# Patient Record
Sex: MALE | Race: WHITE | NOT HISPANIC OR LATINO | Employment: OTHER | URBAN - METROPOLITAN AREA
[De-identification: names, ages, dates, MRNs, and addresses within clinical notes are randomized per-mention and may not be internally consistent; named-entity substitution may affect disease eponyms.]

---

## 2017-01-05 RX ORDER — TORSEMIDE 20 MG/1
TABLET ORAL
Qty: 90 TABLET | Refills: 3 | Status: ON HOLD | OUTPATIENT
Start: 2017-01-05 | End: 2017-08-11 | Stop reason: HOSPADM

## 2017-01-05 RX ORDER — CLOPIDOGREL BISULFATE 75 MG/1
TABLET ORAL
Qty: 90 TABLET | Refills: 3 | Status: ON HOLD | OUTPATIENT
Start: 2017-01-05 | End: 2017-08-11

## 2017-01-05 RX ORDER — RANOLAZINE 500 MG/1
TABLET, FILM COATED, EXTENDED RELEASE ORAL
Qty: 180 TABLET | Refills: 3 | Status: SHIPPED | OUTPATIENT
Start: 2017-01-05 | End: 2017-10-20

## 2017-01-05 RX ORDER — LEVETIRACETAM 500 MG/1
TABLET ORAL
Qty: 180 TABLET | Refills: 3 | Status: SHIPPED | OUTPATIENT
Start: 2017-01-05 | End: 2017-02-20 | Stop reason: ALTCHOICE

## 2017-01-05 RX ORDER — PEN NEEDLE, DIABETIC 31 GX5/16"
NEEDLE, DISPOSABLE MISCELLANEOUS
Qty: 360 EACH | Refills: 3 | Status: SHIPPED | OUTPATIENT
Start: 2017-01-05 | End: 2018-05-22 | Stop reason: SDUPTHER

## 2017-01-05 RX ORDER — DABIGATRAN ETEXILATE MESYLATE 75 MG/1
CAPSULE ORAL
Qty: 180 CAPSULE | Refills: 3 | Status: ON HOLD | OUTPATIENT
Start: 2017-01-05 | End: 2017-08-11

## 2017-01-05 RX ORDER — INSULIN GLARGINE 100 [IU]/ML
INJECTION, SOLUTION SUBCUTANEOUS
Qty: 45 ML | Refills: 3 | Status: SHIPPED | OUTPATIENT
Start: 2017-01-05 | End: 2017-03-08 | Stop reason: SDUPTHER

## 2017-01-05 RX ORDER — ISOSORBIDE MONONITRATE 60 MG/1
TABLET, EXTENDED RELEASE ORAL
Qty: 90 TABLET | Refills: 3 | Status: SHIPPED | OUTPATIENT
Start: 2017-01-05 | End: 2017-10-23 | Stop reason: SDUPTHER

## 2017-01-05 RX ORDER — INSULIN LISPRO 100 [IU]/ML
INJECTION, SOLUTION INTRAVENOUS; SUBCUTANEOUS
Qty: 90 ML | Refills: 3 | Status: SHIPPED | OUTPATIENT
Start: 2017-01-05 | End: 2017-03-08 | Stop reason: SDUPTHER

## 2017-01-05 RX ORDER — CARVEDILOL 25 MG/1
TABLET ORAL
Qty: 90 TABLET | Refills: 3 | Status: SHIPPED | OUTPATIENT
Start: 2017-01-05 | End: 2017-10-20 | Stop reason: SDUPTHER

## 2017-01-31 ENCOUNTER — TELEPHONE (OUTPATIENT)
Dept: FAMILY MEDICINE | Facility: CLINIC | Age: 72
End: 2017-01-31

## 2017-02-02 DIAGNOSIS — Z95.810 CARDIAC DEFIBRILLATOR IN SITU: Primary | ICD-10-CM

## 2017-02-02 DIAGNOSIS — I50.32 CHRONIC DIASTOLIC CONGESTIVE HEART FAILURE: ICD-10-CM

## 2017-02-02 DIAGNOSIS — I49.01 VF (VENTRICULAR FIBRILLATION): ICD-10-CM

## 2017-02-17 NOTE — TELEPHONE ENCOUNTER
----- Message from Funmi Moore sent at 2/17/2017 10:42 AM CST -----  Please call patients daughter, Leyla, in regards to medication questions, [phone]

## 2017-02-20 ENCOUNTER — CLINICAL SUPPORT (OUTPATIENT)
Dept: CARDIOLOGY | Facility: CLINIC | Age: 72
End: 2017-02-20
Payer: MEDICARE

## 2017-02-20 ENCOUNTER — PATIENT OUTREACH (OUTPATIENT)
Dept: ADMINISTRATIVE | Facility: CLINIC | Age: 72
End: 2017-02-20
Payer: MEDICARE

## 2017-02-20 DIAGNOSIS — I50.32 CHRONIC DIASTOLIC CONGESTIVE HEART FAILURE: ICD-10-CM

## 2017-02-20 DIAGNOSIS — I49.01 VF (VENTRICULAR FIBRILLATION): ICD-10-CM

## 2017-02-20 DIAGNOSIS — Z95.810 CARDIAC DEFIBRILLATOR IN SITU: ICD-10-CM

## 2017-02-20 PROCEDURE — 93284 PRGRMG EVAL IMPLANTABLE DFB: CPT | Mod: S$GLB,,, | Performed by: INTERNAL MEDICINE

## 2017-02-20 RX ORDER — LEVETIRACETAM 750 MG/1
500 TABLET ORAL 2 TIMES DAILY
COMMUNITY
End: 2017-02-27 | Stop reason: ALTCHOICE

## 2017-02-20 RX ORDER — ATORVASTATIN CALCIUM 10 MG/1
10 TABLET, FILM COATED ORAL DAILY
Qty: 90 TABLET | Refills: 3 | Status: SHIPPED | OUTPATIENT
Start: 2017-02-20 | End: 2017-04-12

## 2017-02-20 NOTE — PROGRESS NOTES
Discharge Information     Discharge Date:  2/16/17          Primary Discharge Diagnosis:  Seizure          Juanita Wasserman RN attempted to contact patient. No answer. The following message was left for the patient to return the call:  Good morning,  I am a nurse calling on behalf of [facility] from the Care Coordination [facility].  This is a Transitional Care Call for Abdullahi Salazar. When you have a moment please contact us at [phone] or [phone] Monday through Friday, between the hours of 8 am to 4 pm. We look forward to speaking with you. On behalf of [facility] have a nice day.    The patient has a scheduled HOSFU appointment with Maximo Hernandez MD on 3/1/17 @ 1320hrs. Message sent to Physician staff.    ADDENDUM:  Pt returned my call @ 0853hrs and TCC was completed

## 2017-02-20 NOTE — PATIENT INSTRUCTIONS
"  Recurrent Seizure (Adult)    You have had another seizure today. A common cause of seizures that keep happening (recurrent seizures) is missing doses of seizure medicine. But sometimes seizures are hard to control even when you take the medicine correctly. If this is the case for you, your healthcare provider may need to increase your dosage. Or you may need to add or change to another medicine.  Home care  Follow these tips when caring for yourself at home. For this seizure:  · Seizures arent predictable. So avoid doing anything that might cause danger to you or other people if you have another seizure. Until the seizures are under good control, take these precautions:  ¨ Dont drive, ride a motorcycle, or ride a bike.  ¨ Dont operate dangerous equipment such as power tools  ¨ Take showers instead of baths.  ¨ Dont swim or climb ladders, trees, or roofs.  · Tell your close friends and relatives about your seizure. Teach them what to do for you if it happens again.  · If medicine was prescribed to prevent seizures, take it exactly as directed. It does not work when taken "as needed." Missing doses will increase the risk of having another seizure.  · If you miss a dose, take the missed dose as soon as you remember. If it is almost time for your next dose, skip the missed dose. Restart the medicine at your next scheduled time. Dont take extra medicine to make up the missed dose.  · Wear a "Medic-Alert" bracelet to let emergency personnel know about your condition.  · Follow a regular sleep schedule such that you get at least 6 to 8 hours of restful sleep every night. This is especially important when you are sick with a cold or flu and/or another type of infection.  For future seizures, if you are alone:  If you feel a seizure coming on, lie down on a bed or on the floor with something soft under your head. Lie on your left side, not on your back. This will keep you from falling. It will also let fluid drain out " of your mouth and prevent choking. Be sure you are clear of any objects that might injure you during the seizure. Call for help if there is time.  For future seizures, if someone is with you:  The person should help you get into a safe position and call for help. The person shouldnt try to force anything in your mouth once the seizure begins. This could harm your teeth or jaw.  Follow-up care  Follow up with your healthcare provider. Keep a seizure calendar to record how often you have a seizure. If you are being started on anti-seizure medicine, make sure that you use additional birth control. Seizure medicine can affect how well birth control pills work, and you could become pregnant. Avoid alcohol until your doctor tells you its OK.  Note: For the safety of yourself and others on the road, certain states require that the treating doctor tell the Public Health Department about any adult who is treated for a seizure and is at risk of more seizures. In this case, the Department of Motor Vehicles will be told. A restriction will be put on your s license until a doctor gives you medical clearance to drive again. Contact your treating doctor to find out if your state requires the reporting of patients with a seizures condition.  When to seek medical advice  Call your healthcare provider right away if any of these occur:  · Seizures happen more often or last longer than usual  · A seizure lasts over 5 minutes  · You dont wake up between seizures  · Confusion that lasts more than 30 minutes after a seizure  · Injury during a seizure  · Fever over 100.4ºF (38.0ºC), or as advised  · Unusual irritability, drowsiness, or confusion  · Stiff or painful neck  · Headache that gets worse   Date Last Reviewed: 8/1/2016  © 4791-3872 [employer]. [address] [place] [address]. All rights reserved. This information is not intended as a substitute for professional medical care. Always follow your  healthcare professional's instructions.

## 2017-02-21 ENCOUNTER — TELEPHONE (OUTPATIENT)
Dept: NEUROLOGY | Facility: CLINIC | Age: 72
End: 2017-02-21

## 2017-02-21 ENCOUNTER — TELEPHONE (OUTPATIENT)
Dept: FAMILY MEDICINE | Facility: CLINIC | Age: 72
End: 2017-02-21

## 2017-02-21 NOTE — TELEPHONE ENCOUNTER
----- Message from Sharita Kaur sent at 2/21/2017  9:16 AM CST -----  Contact: pt  Pt is requesting to speak with nurse regarding Neuro Surgery. Pt was not specific. Pls call pt back at [phone]

## 2017-02-21 NOTE — TELEPHONE ENCOUNTER
Spoke with Tara Gallagher RN clinical care coordinator in Cardiology/Pacemaker dept. Patient was seen at [place] by Dr. Rocha and was told he may have hydro encephalitis, scheduled to see Dr. Hyman for second opinion. [facility] Tara plans to talk to Dr. Rodney about placing a referral, as he is at hospital today. Appt scheduled, spoke with patient, directions given.

## 2017-02-22 ENCOUNTER — OFFICE VISIT (OUTPATIENT)
Dept: FAMILY MEDICINE | Facility: CLINIC | Age: 72
End: 2017-02-22
Payer: MEDICARE

## 2017-02-22 ENCOUNTER — LAB VISIT (OUTPATIENT)
Dept: LAB | Facility: HOSPITAL | Age: 72
End: 2017-02-22
Attending: NURSE PRACTITIONER
Payer: MEDICARE

## 2017-02-22 VITALS
SYSTOLIC BLOOD PRESSURE: 150 MMHG | BODY MASS INDEX: 35.83 KG/M2 | TEMPERATURE: 98 F | HEIGHT: 67 IN | DIASTOLIC BLOOD PRESSURE: 83 MMHG | RESPIRATION RATE: 18 BRPM | OXYGEN SATURATION: 96 % | HEART RATE: 55 BPM | WEIGHT: 228.31 LBS

## 2017-02-22 DIAGNOSIS — G40.909 SEIZURE DISORDER: Primary | ICD-10-CM

## 2017-02-22 DIAGNOSIS — Z79.4 LONG-TERM INSULIN USE: ICD-10-CM

## 2017-02-22 DIAGNOSIS — G91.9 HYDROCEPHALUS: ICD-10-CM

## 2017-02-22 DIAGNOSIS — I10 ESSENTIAL HYPERTENSION: ICD-10-CM

## 2017-02-22 DIAGNOSIS — N17.9 ACUTE RENAL FAILURE, UNSPECIFIED ACUTE RENAL FAILURE TYPE: ICD-10-CM

## 2017-02-22 LAB
ALBUMIN SERPL BCP-MCNC: 3.6 G/DL
ALP SERPL-CCNC: 72 U/L
ALT SERPL W/O P-5'-P-CCNC: 22 U/L
ANION GAP SERPL CALC-SCNC: 9 MMOL/L
AST SERPL-CCNC: 19 U/L
BILIRUB SERPL-MCNC: 0.5 MG/DL
BUN SERPL-MCNC: 18 MG/DL
CALCIUM SERPL-MCNC: 9.6 MG/DL
CHLORIDE SERPL-SCNC: 103 MMOL/L
CO2 SERPL-SCNC: 28 MMOL/L
CREAT SERPL-MCNC: 1.7 MG/DL
CREAT UR-MCNC: 34 MG/DL
EST. GFR  (AFRICAN AMERICAN): 45.2 ML/MIN/1.73 M^2
EST. GFR  (NON AFRICAN AMERICAN): 39.1 ML/MIN/1.73 M^2
GLUCOSE SERPL-MCNC: 181 MG/DL
MICROALBUMIN UR DL<=1MG/L-MCNC: 524 UG/ML
MICROALBUMIN/CREATININE RATIO: 1541.2 UG/MG
POTASSIUM SERPL-SCNC: 4.3 MMOL/L
PROT SERPL-MCNC: 7.1 G/DL
SODIUM SERPL-SCNC: 140 MMOL/L

## 2017-02-22 PROCEDURE — 99999 PR PBB SHADOW E&M-EST. PATIENT-LVL IV: CPT | Mod: PBBFAC,,, | Performed by: NURSE PRACTITIONER

## 2017-02-22 PROCEDURE — 36415 COLL VENOUS BLD VENIPUNCTURE: CPT | Mod: PO

## 2017-02-22 PROCEDURE — 3079F DIAST BP 80-89 MM HG: CPT | Mod: S$GLB,,, | Performed by: NURSE PRACTITIONER

## 2017-02-22 PROCEDURE — 1157F ADVNC CARE PLAN IN RCRD: CPT | Mod: S$GLB,,, | Performed by: NURSE PRACTITIONER

## 2017-02-22 PROCEDURE — 1160F RVW MEDS BY RX/DR IN RCRD: CPT | Mod: S$GLB,,, | Performed by: NURSE PRACTITIONER

## 2017-02-22 PROCEDURE — 2022F DILAT RTA XM EVC RTNOPTHY: CPT | Mod: S$GLB,,, | Performed by: NURSE PRACTITIONER

## 2017-02-22 PROCEDURE — 1159F MED LIST DOCD IN RCRD: CPT | Mod: S$GLB,,, | Performed by: NURSE PRACTITIONER

## 2017-02-22 PROCEDURE — 3077F SYST BP >= 140 MM HG: CPT | Mod: S$GLB,,, | Performed by: NURSE PRACTITIONER

## 2017-02-22 PROCEDURE — 3045F PR MOST RECENT HEMOGLOBIN A1C LEVEL 7.0-9.0%: CPT | Mod: S$GLB,,, | Performed by: NURSE PRACTITIONER

## 2017-02-22 PROCEDURE — 80053 COMPREHEN METABOLIC PANEL: CPT

## 2017-02-22 PROCEDURE — 3066F NEPHROPATHY DOC TX: CPT | Mod: S$GLB,,, | Performed by: NURSE PRACTITIONER

## 2017-02-22 PROCEDURE — 99499 UNLISTED E&M SERVICE: CPT | Mod: S$GLB,,, | Performed by: NURSE PRACTITIONER

## 2017-02-22 PROCEDURE — 99213 OFFICE O/P EST LOW 20 MIN: CPT | Mod: S$GLB,,, | Performed by: NURSE PRACTITIONER

## 2017-02-22 NOTE — MR AVS SNAPSHOT
[facility]  [address] [facility] [address]  Phone: [phone]  Fax: [phone]                  Abdullahi Salazar   2017 10:00 AM   Office Visit    Description:  Male : 1945   Provider:  Shellie Tejada NP   Department:  [facility]           Reason for Visit     Follow-up           Diagnoses this Visit        Comments    Seizure disorder    -  Primary     Hydrocephalus         Uncontrolled type 2 diabetes mellitus with nephropathy         Essential hypertension         Long-term insulin use         Acute renal failure, unspecified acute renal failure type                To Do List           Future Appointments        Provider Department Dept Phone    2017 2:55 PM LABORATORY, [place] [facility] [phone]    3/1/2017 9:00 AM Efe Greene MD [facility] - Neurology [phone]    3/1/2017 1:20 PM Maximo Hernandez MD [facility] [phone]    3/6/2017 11:00 AM Maximo Hernandez MD [facility] [phone]    2017 9:20 AM Hermilo Hyman DO [facility] Neurology [phone]      Goals (5 Years of Data)     None      Follow-Up and Disposition     Return in about 2 weeks (around 3/8/2017).      [facility]     [facility] Nurse Care [facility] - 24/7 Assistance  Registered nurses in the [facility] provide clinical advisement, health education, appointment booking, and other advisory services.  Call for this free service at [phone].             Medications           Message regarding Medications     Verify the changes and/or additions to your medication regime listed below are the same as discussed with your clinician today.  If any of these changes or additions are incorrect, please notify your healthcare provider.             Verify that the below list of medications is an accurate representation of the medications you are currently taking.  If none reported, the  "list may be blank. If incorrect, please contact your healthcare provider. Carry this list with you in case of emergency.           Current Medications     ACCU-CHEK CLEOPATRA PLUS TEST STRP Strp TEST BLOOD SUGARS 4 TIMES DAILY    amlodipine (NORVASC) 5 MG tablet Take 1 tablet (5 mg total) by mouth once daily.    aspirin (ECOTRIN) 81 MG EC tablet Take 81 mg by mouth once daily. Every day    atorvastatin (LIPITOR) 10 MG tablet Take 1 tablet (10 mg total) by mouth once daily.    BD INSULIN PEN NEEDLE UF SHORT 31 gauge x 5/16" Ndle USE FOUR TIMES DAILY    benazepril (LOTENSIN) 20 MG tablet Take 1 tablet (20 mg total) by mouth every evening.    carvedilol (COREG) 25 MG tablet TAKE 1/2 TABLET TWICE DAILY WITH MEALS    clopidogrel (PLAVIX) 75 mg tablet TAKE 1 TABLET ONE TIME DAILY    ezetimibe (ZETIA) 10 mg tablet Take 1 tablet (10 mg total) by mouth once daily.    HUMALOG KWIKPEN 100 unit/mL InPn pen INJECT  30 UNITS SUBCUTANEOUSLY THREE TIMES DAILY BEFORE MEALS    isosorbide mononitrate (IMDUR) 60 MG 24 hr tablet TAKE 1 TABLET (60 MG TOTAL) BY MOUTH EVERY EVENING.    lancets (TRUEPLUS LANCETS) 28 gauge Misc 1 lancet by Misc.(Non-Drug; Combo Route) route 2 (two) times daily as needed.    LANTUS SOLOSTAR 100 unit/mL (3 mL) InPn pen INJECT  45 UNITS SUBCUTANEOUSLY EVERY DAY    levetiracetam (KEPPRA) 750 MG Tab Take 500 mg by mouth 2 (two) times daily.    PRADAXA 75 mg Cap TAKE 1 CAPSULE TWICE DAILY    RANEXA 500 mg Tb12 TAKE 1 TABLET (500 MG TOTAL) BY MOUTH 2 (TWO) TIMES DAILY.    torsemide (DEMADEX) 20 MG Tab TAKE 1 TABLET (20 MG TOTAL) BY MOUTH EVERY MORNING.    nitroGLYCERIN (NITROSTAT) 0.4 MG SL tablet Place 1 tablet (0.4 mg total) under the tongue every 5 (five) minutes as needed for Chest pain.           Clinical Reference Information           Your Vitals Were     BP Pulse Temp Resp Height Weight    150/83 55 97.6 °F (36.4 °C) (Tympanic) 18 5' 7" (1.702 m) 103.5 kg (228 lb 4.6 oz)    SpO2 BMI             96% 35.75 kg/m2  "        Blood Pressure          Most Recent Value    BP  (!)  150/83      Allergies as of 2/22/2017     No Known Allergies      Immunizations Administered on Date of Encounter - 2/22/2017     None      Orders Placed During Today's Visit     Future Labs/Procedures Expected by Expires    Comprehensive metabolic panel  2/22/2017 4/23/2018    Microalbumin/creatinine urine ratio  2/22/2017 4/23/2018      Language Assistance Services     ATTENTION: Language assistance services are available, free of charge. Please call [phone].      ATENCIÓN: Si nikkyla irina, tiene a puentes disposición servicios gratuitos de asistencia lingüística. Llame al [phone].     CHÚ Ý: N?u b?n nói Ti?ng Vi?t, có các d?ch v? h? tr? ngôn ng? mi?n phí dành cho b?n. G?i s? [phone].         [facility] complies with applicable Federal civil rights laws and does not discriminate on the basis of race, color, national origin, age, disability, or sex.

## 2017-02-22 NOTE — PROGRESS NOTES
"Subjective:       Patient ID: Abdullahi Salazar is a 71 y.o. male.    Chief Complaint: Follow-up  Pt reports to clinic for follow up evaluation of recent hospitalization at [place].  Pt's wife called EMS for seizure activity.  Pt reports he did skip a "dose or 2" of Keppra.  During hospitalization CT indicated hydrocephalus.  Pt has an appt with neuro on 3/1/17.  Pt denies headache, vision changes or changes in gait. Pt was also noted to have a BNP of 469; EF=55-60%; Creatinine elevated at 1.5; Hemoglobin A1C has increased to 9.5 (previous was 8.0). Pt reports FBS of 130, acknowledges not monitoring diet.  Pt also reports he has not been taking Humalog as ordered.  He has been using 20units tid instead of 30 units tid as prescribed.  Pt wife reported pt was "drooling at lot on yesterday", home health nurse was available and monitored patient closely for seizure activity.   HPI  Review of Systems   Constitutional: Negative.    HENT: Negative.    Respiratory: Negative.    Genitourinary: Negative.    Neurological: Negative for dizziness, tremors, seizures, speech difficulty, light-headedness and headaches.       Objective:      Physical Exam   Constitutional: He is oriented to person, place, and time. He appears well-developed and well-nourished.   HENT:   Head: Normocephalic.   Right Ear: Tympanic membrane normal.   Left Ear: Tympanic membrane normal.   Nose: Mucosal edema and rhinorrhea present. Right sinus exhibits no maxillary sinus tenderness and no frontal sinus tenderness. Left sinus exhibits no maxillary sinus tenderness and no frontal sinus tenderness.   Eyes: EOM are normal. Pupils are equal, round, and reactive to light.   Neck: Neck supple.   Cardiovascular: Normal rate and normal heart sounds.    Pulmonary/Chest: Effort normal and breath sounds normal.   Musculoskeletal: Normal range of motion.   Lymphadenopathy:     He has no cervical adenopathy.   Neurological: He is alert and oriented to person, place, " and time. He has normal strength. Gait normal. GCS eye subscore is 4. GCS verbal subscore is 5. GCS motor subscore is 6.   Skin: Skin is warm and dry.   Psychiatric: He has a normal mood and affect.   Vitals reviewed.      Assessment:       1. Seizure disorder    2. Hydrocephalus    3. Uncontrolled type 2 diabetes mellitus with nephropathy    4. Essential hypertension    5. Long-term insulin use    6. Acute renal failure, unspecified acute renal failure type        Plan:   Seizure disorder  -educated both patient and wife on compliance with keppra. Verbalized understanding.  Keep appt with neurologist.   -seek emergency evaluation for seizure activity  Hydrocephalus  -seek emergency evaluation for changes in behavior, vision, decreased LOC or worsening headache.   Uncontrolled type 2 diabetes mellitus with nephropathy  -     Microalbumin/creatinine urine ratio; Future; Expected date: 2/22/17  -     Comprehensive metabolic panel; Future; Expected date: 2/22/17  -uncontrolled.  Pt instructed to take Humalog 30 units tid with meals as previously prescribed and Lantus 45 units as HS.  Pt also instructed to monitor glucose readings ACHS.  Blood glucose log given.  Bring log to follow up appt with Dr. clarke to re evaluate.    Essential hypertension  -guidelines recommends ,140/80;   uncontrolled  Long-term insulin use  Per #3  Acute renal failure, unspecified acute renal failure type  -will re evaluate chemistry for improving Creatinine.   If not improved or worsening microalbumin/creatinine ratio, will refer to nephro.     Return in about 2 weeks (around 3/8/2017).

## 2017-02-23 NOTE — PROGRESS NOTES
Patient, Abdullahi Salazar (MRN #[id number]), presented with a recorded BMI of 35.75 kg/m^2 and a documented comorbidity(s):  - Diabetes Mellitus Type 2  - Hypertension  to which the severe obesity is a contributing factor. This is consistent with the definition of severe obesity (BMI 35.0-35.9) with comorbidity (ICD-10 E66.01, Z68.35). The patient's severe obesity was monitored, evaluated, addressed and/or treated. This addendum to the medical record is made on 02/23/2017.

## 2017-02-24 ENCOUNTER — TELEPHONE (OUTPATIENT)
Dept: NEUROLOGY | Facility: CLINIC | Age: 72
End: 2017-02-24

## 2017-02-24 NOTE — TELEPHONE ENCOUNTER
----- Message from Eden Nelson sent at 2/24/2017  1:52 PM CST -----  Daughter (Leydi Salazar)requesting to speak with nurse concerning getting sooner appointment for patient than April/please call back at [phone] to reschedule or advise.

## 2017-02-24 NOTE — TELEPHONE ENCOUNTER
Spoke with patients daughter, informed her the soonest available now in May 9th for a consult. Reports he has appt with Dr. Sidney Felton this upcoming week, keeping appt with Boogie as well.  On the cancellation list

## 2017-02-27 ENCOUNTER — OFFICE VISIT (OUTPATIENT)
Dept: NEPHROLOGY | Facility: CLINIC | Age: 72
End: 2017-02-27
Payer: MEDICARE

## 2017-02-27 VITALS
SYSTOLIC BLOOD PRESSURE: 140 MMHG | OXYGEN SATURATION: 98 % | WEIGHT: 230.63 LBS | BODY MASS INDEX: 36.12 KG/M2 | HEART RATE: 70 BPM | TEMPERATURE: 98 F | DIASTOLIC BLOOD PRESSURE: 70 MMHG

## 2017-02-27 DIAGNOSIS — G40.909 SEIZURE DISORDER: ICD-10-CM

## 2017-02-27 DIAGNOSIS — I15.2 HYPERTENSION ASSOCIATED WITH DIABETES: ICD-10-CM

## 2017-02-27 DIAGNOSIS — N18.30 TYPE 2 DIABETES MELLITUS WITH STAGE 3 CHRONIC KIDNEY DISEASE, WITH LONG-TERM CURRENT USE OF INSULIN: ICD-10-CM

## 2017-02-27 DIAGNOSIS — E11.22 TYPE 2 DIABETES MELLITUS WITH STAGE 3 CHRONIC KIDNEY DISEASE, WITH LONG-TERM CURRENT USE OF INSULIN: ICD-10-CM

## 2017-02-27 DIAGNOSIS — R80.9 PROTEINURIA DUE TO TYPE 2 DIABETES MELLITUS: ICD-10-CM

## 2017-02-27 DIAGNOSIS — N18.30 KIDNEY DISEASE, CHRONIC, STAGE III (GFR 30-59 ML/MIN): Primary | ICD-10-CM

## 2017-02-27 DIAGNOSIS — E11.29 PROTEINURIA DUE TO TYPE 2 DIABETES MELLITUS: ICD-10-CM

## 2017-02-27 DIAGNOSIS — G91.9 HYDROCEPHALUS: ICD-10-CM

## 2017-02-27 DIAGNOSIS — Z79.4 TYPE 2 DIABETES MELLITUS WITH STAGE 3 CHRONIC KIDNEY DISEASE, WITH LONG-TERM CURRENT USE OF INSULIN: ICD-10-CM

## 2017-02-27 DIAGNOSIS — R80.9 DIABETES MELLITUS WITH MICROALBUMINURIA: ICD-10-CM

## 2017-02-27 DIAGNOSIS — E11.29 DIABETES MELLITUS WITH MICROALBUMINURIA: ICD-10-CM

## 2017-02-27 DIAGNOSIS — E11.59 HYPERTENSION ASSOCIATED WITH DIABETES: ICD-10-CM

## 2017-02-27 DIAGNOSIS — E11.21 TYPE 2 DIABETES MELLITUS WITH DIABETIC NEPHROPATHY, WITHOUT LONG-TERM CURRENT USE OF INSULIN: ICD-10-CM

## 2017-02-27 PROCEDURE — 99214 OFFICE O/P EST MOD 30 MIN: CPT | Mod: S$GLB,,, | Performed by: INTERNAL MEDICINE

## 2017-02-27 PROCEDURE — 2022F DILAT RTA XM EVC RTNOPTHY: CPT | Mod: S$GLB,,, | Performed by: INTERNAL MEDICINE

## 2017-02-27 PROCEDURE — 1160F RVW MEDS BY RX/DR IN RCRD: CPT | Mod: S$GLB,,, | Performed by: INTERNAL MEDICINE

## 2017-02-27 PROCEDURE — 99999 PR PBB SHADOW E&M-EST. PATIENT-LVL III: CPT | Mod: PBBFAC,,, | Performed by: INTERNAL MEDICINE

## 2017-02-27 PROCEDURE — 3077F SYST BP >= 140 MM HG: CPT | Mod: S$GLB,,, | Performed by: INTERNAL MEDICINE

## 2017-02-27 PROCEDURE — 1126F AMNT PAIN NOTED NONE PRSNT: CPT | Mod: S$GLB,,, | Performed by: INTERNAL MEDICINE

## 2017-02-27 PROCEDURE — 1157F ADVNC CARE PLAN IN RCRD: CPT | Mod: S$GLB,,, | Performed by: INTERNAL MEDICINE

## 2017-02-27 PROCEDURE — 3045F PR MOST RECENT HEMOGLOBIN A1C LEVEL 7.0-9.0%: CPT | Mod: S$GLB,,, | Performed by: INTERNAL MEDICINE

## 2017-02-27 PROCEDURE — 3066F NEPHROPATHY DOC TX: CPT | Mod: S$GLB,,, | Performed by: INTERNAL MEDICINE

## 2017-02-27 PROCEDURE — 99499 UNLISTED E&M SERVICE: CPT | Mod: S$GLB,,, | Performed by: INTERNAL MEDICINE

## 2017-02-27 PROCEDURE — 1159F MED LIST DOCD IN RCRD: CPT | Mod: S$GLB,,, | Performed by: INTERNAL MEDICINE

## 2017-02-27 PROCEDURE — 3078F DIAST BP <80 MM HG: CPT | Mod: S$GLB,,, | Performed by: INTERNAL MEDICINE

## 2017-02-27 RX ORDER — FERROUS SULFATE 325(65) MG
325 TABLET ORAL 3 TIMES DAILY
COMMUNITY
Start: 2017-02-23 | End: 2017-10-20

## 2017-02-27 RX ORDER — LEVETIRACETAM 500 MG/1
750 TABLET ORAL 2 TIMES DAILY
COMMUNITY
End: 2018-02-19 | Stop reason: SDUPTHER

## 2017-02-27 NOTE — MR AVS SNAPSHOT
[facility] - Nephrology  [address] [facility] [address]  [facility] [address]  Phone: [phone]                  Abdullahi Salazar   2017 1:20 PM   Office Visit    Description:  Male : 1945   Provider:  Gregory Sosa MD   Department:  [facility] - Nephrology           Reason for Visit     Consult     Abnormal Lab                To Do List           Future Appointments        Provider Department Dept Phone    3/1/2017 9:00 AM Efe Greene MD [facility] Neurology [phone]    3/1/2017 1:20 PM [facility] [phone]    3/6/2017 11:00 AM Maximo Hernandez MD [facility] [phone]    2017 9:20 AM Hermilo Hyman DO [facility] Neurology [phone]    2017 10:00 AM Toño Lawrence MD [facility] - General Surgery [phone]      Goals (5 Years of Data)     None      Follow-Up and Disposition     Return in about 4 months (around 2017).      [facility]     [facility] Nurse Care Line -  Assistance  Registered nurses in the [facility] provide clinical advisement, health education, appointment booking, and other advisory services.  Call for this free service at [phone].             Medications           Message regarding Medications     Verify the changes and/or additions to your medication regime listed below are the same as discussed with your clinician today.  If any of these changes or additions are incorrect, please notify your healthcare provider.             Verify that the below list of medications is an accurate representation of the medications you are currently taking.  If none reported, the list may be blank. If incorrect, please contact your healthcare provider. Carry this list with you in case of emergency.           Current Medications     ACCU-CHEK CLEOPATRA PLUS TEST STRP Strp TEST BLOOD SUGARS 4 TIMES DAILY    amlodipine (NORVASC) 5 MG tablet Take 1 tablet (5 mg total) by mouth once  "daily.    aspirin (ECOTRIN) 81 MG EC tablet Take 81 mg by mouth once daily. Every day    atorvastatin (LIPITOR) 10 MG tablet Take 1 tablet (10 mg total) by mouth once daily.    BD INSULIN PEN NEEDLE UF SHORT 31 gauge x 5/16" Ndle USE FOUR TIMES DAILY    benazepril (LOTENSIN) 20 MG tablet Take 1 tablet (20 mg total) by mouth every evening.    carvedilol (COREG) 25 MG tablet TAKE 1/2 TABLET TWICE DAILY WITH MEALS    clopidogrel (PLAVIX) 75 mg tablet TAKE 1 TABLET ONE TIME DAILY    ezetimibe (ZETIA) 10 mg tablet Take 1 tablet (10 mg total) by mouth once daily.    ferrous sulfate 325 mg (65 mg iron) Tab tablet 325 mg 3 (three) times daily.     HUMALOG KWIKPEN 100 unit/mL InPn pen INJECT  30 UNITS SUBCUTANEOUSLY THREE TIMES DAILY BEFORE MEALS    isosorbide mononitrate (IMDUR) 60 MG 24 hr tablet TAKE 1 TABLET (60 MG TOTAL) BY MOUTH EVERY EVENING.    lancets (TRUEPLUS LANCETS) 28 gauge Misc 1 lancet by Misc.(Non-Drug; Combo Route) route 2 (two) times daily as needed.    LANTUS SOLOSTAR 100 unit/mL (3 mL) InPn pen INJECT  45 UNITS SUBCUTANEOUSLY EVERY DAY    levetiracetam (KEPPRA) 500 MG Tab Take 500 mg by mouth 2 (two) times daily.    PRADAXA 75 mg Cap TAKE 1 CAPSULE TWICE DAILY    RANEXA 500 mg Tb12 TAKE 1 TABLET (500 MG TOTAL) BY MOUTH 2 (TWO) TIMES DAILY.    torsemide (DEMADEX) 20 MG Tab TAKE 1 TABLET (20 MG TOTAL) BY MOUTH EVERY MORNING.    nitroGLYCERIN (NITROSTAT) 0.4 MG SL tablet Place 1 tablet (0.4 mg total) under the tongue every 5 (five) minutes as needed for Chest pain.           Clinical Reference Information           Your Vitals Were     BP                   140/70           Blood Pressure          Most Recent Value    BP  (!)  140/70      Allergies as of 2/27/2017     No Known Allergies      Immunizations Administered on Date of Encounter - 2/27/2017     None      Language Assistance Services     ATTENTION: Language assistance services are available, free of charge. Please call [phone].      ATENCIÓN: " Si habla español, tiene a puentes disposición servicios gratuitos de asistencia lingüística. Llame al [phone].     CHÚ Ý: N?u b?n nói Ti?ng Vi?t, có các d?ch v? h? tr? ngôn ng? mi?n phí dành cho b?n. G?i s? [phone].         [facility] complies with applicable Federal civil rights laws and does not discriminate on the basis of race, color, national origin, age, disability, or sex.

## 2017-02-27 NOTE — LETTER
March 6, 2017      Shellie Tejada NP  [address]  1st Floor  [facility] [address]           [facility] Nephrology  [address] [facility] [address] [place] [address]  Phone: [phone]          Patient: Abdullahi Salazar   MR Number: [id number]   YOB: 1945   Date of Visit: 2/27/2017       Dear Shellie Tejada:    Thank you for referring Abdullahi Salazar to me for evaluation. Attached you will find relevant portions of my assessment and plan of care.    If you have questions, please do not hesitate to call me. I look forward to following Abdullahi Salazar along with you.    Sincerely,    Gregory Sosa MD    Enclosure  CC:  No Recipients    If you would like to receive this communication electronically, please contact [email or web address] or [phone] to request more information on [facility] Link access.    For providers and/or their staff who would like to refer a patient to [facility], please contact us through our one-stop-shop provider referral line, Yvette Russo, at [phone].    If you feel you have received this communication in error or would no longer like to receive these types of communications, please e-mail [email or web address]

## 2017-03-01 ENCOUNTER — LAB VISIT (OUTPATIENT)
Dept: LAB | Facility: HOSPITAL | Age: 72
End: 2017-03-01
Attending: FAMILY MEDICINE
Payer: MEDICARE

## 2017-03-01 ENCOUNTER — OFFICE VISIT (OUTPATIENT)
Dept: FAMILY MEDICINE | Facility: CLINIC | Age: 72
End: 2017-03-01
Payer: MEDICARE

## 2017-03-01 ENCOUNTER — OFFICE VISIT (OUTPATIENT)
Dept: NEUROLOGY | Facility: CLINIC | Age: 72
End: 2017-03-01
Payer: MEDICARE

## 2017-03-01 VITALS
SYSTOLIC BLOOD PRESSURE: 166 MMHG | HEART RATE: 69 BPM | DIASTOLIC BLOOD PRESSURE: 80 MMHG | WEIGHT: 231.5 LBS | HEIGHT: 67 IN | BODY MASS INDEX: 36.34 KG/M2

## 2017-03-01 VITALS
HEIGHT: 67 IN | TEMPERATURE: 97 F | WEIGHT: 231.25 LBS | SYSTOLIC BLOOD PRESSURE: 160 MMHG | DIASTOLIC BLOOD PRESSURE: 80 MMHG | BODY MASS INDEX: 36.29 KG/M2 | HEART RATE: 70 BPM | RESPIRATION RATE: 20 BRPM

## 2017-03-01 DIAGNOSIS — N40.1 BENIGN NON-NODULAR PROSTATIC HYPERPLASIA WITH LOWER URINARY TRACT SYMPTOMS: ICD-10-CM

## 2017-03-01 DIAGNOSIS — G40.909 SEIZURE DISORDER: ICD-10-CM

## 2017-03-01 DIAGNOSIS — R80.9 DIABETES MELLITUS WITH MICROALBUMINURIA: ICD-10-CM

## 2017-03-01 DIAGNOSIS — E11.29 DIABETES MELLITUS WITH MICROALBUMINURIA: ICD-10-CM

## 2017-03-01 DIAGNOSIS — G91.9 HYDROCEPHALUS: Primary | ICD-10-CM

## 2017-03-01 DIAGNOSIS — I70.213 ATHEROSCLEROSIS OF NATIVE ARTERY OF BOTH LOWER EXTREMITIES WITH INTERMITTENT CLAUDICATION: ICD-10-CM

## 2017-03-01 DIAGNOSIS — R41.3 MEMORY LOSS: ICD-10-CM

## 2017-03-01 DIAGNOSIS — I10 ESSENTIAL HYPERTENSION: ICD-10-CM

## 2017-03-01 DIAGNOSIS — I48.0 PAROXYSMAL ATRIAL FIBRILLATION: ICD-10-CM

## 2017-03-01 DIAGNOSIS — R26.9 GAIT DIFFICULTY: Chronic | ICD-10-CM

## 2017-03-01 DIAGNOSIS — R35.0 URINARY FREQUENCY: ICD-10-CM

## 2017-03-01 LAB
BACTERIA #/AREA URNS HPF: ABNORMAL /HPF
BILIRUB UR QL STRIP: NEGATIVE
CLARITY UR: CLEAR
COLOR UR: YELLOW
GLUCOSE UR QL STRIP: NEGATIVE
HGB UR QL STRIP: NEGATIVE
HYALINE CASTS #/AREA URNS LPF: 30 /LPF
KETONES UR QL STRIP: NEGATIVE
LEUKOCYTE ESTERASE UR QL STRIP: NEGATIVE
MICROSCOPIC COMMENT: ABNORMAL
NITRITE UR QL STRIP: NEGATIVE
PH UR STRIP: 6 [PH] (ref 5–8)
PROT UR QL STRIP: ABNORMAL
RBC #/AREA URNS HPF: 1 /HPF (ref 0–4)
SP GR UR STRIP: 1.03 (ref 1–1.03)
SQUAMOUS #/AREA URNS HPF: 2 /HPF
URN SPEC COLLECT METH UR: ABNORMAL
WBC #/AREA URNS HPF: 1 /HPF (ref 0–5)

## 2017-03-01 PROCEDURE — 99499 UNLISTED E&M SERVICE: CPT | Mod: S$GLB,,, | Performed by: FAMILY MEDICINE

## 2017-03-01 PROCEDURE — 99499 UNLISTED E&M SERVICE: CPT | Mod: S$GLB,,, | Performed by: PSYCHIATRY & NEUROLOGY

## 2017-03-01 PROCEDURE — 1157F ADVNC CARE PLAN IN RCRD: CPT | Mod: S$GLB,,, | Performed by: PSYCHIATRY & NEUROLOGY

## 2017-03-01 PROCEDURE — 99999 PR PBB SHADOW E&M-EST. PATIENT-LVL III: CPT | Mod: PBBFAC,,, | Performed by: PSYCHIATRY & NEUROLOGY

## 2017-03-01 PROCEDURE — 1159F MED LIST DOCD IN RCRD: CPT | Mod: S$GLB,,, | Performed by: PSYCHIATRY & NEUROLOGY

## 2017-03-01 PROCEDURE — 36415 COLL VENOUS BLD VENIPUNCTURE: CPT | Mod: PO

## 2017-03-01 PROCEDURE — 99203 OFFICE O/P NEW LOW 30 MIN: CPT | Mod: S$GLB,,, | Performed by: PSYCHIATRY & NEUROLOGY

## 2017-03-01 PROCEDURE — 83036 HEMOGLOBIN GLYCOSYLATED A1C: CPT

## 2017-03-01 PROCEDURE — 99499 UNLISTED E&M SERVICE: CPT | Mod: ,,, | Performed by: FAMILY MEDICINE

## 2017-03-01 PROCEDURE — 1160F RVW MEDS BY RX/DR IN RCRD: CPT | Mod: S$GLB,,, | Performed by: PSYCHIATRY & NEUROLOGY

## 2017-03-01 PROCEDURE — 99999 PR PBB SHADOW E&M-EST. PATIENT-LVL IV: CPT | Mod: PBBFAC,,, | Performed by: FAMILY MEDICINE

## 2017-03-01 PROCEDURE — 1126F AMNT PAIN NOTED NONE PRSNT: CPT | Mod: S$GLB,,, | Performed by: PSYCHIATRY & NEUROLOGY

## 2017-03-01 PROCEDURE — 3079F DIAST BP 80-89 MM HG: CPT | Mod: S$GLB,,, | Performed by: PSYCHIATRY & NEUROLOGY

## 2017-03-01 PROCEDURE — 3077F SYST BP >= 140 MM HG: CPT | Mod: S$GLB,,, | Performed by: PSYCHIATRY & NEUROLOGY

## 2017-03-01 RX ORDER — AMLODIPINE BESYLATE 10 MG/1
10 TABLET ORAL DAILY
Qty: 90 TABLET | Refills: 3 | Status: SHIPPED | OUTPATIENT
Start: 2017-03-01 | End: 2017-10-23 | Stop reason: SDUPTHER

## 2017-03-01 NOTE — PATIENT INSTRUCTIONS
1) Ask primary care to check your prostate     2) I will ask your cardiologist if it is okay to stop Pradaxa temproarily for a lumbar puncture    3) We will get you scheduled for a repeat EEG - this is to find out if your seizure medications are working well enough    4) Go to the second floor today and have your blood drawn

## 2017-03-01 NOTE — PROGRESS NOTES
"Abdullahi WARD Chief Complaints during this visit:  New Patient visit for  "I thought I was coming for evation for hydrocephalus"   Maximo Hernandez MD  [address] [place] [address]    Primary Care Physician  Maximo Hernandez MD  [address] [facility] [address]          History of present illness:   71 y.o.  male seen in consultation for evaluation of NPH.  Patient is accompanied by his wife Rd and daughter Catrachito who aid in history giving. They state he has had at least two CT scans that have shown possible NPH. He has had gait difficulty since 2011. His family decscribes a slow, deliberate gait that he states he still has. No falls.     He has also had some memory trouble, his daughter states he got lost driving once and couldn't figure out how to log into his email. This has also been present over the last five years. He and his family state he is independent in all ADLs. They are concerned that his short term memory is bad.     Regarding possible urinary incontinence, he is having urinary frequency. He is not sure if his prostate has been checked. Has not had any true incontinence but does have trouble getting to the bathroom on time and frequent nocturia.     At [facility], he was told he needed a lumbar puncture and possuble shunt. They would like to transition from [facility] to [facility].     Of note pt also has seizures, starting about one year ago. Family is not able to provide history of the first event, his most recent event, he was slumped over in a chair, drooling and kicking his feet. This was about two weeks ago. He was already on Keppra and this was increased to 750mg BID. Had one incident as well in the last two weeks where he was unable to speak to a therapist who came to the house.     Is on Pradaxa for atrial fibrillation and CAD     II.  Review of systems      Hyposmia (HENT)?Yes  RBD/sleep issues (Constitutional)?Yes due to urinary frequency and nocturia "   Depression/anxiety (Psychiatric)?No  Fatigue (Constitutional)?Yes  Constipation (GI)?No  Urinary issues ()?Yes  Sexual dysfunction ()?No  Orthostasis (Cardiovascular)?No  Leg swelling (Cardiovascular)? No  Falls (Musculoskeletal)?No  Cognitive impairment (Neurologic)?Yes  Psychoses (Psychiatric)?No  Pain/Paresthesia (Neurologic)?No  Visual changes (Eyes)?No  Moles / skin changes (Skin)?No  Stridor / SOB (Pulm)?No  Bruising (Heme)?No    III.  Past Medical History:   Diagnosis Date    Actinic keratoses     Altered mental status 3/22/2015    Anticoagulant long-term use     Atrial fibrillation     CAD (coronary artery disease)     stents after bypass    Carotid artery stenosis 3/31/2015    Colon polyps 2011    repeat colonoscopy 2014    Combined hyperlipidemia associated with type 2 diabetes mellitus     Diabetes mellitus type II, uncontrolled     dx 2004    GERD (gastroesophageal reflux disease)     Grand mal seizure     last 2/2016    History of cardiac pacemaker 3/31/2015    HTN (hypertension)     Mitral valve insufficiency     Seizure disorder, grand mal     Sleep apnea with use of continuous positive airway pressure (CPAP)     Syncope 3/30/2015     Family History   Problem Relation Age of Onset    Stomach cancer Mother     Lung cancer Father     Diabetes Sister     Hypertension Sister     Heart disease Neg Hx     Stroke Neg Hx      No family history of seizures     Social History     Social History    Marital status:      Spouse name: N/A    Number of children: N/A    Years of education: N/A     Social History Main Topics    Smoking status: Former Smoker     Packs/day: 2.00     Years: 25.00     Types: Cigarettes     Quit date: 1/1/1983    Smokeless tobacco: Never Used      Comment: quit 1983     Alcohol use Yes      Comment: occasional    Drug use: No    Sexual activity: Not Asked     Other Topics Concern    None     Social History Narrative         Current Outpatient  "Prescriptions on File Prior to Visit   Medication Sig Dispense Refill    ACCU-CHEK CLEOPATRA PLUS TEST STRP Strp TEST BLOOD SUGARS 4 TIMES DAILY 150 strip PRN    amlodipine (NORVASC) 5 MG tablet Take 1 tablet (5 mg total) by mouth once daily. 90 tablet 0    aspirin (ECOTRIN) 81 MG EC tablet Take 81 mg by mouth once daily. Every day      atorvastatin (LIPITOR) 10 MG tablet Take 1 tablet (10 mg total) by mouth once daily. 90 tablet 3    BD INSULIN PEN NEEDLE UF SHORT 31 gauge x 5/16" Ndle USE FOUR TIMES DAILY 360 each 3    benazepril (LOTENSIN) 20 MG tablet Take 1 tablet (20 mg total) by mouth every evening. 90 tablet 3    carvedilol (COREG) 25 MG tablet TAKE 1/2 TABLET TWICE DAILY WITH MEALS 90 tablet 3    clopidogrel (PLAVIX) 75 mg tablet TAKE 1 TABLET ONE TIME DAILY 90 tablet 3    ezetimibe (ZETIA) 10 mg tablet Take 1 tablet (10 mg total) by mouth once daily. 90 tablet 3    ferrous sulfate 325 mg (65 mg iron) Tab tablet 325 mg 3 (three) times daily.       HUMALOG KWIKPEN 100 unit/mL InPn pen INJECT  30 UNITS SUBCUTANEOUSLY THREE TIMES DAILY BEFORE MEALS 90 mL 3    isosorbide mononitrate (IMDUR) 60 MG 24 hr tablet TAKE 1 TABLET (60 MG TOTAL) BY MOUTH EVERY EVENING. 90 tablet 3    lancets (TRUEPLUS LANCETS) 28 gauge Misc 1 lancet by Misc.(Non-Drug; Combo Route) route 2 (two) times daily as needed. 200 each 3    LANTUS SOLOSTAR 100 unit/mL (3 mL) InPn pen INJECT  45 UNITS SUBCUTANEOUSLY EVERY DAY 45 mL 3    levetiracetam (KEPPRA) 500 MG Tab Take 750 mg by mouth 2 (two) times daily.       PRADAXA 75 mg Cap TAKE 1 CAPSULE TWICE DAILY 180 capsule 3    RANEXA 500 mg Tb12 TAKE 1 TABLET (500 MG TOTAL) BY MOUTH 2 (TWO) TIMES DAILY. 180 tablet 3    torsemide (DEMADEX) 20 MG Tab TAKE 1 TABLET (20 MG TOTAL) BY MOUTH EVERY MORNING. 90 tablet 3    nitroGLYCERIN (NITROSTAT) 0.4 MG SL tablet Place 1 tablet (0.4 mg total) under the tongue every 5 (five) minutes as needed for Chest pain. 30 tablet 0     No current " "facility-administered medications on file prior to visit.        PRIOR problem-specific medications tried:  For seizures: Keppra for Gait issues: None     Review of patient's allergies indicates:  No Known Allergies    IV. Physical Exam    Vitals:    03/01/17 0825   BP: (!) 166/80   Pulse: 69   Weight: 105 kg (231 lb 7.7 oz)   Height: 5' 7" (1.702 m)     General appearance: Well nourished, well developed, no acute distress.     HEENT: NC/AT, MMM, normal pharynx            -------------------------------------------------------------    MOCA 22/30          Facial Expression: slightly diminished with reduced blink frequency       Affect: full       Orientation to time & place:  Oriented to time, place, person and situation       Attention & concentration:  Normal attention span and concentration          Speech:  normal (not dysarthric)  -------------------------------------------------------  Cranial nerves: optic discs not visualized, pupils equal round and reactive, extraocular movements intact,       facial sensation intact, face symmetrical, hearing intact to whisper, palate raises midline, shoulder shrug strength normal, tongue protrudes midline.        -------------------------------------------------------  Musculoskeletal  Muscle tone: all 4 extremities normal        Muscle Bulk: all 4 extremities normal        Muscle strength:  5/5 in all 4 extremities        No pronator drift  Sensation: Intact to light touch, pin prick, vibration in all extremities        Deep tendon Reflexes: 1+ bilateral biceps, triceps, patella and ankles        --------------------------------------------------------------  Cerebellar and Coordination  Gait:  Mildly magnetic, with reduced stride length, slightly wide based       Finger-nose: no dysmetria       Rapid Alternating Movements (pronation/supination):  R normal; L normal  --------------------------------------------------------------  MOVEMENT DISORDERS FOCUSED " EXAM  Abnormality of movement (bradykinesia, hyperkinesia) present? No    Tremor present?   No   Posture:  normal  Postural stability:  no Rhomberg    V.  Laboratory/ Radiological Data: (Personally reviewed images)    CT brain without contrast 2/15/2016:             Lab Results   Component Value Date    [id number] 465 07/08/2016     Lab Results   Component Value Date    TSH 1.395 09/21/2016     Routine EEG:   DATE OF SERVICE: 02/16/2016.     PATIENT HISTORY: The patient is a 70-year-old male who for the past three or   four years has had intermittent episodes of somewhat variable shaking and   collapsing lasting a few minutes. He did apparently have a tonic-clonic seizure  observed in the ER while being admitted for an upper respiratory infection.     EEG REPORT: This is a standard 16-channel digital EEG recording with electrodes  and the International 10-20 electrode placement system. The patient has a   basic background rhythm that attains 9 Hz, predominantly in the 7 to 9 Hz range.  Does show reactivity. Of note, drowsiness was obtained. The patient does   show some apparent mild disorganization in the left central temporal area, which  is intermittent.     INTERPRETATION: This is a primarily waking and drowsy EEG that does show   possible disorganization in the left central temporal area. It could be   consistent with a seizure focus. I recommend clinical correlation.    VI. Medical Decision Making         Problem List Items Addressed This Visit        Neuro    Hydrocephalus - Primary    Overview     Evident on CT scans dating back to 2015. Unclear if Ex Vacuo vs NPH         Current Assessment & Plan     - Large volume LP with gait assessment if okay to pause Pradaxa per cardiologist          Seizure disorder    Overview     Two to three seizure like episidoes in the last year with abnormal EEG. Last seizure occurred while on Keppra. Concern for persistent seizure focus.          Current Assessment & Plan     -  Repeat EEG on the [facility] if possible   - Check Magnesium and B vitamins today   - Consider repeat CT with and without contrast pending results  - Likely add second AED pending results  - Pt has been advised by outside provider that he cannot drive in the next year. He is not driving at this time.          Relevant Orders    EEG,w/awake & drowsy record    Magnesium    VITAMIN B1    VITAMIN B2    VITAMIN B6    Memory loss    Overview     Present also since 2011. NPH vs MCI          Current Assessment & Plan     - Large volume LP pending   - Check B vitamins as above   - Last TSH wnl             Renal    Urinary frequency    Overview     With Nocturia. No true incontinence. This sounds more obstructive vs central CNS in etiology. Has not had prostate checked per patient and family          Current Assessment & Plan     - Have instructed family to contact their PCP for possible prostate exam  - large volume LP pending if okay to pause Pradaxa, even if evidence of NPH found, would expect history more consistent with true incontinence rather than nocturia with urgency. May have both obstructive and central cause for this.             Other    Gait difficulty (Chronic)    Overview     Since 2011. Suspect NPH. Slight hypomimia, no Parkinsonism on exam today         Current Assessment & Plan     - Large volume LP with gait assessment if okay to pause Pradaxa per cardiologist   - L-dopa trial if large volume tap not beneficial for gait          Relevant Orders    VITAMIN B1                   Tests ordered during this visit:   Orders Placed This Encounter   Procedures    Magnesium    VITAMIN B1    VITAMIN B2    VITAMIN B6    EEG,w/awake & drowsy record         Follow up with myself or Dr. Hyman on the [facility] per patient and family preference. EEG scheduled on the [facility] 3/9/17.

## 2017-03-01 NOTE — MR AVS SNAPSHOT
[facility]  [address] [facility] [address]  Phone: [phone]  Fax: [phone]                  Abdullahi Salazar   3/1/2017 1:20 PM   Office Visit    Description:  Male : 1945   Provider:  Maximo Hernandez MD   Department:  [facility]           Reason for Visit     Follow-up           Diagnoses this Visit        Comments    Hydrocephalus    -  Primary     Seizure disorder         Essential hypertension         Urinary frequency         Benign non-nodular prostatic hyperplasia with lower urinary tract symptoms         Paroxysmal atrial fibrillation         Atherosclerosis of native artery of both lower extremities with intermittent claudication         Diabetes mellitus with microalbuminuria                To Do List           Future Appointments        Provider Department Dept Phone    3/1/2017 3:40 PM LABORATORY, [place] [facility] [phone]    3/9/2017 3:00 PM [facility] EEG [facility] [phone]    3/24/2017 9:20 AM Maximo Hernandez MD [facility] [phone]    2017 9:20 AM Hermilo Hyman DO [place] - Neurology [phone]    2017 10:00 AM Toño Lawrence MD '[place] - General Surgery [phone]      Goals (5 Years of Data)     None      Follow-Up and Disposition     Return in about 3 weeks (around 3/22/2017) for blood pressure check.    Follow-up and Disposition History       These Medications        Disp Refills Start End    amlodipine (NORVASC) 10 MG tablet 90 tablet 3 3/1/2017     Take 1 tablet (10 mg total) by mouth once daily. - Oral    Pharmacy: [facility] - [place] [id number] [facility] Ph #: [phone]         [facility]     [facility] On Call Nurse Care Line -  Assistance  Registered nurses in the [facility] Center provide clinical advisement, health education, appointment booking, and other advisory services.  Call for this  "free service at [phone].             Medications           Message regarding Medications     Verify the changes and/or additions to your medication regime listed below are the same as discussed with your clinician today.  If any of these changes or additions are incorrect, please notify your healthcare provider.        START taking these NEW medications        Refills    amlodipine (NORVASC) 10 MG tablet 3    Sig: Take 1 tablet (10 mg total) by mouth once daily.    Class: Normal    Route: Oral           Verify that the below list of medications is an accurate representation of the medications you are currently taking.  If none reported, the list may be blank. If incorrect, please contact your healthcare provider. Carry this list with you in case of emergency.           Current Medications     ACCU-CHEK CLEOPATRA PLUS TEST STRP Strp TEST BLOOD SUGARS 4 TIMES DAILY    aspirin (ECOTRIN) 81 MG EC tablet Take 81 mg by mouth once daily. Every day    atorvastatin (LIPITOR) 10 MG tablet Take 1 tablet (10 mg total) by mouth once daily.    BD INSULIN PEN NEEDLE UF SHORT 31 gauge x 5/16" Ndle USE FOUR TIMES DAILY    benazepril (LOTENSIN) 20 MG tablet Take 1 tablet (20 mg total) by mouth every evening.    carvedilol (COREG) 25 MG tablet TAKE 1/2 TABLET TWICE DAILY WITH MEALS    clopidogrel (PLAVIX) 75 mg tablet TAKE 1 TABLET ONE TIME DAILY    ezetimibe (ZETIA) 10 mg tablet Take 1 tablet (10 mg total) by mouth once daily.    ferrous sulfate 325 mg (65 mg iron) Tab tablet 325 mg 3 (three) times daily.     HUMALOG KWIKPEN 100 unit/mL InPn pen INJECT  30 UNITS SUBCUTANEOUSLY THREE TIMES DAILY BEFORE MEALS    isosorbide mononitrate (IMDUR) 60 MG 24 hr tablet TAKE 1 TABLET (60 MG TOTAL) BY MOUTH EVERY EVENING.    lancets (TRUEPLUS LANCETS) 28 gauge Misc 1 lancet by Misc.(Non-Drug; Combo Route) route 2 (two) times daily as needed.    LANTUS SOLOSTAR 100 unit/mL (3 mL) InPn pen INJECT  45 UNITS SUBCUTANEOUSLY EVERY DAY    " levetiracetam (KEPPRA) 500 MG Tab Take 750 mg by mouth 2 (two) times daily.     PRADAXA 75 mg Cap TAKE 1 CAPSULE TWICE DAILY    RANEXA 500 mg Tb12 TAKE 1 TABLET (500 MG TOTAL) BY MOUTH 2 (TWO) TIMES DAILY.    torsemide (DEMADEX) 20 MG Tab TAKE 1 TABLET (20 MG TOTAL) BY MOUTH EVERY MORNING.    amlodipine (NORVASC) 10 MG tablet Take 1 tablet (10 mg total) by mouth once daily.    nitroGLYCERIN (NITROSTAT) 0.4 MG SL tablet Place 1 tablet (0.4 mg total) under the tongue every 5 (five) minutes as needed for Chest pain.           Clinical Reference Information           Your Vitals Were     BP                   160/80           Blood Pressure          Most Recent Value    BP  (!)  160/80      Allergies as of 3/1/2017     No Known Allergies      Immunizations Administered on Date of Encounter - 3/1/2017     None      Orders Placed During Today's Visit     Future Labs/Procedures Expected by Expires    Hemoglobin A1c  3/1/2017 3/1/2018    Urinalysis  3/1/2017 3/31/2017    Urine culture  3/1/2017 4/30/2018      Language Assistance Services     ATTENTION: Language assistance services are available, free of charge. Please call [phone].      ATENCIÓN: Si habla irina, tiene a puentes disposición servicios gratuitos de asistencia lingüística. Llame al [phone].     [facility] Ý: N?u b?n nói Ti?ng Vi?t, có các d?ch v? h? tr? ngôn ng? mi?n phí dành cho b?n. G?i s? [phone].         [facility] complies with applicable Federal civil rights laws and does not discriminate on the basis of race, color, national origin, age, disability, or sex.

## 2017-03-01 NOTE — PROGRESS NOTES
Subjective:      Patient ID: Abdullahi Salazar is a 71 y.o. male.    Chief Complaint: Follow-up (hospital)    HPI The patient was recently hospitalized at [facility] for seizure disorder.  The discharge summary from the hospitalization is copied below for reference and completeness.      Transitional Care Note    Family and/or Caretaker present at visit?  Yes.  Diagnostic tests reviewed/disposition: No diagnosic tests pending after this hospitalization.  Disease/illness education: he understands what he had  Home health/community services discussion/referrals: Patient has home health established at [facility]. .   Establishment or re-establishment of referral orders for community resources: No other necessary community resources.   Discussion with other health care providers: No discussion with other health care providers necessary.       Discharge Summaries  - in this encounter    Table of Contents for Discharge Summaries  Jono Tijerina MD - 02/16/2017 12:26 PM CST  Jono Tijreina MD - 02/16/2017 11:04 AM CST     Jono Tijerina MD - 02/16/2017 12:26 PM CST  DATES OF SERVICE: 02/13/2017 -     ADMITTING DIAGNOSES:   1. Noncompliance with the medication.  2. Diabetes mellitus.  3. Hypertension.  4. Biventricular cardiac pacemaker.  5. Hyperlipidemia.  6. Essential hypertension.  7. Atrial fibrillation.  8. Seizure disorder.    DISCHARGE DIAGNOSES:   1. Noncompliance with the medication.  2. Diabetes mellitus.  3. Hypertension.  4. Biventricular cardiac pacemaker.  5. Hyperlipidemia.  6. Essential hypertension.  7. Atrial fibrillation.  8. Seizure disorder.    CONSULTATIONS REQUESTED DURING HOSPITAL COURSE: Neurosurgery consult with Dr. Aldridge, neurology consult with Dr. Rocha and cardiology consult with Dr. Canales.    HOSPITAL COURSE: The patient is a 71-year-old male with multiple medical   problems. At home, the patient is supposed to take many medications, but he   has been  noncompliant. The patient has skipped a few doses of his seizure   medicine, presented to the Emergency Room with seizure-like activity. He was   seen in the Emergency Room, loaded with IV antiepileptic medication and   admitted to the hospitalist service. Neurology was consulted. The patient   also had an EEG done, which was negative. The patient's imaging of the brain   was suspicious for normal pressure hydrocephalus with dilated ventricle. At   this point, neurology has recommended lumbar puncture and a neurosurgery   consultation for possible ventricular shunt placement. Considering that the   patient was on aspirin, Plavix and Pradaxa, at this point neurology is   recommending to take the patient off from these medications at least 1 week   prior to the procedure and then Pradaxa should be stopped 2 days prior to the   lumbar puncture and then neurosurgery will intervene if patient gets benefit   from large volume lumbar puncture. No other medical problems are noted. The   patient is feeling fine and he is anxious to get out of the hospital. Care   plan is discussed with the patient and his family and they agree for procedure  and wanted to proceed with a large volume lumbar puncture.    I have personally spoken to Dr. Aldridge and Dr. Rocha. At this point, Dr. Rocha is recommending to readmit the patient on 03/06/2017 for large volume  lumbar puncture.    PHYSICAL EXAMINATION:  VITAL SIGNS: On the day of discharge, in the last 24 hours, the patient's   temperature was 98.2, pulse 70, respirations 18, blood pressure 166/78.  HEENT: PERRLA. Extraocular movements intact. The sclerae are anicteric. Mouth  and throat without lesions. Moist mucous membranes.  NECK: Supple, with a good range of motion. There is no lymphadenopathy or   thyromegaly. No carotid bruits.  CHEST: Good inspiratory effort. The lungs are clear to auscultation and   percussion. There is good air exchange.  HEART: Regular rate and  rhythm. There is no appreciable murmur or gallop. The  PMI is not displaced.  ABDOMEN: Soft. Active bowel sounds are present. Non-tender to palpation.   There is no organomegaly.  EXTREMITIES: Peripheral pulses are 2+ and intact. There is no peripheral   edema. All joints have a full range of motion.  NEUROLOGIC: Alert and oriented x 3. Cranial nerves II through XII are grossly  intact. Sensory and motor exams are grossly intact.  SKIN: Color is normal for age and gender, no rash or petechiae.    LABORATORY DATA: The patient's most recent blood work result shows WBC count   6.4, hemoglobin 14.4, hematocrit 41.0, platelet 152. Glucose 192, BUN 14,   creatinine 1.46, sodium 142, potassium 4.3, chloride 103, CO2 32.    CONDITION UPON DISCHARGE: Stable.    ACTIVITY: As tolerated.    DIET: Diabetic and cardiac.    MEDICATIONS: Please refer to discharge med reconciliation form for complete   list of discharge medication.    Total discharge time in coordination of care, review of medication, explaining  the care plan, and coordination of discharge more than 30 minutes. Please   refer to discharge med reconciliation form for full list of discharge   medications. The patient and his family is advised to return to the hospital   on 03/06/2017 for readmission. The patient should stop taking aspirin and   Plavix on 02/27/2017 and the patient should stop his Pradaxa on February 03/03/2017. Large volume lumbar puncture will be done in the hospital by Dr. Rocha while the patient is admitted to the hospitalist service and after   lumbar puncture, further intervention from Neurology and Neurosurgery   consultation will be entertained. Please refer to discharge orders for   complete details.    Jono Tijerina MD  For a full list of reconciled medications on discharge, please refer to the   Discharge Medication List Summary for Patient Use, and/or the Discharge   Orders.   V# [id number] D# [id number]  D: ga/WT: DS/T: pre  DD:  02/16/2017 11:04 TD: 02/16/2017 12:26     Back to top of Discharge Summaries  Jhonathan Tijerina MD - 02/16/2017 11:04 AM CST  Formatting of this note may be different from the original.  Total time with the patient was >30 minutes and greater than 50% was spent in counseling and coordination of care. The assessment and plan have been discussed at length. Physicians' notes reviewed. Labs and procedure reviewed.       Medication List     CHANGE how you take these medications     levETIRAcetam 750 MG tablet   Commonly known as: KEPPRA   Take 1 tablet (750 mg total) by mouth 2 (two) times daily.   What changed: See the new instructions.       CONTINUE taking these medications     amLODIPine 10 MG tablet   Commonly known as: NORVASC     aspirin EC 81 MG EC tablet   Commonly known as: ECOTRIN     benazepril 20 MG tablet   Commonly known as: LOTENSIN     carvedilol 12.5 MG tablet   Commonly known as: COREG     CENTRUM SILVER ORAL     clopidogrel 75 mg tablet   Commonly known as: PLAVIX     ezetimibe 10 mg tablet   Commonly known as: ZETIA     insulin glargine 100 unit/mL injection   Commonly known as: LANTUS     insulin lispro 100 unit/mL injection   Commonly known as: HumaLOG     isosorbide mononitrate 60 MG 24 hr tablet   Commonly known as: IMDUR     PRADAXA 75 mg Cap   Generic drug: dabigatran etexilate     torsemide 20 MG tablet   Commonly known as: DEMADEX         Where to Get Your Medications     These medications were sent to [facility] - [facility] [address], [place] [address]   Phone: [phone]    levETIRAcetam 750 MG tablet           Discharge summary Dictated # [id number]    JHONATHAN TIJERINA MD  2/16/2017  11:04 AM     Back to top of Discharge Summaries  Discharge Instructions  - in this encounter    The following attachments cannot be sent through Care Everywhere.    SEIZURE, ADULT (ENGLISH)  DIABETES AND EXERCISE (ENGLISH)  HYPERTENSION (ENGLISH)  DIABETES  MELLITUS AND FOOD (ENGLISH)  CORONARY ARTERY DISEASE, MALE (ENGLISH)  Medications at Time of Discharge  - as of this encounter    Medication Sig. Disp. Refills Start Date End Date   amLODIPine (NORVASC) 10 MG tablet   Take 5 mg by mouth daily.            aspirin EC (ECOTRIN) 81 MG EC tablet   Take 81 mg by mouth daily.           benazepril (LOTENSIN) 20 MG tablet   Take 20 mg by mouth daily.           carvedilol (COREG) 12.5 MG tablet   Take 12.5 mg by mouth 2 (two) times daily with meals.           clopidogrel (PLAVIX) 75 mg tablet   TAKE 1 TABLET ONE TIME DAILY     01/05/2017     dabigatran etexilate (PRADAXA) 75 mg Cap   Take 75 mg by mouth 2 (two) times daily.           ezetimibe (ZETIA) 10 mg tablet   Take 10 mg by mouth daily.           FOLIC ACID/MULTIVIT-MIN/LUTEIN (CENTRUM SILVER ORAL)   Take by mouth.           insulin glargine (LANTUS) 100 unit/mL injection   Inject 45 Units into the skin nightly.           insulin lispro (HUMALOG) 100 unit/mL injection   Inject 15 Units into the skin 3 (three) times daily with meals.           isosorbide mononitrate (IMDUR) 60 MG 24 hr tablet   Take 60 mg by mouth every evening.           levETIRAcetam (KEPPRA) 750 MG tablet   Take 1 tablet (750 mg total) by mouth 2 (two) times daily. 60 tablet   3 02/16/2017     torsemide (DEMADEX) 20 MG tablet   Take 20 mg by mouth daily.           Ordered Prescriptions  - in this encounter    Prescription Sig. Disp. Refills Start Date End Date   levETIRAcetam (KEPPRA) 750 MG tablet   Take 1 tablet (750 mg total) by mouth 2 (two) times daily. 60 tablet   3 02/16/2017     Discharge Disposition  - in this encounter    Disposition Code Departure Means Destination   Home Health     Home       He has had some urinary frequency and was seen by the neurologist at [facility] as the patient has cancelled the LP at [facility] and is going to do that in [place] at [facility].    He had some complaints and the neurologist wanted me to check his prostate  "today.  Below are her notes on his issues with the urine.               Renal     Urinary frequency     Overview       With Nocturia. No true incontinence. This sounds more obstructive vs central CNS in etiology. Has not had prostate checked per patient and family          Current Assessment & Plan       - Have instructed family to contact their PCP for possible prostate exam  - large volume LP pending if okay to pause Pradaxa, even if evidence of NPH found, would expect history more consistent with true incontinence rather than nocturia with urgency. May have both obstructive and central cause for this.            His bp was high.  BP (!) 160/80  Pulse 70  Temp 97.3 °F (36.3 °C) (Oral)   Resp 20  Ht 5' 7" (1.702 m)  Wt 104.9 kg (231 lb 4.2 oz)  BMI 36.22 kg/m2    Health Maintenance Due   Topic Date Due    TETANUS VACCINE  11/12/1963    Hemoglobin A1c  12/21/2016     He has atherosclerosis of the legs and he has a decreased ability to walk distances.  He sees Dr. Escamilla for his vasculature.  He has been on plavix and aspirin.     He also has PAF and he states that rarely feels palpitations.    The patient presents with diabetes.  The patient denies polyuria, polydipsia, polyphagia, hypoglycemia and paresthesias.  The patient's glucose control has been good.  He states that he has been working hard on this.    Home glucose averages are routinely checked.  The patient is without retinopathy currently.  The patient has no history of neuropathy.  The patient currently complains of no podiatric problems.  The patient has excellent compliance.  Hemoglobin A1C   Date Value Ref Range Status   09/21/2016 8.0 (H) 4.5 - 6.2 % Final     Comment:     According to ADA guidelines, hemoglobin A1C <7.0% represents  optimal control in non-pregnant diabetic patients.  Different  metrics may apply to specific populations.   Standards of Medical Care in Diabetes - 2016.  For the purpose of screening for the presence of " diabetes:  <5.7%     Consistent with the absence of diabetes  5.7-6.4%  Consistent with increasing risk for diabetes   (prediabetes)  >or=6.5%  Consistent with diabetes  Currently no consensus exists for use of hemoglobin A1C  for diagnosis of diabetes for children.     07/08/2016 8.1 (H) 4.5 - 6.2 % Final     Comment:     According to ADA guidelines, hemoglobin A1C <7.0% represents  optimal control in non-pregnant diabetic patients.  Different  metrics may apply to specific populations.   Standards of Medical Care in Diabetes - 2016.  For the purpose of screening for the presence of diabetes:  <5.7%     Consistent with the absence of diabetes  5.7-6.4%  Consistent with increasing risk for diabetes   (prediabetes)  >or=6.5%  Consistent with diabetes  Currently no consensus exists for use of hemoglobin A1C  for diagnosis of diabetes for children.     02/15/2016 8.5 (H) 0.0 - 5.6 % Final     Comment:     Reference Interval:  5.0 - 5.6 Normal   5.7 - 6.4 High Risk   > 6.5 Diabetic    Hgb A1c results are standardized based on the (NGSP) National   Glycohemoglobin Standardization Program.    Hemoglobin A1C levels are related to mean serum/plasma glucose   during the preceding 2-3 months.          No results found for: DAVID [id number]    Past Medical History:  Past Medical History:   Diagnosis Date    Actinic keratoses     Altered mental status 3/22/2015    Anticoagulant long-term use     Atrial fibrillation     CAD (coronary artery disease)     stents after bypass    Carotid artery stenosis 3/31/2015    Colon polyps 2011    repeat colonoscopy 2014    Combined hyperlipidemia associated with type 2 diabetes mellitus     Diabetes mellitus type II, uncontrolled     dx 2004    GERD (gastroesophageal reflux disease)     Grand mal seizure     last 2/2016    History of cardiac pacemaker 3/31/2015    HTN (hypertension)     Mitral valve insufficiency     Seizure disorder, grand mal     Sleep apnea with use of  "continuous positive airway pressure (CPAP)     Syncope 3/30/2015     Past Surgical History:   Procedure Laterality Date    CARDIAC PACEMAKER PLACEMENT      CATARACT EXTRACTION W/ INTRAOCULAR LENS  IMPLANT, BILATERAL      CORONARY ARTERY BYPASS GRAFT  1993    three vessels    CORONARY STENT PLACEMENT      HERNIA REPAIR      as infant     Review of patient's allergies indicates:  No Known Allergies  Current Outpatient Prescriptions on File Prior to Visit   Medication Sig Dispense Refill    ACCU-CHEK CLEOPATRA PLUS TEST STRP Strp TEST BLOOD SUGARS 4 TIMES DAILY 150 strip PRN    aspirin (ECOTRIN) 81 MG EC tablet Take 81 mg by mouth once daily. Every day      atorvastatin (LIPITOR) 10 MG tablet Take 1 tablet (10 mg total) by mouth once daily. 90 tablet 3    BD INSULIN PEN NEEDLE UF SHORT 31 gauge x 5/16" Ndle USE FOUR TIMES DAILY 360 each 3    benazepril (LOTENSIN) 20 MG tablet Take 1 tablet (20 mg total) by mouth every evening. 90 tablet 3    carvedilol (COREG) 25 MG tablet TAKE 1/2 TABLET TWICE DAILY WITH MEALS 90 tablet 3    clopidogrel (PLAVIX) 75 mg tablet TAKE 1 TABLET ONE TIME DAILY 90 tablet 3    ezetimibe (ZETIA) 10 mg tablet Take 1 tablet (10 mg total) by mouth once daily. 90 tablet 3    ferrous sulfate 325 mg (65 mg iron) Tab tablet 325 mg 3 (three) times daily.       HUMALOG KWIKPEN 100 unit/mL InPn pen INJECT  30 UNITS SUBCUTANEOUSLY THREE TIMES DAILY BEFORE MEALS 90 mL 3    isosorbide mononitrate (IMDUR) 60 MG 24 hr tablet TAKE 1 TABLET (60 MG TOTAL) BY MOUTH EVERY EVENING. 90 tablet 3    lancets (TRUEPLUS LANCETS) 28 gauge Misc 1 lancet by Misc.(Non-Drug; Combo Route) route 2 (two) times daily as needed. 200 each 3    LANTUS SOLOSTAR 100 unit/mL (3 mL) InPn pen INJECT  45 UNITS SUBCUTANEOUSLY EVERY DAY 45 mL 3    levetiracetam (KEPPRA) 500 MG Tab Take 750 mg by mouth 2 (two) times daily.       PRADAXA 75 mg Cap TAKE 1 CAPSULE TWICE DAILY 180 capsule 3    RANEXA 500 mg Tb12 TAKE 1 TABLET " "(500 MG TOTAL) BY MOUTH 2 (TWO) TIMES DAILY. 180 tablet 3    torsemide (DEMADEX) 20 MG Tab TAKE 1 TABLET (20 MG TOTAL) BY MOUTH EVERY MORNING. 90 tablet 3    [DISCONTINUED] amlodipine (NORVASC) 5 MG tablet Take 1 tablet (5 mg total) by mouth once daily. 90 tablet 0    nitroGLYCERIN (NITROSTAT) 0.4 MG SL tablet Place 1 tablet (0.4 mg total) under the tongue every 5 (five) minutes as needed for Chest pain. 30 tablet 0     No current facility-administered medications on file prior to visit.      Social History     Social History    Marital status:      Spouse name: N/A    Number of children: N/A    Years of education: N/A     Occupational History    Not on file.     Social History Main Topics    Smoking status: Former Smoker     Packs/day: 2.00     Years: 25.00     Types: Cigarettes     Quit date: 1/1/1983    Smokeless tobacco: Never Used      Comment: quit 1983     Alcohol use Yes      Comment: occasional    Drug use: No    Sexual activity: Not on file     Other Topics Concern    Not on file     Social History Narrative     Family History   Problem Relation Age of Onset    Stomach cancer Mother     Lung cancer Father     Diabetes Sister     Hypertension Sister     Heart disease Neg Hx     Stroke Neg Hx            Review of Systems   Constitutional: Positive for fatigue. Negative for fever.   Respiratory: Negative for cough, shortness of breath and wheezing.    Cardiovascular: Negative for chest pain and palpitations.   Gastrointestinal: Negative for abdominal distention and abdominal pain.   Genitourinary: Positive for frequency. Negative for dysuria and hematuria.       Objective:   BP (!) 160/80  Pulse 70  Temp 97.3 °F (36.3 °C) (Oral)   Resp 20  Ht 5' 7" (1.702 m)  Wt 104.9 kg (231 lb 4.2 oz)  BMI 36.22 kg/m2    Physical Exam   Constitutional: He is oriented to person, place, and time. He appears well-developed and well-nourished.   HENT:   Head: Normocephalic and atraumatic.   Right " Ear: External ear normal.   Left Ear: External ear normal.   Nose: Nose normal.   Mouth/Throat: Oropharynx is clear and moist. No oropharyngeal exudate.   Eyes: Conjunctivae and EOM are normal. Pupils are equal, round, and reactive to light. Right eye exhibits no discharge. Left eye exhibits no discharge. No scleral icterus.   Neck: Normal range of motion. Neck supple. No JVD present. No thyromegaly present.   Cardiovascular: Normal rate, regular rhythm, normal heart sounds and intact distal pulses.  Exam reveals no gallop and no friction rub.    No murmur heard.  Pulmonary/Chest: Effort normal and breath sounds normal. No respiratory distress. He has no wheezes. He has no rales. He exhibits no tenderness.   Abdominal: Soft. Bowel sounds are normal. He exhibits no distension and no mass. There is no tenderness. There is no rebound and no guarding.   Genitourinary: Prostate is enlarged. Prostate is not tender.   Musculoskeletal: Normal range of motion. He exhibits no edema or tenderness.   Lymphadenopathy:     He has no cervical adenopathy.   Neurological: He is alert and oriented to person, place, and time. No cranial nerve deficit. Coordination normal.   Skin: Skin is warm and dry. He is not diaphoretic.   Psychiatric: He has a normal mood and affect.       Assessment:     1. Hydrocephalus    2. Seizure disorder    3. Essential hypertension    4. Urinary frequency    5. Benign non-nodular prostatic hyperplasia with lower urinary tract symptoms    6. Paroxysmal atrial fibrillation    7. Atherosclerosis of native artery of both lower extremities with intermittent claudication    8. Diabetes mellitus with microalbuminuria        Plan:    Abdullahi was seen today for follow-up.    Diagnoses and all orders for this visit:    Hydrocephalus    Seizure disorder    Essential hypertension  -     amlodipine (NORVASC) 10 MG tablet; Take 1 tablet (10 mg total) by mouth once daily.    Urinary frequency  -     Urinalysis; Future  -      Urine culture; Future    Benign non-nodular prostatic hyperplasia with lower urinary tract symptoms    Paroxysmal atrial fibrillation    Atherosclerosis of native artery of both lower extremities with intermittent claudication    Diabetes mellitus with microalbuminuria  -     Hemoglobin A1c; Future      rtc in 3 weeks for a recheck on the blood pressure.  See his neurologist for his workup as they are planning with the heart doctor's approval.   Treat his urinary frequency based on the urinalysis.  If it is infected treat that.  If it is not infected, use something for his bph to see if that will help.  I will probably choose flomax.  Decrease his fluid intake.

## 2017-03-01 NOTE — PROGRESS NOTES
The urine is not infected.  Therefore, I will treat his prostate enlargement with flomax 0.4 mg po q day #90 and rf x 3.    He does have a significant amount of protein in the urine.  Book him with a nephrologist.

## 2017-03-01 NOTE — LETTER
March 1, 2017      Maximo Hernandez MD  [address] [address]           [facility]  [address]  [place] [address]  Phone: [phone]  Fax: [phone]          Patient: Abdullahi Salazar   MR Number: [id number]   YOB: 1945   Date of Visit: 3/1/2017       Dear Dr. Maximo Hernandez:    Thank you for referring Abdullahi Salazar to me for evaluation. Attached you will find relevant portions of my assessment and plan of care.    If you have questions, please do not hesitate to call me. I look forward to following Abdullahi Salazar along with you.    Sincerely,    Efe Greene MD    Enclosure  CC:  No Recipients    If you would like to receive this communication electronically, please contact [email or web address] or [phone] to request more information on [facility] access.    For providers and/or their staff who would like to refer a patient to [facility], please contact us through our one-stop-shop provider referral line, [facility], at [phone].    If you feel you have received this communication in error or would no longer like to receive these types of communications, please e-mail [email or web address]

## 2017-03-01 NOTE — ASSESSMENT & PLAN NOTE
- Have instructed family to contact their PCP for possible prostate exam  - large volume LP pending if okay to pause Pradaxa, even if evidence of NPH found, would expect history more consistent with true incontinence rather than nocturia with urgency. May have both obstructive and central cause for this.

## 2017-03-01 NOTE — ASSESSMENT & PLAN NOTE
- Large volume LP with gait assessment if okay to pause Pradaxa per cardiologist   - L-dopa trial if large volume tap not beneficial for gait

## 2017-03-01 NOTE — ASSESSMENT & PLAN NOTE
- Repeat EEG on the [place] if possible   - Check Magnesium and B vitamins today   - Consider repeat CT with and without contrast pending results  - Likely add second AED pending results  - Pt has been advised by outside provider that he cannot drive in the next year. He is not driving at this time.

## 2017-03-01 NOTE — MR AVS SNAPSHOT
[facility]  [address]  [facility] [address]  Phone: [phone]  Fax: [phone]                  Abdullahi Salazar   3/1/2017 9:00 AM   Office Visit    Description:  Male : 1945   Provider:  Efe Greene MD   Department:  [facility]           Diagnoses this Visit        Comments    Hydrocephalus    -  Primary     Gait difficulty         Urinary frequency         Memory loss         Seizure disorder                To Do List           Future Appointments        Provider Department Dept Phone    3/9/2017 3:00 PM [facility] [phone]    3/24/2017 9:20 AM Maximo Hernandez MD [facility] Medicine [phone]    2017 9:20 AM Hermilo Hyman DO [facility] Neurology [phone]    2017 10:00 AM Toño Lawrence MD '[place] [facility] [phone]      Goals (5 Years of Data)     None      [facility]     [facility] -  Assistance  Registered nurses in the [facility] provide clinical advisement, health education, appointment booking, and other advisory services.  Call for this free service at [phone].             Medications           Message regarding Medications     Verify the changes and/or additions to your medication regime listed below are the same as discussed with your clinician today.  If any of these changes or additions are incorrect, please notify your healthcare provider.             Verify that the below list of medications is an accurate representation of the medications you are currently taking.  If none reported, the list may be blank. If incorrect, please contact your healthcare provider. Carry this list with you in case of emergency.           Current Medications     ACCU-CHEK CLEOPATRA PLUS TEST STRP Strp TEST BLOOD SUGARS 4 TIMES DAILY    aspirin (ECOTRIN) 81 MG EC tablet Take 81 mg by mouth once daily. Every day    atorvastatin (LIPITOR) 10 MG  "tablet Take 1 tablet (10 mg total) by mouth once daily.    BD INSULIN PEN NEEDLE UF SHORT 31 gauge x 5/16" Ndle USE FOUR TIMES DAILY    benazepril (LOTENSIN) 20 MG tablet Take 1 tablet (20 mg total) by mouth every evening.    carvedilol (COREG) 25 MG tablet TAKE 1/2 TABLET TWICE DAILY WITH MEALS    clopidogrel (PLAVIX) 75 mg tablet TAKE 1 TABLET ONE TIME DAILY    ezetimibe (ZETIA) 10 mg tablet Take 1 tablet (10 mg total) by mouth once daily.    ferrous sulfate 325 mg (65 mg iron) Tab tablet 325 mg 3 (three) times daily.     HUMALOG KWIKPEN 100 unit/mL InPn pen INJECT  30 UNITS SUBCUTANEOUSLY THREE TIMES DAILY BEFORE MEALS    isosorbide mononitrate (IMDUR) 60 MG 24 hr tablet TAKE 1 TABLET (60 MG TOTAL) BY MOUTH EVERY EVENING.    lancets (TRUEPLUS LANCETS) 28 gauge Misc 1 lancet by Misc.(Non-Drug; Combo Route) route 2 (two) times daily as needed.    LANTUS SOLOSTAR 100 unit/mL (3 mL) InPn pen INJECT  45 UNITS SUBCUTANEOUSLY EVERY DAY    levetiracetam (KEPPRA) 500 MG Tab Take 750 mg by mouth 2 (two) times daily.     PRADAXA 75 mg Cap TAKE 1 CAPSULE TWICE DAILY    RANEXA 500 mg Tb12 TAKE 1 TABLET (500 MG TOTAL) BY MOUTH 2 (TWO) TIMES DAILY.    torsemide (DEMADEX) 20 MG Tab TAKE 1 TABLET (20 MG TOTAL) BY MOUTH EVERY MORNING.    nitroGLYCERIN (NITROSTAT) 0.4 MG SL tablet Place 1 tablet (0.4 mg total) under the tongue every 5 (five) minutes as needed for Chest pain.           Clinical Reference Information           Your Vitals Were     BP                   166/80           Blood Pressure          Most Recent Value    BP  (!)  166/80      Allergies as of 3/1/2017     No Known Allergies      Immunizations Administered on Date of Encounter - 3/1/2017     None      Orders Placed During Today's Visit     Future Labs/Procedures Expected by Expires    Magnesium  3/1/2017 4/30/2018    VITAMIN B1  3/1/2017 4/30/2018    VITAMIN B2  3/1/2017 4/30/2018    VITAMIN B6  3/1/2017 4/30/2018    EEG,w/awake & drowsy record  As directed " 3/1/2018      Instructions    1) Ask primary care to check your prostate     2) I will ask your cardiologist if it is okay to stop Pradaxa temproarily for a lumbar puncture    3) We will get you scheduled for a repeat EEG - this is to find out if your seizure medications are working well enough    4) Go to the second floor today and have your blood drawn             Language Assistance Services     ATTENTION: Language assistance services are available, free of charge. Please call [phone].      ATENCIÓN: Si habla irina, tiene a puentes disposición servicios gratuitos de asistencia lingüística. Llame al [phone].     CHACHO Ý: N?u b?n nói Ti?ng Vi?t, có các d?ch v? h? tr? ngôn ng? mi?n phí dành cho b?n. G?i s? [phone].         [facility] complies with applicable Federal civil rights laws and does not discriminate on the basis of race, color, national origin, age, disability, or sex.

## 2017-03-02 LAB
BACTERIA UR CULT: NO GROWTH
ESTIMATED AVG GLUCOSE: 212 MG/DL
HBA1C MFR BLD HPLC: 9 %

## 2017-03-02 NOTE — PROGRESS NOTES
The diabetes is very out of control.   Change from:    HUMALOG KWIKPEN 100 unit/mL InPn pen      Sig: INJECT  30 UNITS SUBCUTANEOUSLY THREE TIMES DAILY BEFORE MEALS   AND      LANTUS SOLOSTAR 100 unit/mL (3 mL) InPn pen       Sig: INJECT  45 UNITS SUBCUTANEOUSLY EVERY DAY  TO THE FOLLOWING DOSES:                HUMALOG KWIKPEN 100 unit/mL InPn pen      Sig: INJECT 33 UNITS SUBCUTANEOUSLY THREE TIMES DAILY BEFORE MEALS   AND      LANTUS SOLOSTAR 100 unit/mL (3 mL) InPn pen       Sig: INJECT 52 UNITS SUBCUTANEOUSLY EVERY DAY    Also, book an appointment with the diabetic educator.   Keep a log of the glucoses before meals and bed time so that this can be reviewed.    Recheck an a1c in 3 months.

## 2017-03-03 DIAGNOSIS — R26.9 GAIT DIFFICULTY: Primary | Chronic | ICD-10-CM

## 2017-03-03 NOTE — PROGRESS NOTES
High volume LP with PT scheduled 3/10/17 at 1pm. Pt instructed to stop his Pradaxa 3/6/17 and present to 2nd floor radiology dept here at the [facility]. PT notified via email.

## 2017-03-08 ENCOUNTER — TELEPHONE (OUTPATIENT)
Dept: NEUROLOGY | Facility: CLINIC | Age: 72
End: 2017-03-08

## 2017-03-08 DIAGNOSIS — N18.30 KIDNEY DISEASE, CHRONIC, STAGE III (GFR 30-59 ML/MIN): Primary | ICD-10-CM

## 2017-03-08 DIAGNOSIS — E11.29 DIABETES MELLITUS WITH MICROALBUMINURIA: ICD-10-CM

## 2017-03-08 DIAGNOSIS — R80.9 PROTEINURIA DUE TO TYPE 2 DIABETES MELLITUS: ICD-10-CM

## 2017-03-08 DIAGNOSIS — R80.9 DIABETES MELLITUS WITH MICROALBUMINURIA: ICD-10-CM

## 2017-03-08 DIAGNOSIS — E11.29 PROTEINURIA DUE TO TYPE 2 DIABETES MELLITUS: ICD-10-CM

## 2017-03-08 RX ORDER — TAMSULOSIN HYDROCHLORIDE 0.4 MG/1
0.4 CAPSULE ORAL DAILY
Qty: 90 CAPSULE | Refills: 3 | Status: SHIPPED | OUTPATIENT
Start: 2017-03-08 | End: 2017-10-23 | Stop reason: SDUPTHER

## 2017-03-08 RX ORDER — INSULIN GLARGINE 100 [IU]/ML
INJECTION, SOLUTION SUBCUTANEOUS
Qty: 45 ML | Refills: 3 | Status: SHIPPED | OUTPATIENT
Start: 2017-03-08 | End: 2017-04-12 | Stop reason: SDUPTHER

## 2017-03-08 RX ORDER — INSULIN LISPRO 100 [IU]/ML
INJECTION, SOLUTION INTRAVENOUS; SUBCUTANEOUS
Qty: 90 ML | Refills: 3 | Status: SHIPPED | OUTPATIENT
Start: 2017-03-08 | End: 2017-04-12 | Stop reason: SDUPTHER

## 2017-03-08 NOTE — TELEPHONE ENCOUNTER
----- Message from Quynh Adams sent at 3/8/2017  2:38 PM CST -----  Contact: Daughter- Maritza  Returning call,       Call: [phone]

## 2017-03-08 NOTE — TELEPHONE ENCOUNTER
I have signed for the following orders AND/OR meds.  Please call the patient and ask the patient to schedule the testing AND/OR inform about any medications that were sent.      Orders Placed This Encounter   Procedures    Ambulatory consult to Nephrology     Referral Priority:   Routine     Referral Type:   Consultation     Referral Reason:   Specialty Services Required     Requested Specialty:   Nephrology     Number of Visits Requested:   1    Ambulatory consult to Diabetic Education     Referral Priority:   Routine     Referral Type:   Consultation     Referral Reason:   Specialty Services Required     Requested Specialty:   Diabetes     Number of Visits Requested:   1         Medications Ordered This Encounter      insulin glargine (LANTUS SOLOSTAR) 100 unit/mL (3 mL) InPn pen          Sig: INJECT  52 UNITS SUBCUTANEOUSLY EVERY DAY          Dispense:  45 mL          Refill:  3      insulin lispro (HUMALOG KWIKPEN) 100 unit/mL InPn pen          Sig: INJECT  33 UNITS SUBCUTANEOUSLY THREE TIMES DAILY BEFORE MEALS          Dispense:  90 mL          Refill:  3      tamsulosin (FLOMAX) 0.4 mg Cp24          Sig: Take 1 capsule (0.4 mg total) by mouth once daily.          Dispense:  90 capsule          Refill:  3

## 2017-03-09 ENCOUNTER — HOSPITAL ENCOUNTER (OUTPATIENT)
Dept: NEUROLOGY | Facility: HOSPITAL | Age: 72
Discharge: HOME | End: 2017-03-09
Attending: PSYCHIATRY & NEUROLOGY
Payer: MEDICARE

## 2017-03-09 DIAGNOSIS — G40.909 SEIZURE DISORDER: ICD-10-CM

## 2017-03-09 PROCEDURE — 95819 EEG AWAKE AND ASLEEP: CPT | Mod: 26,,, | Performed by: PSYCHIATRY & NEUROLOGY

## 2017-03-09 PROCEDURE — 95819 EEG AWAKE AND ASLEEP: CPT

## 2017-03-09 NOTE — TELEPHONE ENCOUNTER
Spoke with patient's daughter. Pt with confusion regarding his Pradaxa, needs to stop this 3-4 days prior to LP per his cardiolgist. She is going to call him to remind him to stop this today and call us back if he has taken it today so that we can reschedule his LP.

## 2017-03-10 ENCOUNTER — HOSPITAL ENCOUNTER (OUTPATIENT)
Dept: RADIOLOGY | Facility: HOSPITAL | Age: 72
Discharge: HOME | End: 2017-03-10
Attending: PSYCHIATRY & NEUROLOGY
Payer: MEDICARE

## 2017-03-10 ENCOUNTER — OFFICE VISIT (OUTPATIENT)
Dept: NEUROLOGY | Facility: CLINIC | Age: 72
End: 2017-03-10
Payer: MEDICARE

## 2017-03-10 VITALS
HEART RATE: 69 BPM | HEIGHT: 67 IN | BODY MASS INDEX: 35.98 KG/M2 | SYSTOLIC BLOOD PRESSURE: 187 MMHG | WEIGHT: 229.25 LBS | DIASTOLIC BLOOD PRESSURE: 89 MMHG

## 2017-03-10 DIAGNOSIS — R94.01 ABNORMAL EEG: Primary | ICD-10-CM

## 2017-03-10 DIAGNOSIS — R26.9 GAIT DIFFICULTY: Chronic | ICD-10-CM

## 2017-03-10 PROBLEM — E11.29 TYPE 2 DIABETES MELLITUS WITH RENAL COMPLICATION: Status: ACTIVE | Noted: 2017-03-10

## 2017-03-10 PROBLEM — E11.22 TYPE 2 DIABETES MELLITUS WITH STAGE 3 CHRONIC KIDNEY DISEASE: Status: ACTIVE | Noted: 2017-03-10

## 2017-03-10 PROBLEM — N18.30 TYPE 2 DIABETES MELLITUS WITH STAGE 3 CHRONIC KIDNEY DISEASE: Status: ACTIVE | Noted: 2017-03-10

## 2017-03-10 LAB
BASOPHILS NFR CSF MANUAL: 1 %
CLARITY CSF: ABNORMAL
CLARITY CSF: ABNORMAL
COLOR CSF: ABNORMAL
COLOR CSF: ABNORMAL
CSF, COMMENT: ABNORMAL
EOSINOPHIL NFR CSF MANUAL: 1 %
EOSINOPHIL NFR CSF MANUAL: 3 %
GLUCOSE CSF-MCNC: 88 MG/DL
INR PPP: 1.1
LYMPHOCYTES NFR CSF MANUAL: 18 %
LYMPHOCYTES NFR CSF MANUAL: 39 %
MONOS+MACROS NFR CSF MANUAL: 16 %
MONOS+MACROS NFR CSF MANUAL: 6 %
NEUTROPHILS NFR CSF MANUAL: 42 %
NEUTROPHILS NFR CSF MANUAL: 74 %
PROT CSF-MCNC: 99 MG/DL
PROTHROMBIN TIME: 11.7 SEC
RBC # CSF: 930 /CU MM
RBC # CSF: ABNORMAL /CU MM
SPECIMEN VOL CSF: 0.5 ML
SPECIMEN VOL CSF: 4.5 ML
WBC # CSF: 4 /CU MM
WBC # CSF: 4 /CU MM

## 2017-03-10 PROCEDURE — 77003 FLUOROGUIDE FOR SPINE INJECT: CPT | Mod: 26,,, | Performed by: RADIOLOGY

## 2017-03-10 PROCEDURE — 84157 ASSAY OF PROTEIN OTHER: CPT

## 2017-03-10 PROCEDURE — 62270 DX LMBR SPI PNXR: CPT | Mod: TC

## 2017-03-10 PROCEDURE — 89051 BODY FLUID CELL COUNT: CPT | Mod: 91

## 2017-03-10 PROCEDURE — 82945 GLUCOSE OTHER FLUID: CPT

## 2017-03-10 PROCEDURE — 99999 PR PBB SHADOW E&M-EST. PATIENT-LVL IV: CPT | Mod: PBBFAC,,, | Performed by: PSYCHIATRY & NEUROLOGY

## 2017-03-10 PROCEDURE — 85610 PROTHROMBIN TIME: CPT

## 2017-03-10 PROCEDURE — 36415 COLL VENOUS BLD VENIPUNCTURE: CPT

## 2017-03-10 PROCEDURE — 62270 DX LMBR SPI PNXR: CPT | Mod: ,,, | Performed by: RADIOLOGY

## 2017-03-10 PROCEDURE — 99499 UNLISTED E&M SERVICE: CPT | Mod: S$GLB,,, | Performed by: PSYCHIATRY & NEUROLOGY

## 2017-03-10 NOTE — PROGRESS NOTES
CSF specimens collected using two patient identifiers.  Verified by the patient, Jayleen Canales, and myself.    Oxana Preston, RTR) [id number]

## 2017-03-10 NOTE — PROGRESS NOTES
Physical Therapy  Evaluation & Treatment    Abdullahi Salazar   MRN: [id number]   Admitting Diagnosis: LP for treatment of NPH    Pre LP Assesment: 2187-8084  Post LP Assessment: 3051-9181  Billable Minutes:  Evaluation 20 and Therapeutic Activity 25    Diagnosis: LP for treatment of NPH    Past Medical History:   Diagnosis Date    Actinic keratoses     Altered mental status 3/22/2015    Anticoagulant long-term use     Atrial fibrillation     CAD (coronary artery disease)     stents after bypass    Carotid artery stenosis 3/31/2015    Colon polyps 2011    repeat colonoscopy 2014    Combined hyperlipidemia associated with type 2 diabetes mellitus     Diabetes mellitus type II, uncontrolled     dx 2004    GERD (gastroesophageal reflux disease)     Grand mal seizure     last 2/2016    History of cardiac pacemaker 3/31/2015    HTN (hypertension)     Mitral valve insufficiency     Seizure disorder, grand mal     Sleep apnea with use of continuous positive airway pressure (CPAP)     Syncope 3/30/2015      Past Surgical History:   Procedure Laterality Date    CARDIAC PACEMAKER PLACEMENT      CATARACT EXTRACTION W/ INTRAOCULAR LENS  IMPLANT, BILATERAL      CORONARY ARTERY BYPASS GRAFT  1993    three vessels    CORONARY STENT PLACEMENT      HERNIA REPAIR      as infant       Referring physician: Sidney-Fee  Date referred to PT: 3/10/17    General Precautions: Standard,      Patient History:  Pt states living in [place], [place] in a 1SH with 3 ARVIN (railing on left) with his wife. Pt states being independent with all ADLs and functional mobility PTA with no DME. Pt reports only having one fall during seizure.  Equipment used at home:  No Assistive Device.  Equipment owned but not using:  No Assistive Device.  Activity level: active.   Work: retired .   Drive: can drive, has been asked to stop driving for a year due to recent seizure 2/13/17.   Cooking/Cleaning/Managing Home: yes.   Hand dominance:right.  "  Yessenia practiced: Confucianism.  Hobbies: photography & wood working.    Subjective:  Communicated with Dr. Greene prior to session.  Pt agrees to participate in evaluation. After final assessment, pt states "I want to walk to the car, I don't need the wheelchair."  Pt's wife and daughter present during both sessions.  Chief Complaint: gait instability  Patient goals: "I want to walk better."    Pt reports not having any pain.    Objective:     Cognitive Exam:  Oriented to: Person, Place, Time and Situation    Follows Commands/attention: Follows multistep  commands  Communication: clear/fluent  Safety awareness/insight to disability: intact    Physical Exam:  Postural examination/scapula alignment: Rounded shoulder    Skin integrity: Visible skin intact  Edema: None noted    Sensation:   Intact  light/touch BUE and BLE    Upper Extremity Range of Motion:  Right Upper Extremity: WFL  Left Upper Extremity: WFL    Upper Extremity Strength:  Right Upper Extremity: WFL  Left Upper Extremity: WFL    Lower Extremity Range of Motion:  Right Lower Extremity: WFL  Left Lower Extremity: WFL    Lower Extremity Strength:  Right Lower Extremity: WFL  Left Lower Extremity: WFL     Fine motor coordination:  Intact  Left hand, finger to nose, Right hand, finger to nose, Left hand thumb/finger opposition skills, Right hand thumb/finger opposition skills, RLE heel shin and LLE heel shin    Tinetti Balance Score  Gait:   Pre: 8 Post: 12  Balance:    Pre: 13 Post: 16  Total:   Pre: 21 Post: 28    Dynamic Gait Index:   Pre: 17 Post: 23    Therapeutic Activities and Exercises:  Provided education to patient and family regarding purpose of balance assessments, scores will be reported to MD and they would have a discussion about long-term treatment options. Pt requested copies of balance assessments, provided written copies of Tinetti and DGI. Pt and family verbalized understanding of all education.    Assessment:   Abdullahi Salazar is a 71 " y.o. male with a medical diagnosis of NPH. Pt receiving Physical Therapy evaluation due to LP procedure to determine long-term treatment options. Pt presents with good strength of BUE and BLE. Pt's balance initially was poor, scoring on both assessments as a moderate fall risk. Post LP procedure, pt's balance improved categorizing him as a low fall risk. Pt initially presented with short shuffling gait pattern with slow varinder. After LP, pt presents with an increased fluid and natural gait pattern with a normal gait speed. Pt would benefit from OP Physical Therapy to continue to improve balance at a higher level.     Rehab identified problem list/impairments: impaired balance    Rehab potential is good.    Activity tolerance: Excellent    Discharge recommendations: OP PT    GOALS:   No goals set at this evaluation.  PLAN:    Plan of Care reviewed with: Patient, family and Dr. Greene    Personal factors/comorbidities: seizures  Body systems elements affected: neuromuscular system  Clinical Presentation: stable  Functional Outcome Tools: ROM, MMT, Tinetti, DGI  Evaluation Complexity Level: Low    Ivis Castro PT, DPT  3/10/2017  [phone]

## 2017-03-10 NOTE — H&P
"Radiology History & Physical      SUBJECTIVE:     Chief Complaint: Gait disturbance, concern for NPH    History of Present Illness:  Abdullahi Salazar is a 71 y.o. male who presents for lumbar puncture.    Past Medical History:   Diagnosis Date    Actinic keratoses     Altered mental status 3/22/2015    Anticoagulant long-term use     Atrial fibrillation     CAD (coronary artery disease)     stents after bypass    Carotid artery stenosis 3/31/2015    Colon polyps 2011    repeat colonoscopy 2014    Combined hyperlipidemia associated with type 2 diabetes mellitus     Diabetes mellitus type II, uncontrolled     dx 2004    GERD (gastroesophageal reflux disease)     Grand mal seizure     last 2/2016    History of cardiac pacemaker 3/31/2015    HTN (hypertension)     Mitral valve insufficiency     Seizure disorder, grand mal     Sleep apnea with use of continuous positive airway pressure (CPAP)     Syncope 3/30/2015     Past Surgical History:   Procedure Laterality Date    CARDIAC PACEMAKER PLACEMENT      CATARACT EXTRACTION W/ INTRAOCULAR LENS  IMPLANT, BILATERAL      CORONARY ARTERY BYPASS GRAFT  1993    three vessels    CORONARY STENT PLACEMENT      HERNIA REPAIR      as infant       Home Meds:   Prior to Admission medications    Medication Sig Start Date End Date Taking? Authorizing Provider   ACCU-CHEK CLEOPATRA PLUS TEST STRP Strp TEST BLOOD SUGARS 4 TIMES DAILY 8/19/16   Maximo Hernandez MD   amlodipine (NORVASC) 10 MG tablet Take 1 tablet (10 mg total) by mouth once daily. 3/1/17   Maximo Hernandez MD   aspirin (ECOTRIN) 81 MG EC tablet Take 81 mg by mouth once daily. Every day 12/1/11   Historical Provider, MD   atorvastatin (LIPITOR) 10 MG tablet Take 1 tablet (10 mg total) by mouth once daily. 2/20/17 2/20/18  Gordy Rodney MD   BD INSULIN PEN NEEDLE UF SHORT 31 gauge x 5/16" Ndle USE FOUR TIMES DAILY 1/5/17   Maximo Hernandez MD   benazepril (LOTENSIN) 20 MG tablet Take 1 tablet (20 mg " total) by mouth every evening. 10/12/16 10/12/17  Kash Gayle MD   carvedilol (COREG) 25 MG tablet TAKE 1/2 TABLET TWICE DAILY WITH MEALS 1/5/17   Maximo Hernandez MD   clopidogrel (PLAVIX) 75 mg tablet TAKE 1 TABLET ONE TIME DAILY 1/5/17   Maximo Hernandez MD   ezetimibe (ZETIA) 10 mg tablet Take 1 tablet (10 mg total) by mouth once daily. 3/14/16 3/14/17  Maximo Hernandez MD   ferrous sulfate 325 mg (65 mg iron) Tab tablet 325 mg 3 (three) times daily.  2/23/17   Historical Provider, MD   insulin glargine (LANTUS SOLOSTAR) 100 unit/mL (3 mL) InPn pen INJECT  52 UNITS SUBCUTANEOUSLY EVERY DAY 3/8/17   Maximo Hernandez MD   insulin lispro (HUMALOG KWIKPEN) 100 unit/mL InPn pen INJECT  33 UNITS SUBCUTANEOUSLY THREE TIMES DAILY BEFORE MEALS 3/8/17   Maximo Hernandez MD   isosorbide mononitrate (IMDUR) 60 MG 24 hr tablet TAKE 1 TABLET (60 MG TOTAL) BY MOUTH EVERY EVENING. 1/5/17   Maximo Hernandez MD   lancets (TRUEPLUS LANCETS) 28 gauge Misc 1 lancet by Misc.(Non-Drug; Combo Route) route 2 (two) times daily as needed. 5/25/16   Maximo Hernandez MD   levetiracetam (KEPPRA) 500 MG Tab Take 750 mg by mouth 2 (two) times daily.     Historical Provider, MD   nitroGLYCERIN (NITROSTAT) 0.4 MG SL tablet Place 1 tablet (0.4 mg total) under the tongue every 5 (five) minutes as needed for Chest pain. 2/19/16 2/20/17  Stephen Sharma MD   PRADAXA 75 mg Cap TAKE 1 CAPSULE TWICE DAILY 1/5/17   Maximo Hernandez MD   RANEXA 500 mg Tb12 TAKE 1 TABLET (500 MG TOTAL) BY MOUTH 2 (TWO) TIMES DAILY. 1/5/17   Maximo Hernandez MD   tamsulosin (FLOMAX) 0.4 mg Cp24 Take 1 capsule (0.4 mg total) by mouth once daily. 3/8/17 3/8/18  Maximo Hernandez MD   torsemide (DEMADEX) 20 MG Tab TAKE 1 TABLET (20 MG TOTAL) BY MOUTH EVERY MORNING. 1/5/17   Maximo Hernandez MD     Anticoagulants/Antiplatelets: Aspirin 81mg QD (last dose 3/9/2017), Plavix 75mg QD (last dose 3/6/17), Pradaxa 75mg BID (last dose 3/6/17 AM)    Allergies: Review of  patient's allergies indicates:  No Known Allergies  Sedation History:  no adverse reactions    Review of Systems:   Hematological: no known coagulopathies  Respiratory: no shortness of breath  Cardiovascular: no chest pain  Gastrointestinal: no abdominal pain  Genito-Urinary: no dysuria  Musculoskeletal: negative  Neurological: no TIA or stroke symptoms         OBJECTIVE:     Vital Signs (Most Recent)       Physical Exam:  ASA: III  Mallampati: II    General: no acute distress  Mental Status: alert and oriented to person, place and time  HEENT: normocephalic, atraumatic  Chest: unlabored breathing  Abdomen: nondistended  Extremity: moves all extremities    Laboratory  Lab Results   Component Value Date    INR 1.1 03/10/2017       Lab Results   Component Value Date    WBC 5.42 09/21/2016    HGB 13.4 (L) 09/21/2016    HCT 38.9 (L) 09/21/2016    MCV 96 09/21/2016     09/21/2016      Lab Results   Component Value Date     (H) 02/22/2017     02/22/2017    K 4.3 02/22/2017     02/22/2017    CO2 28 02/22/2017    BUN 18 02/22/2017    CREATININE 1.7 (H) 02/22/2017    CALCIUM 9.6 02/22/2017    MG 2.0 03/01/2017    ALT 22 02/22/2017    AST 19 02/22/2017    ALBUMIN 3.6 02/22/2017    BILITOT 0.5 02/22/2017    BILIDIR 0.4 (H) 07/24/2015       ASSESSMENT/PLAN:     Sedation Plan: Local anesthesia  Patient will undergo lumbar puncture.    Rafa Santiago MD  PGY-II  Dept of Radiology   Pager: [phone]

## 2017-03-10 NOTE — PROGRESS NOTES
Pt not interviewed or examined today. In office for PT assessment only.     EEG reviewed with focal left temporal slowing. Unable to have MRI, Will obtain CT brain with and without contrast. Communicated plan of care to patient and family.

## 2017-03-10 NOTE — PT/OT/SLP EVAL
Physical Therapy  Evaluation & Treatment    Abdullahi Salazar   MRN: [id number]   Admitting Diagnosis: LP for treatment of NPH    Pre LP Assesment: 4961-4216  Post LP Assessment: 5981-6934  Billable Minutes:  Evaluation 20 and Therapeutic Activity 25    Diagnosis: LP for treatment of NPH    Past Medical History:   Diagnosis Date    Actinic keratoses     Altered mental status 3/22/2015    Anticoagulant long-term use     Atrial fibrillation     CAD (coronary artery disease)     stents after bypass    Carotid artery stenosis 3/31/2015    Colon polyps 2011    repeat colonoscopy 2014    Combined hyperlipidemia associated with type 2 diabetes mellitus     Diabetes mellitus type II, uncontrolled     dx 2004    GERD (gastroesophageal reflux disease)     Grand mal seizure     last 2/2016    History of cardiac pacemaker 3/31/2015    HTN (hypertension)     Mitral valve insufficiency     Seizure disorder, grand mal     Sleep apnea with use of continuous positive airway pressure (CPAP)     Syncope 3/30/2015      Past Surgical History:   Procedure Laterality Date    CARDIAC PACEMAKER PLACEMENT      CATARACT EXTRACTION W/ INTRAOCULAR LENS  IMPLANT, BILATERAL      CORONARY ARTERY BYPASS GRAFT  1993    three vessels    CORONARY STENT PLACEMENT      HERNIA REPAIR      as infant       Referring physician: Sidney-Fee  Date referred to PT: 3/10/17    General Precautions: Standard,      Patient History:  Pt states living in [place], [place] in a 1SH with 3 ARVIN (railing on left) with his wife. Pt states being independent with all ADLs and functional mobility PTA with no DME. Pt reports only having one fall during seizure.  Equipment used at home:  No Assistive Device.  Equipment owned but not using:  No Assistive Device.  Activity level: active.   Work: retired .   Drive: can drive, has been asked to stop driving for a year due to recent seizure 2/13/17.   Cooking/Cleaning/Managing Home: yes.   Hand dominance:right.  "  Yessenia practiced: [employer].  Hobbies: photography & wood working.    Subjective:  Communicated with Dr. Greene prior to session.  Pt agrees to participate in evaluation. After final assessment, pt states "I want to walk to the car, I don't need the wheelchair."  Pt's wife and daughter present during both sessions.  Chief Complaint: gait instability  Patient goals: "I want to walk better."    Pt reports not having any pain.    Objective:     Cognitive Exam:  Oriented to: Person, Place, Time and Situation    Follows Commands/attention: Follows multistep  commands  Communication: clear/fluent  Safety awareness/insight to disability: intact    Physical Exam:  Postural examination/scapula alignment: Rounded shoulder    Skin integrity: Visible skin intact  Edema: None noted    Sensation:   Intact  light/touch BUE and BLE    Upper Extremity Range of Motion:  Right Upper Extremity: WFL  Left Upper Extremity: WFL    Upper Extremity Strength:  Right Upper Extremity: WFL  Left Upper Extremity: WFL    Lower Extremity Range of Motion:  Right Lower Extremity: WFL  Left Lower Extremity: WFL    Lower Extremity Strength:  Right Lower Extremity: WFL  Left Lower Extremity: WFL     Fine motor coordination:  Intact  Left hand, finger to nose, Right hand, finger to nose, Left hand thumb/finger opposition skills, Right hand thumb/finger opposition skills, RLE heel shin and LLE heel shin    Tinetti Balance Score  Gait:   Pre: 8 Post: 12  Balance:    Pre: 13 Post: 16  Total:   Pre: 21 Post: 28    Dynamic Gait Index:   Pre: 17 Post: 23    Therapeutic Activities and Exercises:  Provided education to patient and family regarding purpose of balance assessments, scores will be reported to MD and they would have a discussion about long-term treatment options. Pt requested copies of balance assessments, provided written copies of Tinetti and DGI. Pt and family verbalized understanding of all education.    Assessment:   Abdullahi Salazar is a 71 " y.o. male with a medical diagnosis of NPH. Pt receiving Physical Therapy evaluation due to LP procedure to determine long-term treatment options. Pt presents with good strength of BUE and BLE. Pt's balance initially was poor, scoring on both assessments as a moderate fall risk. Post LP procedure, pt's balance improved categorizing him as a low fall risk. Pt initially presented with short shuffling gait pattern with slow varinder. After LP, pt presents with an increased fluid and natural gait pattern with a normal gait speed. Pt would benefit from OP Physical Therapy to continue to improve balance at a higher level.     Rehab identified problem list/impairments: impaired balance    Rehab potential is good.    Activity tolerance: Excellent    Discharge recommendations: OP PT    GOALS:   No goals set at this evaluation.  PLAN:    Plan of Care reviewed with: Patient, family and Dr. Greene    Personal factors/comorbidities: seizures  Body systems elements affected: neuromuscular system  Clinical Presentation: stable  Functional Outcome Tools: ROM, MMT, Tinetti, DGI  Evaluation Complexity Level: Low    Ivis Castro PT, DPT  3/10/2017  [phone]

## 2017-03-10 NOTE — MR AVS SNAPSHOT
[facility] Neurology  [address]  [facility] [address]  Phone: [phone]  Fax: [phone]                  Abdullahi Salazar   3/10/2017 12:00 PM   Office Visit    Description:  Male : 1945   Provider:  Efe Greene MD   Department:  [facility]rology           Diagnoses this Visit        Comments    Abnormal EEG    -  Primary            To Do List           Future Appointments        Provider Department Dept Phone    3/10/2017  1:00 PM [facility] FLIP2 500 LB LIMIT [facility] [phone]    3/15/2017 1:45 PM [facility] CT1 LIMIT 450 LBS [facility] [phone]    3/24/2017 9:20 AM Maximo Hernandez MD [facility] [phone]    3/29/2017 9:40 AM Gregory Sosa MD [facility] [phone]    2017 9:20 AM Hermilo Hyman DO [facility] [phone]      Goals (5 Years of Data)     None      [facility]     [facility] -  Assistance  Registered nurses in the [facility] provide clinical advisement, health education, appointment booking, and other advisory services.  Call for this free service at [phone].             Medications           Message regarding Medications     Verify the changes and/or additions to your medication regime listed below are the same as discussed with your clinician today.  If any of these changes or additions are incorrect, please notify your healthcare provider.             Verify that the below list of medications is an accurate representation of the medications you are currently taking.  If none reported, the list may be blank. If incorrect, please contact your healthcare provider. Carry this list with you in case of emergency.           Current Medications     ACCU-CHEK CLEOPATRA PLUS TEST STRP Strp TEST BLOOD SUGARS 4 TIMES DAILY    amlodipine (NORVASC) 10 MG tablet Take 1 tablet (10 mg total) by mouth once daily.    aspirin  "(ECOTRIN) 81 MG EC tablet Take 81 mg by mouth once daily. Every day    atorvastatin (LIPITOR) 10 MG tablet Take 1 tablet (10 mg total) by mouth once daily.    BD INSULIN PEN NEEDLE UF SHORT 31 gauge x 5/16" Ndle USE FOUR TIMES DAILY    benazepril (LOTENSIN) 20 MG tablet Take 1 tablet (20 mg total) by mouth every evening.    carvedilol (COREG) 25 MG tablet TAKE 1/2 TABLET TWICE DAILY WITH MEALS    clopidogrel (PLAVIX) 75 mg tablet TAKE 1 TABLET ONE TIME DAILY    ezetimibe (ZETIA) 10 mg tablet Take 1 tablet (10 mg total) by mouth once daily.    ferrous sulfate 325 mg (65 mg iron) Tab tablet 325 mg 3 (three) times daily.     insulin glargine (LANTUS SOLOSTAR) 100 unit/mL (3 mL) InPn pen INJECT  52 UNITS SUBCUTANEOUSLY EVERY DAY    insulin lispro (HUMALOG KWIKPEN) 100 unit/mL InPn pen INJECT  33 UNITS SUBCUTANEOUSLY THREE TIMES DAILY BEFORE MEALS    isosorbide mononitrate (IMDUR) 60 MG 24 hr tablet TAKE 1 TABLET (60 MG TOTAL) BY MOUTH EVERY EVENING.    lancets (TRUEPLUS LANCETS) 28 gauge Misc 1 lancet by Misc.(Non-Drug; Combo Route) route 2 (two) times daily as needed.    levetiracetam (KEPPRA) 500 MG Tab Take 750 mg by mouth 2 (two) times daily.     nitroGLYCERIN (NITROSTAT) 0.4 MG SL tablet Place 1 tablet (0.4 mg total) under the tongue every 5 (five) minutes as needed for Chest pain.    PRADAXA 75 mg Cap TAKE 1 CAPSULE TWICE DAILY    RANEXA 500 mg Tb12 TAKE 1 TABLET (500 MG TOTAL) BY MOUTH 2 (TWO) TIMES DAILY.    tamsulosin (FLOMAX) 0.4 mg Cp24 Take 1 capsule (0.4 mg total) by mouth once daily.    torsemide (DEMADEX) 20 MG Tab TAKE 1 TABLET (20 MG TOTAL) BY MOUTH EVERY MORNING.           Clinical Reference Information           Your Vitals Were     BP Pulse Height Weight BMI    187/89 69 5' 7" (1.702 m) 104 kg (229 lb 4.5 oz) 35.91 kg/m2      Blood Pressure          Most Recent Value    BP  (!)  187/89      Allergies as of 3/10/2017     No Known Allergies      Immunizations Administered on Date of Encounter - " 3/10/2017     None      Orders Placed During Today's Visit     Future Labs/Procedures Expected by Expires    CT Head W Wo Contrast  3/10/2017 3/10/2018      Language Assistance Services     ATTENTION: Language assistance services are available, free of charge. Please call [phone].      ATENCIÓN: Si habla español, tiene a puentes disposición servicios gratuitos de asistencia lingüística. Llame al [phone].     CHÚ Ý: N?u b?n nói Ti?ng Vi?t, có các d?ch v? h? tr? ngôn ng? mi?n phí dành cho b?n. G?i s? [phone].         [facility] complies with applicable Federal civil rights laws and does not discriminate on the basis of race, color, national origin, age, disability, or sex.

## 2017-03-10 NOTE — PROCEDURES
DATE OF SERVICE:  03/09/2017    EEG NUMBER:  ON-    REFERRING PHYSICIAN:  Efe Greene MD    This EEG was performed to assess for evidence of underlying epilepsy.    ELECTROENCEPHALOGRAM REPORT    METHODOLOGY:  Electroencephalographic (EEG) recording is recorded with   electrodes placed according to the International 10-20 placement system.  Thirty   two (32) channels of digital signal (sampling rate of 512/sec), including T1   and T2, were simultaneously recorded from the scalp and may include EKG, EMG,   and/or eye monitors.  Recording band pass was 0.1 to 512 Hz.  Digital video   recording of the patient is simultaneously recorded with the EEG.  The patient   is instructed to report clinical symptoms which may occur during the recording   session.  EEG and video recording are stored and archived in digital format.    Activation procedures, which include photic stimulation, hyperventilation and   instructing patients to perform simple tasks, are done in selected patients.    The EEG is displayed on a monitor screen and can be reviewed using different   montages.  Computer assisted-analysis is employed to detect spike and   electrographic seizure activity.  The entire record is submitted for computer   analysis.  The entire recording is visually reviewed, and the times identified   by computer analysis as being spikes or seizures are reviewed again.    Compressed spectral analysis (CSA) is also performed on the activity recorded   from each individual channel.  This is displayed as a power display of   frequencies from 0 to 30 Hz over time.  The CSA is reviewed looking for   asymmetries in power between homologous areas of the scalp, then compared with   the original EEG recording.    Netgen software was also utilized in the review of this study.  This software   suite analyzes the EEG recording in multiple domains.  Coherence and rhythmicity   are computed to identify EEG sections which may contain  organized seizures.    Each channel undergoes analysis to detect the presence of spike and sharp waves   which have special and morphological characteristics of epileptic activity.  The   routine EEG recording is converted from special into frequency domain.  This is   then displayed comparing homologous areas to identify areas of significant   asymmetry.  Algorithm to identify non-cortically generated artifact is used to   separate artifact from the EEG. This EEG was performed to assist for evidence of   underlying epilepsy.    EEG FINDINGS:  The recording was obtained with a number of standard bipolar and   referential montages during wakefulness, drowsiness and sleep.  In the alert   state, the posterior background rhythm was a symmetric, well-modulated 8.5 to 9   Hz alpha rhythm, which reacted symmetrically to eye opening.  Intermittent   photic stimulation failed to evoke driving responses.  No abnormalities were   activated by photic stimulation.  Hyperventilation was not performed.  During   drowsiness, the background rhythm waxed and waned and there were periods of   slowing.  Intermittent left temporal focal polymorphic delta range slowing was   noted.  During stage II of sleep, symmetric V waves and sleep spindles were   noted.  There were no interictal epileptiform abnormalities and no clinical or   electrographic seizures were recorded.  The EKG channel revealed sinus rhythm   with occasional PVCs.    IMPRESSION:  This is an abnormal EEG during wakefulness, drowsiness and sleep.    Left temporal focal slowing was noted.    CLINICAL CORRELATION:  The patient is a 71-year-old male who is being evaluated   for history of seizures, who is currently maintained on Keppra.  This is an   abnormal EEG during wakefulness, drowsiness and sleep.  The presence of focal   slowing in the left temporal region is suggestive of focal neuronal dysfunction   in this region.  There is no evidence of an epileptic process on  this recording.    No seizures were recorded during this study.  Of note, the EKG channel   revealed sinus rhythm with occasional PVCs.      FAK/PN  dd: 03/09/2017 16:54:55 (CST)  td: 03/09/2017 17:20:29 (CST)  Doc ID   #[id number]  Job ID #[id number]    CC:

## 2017-03-10 NOTE — PROGRESS NOTES
Subjective:       Patient ID: Abdullahi Salazar is a 71 y.o. White male who presents for follow-up evaluation of Consult and Chronic Kidney Disease    HPI     He is known to me from my previous practice in [place].     He reports he is doing well however his last Hba1c was 8. He denies edema and no SOB although mobility is impaired. No problems with urination. No foamy urine and no flank pain. He avoids NSAIDs and no herbal medications    Review of Systems   Constitutional: Negative for activity change, appetite change, fatigue and unexpected weight change.   HENT: Negative for facial swelling.    Respiratory: Negative for cough and shortness of breath.    Cardiovascular: Negative for chest pain and leg swelling.   Gastrointestinal: Negative for abdominal distention.   Genitourinary: Negative for difficulty urinating, dysuria and hematuria.   Musculoskeletal: Positive for arthralgias.   Neurological: Negative for weakness and headaches.       Objective:      Physical Exam   Constitutional: He is oriented to person, place, and time. He appears well-developed and well-nourished.   Neck: No JVD present.   Cardiovascular: S1 normal and S2 normal.  Exam reveals no friction rub.    Pulmonary/Chest: Breath sounds normal. He has no wheezes. He has no rales.   Abdominal: Soft.   Musculoskeletal: He exhibits no edema.   Neurological: He is alert and oriented to person, place, and time.   Skin: Skin is warm and dry.   Psychiatric: He has a normal mood and affect.   Vitals reviewed.      Assessment:       1. Kidney disease, chronic, stage III (GFR 30-59 ml/min)    2. Hypertension associated with diabetes    3. Proteinuria due to type 2 diabetes mellitus    4. Uncontrolled type 2 diabetes mellitus without complication, with long-term current use of insulin    5. Seizure disorder    6. Hydrocephalus    7. Diabetes mellitus with microalbuminuria        Plan:             CKD stage 3 with stable kidney function.  Continue RAAS blockade  for renal preservation    HTN is controlled    MBD--due for PTH    DM--continue endocrine follow up      RTC 4 months with labs prior in [place]

## 2017-03-13 ENCOUNTER — PATIENT MESSAGE (OUTPATIENT)
Dept: NEUROLOGY | Facility: CLINIC | Age: 72
End: 2017-03-13

## 2017-03-14 ENCOUNTER — PATIENT MESSAGE (OUTPATIENT)
Dept: NEUROLOGY | Facility: CLINIC | Age: 72
End: 2017-03-14

## 2017-03-15 ENCOUNTER — HOSPITAL ENCOUNTER (OUTPATIENT)
Dept: RADIOLOGY | Facility: HOSPITAL | Age: 72
Discharge: HOME | End: 2017-03-15
Attending: PSYCHIATRY & NEUROLOGY
Payer: MEDICARE

## 2017-03-15 DIAGNOSIS — R94.01 ABNORMAL EEG: ICD-10-CM

## 2017-03-15 PROCEDURE — 70470 CT HEAD/BRAIN W/O & W/DYE: CPT | Mod: TC,PO

## 2017-03-15 PROCEDURE — 25500020 PHARM REV CODE 255: Mod: PO | Performed by: PSYCHIATRY & NEUROLOGY

## 2017-03-15 PROCEDURE — 70470 CT HEAD/BRAIN W/O & W/DYE: CPT | Mod: 26,,, | Performed by: RADIOLOGY

## 2017-03-15 RX ADMIN — IOHEXOL 75 ML: 350 INJECTION, SOLUTION INTRAVENOUS at 01:03

## 2017-03-17 PROCEDURE — 99495 TRANSJ CARE MGMT MOD F2F 14D: CPT | Mod: S$GLB,,, | Performed by: FAMILY MEDICINE

## 2017-03-22 ENCOUNTER — OFFICE VISIT (OUTPATIENT)
Dept: FAMILY MEDICINE | Facility: CLINIC | Age: 72
End: 2017-03-22
Payer: MEDICARE

## 2017-03-22 VITALS
TEMPERATURE: 98 F | HEIGHT: 67 IN | RESPIRATION RATE: 18 BRPM | BODY MASS INDEX: 36.42 KG/M2 | WEIGHT: 232.06 LBS | HEART RATE: 71 BPM | SYSTOLIC BLOOD PRESSURE: 167 MMHG | DIASTOLIC BLOOD PRESSURE: 80 MMHG

## 2017-03-22 DIAGNOSIS — I10 ESSENTIAL HYPERTENSION: Primary | ICD-10-CM

## 2017-03-22 PROCEDURE — 99213 OFFICE O/P EST LOW 20 MIN: CPT | Mod: S$GLB,,, | Performed by: NURSE PRACTITIONER

## 2017-03-22 PROCEDURE — 1160F RVW MEDS BY RX/DR IN RCRD: CPT | Mod: S$GLB,,, | Performed by: NURSE PRACTITIONER

## 2017-03-22 PROCEDURE — 1159F MED LIST DOCD IN RCRD: CPT | Mod: S$GLB,,, | Performed by: NURSE PRACTITIONER

## 2017-03-22 PROCEDURE — 1157F ADVNC CARE PLAN IN RCRD: CPT | Mod: S$GLB,,, | Performed by: NURSE PRACTITIONER

## 2017-03-22 PROCEDURE — 99499 UNLISTED E&M SERVICE: CPT | Mod: S$GLB,,, | Performed by: NURSE PRACTITIONER

## 2017-03-22 PROCEDURE — 3079F DIAST BP 80-89 MM HG: CPT | Mod: S$GLB,,, | Performed by: NURSE PRACTITIONER

## 2017-03-22 PROCEDURE — 99999 PR PBB SHADOW E&M-EST. PATIENT-LVL IV: CPT | Mod: PBBFAC,,, | Performed by: NURSE PRACTITIONER

## 2017-03-22 PROCEDURE — 3077F SYST BP >= 140 MM HG: CPT | Mod: S$GLB,,, | Performed by: NURSE PRACTITIONER

## 2017-03-22 NOTE — PROGRESS NOTES
Subjective:       Patient ID: Abdullahi Salazar is a 71 y.o. male.    Chief Complaint: Blood Pressure Check  Pt reports to clinic for follow up evaluation of blood pressure.  On last visit with PCP pt was placed on norvasc.  Pt's daughter reports 2 bp readings in 140's, and one bp reading of 180's.  Pt did not take bp medication today.  Denies any chest pain, headache or dizziness. Reports decreased sodium intake. PMH of CAD,  CHF, hyperlipidemia, DM, CKD stage 3 and carotid stenosis.    Hypertension   This is a chronic problem. The current episode started more than 1 year ago. The problem has been gradually worsening since onset. The problem is uncontrolled. Pertinent negatives include no blurred vision, chest pain, palpitations or sweats. Risk factors for coronary artery disease include diabetes mellitus, dyslipidemia, family history, male gender and obesity. Past treatments include ACE inhibitors and calcium channel blockers. The current treatment provides moderate improvement. Compliance problems include diet.      Review of Systems   Constitutional: Negative.    HENT: Negative.    Eyes: Negative for blurred vision.   Respiratory: Negative for cough.    Cardiovascular: Positive for leg swelling. Negative for chest pain and palpitations.   Gastrointestinal: Negative.    Genitourinary: Negative.    Psychiatric/Behavioral: Negative.        Objective:      Physical Exam   Constitutional: He is oriented to person, place, and time. He appears well-developed and well-nourished.   HENT:   Head: Normocephalic.   Eyes: EOM are normal.   Neck: Neck supple.   Cardiovascular: Normal rate and normal heart sounds.  Exam reveals no friction rub.    No murmur heard.  Pulmonary/Chest: Effort normal and breath sounds normal.   Musculoskeletal: He exhibits edema (1+ pitting BLE).   Neurological: He is alert and oriented to person, place, and time.   Skin: Skin is warm and dry.   Vitals reviewed.      Assessment:       1. Essential  hypertension        Plan:   Essential hypertension  -not able to accurately evaluate pt's bp today, due to no taking meds prior to visit.  Pt instructed to continue current treatment plan.  Instructed patient to monitor bp 1-2 hours after taking medication.  email bp readings within 5-7 days.    -uncontrolled.  Guidelines <140/80;  -diet and exercise to aid in treatment    Follow up pending pt's home readings

## 2017-03-22 NOTE — MR AVS SNAPSHOT
[facility]  [address] [address]  Phone: [phone]  Fax: [phone]                  Abdullahi Salazar   3/22/2017 9:00 AM   Office Visit    Description:  Male : 1945   Provider:  Shellie Tejada NP   Department:  [facility]           Reason for Visit     Blood Pressure Check           Diagnoses this Visit        Comments    Essential hypertension    -  Primary            To Do List           Future Appointments        Provider Department Dept Phone    3/22/2017 9:00 AM Shellie Tejada NP [facility] [phone]    3/24/2017 9:00 AM Efe Greene MD [facility] [phone]    2017 2:00 PM Maximo Hernandez MD [facility] [phone]    2017 9:20 AM Hermilo Hyman DO [facility] [phone]    2017 10:00 AM Toño Lawrence MD [facility] [phone]      Goals (5 Years of Data)     None      [facility]     [facility] -  Assistance  Registered nurses in [facility] provide clinical advisement, health education, appointment booking, and other advisory services.  Call for this free service at [phone].             Medications           Message regarding Medications     Verify the changes and/or additions to your medication regime listed below are the same as discussed with your clinician today.  If any of these changes or additions are incorrect, please notify your healthcare provider.             Verify that the below list of medications is an accurate representation of the medications you are currently taking.  If none reported, the list may be blank. If incorrect, please contact your healthcare provider. Carry this list with you in case of emergency.           Current Medications     ACCU-CHEK CLEOPATRA PLUS TEST STRP Strp TEST BLOOD SUGARS 4 TIMES DAILY    amlodipine (NORVASC) 10 MG tablet Take 1 tablet (10 mg  "total) by mouth once daily.    aspirin (ECOTRIN) 81 MG EC tablet Take 81 mg by mouth once daily. Every day    atorvastatin (LIPITOR) 10 MG tablet Take 1 tablet (10 mg total) by mouth once daily.    BD INSULIN PEN NEEDLE UF SHORT 31 gauge x 5/16" Ndle USE FOUR TIMES DAILY    benazepril (LOTENSIN) 20 MG tablet Take 1 tablet (20 mg total) by mouth every evening.    carvedilol (COREG) 25 MG tablet TAKE 1/2 TABLET TWICE DAILY WITH MEALS    clopidogrel (PLAVIX) 75 mg tablet TAKE 1 TABLET ONE TIME DAILY    ferrous sulfate 325 mg (65 mg iron) Tab tablet 325 mg 3 (three) times daily.     insulin glargine (LANTUS SOLOSTAR) 100 unit/mL (3 mL) InPn pen INJECT  52 UNITS SUBCUTANEOUSLY EVERY DAY    insulin lispro (HUMALOG KWIKPEN) 100 unit/mL InPn pen INJECT  33 UNITS SUBCUTANEOUSLY THREE TIMES DAILY BEFORE MEALS    isosorbide mononitrate (IMDUR) 60 MG 24 hr tablet TAKE 1 TABLET (60 MG TOTAL) BY MOUTH EVERY EVENING.    lancets (TRUEPLUS LANCETS) 28 gauge Misc 1 lancet by Misc.(Non-Drug; Combo Route) route 2 (two) times daily as needed.    levetiracetam (KEPPRA) 500 MG Tab Take 750 mg by mouth 2 (two) times daily.     PRADAXA 75 mg Cap TAKE 1 CAPSULE TWICE DAILY    RANEXA 500 mg Tb12 TAKE 1 TABLET (500 MG TOTAL) BY MOUTH 2 (TWO) TIMES DAILY.    tamsulosin (FLOMAX) 0.4 mg Cp24 Take 1 capsule (0.4 mg total) by mouth once daily.    torsemide (DEMADEX) 20 MG Tab TAKE 1 TABLET (20 MG TOTAL) BY MOUTH EVERY MORNING.    ezetimibe (ZETIA) 10 mg tablet Take 1 tablet (10 mg total) by mouth once daily.    nitroGLYCERIN (NITROSTAT) 0.4 MG SL tablet Place 1 tablet (0.4 mg total) under the tongue every 5 (five) minutes as needed for Chest pain.           Clinical Reference Information           Your Vitals Were     BP Pulse Temp Resp Height Weight    167/80 71 97.6 °F (36.4 °C) (Tympanic) 18 5' 7" (1.702 m) 105.3 kg (232 lb 0.6 oz)    BMI                36.34 kg/m2          Blood Pressure          Most Recent Value    BP  (!)  167/80    "   Allergies as of 3/22/2017     No Known Allergies      Immunizations Administered on Date of Encounter - 3/22/2017     None      Instructions    Monitor blood pressure daily after taking medication.   E-mail reading within 1 week.         Language Assistance Services     ATTENTION: Language assistance services are available, free of charge. Please call [phone].      ATENCIÓN: Si nikkyla irina, tiene a puentes disposición servicios gratuitos de asistencia lingüística. Llame al [phone].     CHÚ Ý: N?u b?n nói Ti?ng Vi?t, có các d?ch v? h? tr? ngôn ng? mi?n phí dành cho b?n. G?i s? [phone].         [facility] complies with applicable Federal civil rights laws and does not discriminate on the basis of race, color, national origin, age, disability, or sex.

## 2017-03-24 ENCOUNTER — OFFICE VISIT (OUTPATIENT)
Dept: NEUROLOGY | Facility: CLINIC | Age: 72
End: 2017-03-24
Payer: MEDICARE

## 2017-03-24 VITALS
HEIGHT: 67 IN | BODY MASS INDEX: 36.54 KG/M2 | DIASTOLIC BLOOD PRESSURE: 80 MMHG | SYSTOLIC BLOOD PRESSURE: 176 MMHG | HEART RATE: 69 BPM | WEIGHT: 232.81 LBS

## 2017-03-24 DIAGNOSIS — G40.909 SEIZURE DISORDER: ICD-10-CM

## 2017-03-24 DIAGNOSIS — R35.0 URINARY FREQUENCY: ICD-10-CM

## 2017-03-24 DIAGNOSIS — R26.9 ABNORMAL GAIT: Primary | ICD-10-CM

## 2017-03-24 DIAGNOSIS — G91.9 HYDROCEPHALUS: ICD-10-CM

## 2017-03-24 DIAGNOSIS — R26.9 GAIT DIFFICULTY: Chronic | ICD-10-CM

## 2017-03-24 PROCEDURE — 1159F MED LIST DOCD IN RCRD: CPT | Mod: S$GLB,,, | Performed by: PSYCHIATRY & NEUROLOGY

## 2017-03-24 PROCEDURE — 99213 OFFICE O/P EST LOW 20 MIN: CPT | Mod: S$GLB,,, | Performed by: PSYCHIATRY & NEUROLOGY

## 2017-03-24 PROCEDURE — 3077F SYST BP >= 140 MM HG: CPT | Mod: S$GLB,,, | Performed by: PSYCHIATRY & NEUROLOGY

## 2017-03-24 PROCEDURE — 99499 UNLISTED E&M SERVICE: CPT | Mod: S$GLB,,, | Performed by: PSYCHIATRY & NEUROLOGY

## 2017-03-24 PROCEDURE — 1126F AMNT PAIN NOTED NONE PRSNT: CPT | Mod: S$GLB,,, | Performed by: PSYCHIATRY & NEUROLOGY

## 2017-03-24 PROCEDURE — 1160F RVW MEDS BY RX/DR IN RCRD: CPT | Mod: S$GLB,,, | Performed by: PSYCHIATRY & NEUROLOGY

## 2017-03-24 PROCEDURE — 3079F DIAST BP 80-89 MM HG: CPT | Mod: S$GLB,,, | Performed by: PSYCHIATRY & NEUROLOGY

## 2017-03-24 PROCEDURE — 99999 PR PBB SHADOW E&M-EST. PATIENT-LVL III: CPT | Mod: PBBFAC,,, | Performed by: PSYCHIATRY & NEUROLOGY

## 2017-03-24 PROCEDURE — 1157F ADVNC CARE PLAN IN RCRD: CPT | Mod: S$GLB,,, | Performed by: PSYCHIATRY & NEUROLOGY

## 2017-03-24 NOTE — PROGRESS NOTES
Abdullahi WARD Chief Complaints during this visit:  Follow up for seizures and hydrocephalus with abnormal gait.   Maximo Hernandez MD  [address] [place] [address]    Primary Care Physician  Maximo Hernandez MD  [address] [facility] [address]    Interval History 3/24/17:  Accompanied today by his daughter and son in law, wife, Rd.   Had EEG and LP done, walking improved but only 5ml of CSF removed per radiology.   Pt couldn't walk the full 6 blocks to the street car about one week ago.  Still feels like walking is better overall.   Physical therapist is working with him.   No seizure like activity since last visit  Was found to have enlarged prostate, started on flomax.       History of present illness 3/1/17:   71 y.o.  male seen in consultation for evaluation of NPH.  Patient is accompanied by his wife Rd and daughter Catrachito who aid in history giving. They state he has had at least two CT scans that have shown possible NPH. He has had gait difficulty since 2011. His family decscribes a slow, deliberate gait that he states he still has. No falls.     He has also had some memory trouble, his daughter states he got lost driving once and couldn't figure out how to log into his email. This has also been present over the last five years. He and his family state he is independent in all ADLs. They are concerned that his short term memory is bad.     Regarding possible urinary incontinence, he is having urinary frequency. He is not sure if his prostate has been checked. Has not had any true incontinence but does have trouble getting to the bathroom on time and frequent nocturia.     At [facility], he was told he needed a lumbar puncture and possuble shunt. They would like to transition from [facility] system to [facility].     Of note pt also has seizures, starting about one year ago. Family is not able to provide history of the first event, his most recent event, he was slumped over in a  chair, drooling and kicking his feet. This was about two weeks ago. He was already on Keppra and this was increased to 750mg BID. Had one incident as well in the last two weeks where he was unable to speak to a therapist who came to the house.     Is on Pradaxa for atrial fibrillation and CAD     II.  Review of systems      Urinary issues ()?Yes    III.  Past Medical History:   Diagnosis Date    Actinic keratoses     Altered mental status 3/22/2015    Anticoagulant long-term use     Atrial fibrillation     CAD (coronary artery disease)     stents after bypass    Carotid artery stenosis 3/31/2015    Colon polyps 2011    repeat colonoscopy 2014    Combined hyperlipidemia associated with type 2 diabetes mellitus     Diabetes mellitus type II, uncontrolled     dx 2004    GERD (gastroesophageal reflux disease)     Grand mal seizure     last 2/2016    History of cardiac pacemaker 3/31/2015    HTN (hypertension)     Mitral valve insufficiency     Seizure disorder, grand mal     Sleep apnea with use of continuous positive airway pressure (CPAP)     Syncope 3/30/2015     Family History   Problem Relation Age of Onset    Stomach cancer Mother     Lung cancer Father     Diabetes Sister     Hypertension Sister     Heart disease Neg Hx     Stroke Neg Hx      No family history of seizures     Social History     Social History    Marital status:      Spouse name: N/A    Number of children: N/A    Years of education: N/A     Social History Main Topics    Smoking status: Former Smoker     Packs/day: 2.00     Years: 25.00     Types: Cigarettes     Quit date: 1/1/1983    Smokeless tobacco: Never Used      Comment: quit 1983     Alcohol use Yes      Comment: occasional    Drug use: No    Sexual activity: Not on file     Other Topics Concern    Not on file     Social History Narrative         Current Outpatient Prescriptions on File Prior to Visit   Medication Sig Dispense Refill    ACCU-CHEK  "CLEOPATRA PLUS TEST STRP Strp TEST BLOOD SUGARS 4 TIMES DAILY 150 strip PRN    amlodipine (NORVASC) 10 MG tablet Take 1 tablet (10 mg total) by mouth once daily. 90 tablet 3    aspirin (ECOTRIN) 81 MG EC tablet Take 81 mg by mouth once daily. Every day      atorvastatin (LIPITOR) 10 MG tablet Take 1 tablet (10 mg total) by mouth once daily. 90 tablet 3    BD INSULIN PEN NEEDLE UF SHORT 31 gauge x 5/16" Ndle USE FOUR TIMES DAILY 360 each 3    benazepril (LOTENSIN) 20 MG tablet Take 1 tablet (20 mg total) by mouth every evening. 90 tablet 3    carvedilol (COREG) 25 MG tablet TAKE 1/2 TABLET TWICE DAILY WITH MEALS 90 tablet 3    clopidogrel (PLAVIX) 75 mg tablet TAKE 1 TABLET ONE TIME DAILY 90 tablet 3    ezetimibe (ZETIA) 10 mg tablet Take 1 tablet (10 mg total) by mouth once daily. 90 tablet 3    ferrous sulfate 325 mg (65 mg iron) Tab tablet 325 mg 3 (three) times daily.       insulin glargine (LANTUS SOLOSTAR) 100 unit/mL (3 mL) InPn pen INJECT  52 UNITS SUBCUTANEOUSLY EVERY DAY 45 mL 3    insulin lispro (HUMALOG KWIKPEN) 100 unit/mL InPn pen INJECT  33 UNITS SUBCUTANEOUSLY THREE TIMES DAILY BEFORE MEALS 90 mL 3    isosorbide mononitrate (IMDUR) 60 MG 24 hr tablet TAKE 1 TABLET (60 MG TOTAL) BY MOUTH EVERY EVENING. 90 tablet 3    lancets (TRUEPLUS LANCETS) 28 gauge Misc 1 lancet by Misc.(Non-Drug; Combo Route) route 2 (two) times daily as needed. 200 each 3    levetiracetam (KEPPRA) 500 MG Tab Take 750 mg by mouth 2 (two) times daily.       nitroGLYCERIN (NITROSTAT) 0.4 MG SL tablet Place 1 tablet (0.4 mg total) under the tongue every 5 (five) minutes as needed for Chest pain. 30 tablet 0    PRADAXA 75 mg Cap TAKE 1 CAPSULE TWICE DAILY 180 capsule 3    RANEXA 500 mg Tb12 TAKE 1 TABLET (500 MG TOTAL) BY MOUTH 2 (TWO) TIMES DAILY. 180 tablet 3    tamsulosin (FLOMAX) 0.4 mg Cp24 Take 1 capsule (0.4 mg total) by mouth once daily. 90 capsule 3    torsemide (DEMADEX) 20 MG Tab TAKE 1 TABLET (20 MG TOTAL) " "BY MOUTH EVERY MORNING. 90 tablet 3     No current facility-administered medications on file prior to visit.        PRIOR problem-specific medications tried:  For seizures: Keppra for Gait issues: None     Review of patient's allergies indicates:  No Known Allergies    IV. Physical Exam    Vitals:    03/24/17 0759   BP: (!) 176/80   Pulse: 69   Weight: 105.6 kg (232 lb 12.9 oz)   Height: 5' 7" (1.702 m)     General appearance: Well nourished, well developed, no acute distress.     HEENT: NC/AT, MMM, normal pharynx            -------------------------------------------------------------    MOCA 22/30 from 3/1/17    Facial Expression: slightly diminished with reduced blink frequency       Affect: full       Speech:  normal (not dysarthric)  -------------------------------------------------------  Cranial nerves: EOMI         --------------------------------------------------------------  Cerebellar and Coordination  Gait:  Normal stance and stride length with slightly reduced arm swing     Rapid Alternating Movements (pronation/supination):  R normal; L normal  --------------------------------------------------------------  MOVEMENT DISORDERS FOCUSED EXAM  Abnormality of movement (bradykinesia, hyperkinesia) present? No    Tremor present?   No     V.  Laboratory/ Radiological Data: (Personally reviewed images)    CT brain without contrast 2/15/2016:             Lab Results   Component Value Date    XYFAGZQY31 465 07/08/2016     Lab Results   Component Value Date    TSH 1.395 09/21/2016     Routine EEG:   DATE OF SERVICE: 02/16/2016.     PATIENT HISTORY: The patient is a 70-year-old male who for the past three or   four years has had intermittent episodes of somewhat variable shaking and   collapsing lasting a few minutes. He did apparently have a tonic-clonic seizure  observed in the ER while being admitted for an upper respiratory infection.     EEG REPORT: This is a standard 16-channel digital EEG recording with " electrodes  and the International 10-20 electrode placement system. The patient has a   basic background rhythm that attains 9 Hz, predominantly in the 7 to 9 Hz range.  Does show reactivity. Of note, drowsiness was obtained. The patient does   show some apparent mild disorganization in the left central temporal area, which  is intermittent.     INTERPRETATION: This is a primarily waking and drowsy EEG that does show   possible disorganization in the left central temporal area. It could be   consistent with a seizure focus. I recommend clinical correlation.    CSF studies reviewed with patient and family, increased red cells due to physical tap and on ASA and plavix at time of LP    CT head with and without contrast 3/15/17 - no evidence for left temporal mass,   1.  Persistent ventricular dilatation which is suspicious for communicating (normal pressure) hydrocephalus.  The degree of ventricular dilatation is unchanged.  2.  Chronic microvascular ischemic changes of the bilateral cerebral white matter.    EEG 3/9/17:  EEG FINDINGS: The recording was obtained with a number of standard bipolar and   referential montages during wakefulness, drowsiness and sleep. In the alert   state, the posterior background rhythm was a symmetric, well-modulated 8.5 to 9   Hz alpha rhythm, which reacted symmetrically to eye opening. Intermittent   photic stimulation failed to evoke driving responses. No abnormalities were   activated by photic stimulation. Hyperventilation was not performed. During   drowsiness, the background rhythm waxed and waned and there were periods of   slowing. Intermittent left temporal focal polymorphic delta range slowing was   noted. During stage II of sleep, symmetric V waves and sleep spindles were   noted. There were no interictal epileptiform abnormalities and no clinical or   electrographic seizures were recorded. The EKG channel revealed sinus rhythm   with occasional PVCs.     IMPRESSION: This is  an abnormal EEG during wakefulness, drowsiness and sleep.   Left temporal focal slowing was noted.     CLINICAL CORRELATION: The patient is a 71-year-old male who is being evaluated   for history of seizures, who is currently maintained on Keppra. This is an   abnormal EEG during wakefulness, drowsiness and sleep. The presence of focal   slowing in the left temporal region is suggestive of focal neuronal dysfunction   in this region. There is no evidence of an epileptic process on this recording.  No seizures were recorded during this study. Of note, the EKG channel   revealed sinus rhythm with occasional PVCs.    VI. Medical Decision Making         Problem List Items Addressed This Visit        Other    Gait difficulty (Chronic)    Overview     Since 2011. Suspect NPH. Slight hypomimia, again no other Parkinsonism on exam today         Current Assessment & Plan     Gait improved after last LP, and is still better for me today than last visit  but only 5ml of CSF withdrawn, concern this may have been placebo effect.    - Obtain Cisternogram as above         Hydrocephalus    Overview     Evident on CT scans dating back to 2015. Unclear if Ex Vacuo vs NPH. Gait improved after LP 3/10/17 but only 5ml of CSF removed.          Current Assessment & Plan     - Plan for cisternogram   - Referral to NSGY on the [facility] if positive  - Continued clinical monitoring if negative   - Continue physical therapy          Seizure disorder    Overview     Last seizure 2/2017 occurred while on Keppra, Keppra increased to 750 BID at that time. CT brain with and without contrast negative for evidence of L temporal lobe mass. Persistent slowing on EEG 3/9/17 L temporal.            Current Assessment & Plan     No seizure like activity since last visit    - Continue Keppra 750mg BID  - Pt aware he cannot drive until he is at least 6 month seizure free. Last seizure in February 2017.          Urinary frequency    Overview     Found to  have prostate enlargement per family, now on flomax          Current Assessment & Plan     - Defer management to PCP            Other Visit Diagnoses     Abnormal gait    -  Primary    Relevant Orders    NM Cisternogram    FL Lumbar Puncture (xpd)                   Tests ordered during this visit:   Orders Placed This Encounter   Procedures    NM Cisternogram    FL Lumbar Puncture (xpd)

## 2017-03-24 NOTE — ASSESSMENT & PLAN NOTE
- Plan for cisternogram   - Referral to NSGY on the [place] if positive  - Continued clinical monitoring if negative   - Continue physical therapy

## 2017-03-24 NOTE — MR AVS SNAPSHOT
[facility]  [address]  [facility] [address]  Phone: [phone]  Fax: [phone]                  Abdullahi Salazar   3/24/2017 9:00 AM   Office Visit    Description:  Male : 1945   Provider:  Efe Greene MD   Department:  [facility]           Diagnoses this Visit        Comments    Abnormal gait    -  Primary            To Do List           Future Appointments        Provider Department Dept Phone    2017 2:00 PM Maximo Hernandez MD [facility] [phone]    2017 9:20 AM Hermilo Hyman DO [facility] [phone]    2017 11:00 AM NOM FLIP2 500 LB LIMIT [facility] [phone]    2017 3:00 PM NOM NM4 MG2 400LB LIMIT [facility] [phone]    2017 3:00 PM NOM NM4 MG2 400LB LIMIT [facility] [phone]      Goals (5 Years of Data)     None      [facility]     [facility] Nurse Care Line -  Assistance  Registered nurses in the [facility] provide clinical advisement, health education, appointment booking, and other advisory services.  Call for this free service at [phone].             Medications           Message regarding Medications     Verify the changes and/or additions to your medication regime listed below are the same as discussed with your clinician today.  If any of these changes or additions are incorrect, please notify your healthcare provider.             Verify that the below list of medications is an accurate representation of the medications you are currently taking.  If none reported, the list may be blank. If incorrect, please contact your healthcare provider. Carry this list with you in case of emergency.           Current Medications     ACCU-CHEK CLEOPATRA PLUS TEST STRP Strp TEST BLOOD SUGARS 4 TIMES DAILY    amlodipine (NORVASC) 10 MG tablet Take 1 tablet (10 mg total) by mouth once daily.     "aspirin (ECOTRIN) 81 MG EC tablet Take 81 mg by mouth once daily. Every day    atorvastatin (LIPITOR) 10 MG tablet Take 1 tablet (10 mg total) by mouth once daily.    BD INSULIN PEN NEEDLE UF SHORT 31 gauge x 5/16" Ndle USE FOUR TIMES DAILY    benazepril (LOTENSIN) 20 MG tablet Take 1 tablet (20 mg total) by mouth every evening.    carvedilol (COREG) 25 MG tablet TAKE 1/2 TABLET TWICE DAILY WITH MEALS    clopidogrel (PLAVIX) 75 mg tablet TAKE 1 TABLET ONE TIME DAILY    ezetimibe (ZETIA) 10 mg tablet Take 1 tablet (10 mg total) by mouth once daily.    ferrous sulfate 325 mg (65 mg iron) Tab tablet 325 mg 3 (three) times daily.     insulin glargine (LANTUS SOLOSTAR) 100 unit/mL (3 mL) InPn pen INJECT  52 UNITS SUBCUTANEOUSLY EVERY DAY    insulin lispro (HUMALOG KWIKPEN) 100 unit/mL InPn pen INJECT  33 UNITS SUBCUTANEOUSLY THREE TIMES DAILY BEFORE MEALS    isosorbide mononitrate (IMDUR) 60 MG 24 hr tablet TAKE 1 TABLET (60 MG TOTAL) BY MOUTH EVERY EVENING.    lancets (TRUEPLUS LANCETS) 28 gauge Misc 1 lancet by Misc.(Non-Drug; Combo Route) route 2 (two) times daily as needed.    levetiracetam (KEPPRA) 500 MG Tab Take 750 mg by mouth 2 (two) times daily.     nitroGLYCERIN (NITROSTAT) 0.4 MG SL tablet Place 1 tablet (0.4 mg total) under the tongue every 5 (five) minutes as needed for Chest pain.    PRADAXA 75 mg Cap TAKE 1 CAPSULE TWICE DAILY    RANEXA 500 mg Tb12 TAKE 1 TABLET (500 MG TOTAL) BY MOUTH 2 (TWO) TIMES DAILY.    tamsulosin (FLOMAX) 0.4 mg Cp24 Take 1 capsule (0.4 mg total) by mouth once daily.    torsemide (DEMADEX) 20 MG Tab TAKE 1 TABLET (20 MG TOTAL) BY MOUTH EVERY MORNING.           Clinical Reference Information           Your Vitals Were     BP Pulse Height Weight BMI    176/80 69 5' 7" (1.702 m) 105.6 kg (232 lb 12.9 oz) 36.46 kg/m2      Blood Pressure          Most Recent Value    BP  (!)  176/80      Allergies as of 3/24/2017     No Known Allergies      Immunizations Administered on Date of " Encounter - 3/24/2017     None      Orders Placed During Today's Visit     Future Labs/Procedures Expected by Expires    FL Lumbar Puncture (xpd)  3/24/2017 3/24/2018    NM Cisternogram  3/24/2017 3/24/2018      Language Assistance Services     ATTENTION: Language assistance services are available, free of charge. Please call [phone].      ATENCIÓN: Si habla español, tiene a puentes disposición servicios gratuitos de asistencia lingüística. Llame al [phone].     CHÚ Ý: N?u b?n nói Ti?ng Vi?t, có các d?ch v? h? tr? ngôn ng? mi?n phí dành cho b?n. G?i s? [phone].         [facility] complies with applicable Federal civil rights laws and does not discriminate on the basis of race, color, national origin, age, disability, or sex.

## 2017-03-24 NOTE — ASSESSMENT & PLAN NOTE
Gait improved after last LP, and is still better for me today than last visit  but only 5ml of CSF withdrawn, concern this may have been placebo effect.    - Obtain Cisternogram as above

## 2017-03-24 NOTE — LETTER
March 24, 2017      Maximo Hernandez MD  [address] [facility] [address]           [facility]  [address]  [place] [address]  Phone: [phone]  Fax: [phone]          Patient: Abdullahi Salazar   MR Number: [id number]   YOB: 1945   Date of Visit: 3/24/2017       Dear Dr. Maximo Hernandez:    Thank you for referring Abdullahi Salazar to me for evaluation. Attached you will find relevant portions of my assessment and plan of care.    If you have questions, please do not hesitate to call me. I look forward to following Abdullahi Salazar along with you.    Sincerely,    Efe Greene MD    Enclosure  CC:  No Recipients    If you would like to receive this communication electronically, please contact [email or web address] or [phone] to request more information on [facility] access.    For providers and/or their staff who would like to refer a patient to [facility], please contact us through our one-stop-shop provider referral line, [facility], at [phone].    If you feel you have received this communication in error or would no longer like to receive these types of communications, please e-mail [email or web address]

## 2017-03-24 NOTE — ASSESSMENT & PLAN NOTE
No seizure like activity since last visit    - Continue Keppra 750mg BID  - Pt aware he cannot drive until he is at least 6 month seizure free. Last seizure in February 2017.

## 2017-04-07 ENCOUNTER — PATIENT MESSAGE (OUTPATIENT)
Dept: NEUROLOGY | Facility: CLINIC | Age: 72
End: 2017-04-07

## 2017-04-12 ENCOUNTER — OFFICE VISIT (OUTPATIENT)
Dept: FAMILY MEDICINE | Facility: CLINIC | Age: 72
End: 2017-04-12
Payer: MEDICARE

## 2017-04-12 VITALS
BODY MASS INDEX: 36.63 KG/M2 | DIASTOLIC BLOOD PRESSURE: 90 MMHG | HEART RATE: 69 BPM | HEIGHT: 67 IN | SYSTOLIC BLOOD PRESSURE: 160 MMHG | WEIGHT: 233.38 LBS

## 2017-04-12 DIAGNOSIS — R80.9 PROTEINURIA DUE TO TYPE 2 DIABETES MELLITUS: ICD-10-CM

## 2017-04-12 DIAGNOSIS — N18.30 TYPE 2 DIABETES MELLITUS WITH STAGE 3 CHRONIC KIDNEY DISEASE, WITH LONG-TERM CURRENT USE OF INSULIN: ICD-10-CM

## 2017-04-12 DIAGNOSIS — E11.59 HYPERTENSION ASSOCIATED WITH DIABETES: ICD-10-CM

## 2017-04-12 DIAGNOSIS — E66.9 OBESITY WITH SERIOUS COMORBIDITY: ICD-10-CM

## 2017-04-12 DIAGNOSIS — I11.0 HYPERTENSIVE HEART DISEASE WITH HEART FAILURE: ICD-10-CM

## 2017-04-12 DIAGNOSIS — I15.2 HYPERTENSION ASSOCIATED WITH DIABETES: ICD-10-CM

## 2017-04-12 DIAGNOSIS — N18.30 KIDNEY DISEASE, CHRONIC, STAGE III (GFR 30-59 ML/MIN): ICD-10-CM

## 2017-04-12 DIAGNOSIS — E11.29 PROTEINURIA DUE TO TYPE 2 DIABETES MELLITUS: ICD-10-CM

## 2017-04-12 DIAGNOSIS — E11.29 DIABETES MELLITUS WITH MICROALBUMINURIA: ICD-10-CM

## 2017-04-12 DIAGNOSIS — E11.22 TYPE 2 DIABETES MELLITUS WITH STAGE 3 CHRONIC KIDNEY DISEASE, WITH LONG-TERM CURRENT USE OF INSULIN: ICD-10-CM

## 2017-04-12 DIAGNOSIS — E78.2 COMBINED HYPERLIPIDEMIA ASSOCIATED WITH TYPE 2 DIABETES MELLITUS: ICD-10-CM

## 2017-04-12 DIAGNOSIS — Z79.4 TYPE 2 DIABETES MELLITUS WITH STAGE 3 CHRONIC KIDNEY DISEASE, WITH LONG-TERM CURRENT USE OF INSULIN: ICD-10-CM

## 2017-04-12 DIAGNOSIS — R80.9 DIABETES MELLITUS WITH MICROALBUMINURIA: ICD-10-CM

## 2017-04-12 DIAGNOSIS — E11.69 COMBINED HYPERLIPIDEMIA ASSOCIATED WITH TYPE 2 DIABETES MELLITUS: ICD-10-CM

## 2017-04-12 PROCEDURE — 3077F SYST BP >= 140 MM HG: CPT | Mod: S$GLB,,, | Performed by: FAMILY MEDICINE

## 2017-04-12 PROCEDURE — 1160F RVW MEDS BY RX/DR IN RCRD: CPT | Mod: S$GLB,,, | Performed by: FAMILY MEDICINE

## 2017-04-12 PROCEDURE — 1126F AMNT PAIN NOTED NONE PRSNT: CPT | Mod: S$GLB,,, | Performed by: FAMILY MEDICINE

## 2017-04-12 PROCEDURE — 99999 PR PBB SHADOW E&M-EST. PATIENT-LVL III: CPT | Mod: PBBFAC,,, | Performed by: FAMILY MEDICINE

## 2017-04-12 PROCEDURE — 3066F NEPHROPATHY DOC TX: CPT | Mod: S$GLB,,, | Performed by: FAMILY MEDICINE

## 2017-04-12 PROCEDURE — 3080F DIAST BP >= 90 MM HG: CPT | Mod: S$GLB,,, | Performed by: FAMILY MEDICINE

## 2017-04-12 PROCEDURE — 99214 OFFICE O/P EST MOD 30 MIN: CPT | Mod: S$GLB,,, | Performed by: FAMILY MEDICINE

## 2017-04-12 PROCEDURE — 1159F MED LIST DOCD IN RCRD: CPT | Mod: S$GLB,,, | Performed by: FAMILY MEDICINE

## 2017-04-12 PROCEDURE — 3045F PR MOST RECENT HEMOGLOBIN A1C LEVEL 7.0-9.0%: CPT | Mod: S$GLB,,, | Performed by: FAMILY MEDICINE

## 2017-04-12 PROCEDURE — 1157F ADVNC CARE PLAN IN RCRD: CPT | Mod: S$GLB,,, | Performed by: FAMILY MEDICINE

## 2017-04-12 PROCEDURE — 99499 UNLISTED E&M SERVICE: CPT | Mod: S$GLB,,, | Performed by: FAMILY MEDICINE

## 2017-04-12 RX ORDER — INSULIN GLARGINE 100 [IU]/ML
INJECTION, SOLUTION SUBCUTANEOUS
Qty: 45 ML | Refills: 3 | Status: SHIPPED | OUTPATIENT
Start: 2017-04-12 | End: 2017-07-12 | Stop reason: SDUPTHER

## 2017-04-12 RX ORDER — INSULIN LISPRO 100 [IU]/ML
INJECTION, SOLUTION INTRAVENOUS; SUBCUTANEOUS
Qty: 90 ML | Refills: 3 | Status: ON HOLD | OUTPATIENT
Start: 2017-04-12 | End: 2017-08-11

## 2017-04-12 RX ORDER — BENAZEPRIL HYDROCHLORIDE 40 MG/1
40 TABLET ORAL DAILY
Qty: 90 TABLET | Refills: 0 | Status: SHIPPED | OUTPATIENT
Start: 2017-04-12 | End: 2017-05-26 | Stop reason: SDUPTHER

## 2017-04-12 RX ORDER — ATORVASTATIN CALCIUM 20 MG/1
20 TABLET, FILM COATED ORAL DAILY
Qty: 90 TABLET | Refills: 0 | Status: SHIPPED | OUTPATIENT
Start: 2017-04-12 | End: 2017-05-26 | Stop reason: SDUPTHER

## 2017-04-12 RX ORDER — EZETIMIBE 10 MG/1
10 TABLET ORAL DAILY
Qty: 90 TABLET | Refills: 0 | Status: SHIPPED | OUTPATIENT
Start: 2017-04-12 | End: 2017-05-26 | Stop reason: SDUPTHER

## 2017-04-12 NOTE — PROGRESS NOTES
Subjective:      Patient ID: Abdullahi Salazar is a 71 y.o. male.    Chief Complaint: diabetes  HPI   The patient presents with diabetes.  The patient denies polyuria, polydipsia, polyphagia, hypoglycemia and paresthesias.  The patient's glucose control has been good.  Home glucose averages are routinely checked.  The patient is without retinopathy currently.  The patient has no history of neuropathy.  The patient currently complains of no podiatric problems.  The patient has excellent compliance.  Hemoglobin A1C   Date Value Ref Range Status   03/01/2017 9.0 (H) 4.5 - 6.2 % Final     Comment:     According to ADA guidelines, hemoglobin A1C <7.0% represents  optimal control in non-pregnant diabetic patients.  Different  metrics may apply to specific populations.   Standards of Medical Care in Diabetes - 2016.  For the purpose of screening for the presence of diabetes:  <5.7%     Consistent with the absence of diabetes  5.7-6.4%  Consistent with increasing risk for diabetes   (prediabetes)  >or=6.5%  Consistent with diabetes  Currently no consensus exists for use of hemoglobin A1C  for diagnosis of diabetes for children.     09/21/2016 8.0 (H) 4.5 - 6.2 % Final     Comment:     According to ADA guidelines, hemoglobin A1C <7.0% represents  optimal control in non-pregnant diabetic patients.  Different  metrics may apply to specific populations.   Standards of Medical Care in Diabetes - 2016.  For the purpose of screening for the presence of diabetes:  <5.7%     Consistent with the absence of diabetes  5.7-6.4%  Consistent with increasing risk for diabetes   (prediabetes)  >or=6.5%  Consistent with diabetes  Currently no consensus exists for use of hemoglobin A1C  for diagnosis of diabetes for children.     07/08/2016 8.1 (H) 4.5 - 6.2 % Final     Comment:     According to ADA guidelines, hemoglobin A1C <7.0% represents  optimal control in non-pregnant diabetic patients.  Different  metrics may apply to specific  "populations.   Standards of Medical Care in Diabetes - 2016.  For the purpose of screening for the presence of diabetes:  <5.7%     Consistent with the absence of diabetes  5.7-6.4%  Consistent with increasing risk for diabetes   (prediabetes)  >or=6.5%  Consistent with diabetes  Currently no consensus exists for use of hemoglobin A1C  for diagnosis of diabetes for children.       No results found for: MICROALBUR, [id number]    The patient presents with essential hypertension.  The patient is tolerating the medication well and is in excellent compliance.  The patient is experiencing no side effects.  Counseling was offered regarding low salt diets.  The patient has a reduced salt intake.  The patient denies chest pain, palpitations, shortness of breath, dyspnea on exertion, left or murmur neck pain, nausea, vomiting, diaphoresis, paroxysmal nocturnal dyspnea, and orthopnea.   BP (!) 160/90  Pulse 69  Ht 5' 7" (1.702 m)  Wt 105.8 kg (233 lb 5.7 oz)  BMI 36.55 kg/m2     The patient presents with hyperlipidemia.  The patient reports tolerating the medication well and is in excellent compliance.  There have been no medication side effects.  The patient denies chest pain, neuropathy, and myalgias.  The patient has reduced fat intake and has been exercising.  Current treatment has included the medications listed in the med card.    Lab Results   Component Value Date    CHOL 217 (H) 09/21/2016    CHOL 210 (H) 08/24/2016    CHOL 187 02/18/2016       Lab Results   Component Value Date    HDL 38 (L) 09/21/2016    HDL 40 08/24/2016    HDL 33 (L) 02/18/2016       Lab Results   Component Value Date    LDLCALC 151.8 09/21/2016    LDLCALC 139.8 08/24/2016    LDLCALC 118.6 02/18/2016       Lab Results   Component Value Date    TRIG 136 09/21/2016    TRIG 151 (H) 08/24/2016    TRIG 177 (H) 02/18/2016       Lab Results   Component Value Date    CHOLHDL 17.5 (L) 09/21/2016    CHOLHDL 19.0 (L) 08/24/2016    CHOLHDL 17.6 (L) " "02/18/2016     Lab Results   Component Value Date    ALT 22 02/22/2017    AST 19 02/22/2017    ALKPHOS 72 02/22/2017    BILITOT 0.5 02/22/2017       Health Maintenance Due   Topic Date Due    TETANUS VACCINE  11/12/1963       Past Medical History:  Past Medical History:   Diagnosis Date    Actinic keratoses     Altered mental status 3/22/2015    Anticoagulant long-term use     Atrial fibrillation     CAD (coronary artery disease)     stents after bypass    Carotid artery stenosis 3/31/2015    Colon polyps 2011    repeat colonoscopy 2014    Combined hyperlipidemia associated with type 2 diabetes mellitus     Diabetes mellitus type II, uncontrolled     dx 2004    GERD (gastroesophageal reflux disease)     Grand mal seizure     last 2/2016    History of cardiac pacemaker 3/31/2015    HTN (hypertension)     Mitral valve insufficiency     Seizure disorder, grand mal     Sleep apnea with use of continuous positive airway pressure (CPAP)     Syncope 3/30/2015     Past Surgical History:   Procedure Laterality Date    CARDIAC PACEMAKER PLACEMENT      CATARACT EXTRACTION W/ INTRAOCULAR LENS  IMPLANT, BILATERAL      CORONARY ARTERY BYPASS GRAFT  1993    three vessels    CORONARY STENT PLACEMENT      HERNIA REPAIR      as infant     Review of patient's allergies indicates:  No Known Allergies  Current Outpatient Prescriptions on File Prior to Visit   Medication Sig Dispense Refill    ACCU-CHEK CLEOPATRA PLUS TEST STRP Strp TEST BLOOD SUGARS 4 TIMES DAILY 150 strip PRN    amlodipine (NORVASC) 10 MG tablet Take 1 tablet (10 mg total) by mouth once daily. 90 tablet 3    aspirin (ECOTRIN) 81 MG EC tablet Take 81 mg by mouth once daily. Every day      BD INSULIN PEN NEEDLE UF SHORT 31 gauge x 5/16" Ndle USE FOUR TIMES DAILY 360 each 3    carvedilol (COREG) 25 MG tablet TAKE 1/2 TABLET TWICE DAILY WITH MEALS 90 tablet 3    clopidogrel (PLAVIX) 75 mg tablet TAKE 1 TABLET ONE TIME DAILY 90 tablet 3    ferrous " sulfate 325 mg (65 mg iron) Tab tablet 325 mg 3 (three) times daily.       isosorbide mononitrate (IMDUR) 60 MG 24 hr tablet TAKE 1 TABLET (60 MG TOTAL) BY MOUTH EVERY EVENING. 90 tablet 3    lancets (TRUEPLUS LANCETS) 28 gauge Misc 1 lancet by Misc.(Non-Drug; Combo Route) route 2 (two) times daily as needed. 200 each 3    levetiracetam (KEPPRA) 500 MG Tab Take 750 mg by mouth 2 (two) times daily.       PRADAXA 75 mg Cap TAKE 1 CAPSULE TWICE DAILY 180 capsule 3    RANEXA 500 mg Tb12 TAKE 1 TABLET (500 MG TOTAL) BY MOUTH 2 (TWO) TIMES DAILY. 180 tablet 3    tamsulosin (FLOMAX) 0.4 mg Cp24 Take 1 capsule (0.4 mg total) by mouth once daily. 90 capsule 3    torsemide (DEMADEX) 20 MG Tab TAKE 1 TABLET (20 MG TOTAL) BY MOUTH EVERY MORNING. 90 tablet 3    [DISCONTINUED] atorvastatin (LIPITOR) 10 MG tablet Take 1 tablet (10 mg total) by mouth once daily. 90 tablet 3    [DISCONTINUED] benazepril (LOTENSIN) 20 MG tablet Take 1 tablet (20 mg total) by mouth every evening. 90 tablet 3    [DISCONTINUED] insulin glargine (LANTUS SOLOSTAR) 100 unit/mL (3 mL) InPn pen INJECT  52 UNITS SUBCUTANEOUSLY EVERY DAY 45 mL 3    [DISCONTINUED] insulin lispro (HUMALOG KWIKPEN) 100 unit/mL InPn pen INJECT  33 UNITS SUBCUTANEOUSLY THREE TIMES DAILY BEFORE MEALS 90 mL 3    nitroGLYCERIN (NITROSTAT) 0.4 MG SL tablet Place 1 tablet (0.4 mg total) under the tongue every 5 (five) minutes as needed for Chest pain. 30 tablet 0    [DISCONTINUED] ezetimibe (ZETIA) 10 mg tablet Take 1 tablet (10 mg total) by mouth once daily. 90 tablet 3     No current facility-administered medications on file prior to visit.      Social History     Social History    Marital status:      Spouse name: N/A    Number of children: N/A    Years of education: N/A     Occupational History    Not on file.     Social History Main Topics    Smoking status: Former Smoker     Packs/day: 2.00     Years: 25.00     Types: Cigarettes     Quit date: 1/1/1983     "Smokeless tobacco: Never Used      Comment: quit 1983     Alcohol use Yes      Comment: occasional    Drug use: No    Sexual activity: Not on file     Other Topics Concern    Not on file     Social History Narrative     Family History   Problem Relation Age of Onset    Stomach cancer Mother     Lung cancer Father     Diabetes Sister     Hypertension Sister     Heart disease Neg Hx     Stroke Neg Hx            Review of Systems   Constitutional: Negative.  Negative for chills, diaphoresis and fever.   HENT: Negative for congestion, hearing loss, mouth sores, postnasal drip and sore throat.    Eyes: Negative for pain and visual disturbance.   Respiratory: Negative for cough, chest tightness, shortness of breath and wheezing.    Cardiovascular: Negative for chest pain.   Gastrointestinal: Negative for abdominal pain, anal bleeding, blood in stool, constipation, diarrhea, nausea and vomiting.   Genitourinary: Negative for dysuria and hematuria.   Musculoskeletal: Negative for back pain, neck pain and neck stiffness.   Skin: Negative for rash.   Neurological: Negative for dizziness and weakness.       Objective:   BP (!) 140/67  Pulse 69  Ht 5' 7" (1.702 m)  Wt 105.8 kg (233 lb 5.7 oz)  BMI 36.55 kg/m2    Physical Exam   Constitutional: He appears well-developed and well-nourished.   Cardiovascular: Normal rate, regular rhythm and normal heart sounds.  Exam reveals no gallop and no friction rub.    No murmur heard.  Pulmonary/Chest: Effort normal and breath sounds normal. No respiratory distress. He has no wheezes. He has no rales.   Musculoskeletal: He exhibits no edema.       Assessment:     1. Uncontrolled type 2 diabetes mellitus without complication, with long-term current use of insulin    2. Diabetes mellitus with microalbuminuria    3. Hypertension associated with diabetes    4. Hypertensive heart disease with heart failure    5. Kidney disease, chronic, stage III (GFR 30-59 ml/min)    6. " Proteinuria due to type 2 diabetes mellitus    7. Type 2 diabetes mellitus with stage 3 chronic kidney disease, with long-term current use of insulin    8. Combined hyperlipidemia associated with type 2 diabetes mellitus    9. Obesity with serious comorbidity        Plan:   Diagnoses and all orders for this visit:    Uncontrolled type 2 diabetes mellitus without complication, with long-term current use of insulin  -     insulin lispro (HUMALOG KWIKPEN) 100 unit/mL InPn pen; INJECT  33 UNITS SUBCUTANEOUSLY THREE TIMES DAILY BEFORE MEALS  -     insulin glargine (LANTUS SOLOSTAR) 100 unit/mL (3 mL) InPn pen; INJECT 62 UNITS SUBCUTANEOUSLY EVERY DAY  -     Ambulatory Referral to Diabetes Education  -     Hemoglobin A1c; Future    Diabetes mellitus with microalbuminuria  -     insulin lispro (HUMALOG KWIKPEN) 100 unit/mL InPn pen; INJECT  33 UNITS SUBCUTANEOUSLY THREE TIMES DAILY BEFORE MEALS  -     insulin glargine (LANTUS SOLOSTAR) 100 unit/mL (3 mL) InPn pen; INJECT 62 UNITS SUBCUTANEOUSLY EVERY DAY  -     Ambulatory Referral to Diabetes Education  -     Hemoglobin A1c; Future    Hypertension associated with diabetes  -     benazepril (LOTENSIN) 40 MG tablet; Take 1 tablet (40 mg total) by mouth once daily.    Hypertensive heart disease with heart failure  -     benazepril (LOTENSIN) 40 MG tablet; Take 1 tablet (40 mg total) by mouth once daily.    Kidney disease, chronic, stage III (GFR 30-59 ml/min)    Proteinuria due to type 2 diabetes mellitus    Type 2 diabetes mellitus with stage 3 chronic kidney disease, with long-term current use of insulin  -     insulin lispro (HUMALOG KWIKPEN) 100 unit/mL InPn pen; INJECT  33 UNITS SUBCUTANEOUSLY THREE TIMES DAILY BEFORE MEALS  -     insulin glargine (LANTUS SOLOSTAR) 100 unit/mL (3 mL) InPn pen; INJECT 62 UNITS SUBCUTANEOUSLY EVERY DAY  -     Ambulatory Referral to Diabetes Education    Combined hyperlipidemia associated with type 2 diabetes mellitus  -     ezetimibe  (ZETIA) 10 mg tablet; Take 1 tablet (10 mg total) by mouth once daily.  -     atorvastatin (LIPITOR) 20 MG tablet; Take 1 tablet (20 mg total) by mouth once daily.  -     Hepatic function panel; Future  -     Lipid panel; Future    Obesity with serious comorbidity    I counseled 3 x on weight reduction.  rtc in 3-4 weeks for a bp checkand to review glucoses.

## 2017-04-12 NOTE — MR AVS SNAPSHOT
[facility]  [address] [place] [address]  Phone: [phone]  Fax: [phone]                  Abdullahi Salazar   2017 2:00 PM   Office Visit    Description:  Male : 1945   Provider:  Maximo Hernandez MD   Department:  [facility]           Diagnoses this Visit        Comments    Uncontrolled type 2 diabetes mellitus without complication, with long-term current use of insulin    -  Primary     Diabetes mellitus with microalbuminuria         Hypertension associated with diabetes         Hypertensive heart disease with heart failure         Kidney disease, chronic, stage III (GFR 30-59 ml/min)         Proteinuria due to type 2 diabetes mellitus         Type 2 diabetes mellitus with stage 3 chronic kidney disease, with long-term current use of insulin         Combined hyperlipidemia associated with type 2 diabetes mellitus         Obesity with serious comorbidity                To Do List           Future Appointments        Provider Department Dept Phone    2017 11:00 AM [facility] FLIP2 [address] LB LIMIT [facility] [phone]    2017 3:00 PM [facility] NM4 MG2 [address] [facility] [phone]    2017 3:00 PM [facility] NM4 MG2 [address] LIMIT [facility] [phone]    2017 3:00 PM [facility] NM4 MG2 [address] LIMIT [facility] [phone]    2017 10:00 AM Toño Lawrence MD [facility] [phone]      Goals (5 Years of Data)     None      Follow-Up and Disposition     Return in about 4 weeks (around 5/10/2017) for blood pressure check.    Follow-up and Disposition History       These Medications        Disp Refills Start End    ezetimibe (ZETIA) 10 mg tablet 90 tablet 0 2017    Take 1 tablet (10 mg total) by mouth once daily. - Oral    Pharmacy: [facility] [address] Ph #: [phone]        atorvastatin (LIPITOR) 20 MG tablet 90 tablet 0 4/12/2017 4/12/2018    Take 1 tablet (20 mg total) by mouth once daily. - Oral    Pharmacy: [facility] [place] [id number] [facility] Ph #: [phone]       benazepril (LOTENSIN) 40 MG tablet 90 tablet 0 4/12/2017 4/12/2018    Take 1 tablet (40 mg total) by mouth once daily. - Oral    Pharmacy: [facility] [place] [id number] [facility] Ph #: [phone]       insulin lispro (HUMALOG KWIKPEN) 100 unit/mL InPn pen 90 mL 3 4/12/2017     INJECT  33 UNITS SUBCUTANEOUSLY THREE TIMES DAILY BEFORE MEALS    Pharmacy: [place] [id number] [facility] Ph #: [phone]       insulin glargine (LANTUS SOLOSTAR) 100 unit/mL (3 mL) InPn pen 45 mL 3 4/12/2017     INJECT 62 UNITS SUBCUTANEOUSLY EVERY DAY    Pharmacy: [place] [id number] [facility] Ph #: [phone]         [facility]     [facility] On [facility] Nurse Care Line - 24/7 Assistance  Unless otherwise directed by your provider, please contact [facility], our nurse care line that is available for 24/7 assistance.     Registered nurses in the [facility] provide: appointment scheduling, clinical advisement, health education, and other advisory services.  Call: [phone] (toll free)               Medications           Message regarding Medications     Verify the changes and/or additions to your medication regime listed below are the same as discussed with your clinician today.  If any of these changes or additions are incorrect, please notify your healthcare provider.        START taking these NEW medications        Refills    atorvastatin (LIPITOR) 20 MG tablet 0    Sig: Take 1 tablet (20 mg total) by mouth once daily.    Class: Normal    Route: Oral    benazepril (LOTENSIN) 40 MG tablet 0    Sig: Take 1 tablet (40 mg total) by mouth once daily.    Class: Normal    Route: Oral     "  CHANGE how you are taking these medications     Start Taking Instead of    insulin glargine (LANTUS SOLOSTAR) 100 unit/mL (3 mL) InPn pen insulin glargine (LANTUS SOLOSTAR) 100 unit/mL (3 mL) InPn pen    Dosage:  INJECT 62 UNITS SUBCUTANEOUSLY EVERY DAY Dosage:  INJECT  52 UNITS SUBCUTANEOUSLY EVERY DAY    Reason for Change:  Reorder            Verify that the below list of medications is an accurate representation of the medications you are currently taking.  If none reported, the list may be blank. If incorrect, please contact your healthcare provider. Carry this list with you in case of emergency.           Current Medications     ACCU-CHEK CLEOPATRA PLUS TEST STRP Strp TEST BLOOD SUGARS 4 TIMES DAILY    amlodipine (NORVASC) 10 MG tablet Take 1 tablet (10 mg total) by mouth once daily.    aspirin (ECOTRIN) 81 MG EC tablet Take 81 mg by mouth once daily. Every day    BD INSULIN PEN NEEDLE UF SHORT 31 gauge x 5/16" Ndle USE FOUR TIMES DAILY    carvedilol (COREG) 25 MG tablet TAKE 1/2 TABLET TWICE DAILY WITH MEALS    clopidogrel (PLAVIX) 75 mg tablet TAKE 1 TABLET ONE TIME DAILY    ferrous sulfate 325 mg (65 mg iron) Tab tablet 325 mg 3 (three) times daily.     insulin glargine (LANTUS SOLOSTAR) 100 unit/mL (3 mL) InPn pen INJECT 62 UNITS SUBCUTANEOUSLY EVERY DAY    insulin lispro (HUMALOG KWIKPEN) 100 unit/mL InPn pen INJECT  33 UNITS SUBCUTANEOUSLY THREE TIMES DAILY BEFORE MEALS    isosorbide mononitrate (IMDUR) 60 MG 24 hr tablet TAKE 1 TABLET (60 MG TOTAL) BY MOUTH EVERY EVENING.    lancets (TRUEPLUS LANCETS) 28 gauge Misc 1 lancet by Misc.(Non-Drug; Combo Route) route 2 (two) times daily as needed.    levetiracetam (KEPPRA) 500 MG Tab Take 750 mg by mouth 2 (two) times daily.     PRADAXA 75 mg Cap TAKE 1 CAPSULE TWICE DAILY    RANEXA 500 mg Tb12 TAKE 1 TABLET (500 MG TOTAL) BY MOUTH 2 (TWO) TIMES DAILY.    tamsulosin (FLOMAX) 0.4 mg Cp24 Take 1 capsule (0.4 mg total) by mouth once daily.    torsemide (DEMADEX) 20 " "MG Tab TAKE 1 TABLET (20 MG TOTAL) BY MOUTH EVERY MORNING.    atorvastatin (LIPITOR) 20 MG tablet Take 1 tablet (20 mg total) by mouth once daily.    benazepril (LOTENSIN) 40 MG tablet Take 1 tablet (40 mg total) by mouth once daily.    ezetimibe (ZETIA) 10 mg tablet Take 1 tablet (10 mg total) by mouth once daily.    nitroGLYCERIN (NITROSTAT) 0.4 MG SL tablet Place 1 tablet (0.4 mg total) under the tongue every 5 (five) minutes as needed for Chest pain.           Clinical Reference Information           Your Vitals Were     BP Pulse Height Weight BMI    160/90 69 5' 7" (1.702 m) 105.8 kg (233 lb 5.7 oz) 36.55 kg/m2      Blood Pressure          Most Recent Value    BP  (!)  160/90      Allergies as of 4/12/2017     No Known Allergies      Immunizations Administered on Date of Encounter - 4/12/2017     None      Orders Placed During Today's Visit      Normal Orders This Visit    Ambulatory Referral to Diabetes Education     Future Labs/Procedures Expected by Expires    Hemoglobin A1c  7/11/2017 (Approximate) 8/10/2017    Hepatic function panel  7/11/2017 (Approximate) 8/10/2017    Lipid panel  7/11/2017 (Approximate) 8/10/2017      Language Assistance Services     ATTENTION: Language assistance services are available, free of charge. Please call [phone].      ATENCIÓN: Si nikkyla irina, tiene a puentes disposición servicios gratuitos de asistencia lingüística. Llame al [phone].     CHÚ Ý: N?u b?n nói Ti?ng Vi?t, có các d?ch v? h? tr? ngôn ng? mi?n phí dành cho b?n. G?i s? [phone].         [facility] [place] complies with applicable Federal civil rights laws and does not discriminate on the basis of race, color, national origin, age, disability, or sex.        "

## 2017-04-24 ENCOUNTER — HOSPITAL ENCOUNTER (OUTPATIENT)
Dept: RADIOLOGY | Facility: HOSPITAL | Age: 72
Discharge: HOME | End: 2017-04-24
Attending: PSYCHIATRY & NEUROLOGY
Payer: MEDICARE

## 2017-04-24 DIAGNOSIS — R26.9 ABNORMAL GAIT: ICD-10-CM

## 2017-04-24 PROCEDURE — 77003 FLUOROGUIDE FOR SPINE INJECT: CPT | Mod: 26,GC,, | Performed by: RADIOLOGY

## 2017-04-24 PROCEDURE — 62270 DX LMBR SPI PNXR: CPT | Mod: TC

## 2017-04-24 PROCEDURE — 78630 CEREBROSPINAL FLUID SCAN: CPT | Mod: 26,,, | Performed by: NUCLEAR MEDICINE

## 2017-04-24 PROCEDURE — 62270 DX LMBR SPI PNXR: CPT | Mod: GC,,, | Performed by: RADIOLOGY

## 2017-04-24 NOTE — H&P
"Radiology History & Physical      SUBJECTIVE:     Chief Complaint: rule out NPH    History of Present Illness:  Abdullahi Salazar is a 71 y.o. male who presents for Cisternogram    Past Medical History:   Diagnosis Date    Actinic keratoses     Altered mental status 3/22/2015    Anticoagulant long-term use     Atrial fibrillation     CAD (coronary artery disease)     stents after bypass    Carotid artery stenosis 3/31/2015    Colon polyps 2011    repeat colonoscopy 2014    Combined hyperlipidemia associated with type 2 diabetes mellitus     Diabetes mellitus type II, uncontrolled     dx 2004    GERD (gastroesophageal reflux disease)     Grand mal seizure     last 2/2016    History of cardiac pacemaker 3/31/2015    HTN (hypertension)     Mitral valve insufficiency     Seizure disorder, grand mal     Sleep apnea with use of continuous positive airway pressure (CPAP)     Syncope 3/30/2015     Past Surgical History:   Procedure Laterality Date    CARDIAC PACEMAKER PLACEMENT      CATARACT EXTRACTION W/ INTRAOCULAR LENS  IMPLANT, BILATERAL      CORONARY ARTERY BYPASS GRAFT  1993    three vessels    CORONARY STENT PLACEMENT      HERNIA REPAIR      as infant       Home Meds:   Prior to Admission medications    Medication Sig Start Date End Date Taking? Authorizing Provider   ACCU-CHEK CLEOPATRA PLUS TEST STRP Strp TEST BLOOD SUGARS 4 TIMES DAILY 8/19/16   Maximo Hernandez MD   amlodipine (NORVASC) 10 MG tablet Take 1 tablet (10 mg total) by mouth once daily. 3/1/17   Maximo Hernandez MD   aspirin (ECOTRIN) 81 MG EC tablet Take 81 mg by mouth once daily. Every day 12/1/11   Historical Provider, MD   atorvastatin (LIPITOR) 20 MG tablet Take 1 tablet (20 mg total) by mouth once daily. 4/12/17 4/12/18  Maximo Hernandez MD   BD INSULIN PEN NEEDLE UF SHORT 31 gauge x 5/16" Ndle USE FOUR TIMES DAILY 1/5/17   Maximo Hernandez MD   benazepril (LOTENSIN) 40 MG tablet Take 1 tablet (40 mg total) by mouth once daily. " 4/12/17 4/12/18  Maximo Hernandez MD   carvedilol (COREG) 25 MG tablet TAKE 1/2 TABLET TWICE DAILY WITH MEALS 1/5/17   Maximo Hernandez MD   clopidogrel (PLAVIX) 75 mg tablet TAKE 1 TABLET ONE TIME DAILY 1/5/17   Maximo Hernandez MD   ezetimibe (ZETIA) 10 mg tablet Take 1 tablet (10 mg total) by mouth once daily. 4/12/17 4/12/18  Maximo Hernandez MD   ferrous sulfate 325 mg (65 mg iron) Tab tablet 325 mg 3 (three) times daily.  2/23/17   Historical Provider, MD   insulin glargine (LANTUS SOLOSTAR) 100 unit/mL (3 mL) InPn pen INJECT 62 UNITS SUBCUTANEOUSLY EVERY DAY 4/12/17   Maximo Hernandez MD   insulin lispro (HUMALOG KWIKPEN) 100 unit/mL InPn pen INJECT  33 UNITS SUBCUTANEOUSLY THREE TIMES DAILY BEFORE MEALS 4/12/17   Maximo Hernandez MD   isosorbide mononitrate (IMDUR) 60 MG 24 hr tablet TAKE 1 TABLET (60 MG TOTAL) BY MOUTH EVERY EVENING. 1/5/17   Maximo Hernandez MD   lancets (TRUEPLUS LANCETS) 28 gauge Misc 1 lancet by Misc.(Non-Drug; Combo Route) route 2 (two) times daily as needed. 5/25/16   Maximo Hernandez MD   levetiracetam (KEPPRA) 500 MG Tab Take 750 mg by mouth 2 (two) times daily.     Historical Provider, MD   nitroGLYCERIN (NITROSTAT) 0.4 MG SL tablet Place 1 tablet (0.4 mg total) under the tongue every 5 (five) minutes as needed for Chest pain. 2/19/16 2/20/17  Stephen Sharma MD   PRADAXA 75 mg Cap TAKE 1 CAPSULE TWICE DAILY 1/5/17   Maximo Hernandez MD   RANEXA 500 mg Tb12 TAKE 1 TABLET (500 MG TOTAL) BY MOUTH 2 (TWO) TIMES DAILY. 1/5/17   Maximo Hernandez MD   tamsulosin (FLOMAX) 0.4 mg Cp24 Take 1 capsule (0.4 mg total) by mouth once daily. 3/8/17 3/8/18  Maximo Hernandez MD   torsemide (DEMADEX) 20 MG Tab TAKE 1 TABLET (20 MG TOTAL) BY MOUTH EVERY MORNING. 1/5/17   Maximo Hernandez MD     Anticoagulants/Antiplatelets: aspirin and plavix held since 4 days    Allergies: Review of patient's allergies indicates:  No Known Allergies  Sedation History:  no adverse reactions    Review of Systems:    Hematological: no known coagulopathies  Respiratory: no shortness of breath  Cardiovascular: no chest pain  Gastrointestinal: no abdominal pain  Genito-Urinary: no dysuria  Musculoskeletal: negative  Neurological: no TIA or stroke symptoms         OBJECTIVE:     Vital Signs (Most Recent)       Physical Exam:  ASA: 2  Mallampati: 2    General: no acute distress  Mental Status: alert and oriented to person, place and time  HEENT: normocephalic, atraumatic  Chest: unlabored breathing  Heart: regular heart rate  Abdomen: nondistended  Extremity: moves all extremities    Laboratory  Lab Results   Component Value Date    INR 1.1 03/10/2017       Lab Results   Component Value Date    WBC 5.42 09/21/2016    HGB 13.4 (L) 09/21/2016    HCT 38.9 (L) 09/21/2016    MCV 96 09/21/2016     09/21/2016      Lab Results   Component Value Date     (H) 02/22/2017     02/22/2017    K 4.3 02/22/2017     02/22/2017    CO2 28 02/22/2017    BUN 18 02/22/2017    CREATININE 1.7 (H) 02/22/2017    CALCIUM 9.6 02/22/2017    MG 2.0 03/01/2017    ALT 22 02/22/2017    AST 19 02/22/2017    ALBUMIN 3.6 02/22/2017    BILITOT 0.5 02/22/2017    BILIDIR 0.4 (H) 07/24/2015       ASSESSMENT/PLAN:     Sedation Plan: Locql  Patient will undergo Cisternogram.    BEVERLY BUTT  PGY-II Radiology Resident  [address] [facility]  Pager: [phone]

## 2017-04-24 NOTE — PROCEDURES
Radiology Post-Procedure Note    Pre Op Diagnosis: Rule out NPH    Post Op Diagnosis: Same    Procedure: lumbar puncture    Procedure performed by: Dr. Monroe     Written Informed Consent Obtained: Yes    Specimen Removed: 0 mL CSF    Estimated Blood Loss: Minimal    Findings: Following written informed consent and sterile prep and drape, a 22 gauge spinal needle was inserted at L3-L4 intralaminar space and a radiotracer was injected.  There were no complications. Full report to follow. Pt. Instructed on post procedure care including but not limited to signs of infection, bleeding, headaches, and follow up with ordering physician. Please see nuclear medicine report for further details of cistenogram.      Patient tolerated procedure well.    BEVERLY BUTT  PGY-II Radiology Resident  [address] [facility]  Pager: [phone]

## 2017-04-25 ENCOUNTER — HOSPITAL ENCOUNTER (OUTPATIENT)
Dept: RADIOLOGY | Facility: HOSPITAL | Age: 72
Discharge: HOME | End: 2017-04-25
Attending: PSYCHIATRY & NEUROLOGY
Payer: MEDICARE

## 2017-04-26 ENCOUNTER — HOSPITAL ENCOUNTER (OUTPATIENT)
Dept: RADIOLOGY | Facility: HOSPITAL | Age: 72
Discharge: HOME | End: 2017-04-26
Attending: PSYCHIATRY & NEUROLOGY
Payer: MEDICARE

## 2017-04-26 ENCOUNTER — PATIENT MESSAGE (OUTPATIENT)
Dept: NEUROLOGY | Facility: CLINIC | Age: 72
End: 2017-04-26

## 2017-04-26 PROCEDURE — 78630 CEREBROSPINAL FLUID SCAN: CPT | Mod: TC

## 2017-04-27 ENCOUNTER — TELEPHONE (OUTPATIENT)
Dept: NEUROLOGY | Facility: CLINIC | Age: 72
End: 2017-04-27

## 2017-04-27 DIAGNOSIS — G91.2 NPH (NORMAL PRESSURE HYDROCEPHALUS): ICD-10-CM

## 2017-04-27 NOTE — TELEPHONE ENCOUNTER
Left voicemail for Efe HILL.     Called pt to schedule an appt. Appt date, time, and location given. Pt verbalized understanding.

## 2017-04-27 NOTE — TELEPHONE ENCOUNTER
Spoke with  Marie, given the diagnosis of NPH based on cisternogram counseled that he will need surgical shunting. Will refer to Dr. Oconnor on the [place].     Will route chart to Dr. Oconnor's staff.

## 2017-04-28 ENCOUNTER — TELEPHONE (OUTPATIENT)
Dept: NEUROSURGERY | Facility: CLINIC | Age: 72
End: 2017-04-28

## 2017-04-28 NOTE — TELEPHONE ENCOUNTER
Returned pts call. Pt was confused regarding and thought his appt was actually surgery. Informed pt the appt is to see the provider and discuss possible treatments. Pt verbalized understanding.     ----- Message from Frida Anna sent at 4/28/2017  1:37 PM CDT -----  Contact: pt  Questions regarding procedure  Call back on# [phone]  thanks

## 2017-05-08 ENCOUNTER — OFFICE VISIT (OUTPATIENT)
Dept: SURGERY | Facility: CLINIC | Age: 72
End: 2017-05-08
Payer: MEDICARE

## 2017-05-08 VITALS
BODY MASS INDEX: 37.33 KG/M2 | DIASTOLIC BLOOD PRESSURE: 72 MMHG | TEMPERATURE: 98 F | HEART RATE: 69 BPM | SYSTOLIC BLOOD PRESSURE: 159 MMHG | WEIGHT: 238.31 LBS

## 2017-05-08 DIAGNOSIS — R10.31 RIGHT GROIN PAIN: Primary | ICD-10-CM

## 2017-05-08 PROCEDURE — 99213 OFFICE O/P EST LOW 20 MIN: CPT | Mod: S$GLB,,, | Performed by: SURGERY

## 2017-05-08 PROCEDURE — 1159F MED LIST DOCD IN RCRD: CPT | Mod: S$GLB,,, | Performed by: SURGERY

## 2017-05-08 PROCEDURE — 99999 PR PBB SHADOW E&M-EST. PATIENT-LVL III: CPT | Mod: PBBFAC,,, | Performed by: SURGERY

## 2017-05-08 PROCEDURE — 1126F AMNT PAIN NOTED NONE PRSNT: CPT | Mod: S$GLB,,, | Performed by: SURGERY

## 2017-05-08 PROCEDURE — 3078F DIAST BP <80 MM HG: CPT | Mod: S$GLB,,, | Performed by: SURGERY

## 2017-05-08 PROCEDURE — 1160F RVW MEDS BY RX/DR IN RCRD: CPT | Mod: S$GLB,,, | Performed by: SURGERY

## 2017-05-08 PROCEDURE — 3077F SYST BP >= 140 MM HG: CPT | Mod: S$GLB,,, | Performed by: SURGERY

## 2017-05-08 NOTE — PROGRESS NOTES
Subjective:       Patient ID: Abdullahi Salazar is a 71 y.o. male.    Follow-up for hernia check    Chief Complaint: No chief complaint on file.    HPI Comments: Patient was seen previously with concerns of an inguinal hernia.  None was seen on physical exam.  He underwent an ultrasound that did not show any evidence of a right or left inguinal hernia.    Patient presents for follow-up now.    He denies any groin pain or groin bulges.  He does not have any other complaints other than he recently had a seizure    Review of Systems   Constitutional: Negative.    HENT: Negative.    Eyes: Negative.    Respiratory: Negative.    Cardiovascular: Negative.    Gastrointestinal: Negative.    Endocrine: Negative.    Genitourinary: Negative.    Musculoskeletal: Negative.    Skin: Negative.    Allergic/Immunologic: Negative.    Neurological: Positive for seizures.   Hematological: Negative.    Psychiatric/Behavioral: Negative.        Objective:      Physical Exam   Constitutional: He is oriented to person, place, and time. No distress.   Overweight   HENT:   Head: Normocephalic.   Eyes: No scleral icterus.   Neck: Normal range of motion. No JVD present. No tracheal deviation present. No thyromegaly present.   Cardiovascular: Normal rate, normal heart sounds and intact distal pulses.  Exam reveals no friction rub.    No murmur heard.  No carotid bruit   Pulmonary/Chest: Effort normal.   Abdominal: Soft. Bowel sounds are normal. He exhibits no distension and no mass. There is no tenderness. No hernia.   There is a predominance of adipose tissue in the mons pubis area and just above the penile base but no janneth hernia   Genitourinary: Penis normal.   Lymphadenopathy:     He has no cervical adenopathy.   Neurological: He is alert and oriented to person, place, and time.   Skin: Skin is warm and dry.   Vitals reviewed.      Assessment:      no evidence of a right or left inguinal hernia   Plan:     follow-up with surgery as needed

## 2017-05-08 NOTE — PATIENT INSTRUCTIONS
I do not detect any evidence of a right or left inguinal hernia.    If you ever notice a bulge in your groin or pain in the groin or a fullness in the scrotum please let us know      Hernia (Adult)    A hernia can happen when there is a weakness or defect in the wall of the abdomen or groin. Intestines or nearby tissues may move from their usual location and push through the weakness in the wall. This can cause a hernia (bulge) you may see or feel.  Causes and Risk Factors   A hernia may be present at birth. Or it may be caused by the wear and tear of daily living. Certain factors can make a hernia more likely. These can include:  · Heavy lifting  · Straining, whether from lifting, movement, or constipation  · Chronic cough  · Injury to the abdominal wall  · Excess weight  · Pregnancy  · Prior surgery  · Older age  · Family history of hernia  Symptoms  Symptoms of a hernia may come on suddenly. Or they may appear slowly over time. Some common symptoms include:  · Bulge in the groin area, around the navel, or in the scrotum (the bulge may get bigger when you stand and go away when you lie down)  · Pain or pressure around the bulge  · Pain during activities such as lifting, coughing, or sneezing  · A feeling of weakness or pressure in the groin  · Pain or swelling in the scrotum  Types of hernias  There are different types of hernia. The type you have depends on its location:  · Inguinal: This type is in the groin or scrotum. It is more common in men.  · Femoral: This type is in the groin, upper thigh (where the leg bends), or labia. It is more common in women.  · Ventral: This type is in the abdominal wall.  · Umbilical: This type occurs around the navel (belly button).  · Incisional: This type occurs at the site of a previous surgery.  The condition of the hernia can help determine how urgently it needs to be treated.  · Reducible: It goes back in by itself, or it can be pushed back in.  · Irreducible: It cant be  pushed back in.  · Incarcerated/Strangulated: The intestine is trapped (incarcerated). If this happens, you wont be able to push the bulge back in. If the incarcerated hernia isnt treated, it may become strangulated. This means the area loses blood supply and the tissue may die. This requires emergency surgery! Treatment is needed right away!  In most cases, a hernia will not heal on its own. Surgery is usually needed to repair the defect in the abdominal wall or groin. Youll be told more about surgery, if needed.  If your symptoms are not severe, treatment may sometimes be delayed. In such cases, regular follow-up visits with the provider will be needed. Youll be asked to keep track of your symptoms and to watch for signs of more serious problems. You may also be given guidelines similar to the home care instructions below.  Home Care  To help keep a hernia from getting worse, you may be advised to:  · Avoid heavy lifting and straining as directed.  · Take steps to prevent constipation, such as eating more fiber and drinking more water. This may help reduce straining that can occur when having a bowel movement. Reducing straining may help keep your symptoms from getting worse.  · Maintain a healthy weight or lose excess weight. This can help reduce strain on abdominal muscles and tissues.  · Stop smoking. This can help prevent coughing that may also strain abdominal muscles and tissues.  Follow-up care  Follow up with your healthcare provider, or as directed. If imaging tests were done, they will be reviewed a doctor. You will be told the results and any new findings that may affect your care.  When to seek medical advice  Call your healthcare provider right away if any of these occur:  · Hernia hardens, swells, or grows larger  · Hernia can no longer be pushed back in  · Pain moves to the lower right abdomen (just below the waistline), or spreads to the back  Call 911  Call 911 right away if any of these  occur:  · Nausea and vomiting  · Severe pain, redness, or tenderness in the area near the hernia  · Pain worsens quickly and doesnt get better  · Inability to have a bowel movement or pass gas  · Fever of 100.4°F (38°C) or higher  · Trouble breathing  · Fainting  · Rapid heart rate  · Vomiting blood  · Large amounts of blood in stool  Date Last Reviewed: 6/9/2015  © 1417-4207 [employer]. [address] [place] [address], [place] [address]. All rights reserved. This information is not intended as a substitute for professional medical care. Always follow your healthcare professional's instructions.

## 2017-05-11 ENCOUNTER — INITIAL CONSULT (OUTPATIENT)
Dept: NEUROSURGERY | Facility: CLINIC | Age: 72
End: 2017-05-11
Payer: MEDICARE

## 2017-05-11 VITALS
BODY MASS INDEX: 36.61 KG/M2 | SYSTOLIC BLOOD PRESSURE: 157 MMHG | WEIGHT: 233.25 LBS | HEART RATE: 69 BPM | HEIGHT: 67 IN | DIASTOLIC BLOOD PRESSURE: 73 MMHG

## 2017-05-11 DIAGNOSIS — G91.2 NPH (NORMAL PRESSURE HYDROCEPHALUS): Primary | ICD-10-CM

## 2017-05-11 PROCEDURE — 3078F DIAST BP <80 MM HG: CPT | Mod: S$GLB,,, | Performed by: PHYSICIAN ASSISTANT

## 2017-05-11 PROCEDURE — 1126F AMNT PAIN NOTED NONE PRSNT: CPT | Mod: S$GLB,,, | Performed by: PHYSICIAN ASSISTANT

## 2017-05-11 PROCEDURE — 1160F RVW MEDS BY RX/DR IN RCRD: CPT | Mod: S$GLB,,, | Performed by: PHYSICIAN ASSISTANT

## 2017-05-11 PROCEDURE — 1159F MED LIST DOCD IN RCRD: CPT | Mod: S$GLB,,, | Performed by: PHYSICIAN ASSISTANT

## 2017-05-11 PROCEDURE — 3077F SYST BP >= 140 MM HG: CPT | Mod: S$GLB,,, | Performed by: PHYSICIAN ASSISTANT

## 2017-05-11 PROCEDURE — 99499 UNLISTED E&M SERVICE: CPT | Mod: S$GLB,,, | Performed by: PHYSICIAN ASSISTANT

## 2017-05-11 PROCEDURE — 99203 OFFICE O/P NEW LOW 30 MIN: CPT | Mod: S$GLB,,, | Performed by: PHYSICIAN ASSISTANT

## 2017-05-11 NOTE — MR AVS SNAPSHOT
[facility]  [address] [facility] [address] [place] [address]  Phone: [phone]  Fax: [phone]                  Abdullahi Salazar   2017 3:00 PM   Initial consult    Description:  Male : 1945   Provider:  Bel La PA-C   Department:  [facility]           Reason for Visit     Neurologic Problem                To Do List           Future Appointments        Provider Department Dept Phone    2017 8:00 AM JUANITO Corona, [facility] [phone]    2017 9:00 AM JUANITO Corona, [facility] [phone]    2017 8:45 AM LABORATORY, [place] [facility] [phone]    2017 8:20 AM LABORATORY, [place] [facility] [phone]    2017 8:25 AM SPECIMEN, [place] [facility] [phone]      Goals (5 Years of Data)     None      [facility]     [facility] Nurse Care Line -  Assistance  Unless otherwise directed by your provider, please contact [facility], our nurse care line that is available for  assistance.     Registered nurses in the [facility] provide: appointment scheduling, clinical advisement, health education, and other advisory services.  Call: [phone] (toll free)               Medications           Message regarding Medications     Verify the changes and/or additions to your medication regime listed below are the same as discussed with your clinician today.  If any of these changes or additions are incorrect, please notify your healthcare provider.             Verify that the below list of medications is an accurate representation of the medications you are currently taking.  If none reported, the list may be blank. If incorrect, please contact your healthcare provider. Carry this list with you in case of emergency.           Current Medications     ACCU-CHEK CLEOPATRA PLUS  "TEST STRP Strp TEST BLOOD SUGARS 4 TIMES DAILY    amlodipine (NORVASC) 10 MG tablet Take 1 tablet (10 mg total) by mouth once daily.    aspirin (ECOTRIN) 81 MG EC tablet Take 81 mg by mouth once daily. Every day    atorvastatin (LIPITOR) 20 MG tablet Take 1 tablet (20 mg total) by mouth once daily.    BD INSULIN PEN NEEDLE UF SHORT 31 gauge x 5/16" Ndle USE FOUR TIMES DAILY    benazepril (LOTENSIN) 40 MG tablet Take 1 tablet (40 mg total) by mouth once daily.    carvedilol (COREG) 25 MG tablet TAKE 1/2 TABLET TWICE DAILY WITH MEALS    clopidogrel (PLAVIX) 75 mg tablet TAKE 1 TABLET ONE TIME DAILY    ezetimibe (ZETIA) 10 mg tablet Take 1 tablet (10 mg total) by mouth once daily.    ferrous sulfate 325 mg (65 mg iron) Tab tablet 325 mg 3 (three) times daily.     insulin glargine (LANTUS SOLOSTAR) 100 unit/mL (3 mL) InPn pen INJECT 62 UNITS SUBCUTANEOUSLY EVERY DAY    insulin lispro (HUMALOG KWIKPEN) 100 unit/mL InPn pen INJECT  33 UNITS SUBCUTANEOUSLY THREE TIMES DAILY BEFORE MEALS    isosorbide mononitrate (IMDUR) 60 MG 24 hr tablet TAKE 1 TABLET (60 MG TOTAL) BY MOUTH EVERY EVENING.    lancets (TRUEPLUS LANCETS) 28 gauge Misc 1 lancet by Misc.(Non-Drug; Combo Route) route 2 (two) times daily as needed.    levetiracetam (KEPPRA) 500 MG Tab Take 750 mg by mouth 2 (two) times daily.     PRADAXA 75 mg Cap TAKE 1 CAPSULE TWICE DAILY    RANEXA 500 mg Tb12 TAKE 1 TABLET (500 MG TOTAL) BY MOUTH 2 (TWO) TIMES DAILY.    tamsulosin (FLOMAX) 0.4 mg Cp24 Take 1 capsule (0.4 mg total) by mouth once daily.    torsemide (DEMADEX) 20 MG Tab TAKE 1 TABLET (20 MG TOTAL) BY MOUTH EVERY MORNING.    nitroGLYCERIN (NITROSTAT) 0.4 MG SL tablet Place 1 tablet (0.4 mg total) under the tongue every 5 (five) minutes as needed for Chest pain.           Clinical Reference Information           Your Vitals Were     BP Pulse Height Weight BMI    157/73 69 5' 7" (1.702 m) 105.8 kg (233 lb 4 oz) 36.53 kg/m2      Blood Pressure          Most Recent " Value    BP  (!)  157/73      Allergies as of 5/11/2017     No Known Allergies      Immunizations Administered on Date of Encounter - 5/11/2017     None      Language Assistance Services     ATTENTION: Language assistance services are available, free of charge. Please call [phone].      ATENCIÓN: Si karol arevalo, tiene a puentes disposición servicios gratuitos de asistencia lingüística. Llame al [phone].     CHÚ Ý: N?u b?n nói Ti?ng Vi?t, có các d?ch v? h? tr? ngôn ng? mi?n phí dành cho b?n. G?i s? [phone].         [facility] complies with applicable Federal civil rights laws and does not discriminate on the basis of race, color, national origin, age, disability, or sex.

## 2017-05-11 NOTE — PROGRESS NOTES
Neurosurgery History & Physical    Patient ID: Abdullahi Salazar is a 71 y.o. male.    Chief Complaint   Patient presents with    Neurologic Problem     NPH. Denies headaches, occastional vertigo and intermittent bladder incontinence.        Review of Systems   Constitutional: Negative for activity change, chills, fatigue and unexpected weight change.   HENT: Negative for hearing loss, tinnitus, trouble swallowing and voice change.    Eyes: Negative for visual disturbance.   Respiratory: Negative for apnea, chest tightness and shortness of breath.    Cardiovascular: Negative for chest pain and palpitations.   Gastrointestinal: Negative for abdominal pain, constipation, diarrhea, nausea and vomiting.   Genitourinary: Positive for frequency. Negative for difficulty urinating and dysuria.   Musculoskeletal: Positive for gait problem. Negative for back pain, neck pain and neck stiffness.   Skin: Negative for wound.   Neurological: Negative for dizziness, tremors, seizures, facial asymmetry, speech difficulty, weakness, light-headedness, numbness and headaches.   Psychiatric/Behavioral: Negative for confusion and decreased concentration.       Past Medical History:   Diagnosis Date    Actinic keratoses     Altered mental status 3/22/2015    Anticoagulant long-term use     Atrial fibrillation     CAD (coronary artery disease)     stents after bypass    Carotid artery stenosis 3/31/2015    Colon polyps 2011    repeat colonoscopy 2014    Combined hyperlipidemia associated with type 2 diabetes mellitus     Diabetes mellitus type II, uncontrolled     dx 2004    GERD (gastroesophageal reflux disease)     Grand mal seizure     last 2/2016    History of cardiac pacemaker 3/31/2015    HTN (hypertension)     Mitral valve insufficiency     Seizure disorder, grand mal     Sleep apnea with use of continuous positive airway pressure (CPAP)     Syncope 3/30/2015     Social History     Social History    Marital status:  "     Spouse name: N/A    Number of children: N/A    Years of education: N/A     Occupational History    Not on file.     Social History Main Topics    Smoking status: Former Smoker     Packs/day: 2.00     Years: 25.00     Types: Cigarettes     Quit date: 1/1/1983    Smokeless tobacco: Never Used      Comment: quit 1983     Alcohol use Yes      Comment: occasional    Drug use: No    Sexual activity: Not on file     Other Topics Concern    Not on file     Social History Narrative     Family History   Problem Relation Age of Onset    Stomach cancer Mother     Lung cancer Father     Diabetes Sister     Hypertension Sister     Heart disease Neg Hx     Stroke Neg Hx      Review of patient's allergies indicates:  No Known Allergies    Current Outpatient Prescriptions:     ACCU-CHEK CLEOPATRA PLUS TEST STRP Strp, TEST BLOOD SUGARS 4 TIMES DAILY, Disp: 150 strip, Rfl: PRN    amlodipine (NORVASC) 10 MG tablet, Take 1 tablet (10 mg total) by mouth once daily., Disp: 90 tablet, Rfl: 3    aspirin (ECOTRIN) 81 MG EC tablet, Take 81 mg by mouth once daily. Every day, Disp: , Rfl:     atorvastatin (LIPITOR) 20 MG tablet, Take 1 tablet (20 mg total) by mouth once daily., Disp: 90 tablet, Rfl: 0    BD INSULIN PEN NEEDLE UF SHORT 31 gauge x 5/16" Ndle, USE FOUR TIMES DAILY, Disp: 360 each, Rfl: 3    benazepril (LOTENSIN) 40 MG tablet, Take 1 tablet (40 mg total) by mouth once daily., Disp: 90 tablet, Rfl: 0    carvedilol (COREG) 25 MG tablet, TAKE 1/2 TABLET TWICE DAILY WITH MEALS, Disp: 90 tablet, Rfl: 3    clopidogrel (PLAVIX) 75 mg tablet, TAKE 1 TABLET ONE TIME DAILY, Disp: 90 tablet, Rfl: 3    ezetimibe (ZETIA) 10 mg tablet, Take 1 tablet (10 mg total) by mouth once daily., Disp: 90 tablet, Rfl: 0    ferrous sulfate 325 mg (65 mg iron) Tab tablet, 325 mg 3 (three) times daily. , Disp: , Rfl:     insulin glargine (LANTUS SOLOSTAR) 100 unit/mL (3 mL) InPn pen, INJECT 62 UNITS SUBCUTANEOUSLY EVERY DAY, " "Disp: 45 mL, Rfl: 3    insulin lispro (HUMALOG KWIKPEN) 100 unit/mL InPn pen, INJECT  33 UNITS SUBCUTANEOUSLY THREE TIMES DAILY BEFORE MEALS, Disp: 90 mL, Rfl: 3    isosorbide mononitrate (IMDUR) 60 MG 24 hr tablet, TAKE 1 TABLET (60 MG TOTAL) BY MOUTH EVERY EVENING., Disp: 90 tablet, Rfl: 3    lancets (TRUEPLUS LANCETS) 28 gauge Misc, 1 lancet by Misc.(Non-Drug; Combo Route) route 2 (two) times daily as needed., Disp: 200 each, Rfl: 3    levetiracetam (KEPPRA) 500 MG Tab, Take 750 mg by mouth 2 (two) times daily. , Disp: , Rfl:     PRADAXA 75 mg Cap, TAKE 1 CAPSULE TWICE DAILY, Disp: 180 capsule, Rfl: 3    RANEXA 500 mg Tb12, TAKE 1 TABLET (500 MG TOTAL) BY MOUTH 2 (TWO) TIMES DAILY., Disp: 180 tablet, Rfl: 3    tamsulosin (FLOMAX) 0.4 mg Cp24, Take 1 capsule (0.4 mg total) by mouth once daily., Disp: 90 capsule, Rfl: 3    torsemide (DEMADEX) 20 MG Tab, TAKE 1 TABLET (20 MG TOTAL) BY MOUTH EVERY MORNING., Disp: 90 tablet, Rfl: 3    nitroGLYCERIN (NITROSTAT) 0.4 MG SL tablet, Place 1 tablet (0.4 mg total) under the tongue every 5 (five) minutes as needed for Chest pain., Disp: 30 tablet, Rfl: 0    Vitals:    05/11/17 1433   BP: (!) 157/73   Pulse: 69   Weight: 105.8 kg (233 lb 4 oz)   Height: 5' 7" (1.702 m)       Physical Exam   Constitutional: He is oriented to person, place, and time. He appears well-developed and well-nourished.   HENT:   Head: Normocephalic and atraumatic.   Eyes: Pupils are equal, round, and reactive to light.   Neck: Normal range of motion. Neck supple.   Cardiovascular: Normal rate.    Pulmonary/Chest: Effort normal.   Abdominal: He exhibits no distension.   Musculoskeletal: Normal range of motion. He exhibits no edema.   Neurological: He is alert and oriented to person, place, and time. He has a normal Finger-Nose-Finger Test, a normal Heel to Shin Test, a normal Romberg Test and a normal Tandem Gait Test.   Reflex Scores:       Tricep reflexes are 1+ on the right side and 1+ on " the left side.       Bicep reflexes are 1+ on the right side and 1+ on the left side.       Brachioradialis reflexes are 1+ on the right side and 1+ on the left side.       Patellar reflexes are 1+ on the right side and 1+ on the left side.       Achilles reflexes are 1+ on the right side and 1+ on the left side.  Skin: Skin is warm and dry.   Psychiatric: He has a normal mood and affect. His speech is normal and behavior is normal. Judgment and thought content normal.   Nursing note and vitals reviewed.      Neurologic Exam     Mental Status   Oriented to person, place, and time.   Oriented to person.   Oriented to place.   Oriented to time.   Follows 3 step commands.   Attention: normal. Concentration: normal.   Speech: speech is normal   Level of consciousness: alert  Knowledge: consistent with education.   Able to name object. Able to read. Able to repeat. Able to write. Normal comprehension.     Cranial Nerves     CN II   Visual acuity: normal  Right visual field deficit: none  Left visual field deficit: none     CN III, IV, VI   Pupils are equal, round, and reactive to light.  Right pupil: Size: 3 mm. Shape: regular. Reactivity: brisk. Consensual response: intact.   Left pupil: Size: 3 mm. Shape: regular. Reactivity: brisk. Consensual response: intact.   CN III: no CN III palsy  CN VI: no CN VI palsy  Nystagmus: none   Diplopia: none  Ophthalmoparesis: none  Conjugate gaze: present    CN V   Right facial sensation deficit: none  Left facial sensation deficit: none    CN VII   Right facial weakness: none  Left facial weakness: none    CN VIII   Hearing: intact    CN IX, X   CN IX normal.   CN X normal.     CN XI   Right sternocleidomastoid strength: normal  Left sternocleidomastoid strength: normal  Right trapezius strength: normal  Left trapezius strength: normal    CN XII   Fasciculations: absent  Tongue deviation: none    Motor Exam   Muscle bulk: normal  Overall muscle tone: normal  Right arm pronator  drift: absent  Left arm pronator drift: absent    Strength   Right neck flexion: 5/5  Left neck flexion: 5/5  Right neck extension: 5/5  Left neck extension: 5/5  Right deltoid: 5/5  Left deltoid: 5/5  Right biceps: 5/5  Left biceps: 5/5  Right triceps: 5/5  Left triceps: 5/5  Right wrist flexion: 5/5  Left wrist flexion: 5/5  Right wrist extension: /5  Left wrist extension: 5  Right interossei: 5  Left interossei: 5  Right abdominals: 5/5  Left abdominals: 5/5  Right iliopsoas:   Left iliopsoas:   Right quadriceps:   Left quadriceps:   Right hamstrin/5  Left hamstrin/5  Right glutei:   Left glutei:   Right anterior tibial:   Left anterior tibial:   Right posterior tibial:   Left posterior tibial:   Right peroneal:   Left peroneal:   Right gastroc:   Left gastroc:     Sensory Exam   Right arm light touch: normal  Left arm light touch: normal  Right leg light touch: normal  Left leg light touch: normal  Right arm vibration: normal  Left arm vibration: normal  Right arm pinprick: normal  Left arm pinprick: normal    Gait, Coordination, and Reflexes     Gait  Gait: shuffling and wide-based    Coordination   Romberg: negative  Finger to nose coordination: normal  Heel to shin coordination: normal  Tandem walking coordination: normal    Tremor   Resting tremor: absent  Intention tremor: absent  Action tremor: absent    Reflexes   Right brachioradialis: 1+  Left brachioradialis: 1+  Right biceps: 1+  Left biceps: 1+  Right triceps: 1+  Left triceps: 1+  Right patellar: 1+  Left patellar: 1+  Right achilles: 1+  Left achilles: 1+  Right Chavez: absent  Left Chavez: absent  Right ankle clonus: absent  Left ankle clonus: absent      Provider dictation:  The patient is a 71 year-old  male referred by Dr Greene for evaluation for  shunting for suspected NPH. He has a  6 year history of urinary frequency, wide based shuffling gait, ataxia, and some short  term memory loss. He has undergone lumbar puncture in which only 5cc of CSF was removed and he felt some immediate improvement with walking.  He has an implanted pacemaker and is unable to have MRI. He is maintained on long-term anticoagulation.      CT head reveals mild cerebral atrophy and ventriculomegaly  There is cisternogram evidence of NPH.      He most likely has Normal PRessure hydrocephalus.  I have discussed the case with Dr. Oconnor.  Before proceeding with  shunt placement, we would like to obtain a true high volume lumbar puncture and observe his gait change.  We will also obtain a betaamyloid and phosphorylated tau protien level on the CSF drawn.      We have discussed the natural progression of the disease process and that the benefits of shunting can be time-limited. He will need to be off of anticoagulants for at least 7 days pre-op and post-op. He will need cardiac clearance to undergo surgery.     We will have him follow up in clinic with Dr. Oconnor after his testing is complete to discuss shunting further.    Visit Diagnosis:  NPH (normal pressure hydrocephalus)  -     FL Lumbar Puncture (xpd); Future; Expected date: 5/12/17  -     Miscellaneous Sendout Test Cerebrospinal Fluid; Future; Expected date: 5/12/17  -     GLUCOSE, CSF  -     PROTEIN, CSF  -     Lactic Acid, Body Fluid  (Reference Lab) Other (Specify) (CSF); Future; Expected date: 5/12/17  -     CSF CELL COUNT WITH DIFFERENTIAL  -     Ambulatory consult to Physical Therapy      Total time spent counseling greater than fifty percent of total visit time.  Counseling included discussion regarding imaging findings, diagnosis possibilities, treatment options, risks and benefits.   The patient had many questions regarding the options and long-term effects.

## 2017-05-11 NOTE — LETTER
May 12, 2017      Efe Greene MD  [address]  [facility] [address]           [facility]surgery  [address] [facility] [address] [place] [address]  Phone: [phone]  Fax: [phone]          Patient: Abdullahi Salazar   MR Number: [id number]   YOB: 1945   Date of Visit: 5/11/2017       Dear Dr. Efe Greene:    Thank you for referring Abdullahi Salazar to me for evaluation. Attached you will find relevant portions of my assessment and plan of care.    If you have questions, please do not hesitate to call me. I look forward to following Abdullahi Salazar along with you.    Sincerely,    Bel La PA-C    Enclosure  CC:  No Recipients    If you would like to receive this communication electronically, please contact [email or web address] or [phone] to request more information on [facility] Link access.    For providers and/or their staff who would like to refer a patient to [facility], please contact us through our one-stop-shop provider referral line, [facility], at [phone].    If you feel you have received this communication in error or would no longer like to receive these types of communications, please e-mail [email or web address]

## 2017-05-15 ENCOUNTER — TELEPHONE (OUTPATIENT)
Dept: NEUROSURGERY | Facility: CLINIC | Age: 72
End: 2017-05-15

## 2017-05-15 NOTE — TELEPHONE ENCOUNTER
Spoke to Latoya regarding timed PT walking test before and after lumbar puncture. This has been coordinated.

## 2017-05-18 ENCOUNTER — PATIENT MESSAGE (OUTPATIENT)
Dept: NEUROSURGERY | Facility: CLINIC | Age: 72
End: 2017-05-18

## 2017-05-22 ENCOUNTER — PATIENT MESSAGE (OUTPATIENT)
Dept: NEUROSURGERY | Facility: CLINIC | Age: 72
End: 2017-05-22

## 2017-05-25 ENCOUNTER — OFFICE VISIT (OUTPATIENT)
Dept: DIABETES | Facility: CLINIC | Age: 72
End: 2017-05-25
Payer: MEDICARE

## 2017-05-25 ENCOUNTER — NURSE TRIAGE (OUTPATIENT)
Dept: ADMINISTRATIVE | Facility: CLINIC | Age: 72
End: 2017-05-25

## 2017-05-25 VITALS
HEIGHT: 67 IN | WEIGHT: 234.63 LBS | DIASTOLIC BLOOD PRESSURE: 66 MMHG | SYSTOLIC BLOOD PRESSURE: 142 MMHG | BODY MASS INDEX: 36.83 KG/M2

## 2017-05-25 DIAGNOSIS — E11.69 COMBINED HYPERLIPIDEMIA ASSOCIATED WITH TYPE 2 DIABETES MELLITUS: ICD-10-CM

## 2017-05-25 DIAGNOSIS — E78.2 COMBINED HYPERLIPIDEMIA ASSOCIATED WITH TYPE 2 DIABETES MELLITUS: ICD-10-CM

## 2017-05-25 DIAGNOSIS — N18.30 TYPE 2 DIABETES MELLITUS WITH STAGE 3 CHRONIC KIDNEY DISEASE, WITH LONG-TERM CURRENT USE OF INSULIN: Primary | ICD-10-CM

## 2017-05-25 DIAGNOSIS — E11.22 TYPE 2 DIABETES MELLITUS WITH STAGE 3 CHRONIC KIDNEY DISEASE, WITH LONG-TERM CURRENT USE OF INSULIN: Primary | ICD-10-CM

## 2017-05-25 DIAGNOSIS — Z79.4 TYPE 2 DIABETES MELLITUS WITH STAGE 3 CHRONIC KIDNEY DISEASE, WITH LONG-TERM CURRENT USE OF INSULIN: Primary | ICD-10-CM

## 2017-05-25 DIAGNOSIS — E11.59 HYPERTENSION ASSOCIATED WITH DIABETES: ICD-10-CM

## 2017-05-25 DIAGNOSIS — I15.2 HYPERTENSION ASSOCIATED WITH DIABETES: ICD-10-CM

## 2017-05-25 DIAGNOSIS — E66.9 OBESITY WITH SERIOUS COMORBIDITY: ICD-10-CM

## 2017-05-25 LAB — GLUCOSE SERPL-MCNC: 153 MG/DL (ref 70–110)

## 2017-05-25 PROCEDURE — 99999 PR PBB SHADOW E&M-EST. PATIENT-LVL III: CPT | Mod: PBBFAC,,, | Performed by: NURSE PRACTITIONER

## 2017-05-25 PROCEDURE — 99215 OFFICE O/P EST HI 40 MIN: CPT | Mod: S$GLB,,, | Performed by: NURSE PRACTITIONER

## 2017-05-25 PROCEDURE — 82948 REAGENT STRIP/BLOOD GLUCOSE: CPT | Mod: S$GLB,,, | Performed by: NURSE PRACTITIONER

## 2017-05-25 PROCEDURE — 3066F NEPHROPATHY DOC TX: CPT | Mod: S$GLB,,, | Performed by: NURSE PRACTITIONER

## 2017-05-25 PROCEDURE — 3045F PR MOST RECENT HEMOGLOBIN A1C LEVEL 7.0-9.0%: CPT | Mod: S$GLB,,, | Performed by: NURSE PRACTITIONER

## 2017-05-25 PROCEDURE — 1159F MED LIST DOCD IN RCRD: CPT | Mod: S$GLB,,, | Performed by: NURSE PRACTITIONER

## 2017-05-25 NOTE — PROGRESS NOTES
"PCP: Maximo Hernandez MD    Subjective:     Chief Complaint: Diabetes, establish care    HISTORY OF PRESENT ILLNESS: 71 year old  male presenting, with wife, to establish care for diabetes. Patient has had Type II diabetes since 2002 and has the following complications: cardiovascular disease, neuropathy.  He has a complex medical history including HF s/p ICD, AF, and carotid stenosis.      Initially, he was on oral antidiabetic medications, but switched to insulin several years ago.  He has attended diabetes education classes in the past.  Last year, he was following with their RD.      He did not bring readings or meter today.  Per recall, fasting 120 s ; ac lunch 200 s; ac dinner < 180 s.  He was recently hospitalized for lumbar puncture due to hydrocephalus.  He has occasional hypoglycemic symptoms, does not check BG during those times, but does treat symptoms with honey.     Height: 5' 7" (170.2 cm)  //  Weight: 106.4 kg (234 lb 9.8 oz), Body mass index is 36.75 kg/m².  Home Blood Glucose reading this AM: 127 mg/dl fasting  His blood sugar in clinic today is:    Lab Results   Component Value Date    POCGLU 153 (A) 05/25/2017     Labs Reviewed. ADA recommends A1C of less than 7 %. His most recent A1C is:     Lab Results   Component Value Date    HGBA1C 9.0 (H) 03/01/2017      DM MEDICATIONS:   Lantus 63 units at bedtime  Humalog 30 - 33 units before meals    Review of Systems   Constitutional: Negative for appetite change, diaphoresis, fatigue and unexpected weight change.   HENT: Negative for congestion, hearing loss, rhinorrhea, sneezing and sore throat.    Eyes: Negative for visual disturbance.   Respiratory: Negative for cough, shortness of breath and wheezing.    Cardiovascular: Negative for leg swelling.   Gastrointestinal: Negative for abdominal pain, constipation, diarrhea, nausea and vomiting.   Endocrine: Positive for polydipsia, polyphagia and polyuria. Negative for cold intolerance and heat " intolerance.   Genitourinary: Negative for difficulty urinating, dysuria and urgency.   Skin: Negative for color change, pallor and wound.   Neurological: Positive for numbness. Negative for dizziness, seizures, syncope and headaches.   Psychiatric/Behavioral: Negative for confusion and decreased concentration. The patient is not nervous/anxious.        STANDARDS OF CARE:  Current Ophthalmologist / Optometrist: Dr. Lassiter in [place]. Last exam as noted, 05 / 2016   Current Podiatrist: None.   Recommend regular exams and denies gums bleeding.    ACTIVITY LEVEL: Rarely Active - performs occasional chair exercises   EXERCISE:  None  MEAL PLANNING: Patient reports number of meals per day to be 3 and number of snacks per day to be 2.  Breakfast can be eggs, slice of toast OR cereal.  Lunch / Dinner can bread, rice, beans, with fish or chicken, and vegetables.  Beverages include diet soda, milk, and water. Patient is encouraged to consume 45 - 60 grams of carbohydrates in each meal, and 1800 k / cayden per day.  Per dietary recall, patient is not limiting carbohydrates, saturated fats and sodium.     BLOOD GLUCOSE TESTING:  ACCU-CHEK meter  SOCIAL HISTORY: .  Lives with spouse.  Former smoker.  Has 3 girls.     Objective:      Physical Exam   Constitutional: He is oriented to person, place, and time. He appears well-developed and well-nourished.   HENT:   Head: Normocephalic and atraumatic.   Eyes: EOM are normal. Pupils are equal, round, and reactive to light.   Neck: Normal range of motion. No tracheal deviation present.   Cardiovascular: Normal rate, regular rhythm and intact distal pulses.  Exam reveals no friction rub.    No murmur heard.  Pulses:       Dorsalis pedis pulses are 2+ on the right side, and 2+ on the left side.        Posterior tibial pulses are 2+ on the right side, and 2+ on the left side.   Pulmonary/Chest: Effort normal and breath sounds normal. He has no wheezes.   Abdominal: Soft. Bowel sounds  are normal. He exhibits no distension. There is no tenderness.   Musculoskeletal: Normal range of motion. He exhibits no edema or deformity.        Right foot: There is no deformity.        Left foot: There is no deformity.   Feet:   Right Foot:   Protective Sensation: 6 sites tested. 6 sites sensed.   Skin Integrity: Negative for ulcer, blister, skin breakdown, erythema, warmth, callus or dry skin.   Left Foot:   Protective Sensation: 6 sites tested. 6 sites sensed.   Skin Integrity: Negative for ulcer, blister, skin breakdown, erythema, warmth, callus or dry skin.   Neurological: He is alert and oriented to person, place, and time.   Skin: Skin is warm and dry. No rash noted.   Psychiatric: He has a normal mood and affect. His behavior is normal. Judgment and thought content normal.         Assessment / Plan:     1.) Type 2 diabetes mellitus with stage 3 chronic kidney disease, with long-term current use of insulin, neuropathy  Comments:  - Increase Lantus 65 units daily, at bedtime.  We discussed switching from set doses of Humalgo to carb counting.  He would like to try this.  Rather than taking 30 - 33 units before meals,  he will do set dose of Humalog 15 units + carb counts.  He will take 1 unit of insulin for every 5 gms of carb he eats.  Demonstrated how to do calculation and patient voiced understanding  and re demonstrated technique.  He is going to keep a detailed record of his meals for the next week and return with food diary so I can evaluate accuracy of carb counts.  [facility]  carb counting book was provided.    Orders:  -     POCT glucose    2.) Hypertension associated with diabetes - fair control, continue Lotensin, Coreg, IMDUR    3.) Combined hyperlipidemia associated with type 2 diabetes mellitus - continue Zetia     4.) Obesity with serious comorbidity    Additional Plan Details:    1.) Patient was instructed to monitor blood glucose 2 - 3 x daily, fasting and ac dinner or at bedtime.  Discussed ADA goal for fasting blood sugar, 80 - 130 mg/dL; pp blood sugars below 180 mg/dl. Also, discussed prevention of hypoglycemia and the need to adjust goals to higher levels if persistent hypoglycemia.  Reminded to bring BG records or meter to each visit for review.  2.) Reviewed pathophysiology of diabetes, complications related to the disease, importance of annual dilated eye exam and daily foot examination.  3.) We discussed the ADA recommendations: Hemoglobin A1c below 7.0 %.  All patients with diabetes should be on statins unless contraindicated.  ACE or ARB therapy if not contraindicated.    4.) Meal planning teaching: Carbohydrate definition - one serving is 15 gms. Carbohydrate spacing - carbohydrates should be spaced into approximately 3 meals with 2 snacks ( of one carbohydrate ) between meals or at bedtime. Increase vegetable intake to 2 or more cups of vegetables per day as well as 2 fruit servings. Recommended low saturated fat, low sodium diet to aid in control of hypertension and cholesterol.  5.) Discussed activity, benefits, methods, and precautions. Recommended patient start or continue some form of exercise and increase as tolerated to 30 minutes per day to facilitate weight loss and aid in control of BGs.  6.) He will keep his appointment scheduled for next week and agrees to bring food logs with him. He was explained the above plan and given opportunity to ask questions. Advised to call clinic with any further questions or concerns.     Cielo Trujillo, SERGIO-C, CDE    A total of 60 minutes was spent in face to face time, of which over 50 % was spent in counseling patient on disease process, complications, treatment, and side effects of medications.

## 2017-05-25 NOTE — LETTER
May 25, 2017      Maximo Hernandez MD  [address] [facility] [address]           [facility]  [address] [facility] [address]  Phone: [phone]  Fax: [phone]          Patient: Abdullahi Salazar   MR Number: [id number]   YOB: 1945   Date of Visit: 5/25/2017       Dear Dr. Maximo Hernandez:    Thank you for referring Abdullahi Salazar to me for evaluation. Attached you will find relevant portions of my assessment and plan of care.    If you have questions, please do not hesitate to call me. I look forward to following Abdullahi Salazar along with you.    Sincerely,    Cielo Trujillo, JUANITO, CDE    Enclosure  CC:  No Recipients    If you would like to receive this communication electronically, please contact [email or web address] or [phone] to request more information on [email or web address] Link access.    For providers and/or their staff who would like to refer a patient to [facility], please contact us through our one-stop-shop provider referral line, [facility] , at [phone].    If you feel you have received this communication in error or would no longer like to receive these types of communications, please e-mail [email or web address]

## 2017-05-25 NOTE — TELEPHONE ENCOUNTER
"    Reason for Disposition   [1] Blood glucose > 240 mg/dl (13 mmol/l) AND [2] uses insulin (e.g., insulin-dependent, all type 1 diabetics)   (all triage questions negative)    Answer Assessment - Initial Assessment Questions  1. BLOOD GLUCOSE: "What is your blood glucose level?"       300, but I did not check nor take my insulin nor count my carbs since morning.    2. ONSET: "When did you check the blood glucose?"      Checked just a few minutes ago.    3. USUAL RANGE: "What is your glucose level usually?" (e.g., usual fasting morning value, usual evening value)      Usual , usual .    4. KETONES: "Do you check for ketones (urine or blood test strips)?" If yes, ask: "What does the test show now?"         5. TYPE 1 or 2:  "Do you know what type of diabetes you have?"  (e.g., Type 1, Type 2, Gestational; doesn't know)       DM2    6. INSULIN: "Do you take insulin?" If yes, ask: "Have you missed any shots recently?"      I have been on insulin a long time, but they are changing up the way I do it.    7. DIABETES PILLS: "Do you take any pills for your diabetes?" If yes, ask: "Have you missed taking any pills recently?"        8. OTHER SYMPTOMS: "Do you have any symptoms?" (e.g., fever, frequent urination, difficulty breathing, dizziness, weakness, vomiting)      No. I feel fine.    9. PREGNANCY: "Is there any chance you are pregnant?" "When was your last menstrual period?"      n/a    Protocols used:  DIABETES - HIGH BLOOD SUGAR-A-     tonight; did not check glucose, did not count carbs, and did not use insulin when out today with family at dinner. No symptoms.  States he feels fine.  He said he did not understand how to count carbs. Discussed how to find the carbs in foods, and add them up, and dose insulin as prescribed by Cielo Trujillo NP, today in clinic.  Suggested keeping pen, testing meter, and supplies with him on occasions of eating out.  Also suggested he follow up with questions to Ms " Ronnie during clinic hours.  He said he will be going to diabetes class soon, as well.  Home care advice given for hyperglycemia, to include drinking one 8-oz glass of water every hour for the next 4 hours, and getting back on schedule with insulin.  Message to Ms Ronnie NP, and Maximo Hernandez MD .  Additionally, Abdullahi wants to address his long-standing (6 years) claudication with walking next time he is seen.  Encouraged him to make appointment for this  Please contact caller directly with any additional care advice.

## 2017-05-26 ENCOUNTER — TELEPHONE (OUTPATIENT)
Dept: FAMILY MEDICINE | Facility: CLINIC | Age: 72
End: 2017-05-26

## 2017-05-26 DIAGNOSIS — E78.2 COMBINED HYPERLIPIDEMIA ASSOCIATED WITH TYPE 2 DIABETES MELLITUS: ICD-10-CM

## 2017-05-26 DIAGNOSIS — E11.59 HYPERTENSION ASSOCIATED WITH DIABETES: ICD-10-CM

## 2017-05-26 DIAGNOSIS — E11.69 COMBINED HYPERLIPIDEMIA ASSOCIATED WITH TYPE 2 DIABETES MELLITUS: ICD-10-CM

## 2017-05-26 DIAGNOSIS — I15.2 HYPERTENSION ASSOCIATED WITH DIABETES: ICD-10-CM

## 2017-05-26 DIAGNOSIS — I11.0 HYPERTENSIVE HEART DISEASE WITH HEART FAILURE: ICD-10-CM

## 2017-05-26 NOTE — TELEPHONE ENCOUNTER
tonight; did not check glucose, did not count carbs, and did not use insulin when out today with family at dinner. No symptoms.  States he feels fine.  He said he did not understand how to count carbs. Discussed how to find the carbs in foods, and add them up, and dose insulin as prescribed by Cielo Trujillo NP, today in clinic.  Suggested keeping pen, testing meter, and supplies with him on occasions of eating out.  Also suggested he follow up with questions to Ms Trujillo during clinic hours.  He said he will be going to diabetes class soon, as well.  Home care advice given for hyperglycemia, to include drinking one 8-oz glass of water every hour for the next 4 hours, and getting back on schedule with insulin.  Message to Ms Ronnie NP, and Maximo Hernandez MD .  Additionally, Abdullahi wants to address his long-standing (6 years) claudication with walking next time he is seen.  Encouraged him to make appointment for this  Please contact caller directly with any additional care advice.   (Routing comment)

## 2017-05-27 RX ORDER — EZETIMIBE 10 MG/1
10 TABLET ORAL DAILY
Qty: 90 TABLET | Refills: 0 | Status: SHIPPED | OUTPATIENT
Start: 2017-05-27 | End: 2017-09-21 | Stop reason: SDUPTHER

## 2017-05-27 RX ORDER — ATORVASTATIN CALCIUM 20 MG/1
20 TABLET, FILM COATED ORAL DAILY
Qty: 90 TABLET | Refills: 0 | Status: SHIPPED | OUTPATIENT
Start: 2017-05-27 | End: 2017-07-12

## 2017-05-27 RX ORDER — BENAZEPRIL HYDROCHLORIDE 40 MG/1
40 TABLET ORAL DAILY
Qty: 90 TABLET | Refills: 0 | Status: ON HOLD | OUTPATIENT
Start: 2017-05-27 | End: 2017-08-11 | Stop reason: HOSPADM

## 2017-05-29 ENCOUNTER — CLINICAL SUPPORT (OUTPATIENT)
Dept: CARDIOLOGY | Facility: CLINIC | Age: 72
End: 2017-05-29
Payer: MEDICARE

## 2017-05-29 DIAGNOSIS — I49.01 VF (VENTRICULAR FIBRILLATION): ICD-10-CM

## 2017-05-29 DIAGNOSIS — Z95.810 CARDIAC DEFIBRILLATOR IN SITU: ICD-10-CM

## 2017-05-29 PROCEDURE — 93295 DEV INTERROG REMOTE 1/2/MLT: CPT | Mod: S$GLB,,, | Performed by: INTERNAL MEDICINE

## 2017-05-29 PROCEDURE — 93296 REM INTERROG EVL PM/IDS: CPT | Mod: S$GLB,,, | Performed by: INTERNAL MEDICINE

## 2017-06-08 ENCOUNTER — LAB VISIT (OUTPATIENT)
Dept: LAB | Facility: HOSPITAL | Age: 72
End: 2017-06-08
Attending: FAMILY MEDICINE
Payer: MEDICARE

## 2017-06-08 DIAGNOSIS — E11.9 DIABETES MELLITUS WITHOUT COMPLICATION: ICD-10-CM

## 2017-06-08 PROCEDURE — 36415 COLL VENOUS BLD VENIPUNCTURE: CPT | Mod: PO

## 2017-06-08 PROCEDURE — 83036 HEMOGLOBIN GLYCOSYLATED A1C: CPT

## 2017-06-09 LAB
ESTIMATED AVG GLUCOSE: 192 MG/DL
HBA1C MFR BLD HPLC: 8.3 %

## 2017-06-22 ENCOUNTER — OFFICE VISIT (OUTPATIENT)
Dept: NEUROSURGERY | Facility: CLINIC | Age: 72
End: 2017-06-22
Payer: MEDICARE

## 2017-06-22 VITALS
WEIGHT: 234 LBS | SYSTOLIC BLOOD PRESSURE: 163 MMHG | HEART RATE: 69 BPM | DIASTOLIC BLOOD PRESSURE: 75 MMHG | HEIGHT: 67 IN | BODY MASS INDEX: 36.73 KG/M2

## 2017-06-22 DIAGNOSIS — R41.3 MEMORY LOSS: ICD-10-CM

## 2017-06-22 DIAGNOSIS — G91.2 NORMAL PRESSURE HYDROCEPHALUS SYNDROME: ICD-10-CM

## 2017-06-22 DIAGNOSIS — R26.9 GAIT DIFFICULTY: Primary | Chronic | ICD-10-CM

## 2017-06-22 PROCEDURE — 1126F AMNT PAIN NOTED NONE PRSNT: CPT | Mod: S$GLB,,, | Performed by: NEUROLOGICAL SURGERY

## 2017-06-22 PROCEDURE — 99215 OFFICE O/P EST HI 40 MIN: CPT | Mod: S$GLB,,, | Performed by: NEUROLOGICAL SURGERY

## 2017-06-22 PROCEDURE — 1159F MED LIST DOCD IN RCRD: CPT | Mod: S$GLB,,, | Performed by: NEUROLOGICAL SURGERY

## 2017-06-22 PROCEDURE — 99499 UNLISTED E&M SERVICE: CPT | Mod: S$GLB,,, | Performed by: NEUROLOGICAL SURGERY

## 2017-06-22 NOTE — PROGRESS NOTES
Neurosurgery History & Physical    Patient ID: Abdullahi Salazar is a 71 y.o. male.    Chief Complaint   Patient presents with    Follow-up     NPH follow up after LP. Patient states his symptoms did improve following the procedure.        Review of Systems   Constitutional: Negative for activity change, chills, fatigue and unexpected weight change.   HENT: Negative for hearing loss, tinnitus, trouble swallowing and voice change.    Eyes: Negative for visual disturbance.   Respiratory: Negative for apnea, chest tightness and shortness of breath.    Cardiovascular: Negative for chest pain and palpitations.   Gastrointestinal: Negative for abdominal pain, constipation, diarrhea, nausea and vomiting.   Genitourinary: Positive for frequency. Negative for difficulty urinating and dysuria.   Musculoskeletal: Positive for gait problem. Negative for back pain, neck pain and neck stiffness.   Skin: Negative for wound.   Neurological: Negative for dizziness, tremors, seizures, facial asymmetry, speech difficulty, weakness, light-headedness, numbness and headaches.   Psychiatric/Behavioral: Negative for confusion and decreased concentration.       Past Medical History:   Diagnosis Date    Actinic keratoses     Altered mental status 3/22/2015    Anticoagulant long-term use     Atrial fibrillation     CAD (coronary artery disease)     stents after bypass    Carotid artery stenosis 3/31/2015    Colon polyps 2011    repeat colonoscopy 2014    Combined hyperlipidemia associated with type 2 diabetes mellitus     Diabetes mellitus type II, uncontrolled     dx 2004    GERD (gastroesophageal reflux disease)     Grand mal seizure     last 2/2017    History of cardiac pacemaker 3/31/2015    HTN (hypertension)     Mitral valve insufficiency     Sleep apnea with use of continuous positive airway pressure (CPAP)     Syncope 3/30/2015     Social History     Social History    Marital status:      Spouse name: N/A     "Number of children: N/A    Years of education: N/A     Occupational History    Not on file.     Social History Main Topics    Smoking status: Former Smoker     Packs/day: 2.00     Years: 25.00     Types: Cigarettes     Quit date: 1/1/1983    Smokeless tobacco: Never Used      Comment: quit 1983     Alcohol use Yes      Comment: occasional    Drug use: No    Sexual activity: Not on file     Other Topics Concern    Not on file     Social History Narrative    No narrative on file     Family History   Problem Relation Age of Onset    Stomach cancer Mother     Lung cancer Father     Diabetes Sister     Hypertension Sister     Heart disease Neg Hx     Stroke Neg Hx      Review of patient's allergies indicates:  No Known Allergies    Current Outpatient Prescriptions:     ACCU-CHEK CLEOPATRA PLUS TEST STRP Strp, TEST BLOOD SUGARS 4 TIMES DAILY, Disp: 150 strip, Rfl: PRN    amlodipine (NORVASC) 10 MG tablet, Take 1 tablet (10 mg total) by mouth once daily., Disp: 90 tablet, Rfl: 3    aspirin (ECOTRIN) 81 MG EC tablet, Take 81 mg by mouth once daily. Every day, Disp: , Rfl:     atorvastatin (LIPITOR) 20 MG tablet, Take 1 tablet (20 mg total) by mouth once daily., Disp: 90 tablet, Rfl: 0    BD INSULIN PEN NEEDLE UF SHORT 31 gauge x 5/16" Ndle, USE FOUR TIMES DAILY, Disp: 360 each, Rfl: 3    benazepril (LOTENSIN) 40 MG tablet, Take 1 tablet (40 mg total) by mouth once daily., Disp: 90 tablet, Rfl: 0    carvedilol (COREG) 25 MG tablet, TAKE 1/2 TABLET TWICE DAILY WITH MEALS, Disp: 90 tablet, Rfl: 3    clopidogrel (PLAVIX) 75 mg tablet, TAKE 1 TABLET ONE TIME DAILY, Disp: 90 tablet, Rfl: 3    ezetimibe (ZETIA) 10 mg tablet, Take 1 tablet (10 mg total) by mouth once daily., Disp: 90 tablet, Rfl: 0    ferrous sulfate 325 mg (65 mg iron) Tab tablet, 325 mg 3 (three) times daily. , Disp: , Rfl:     insulin glargine (LANTUS SOLOSTAR) 100 unit/mL (3 mL) InPn pen, INJECT 62 UNITS SUBCUTANEOUSLY EVERY DAY, Disp: 45 " "mL, Rfl: 3    insulin lispro (HUMALOG KWIKPEN) 100 unit/mL InPn pen, INJECT  33 UNITS SUBCUTANEOUSLY THREE TIMES DAILY BEFORE MEALS (Patient taking differently: INJECT  SUBCUTANEOUSLY THREE TIMES DAILY BEFORE MEALS AS A SLIDING SCALE), Disp: 90 mL, Rfl: 3    isosorbide mononitrate (IMDUR) 60 MG 24 hr tablet, TAKE 1 TABLET (60 MG TOTAL) BY MOUTH EVERY EVENING., Disp: 90 tablet, Rfl: 3    lancets (TRUEPLUS LANCETS) 28 gauge Misc, 1 lancet by Misc.(Non-Drug; Combo Route) route 2 (two) times daily as needed., Disp: 200 each, Rfl: 3    levetiracetam (KEPPRA) 500 MG Tab, Take 750 mg by mouth 2 (two) times daily. , Disp: , Rfl:     PRADAXA 75 mg Cap, TAKE 1 CAPSULE TWICE DAILY, Disp: 180 capsule, Rfl: 3    RANEXA 500 mg Tb12, TAKE 1 TABLET (500 MG TOTAL) BY MOUTH 2 (TWO) TIMES DAILY., Disp: 180 tablet, Rfl: 3    tamsulosin (FLOMAX) 0.4 mg Cp24, Take 1 capsule (0.4 mg total) by mouth once daily., Disp: 90 capsule, Rfl: 3    torsemide (DEMADEX) 20 MG Tab, TAKE 1 TABLET (20 MG TOTAL) BY MOUTH EVERY MORNING., Disp: 90 tablet, Rfl: 3    nitroGLYCERIN (NITROSTAT) 0.4 MG SL tablet, Place 1 tablet (0.4 mg total) under the tongue every 5 (five) minutes as needed for Chest pain., Disp: 30 tablet, Rfl: 0    Vitals:    06/22/17 1249   BP: (!) 163/75   Pulse: 69   Weight: 106.1 kg (234 lb)   Height: 5' 7" (1.702 m)       Physical Exam   Constitutional: He is oriented to person, place, and time. He appears well-developed and well-nourished.   HENT:   Head: Normocephalic and atraumatic.   Eyes: Pupils are equal, round, and reactive to light.   Neck: Normal range of motion. Neck supple.   Cardiovascular: Normal rate.    Pulmonary/Chest: Effort normal.   Abdominal: He exhibits no distension.   Musculoskeletal: Normal range of motion. He exhibits no edema.   Neurological: He is alert and oriented to person, place, and time. He has a normal Finger-Nose-Finger Test, a normal Heel to Shin Test, a normal Romberg Test and a normal " Tandem Gait Test.   Reflex Scores:       Tricep reflexes are 1+ on the right side and 1+ on the left side.       Bicep reflexes are 1+ on the right side and 1+ on the left side.       Brachioradialis reflexes are 1+ on the right side and 1+ on the left side.       Patellar reflexes are 1+ on the right side and 1+ on the left side.       Achilles reflexes are 1+ on the right side and 1+ on the left side.  Skin: Skin is warm and dry.   Psychiatric: He has a normal mood and affect. His speech is normal and behavior is normal. Judgment and thought content normal.   Nursing note and vitals reviewed.      Neurologic Exam     Mental Status   Oriented to person, place, and time.   Oriented to person.   Oriented to place.   Oriented to time.   Follows 3 step commands.   Attention: normal. Concentration: normal.   Speech: speech is normal   Level of consciousness: alert  Knowledge: consistent with education.   Able to name object. Able to read. Able to repeat. Able to write. Normal comprehension.     Cranial Nerves     CN II   Visual acuity: normal  Right visual field deficit: none  Left visual field deficit: none     CN III, IV, VI   Pupils are equal, round, and reactive to light.  Right pupil: Size: 3 mm. Shape: regular. Reactivity: brisk. Consensual response: intact.   Left pupil: Size: 3 mm. Shape: regular. Reactivity: brisk. Consensual response: intact.   CN III: no CN III palsy  CN VI: no CN VI palsy  Nystagmus: none   Diplopia: none  Ophthalmoparesis: none  Conjugate gaze: present    CN V   Right facial sensation deficit: none  Left facial sensation deficit: none    CN VII   Right facial weakness: none  Left facial weakness: none    CN VIII   Hearing: intact    CN IX, X   CN IX normal.   CN X normal.     CN XI   Right sternocleidomastoid strength: normal  Left sternocleidomastoid strength: normal  Right trapezius strength: normal  Left trapezius strength: normal    CN XII   Fasciculations: absent  Tongue deviation:  none    Motor Exam   Muscle bulk: normal  Overall muscle tone: normal  Right arm pronator drift: absent  Left arm pronator drift: absent    Strength   Right neck flexion: 5/5  Left neck flexion: 5/5  Right neck extension: 5/5  Left neck extension: /5  Right deltoid: 5/5  Left deltoid: 5/5  Right biceps: 5/5  Left biceps: 5/  Right triceps: 5/  Left triceps: 5/  Right wrist flexion: /  Left wrist flexion: /5  Right wrist extension:   Left wrist extension:   Right interossei:   Left interossei:   Right abdominals: /  Left abdominals:   Right iliopsoas:   Left iliopsoas:   Right quadriceps:   Left quadriceps:   Right hamstrin/5  Left hamstrin/5  Right glutei:   Left glutei:   Right anterior tibial:   Left anterior tibial:   Right posterior tibial:   Left posterior tibial:   Right peroneal:   Left peroneal:   Right gastroc:   Left gastroc:     Sensory Exam   Right arm light touch: normal  Left arm light touch: normal  Right leg light touch: normal  Left leg light touch: normal  Right arm vibration: normal  Left arm vibration: normal  Right arm pinprick: normal  Left arm pinprick: normal    Gait, Coordination, and Reflexes     Gait  Gait: shuffling and wide-based    Coordination   Romberg: negative  Finger to nose coordination: normal  Heel to shin coordination: normal  Tandem walking coordination: normal    Tremor   Resting tremor: absent  Intention tremor: absent  Action tremor: absent    Reflexes   Right brachioradialis: 1+  Left brachioradialis: 1+  Right biceps: 1+  Left biceps: 1+  Right triceps: 1+  Left triceps: 1+  Right patellar: 1+  Left patellar: 1+  Right achilles: 1+  Left achilles: 1+  Right Chavez: absent  Left Chavez: absent  Right ankle clonus: absent  Left ankle clonus: absent      Provider dictation:  The patient is a 71 year-old  male initially referred by Dr Greene for evaluation for  shunting for suspected  NPH. He has undergone CSF protein assay testing for beta amyloid and phosphorylated tau protein and is here to discuss the results. He has a  6 year history of urinary frequency, wide based shuffling gait, ataxia, and some short term memory loss. He has undergone two lumbar punctures, the second was high volume and made a big different in his gait.  He continues to report benefits from it a few weeks later.  H He is maintained on long-term anticoagulation.      I have reviewed the CT head which reveals mild cerebral atrophy and significant ventriculomegaly consistent with NPH.MARY starks has an implanted pacemaker preventing MRI imaging, and therefore we cannot comment on transependymal flow.     I have discussed the case with the family the pro and cons of CSF diversion, and they understand that it does not treat the underlying etiology of the disease but may slow progression for a few years.   We have discussed the natural progression of the disease process. He will need to be off of anticoagulants for at least 7 days pre-op and post-op. He will need cardiac clearance to undergo surgery.     We will plan on  shunting and brain biopsy in the near future    Visit Diagnosis:  Gait difficulty    Memory loss    Normal pressure hydrocephalus syndrome        Total time spent counseling greater than fifty percent of total visit time.  Counseling included discussion regarding imaging findings, diagnosis possibilities, treatment options, risks and benefits.   The patient had many questions regarding the options and long-term effects.

## 2017-06-26 RX ORDER — GLUCOSAM/CHON-MSM1/C/MANG/BOSW 500-416.6
TABLET ORAL
Qty: 100 EACH | Refills: 3 | Status: ON HOLD | OUTPATIENT
Start: 2017-06-26 | End: 2018-06-21 | Stop reason: HOSPADM

## 2017-06-27 ENCOUNTER — TELEPHONE (OUTPATIENT)
Dept: NEPHROLOGY | Facility: CLINIC | Age: 72
End: 2017-06-27

## 2017-06-27 ENCOUNTER — LAB VISIT (OUTPATIENT)
Dept: LAB | Facility: HOSPITAL | Age: 72
End: 2017-06-27
Attending: INTERNAL MEDICINE
Payer: MEDICARE

## 2017-06-27 DIAGNOSIS — E11.59 HYPERTENSION ASSOCIATED WITH DIABETES: ICD-10-CM

## 2017-06-27 DIAGNOSIS — N18.30 KIDNEY DISEASE, CHRONIC, STAGE III (GFR 30-59 ML/MIN): ICD-10-CM

## 2017-06-27 DIAGNOSIS — I15.2 HYPERTENSION ASSOCIATED WITH DIABETES: ICD-10-CM

## 2017-06-27 LAB
ALBUMIN SERPL BCP-MCNC: 3.5 G/DL
ANION GAP SERPL CALC-SCNC: 12 MMOL/L
BASOPHILS # BLD AUTO: 0.03 K/UL
BASOPHILS NFR BLD: 0.6 %
BUN SERPL-MCNC: 20 MG/DL
CALCIUM SERPL-MCNC: 9.2 MG/DL
CHLORIDE SERPL-SCNC: 107 MMOL/L
CO2 SERPL-SCNC: 24 MMOL/L
CREAT SERPL-MCNC: 1.7 MG/DL
DIFFERENTIAL METHOD: ABNORMAL
EOSINOPHIL # BLD AUTO: 0.2 K/UL
EOSINOPHIL NFR BLD: 3 %
ERYTHROCYTE [DISTWIDTH] IN BLOOD BY AUTOMATED COUNT: 13.2 %
EST. GFR  (AFRICAN AMERICAN): 45.2 ML/MIN/1.73 M^2
EST. GFR  (NON AFRICAN AMERICAN): 39.1 ML/MIN/1.73 M^2
GLUCOSE SERPL-MCNC: 158 MG/DL
HCT VFR BLD AUTO: 41.7 %
HGB BLD-MCNC: 14.1 G/DL
LYMPHOCYTES # BLD AUTO: 1.3 K/UL
LYMPHOCYTES NFR BLD: 26.1 %
MCH RBC QN AUTO: 33.2 PG
MCHC RBC AUTO-ENTMCNC: 33.8 %
MCV RBC AUTO: 98 FL
MONOCYTES # BLD AUTO: 0.6 K/UL
MONOCYTES NFR BLD: 10.9 %
NEUTROPHILS # BLD AUTO: 3 K/UL
NEUTROPHILS NFR BLD: 59 %
PHOSPHATE SERPL-MCNC: 2.9 MG/DL
PLATELET # BLD AUTO: 165 K/UL
PMV BLD AUTO: 9.9 FL
POTASSIUM SERPL-SCNC: 4.7 MMOL/L
PTH-INTACT SERPL-MCNC: 132 PG/ML
RBC # BLD AUTO: 4.25 M/UL
SODIUM SERPL-SCNC: 143 MMOL/L
WBC # BLD AUTO: 5.06 K/UL

## 2017-06-27 PROCEDURE — 83970 ASSAY OF PARATHORMONE: CPT

## 2017-06-27 PROCEDURE — 80069 RENAL FUNCTION PANEL: CPT

## 2017-06-27 PROCEDURE — 85025 COMPLETE CBC W/AUTO DIFF WBC: CPT

## 2017-06-27 PROCEDURE — 36415 COLL VENOUS BLD VENIPUNCTURE: CPT | Mod: PO

## 2017-06-30 ENCOUNTER — OFFICE VISIT (OUTPATIENT)
Dept: NEPHROLOGY | Facility: CLINIC | Age: 72
End: 2017-06-30
Payer: MEDICARE

## 2017-06-30 VITALS
SYSTOLIC BLOOD PRESSURE: 138 MMHG | HEIGHT: 67 IN | OXYGEN SATURATION: 97 % | HEART RATE: 76 BPM | BODY MASS INDEX: 36.38 KG/M2 | WEIGHT: 231.81 LBS | TEMPERATURE: 97 F | DIASTOLIC BLOOD PRESSURE: 88 MMHG

## 2017-06-30 DIAGNOSIS — E87.1 HYPONATREMIA: ICD-10-CM

## 2017-06-30 DIAGNOSIS — I15.2 HYPERTENSION ASSOCIATED WITH DIABETES: ICD-10-CM

## 2017-06-30 DIAGNOSIS — E11.29 DIABETES MELLITUS WITH MICROALBUMINURIA: ICD-10-CM

## 2017-06-30 DIAGNOSIS — Z95.810 CARDIAC DEFIBRILLATOR IN SITU: ICD-10-CM

## 2017-06-30 DIAGNOSIS — R80.9 DIABETES MELLITUS WITH MICROALBUMINURIA: ICD-10-CM

## 2017-06-30 DIAGNOSIS — E11.21 TYPE 2 DIABETES MELLITUS WITH DIABETIC NEPHROPATHY, WITHOUT LONG-TERM CURRENT USE OF INSULIN: ICD-10-CM

## 2017-06-30 DIAGNOSIS — E11.29 PROTEINURIA DUE TO TYPE 2 DIABETES MELLITUS: ICD-10-CM

## 2017-06-30 DIAGNOSIS — R80.9 PROTEINURIA DUE TO TYPE 2 DIABETES MELLITUS: ICD-10-CM

## 2017-06-30 DIAGNOSIS — E55.9 VITAMIN D DEFICIENCY: ICD-10-CM

## 2017-06-30 DIAGNOSIS — I11.0 HYPERTENSIVE HEART DISEASE WITH HEART FAILURE: ICD-10-CM

## 2017-06-30 DIAGNOSIS — N18.30 KIDNEY DISEASE, CHRONIC, STAGE III (GFR 30-59 ML/MIN): Primary | ICD-10-CM

## 2017-06-30 DIAGNOSIS — E66.9 OBESITY WITH SERIOUS COMORBIDITY: ICD-10-CM

## 2017-06-30 DIAGNOSIS — E11.59 HYPERTENSION ASSOCIATED WITH DIABETES: ICD-10-CM

## 2017-06-30 PROCEDURE — 3066F NEPHROPATHY DOC TX: CPT | Mod: S$GLB,,, | Performed by: INTERNAL MEDICINE

## 2017-06-30 PROCEDURE — 99999 PR PBB SHADOW E&M-EST. PATIENT-LVL IV: CPT | Mod: PBBFAC,,, | Performed by: INTERNAL MEDICINE

## 2017-06-30 PROCEDURE — 99214 OFFICE O/P EST MOD 30 MIN: CPT | Mod: S$GLB,,, | Performed by: INTERNAL MEDICINE

## 2017-06-30 PROCEDURE — 3045F PR MOST RECENT HEMOGLOBIN A1C LEVEL 7.0-9.0%: CPT | Mod: S$GLB,,, | Performed by: INTERNAL MEDICINE

## 2017-06-30 PROCEDURE — 1159F MED LIST DOCD IN RCRD: CPT | Mod: S$GLB,,, | Performed by: INTERNAL MEDICINE

## 2017-07-01 ENCOUNTER — PATIENT MESSAGE (OUTPATIENT)
Dept: NEUROLOGY | Facility: HOSPITAL | Age: 72
End: 2017-07-01

## 2017-07-03 ENCOUNTER — TELEPHONE (OUTPATIENT)
Dept: NEUROLOGY | Facility: CLINIC | Age: 72
End: 2017-07-03

## 2017-07-03 NOTE — TELEPHONE ENCOUNTER
----- Message from Efe Greene MD sent at 7/1/2017  1:38 PM CDT -----  Dear [facility] staff,          I am Dr. Sidney Felton, I have been following Mr. Salazar in clinic for NPH and epilepsy but am leaving [facility] as of 7/5/17. He prefers to be seen on the [place] and I think Dr. Gusman will be a good physician for him.         Can you all get him scheduled to see Dr. Hyman in the next 6-8 weeks a new patient? He has a pending surgery with Dr. Oconnor for his NPH and likely will not need to be seen until just after that.    Thanks,    Efe Greene MD  Associate Staff, Movement Disorders Fellow  [facility]

## 2017-07-03 NOTE — TELEPHONE ENCOUNTER
Patient notified of appointment with Dr. Sapp.    Please call the patient to schedule an appointment with Dr. Oconnor.

## 2017-07-03 NOTE — TELEPHONE ENCOUNTER
----- Message from Annemarie Álvarez sent at 7/3/2017  8:59 AM CDT -----  Contact: Patient  Patient returning call. Please call back at [phone]. Thanks,

## 2017-07-03 NOTE — TELEPHONE ENCOUNTER
Returned call. I left a message to give us a call back. I was able to get him in sooner with Dr. Sapp. I went ahead and put the appointment for 10/12/2017 at 10am.

## 2017-07-09 ENCOUNTER — PATIENT MESSAGE (OUTPATIENT)
Dept: NEUROSURGERY | Facility: CLINIC | Age: 72
End: 2017-07-09

## 2017-07-11 ENCOUNTER — LAB VISIT (OUTPATIENT)
Dept: LAB | Facility: HOSPITAL | Age: 72
End: 2017-07-11
Attending: FAMILY MEDICINE
Payer: MEDICARE

## 2017-07-11 DIAGNOSIS — E11.69 COMBINED HYPERLIPIDEMIA ASSOCIATED WITH TYPE 2 DIABETES MELLITUS: ICD-10-CM

## 2017-07-11 DIAGNOSIS — R80.9 DIABETES MELLITUS WITH MICROALBUMINURIA: ICD-10-CM

## 2017-07-11 DIAGNOSIS — E78.2 COMBINED HYPERLIPIDEMIA ASSOCIATED WITH TYPE 2 DIABETES MELLITUS: ICD-10-CM

## 2017-07-11 DIAGNOSIS — E11.29 DIABETES MELLITUS WITH MICROALBUMINURIA: ICD-10-CM

## 2017-07-11 LAB
ALBUMIN SERPL BCP-MCNC: 3.6 G/DL
ALP SERPL-CCNC: 85 U/L
ALT SERPL W/O P-5'-P-CCNC: 22 U/L
AST SERPL-CCNC: 20 U/L
BILIRUB DIRECT SERPL-MCNC: 0.3 MG/DL
BILIRUB SERPL-MCNC: 0.7 MG/DL
CHOLEST/HDLC SERPL: 6.5 {RATIO}
ESTIMATED AVG GLUCOSE: 183 MG/DL
HBA1C MFR BLD HPLC: 8 %
HDL/CHOLESTEROL RATIO: 15.4 %
HDLC SERPL-MCNC: 253 MG/DL
HDLC SERPL-MCNC: 39 MG/DL
LDLC SERPL CALC-MCNC: 172.2 MG/DL
NONHDLC SERPL-MCNC: 214 MG/DL
PROT SERPL-MCNC: 7.1 G/DL
TRIGL SERPL-MCNC: 209 MG/DL

## 2017-07-11 PROCEDURE — 83036 HEMOGLOBIN GLYCOSYLATED A1C: CPT

## 2017-07-11 PROCEDURE — 36415 COLL VENOUS BLD VENIPUNCTURE: CPT | Mod: PO

## 2017-07-11 PROCEDURE — 80061 LIPID PANEL: CPT

## 2017-07-11 PROCEDURE — 80076 HEPATIC FUNCTION PANEL: CPT

## 2017-07-11 NOTE — PROGRESS NOTES
Subjective:       Patient ID: Abdullahi Salazar is a 71 y.o. White male who presents for follow-up evaluation of Chronic Kidney Disease    HPI       He is known to me from my previous practice in [place].     He reports he is doing well however his last Hba1c was 8.4. He denies edema and no SOB although mobility is impaired. No problems with urination. No foamy urine and no flank pain. He avoids NSAIDs and no herbal medications. He will soon have a  shunt placed per NS    Review of Systems   Constitutional: Negative for activity change, appetite change, fatigue and unexpected weight change.   HENT: Negative for facial swelling.    Respiratory: Negative for cough and shortness of breath.    Cardiovascular: Negative for chest pain and leg swelling.   Gastrointestinal: Negative for constipation and diarrhea.   Genitourinary: Negative for difficulty urinating, dysuria and hematuria.   Musculoskeletal: Positive for arthralgias and gait problem.   Neurological: Positive for weakness. Negative for light-headedness and headaches.       Objective:      Physical Exam   Constitutional: He is oriented to person, place, and time. He appears well-developed and well-nourished.   Neck: No JVD present.   Cardiovascular: S1 normal and S2 normal.  Exam reveals no friction rub.    Pulmonary/Chest: Breath sounds normal. He has no wheezes. He has no rales.   Abdominal: Soft.   Musculoskeletal: He exhibits no edema.   Neurological: He is alert and oriented to person, place, and time.   Skin: Skin is warm and dry.   Psychiatric: He has a normal mood and affect.   Vitals reviewed.      Assessment:       1. Kidney disease, chronic, stage III (GFR 30-59 ml/min)    2. Hypertension associated with diabetes    3. Hypertensive heart disease with heart failure    4. Proteinuria due to type 2 diabetes mellitus    5. Type 2 diabetes mellitus with diabetic nephropathy, without long-term current use of insulin    6. Diabetes mellitus with  microalbuminuria    7. Hyponatremia    8. Obesity with serious comorbidity    9. Cardiac defibrillator in situ        Plan:             CKD stage 3 with stable kidney function.  Continue RAAS blockade for renal preservation    HTN is controlled. 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 Continue endocrine and PCP follow up      RTC 4 months with labs prior in [place]

## 2017-07-12 DIAGNOSIS — E78.2 COMBINED HYPERLIPIDEMIA ASSOCIATED WITH TYPE 2 DIABETES MELLITUS: ICD-10-CM

## 2017-07-12 DIAGNOSIS — Z79.4 TYPE 2 DIABETES MELLITUS WITH STAGE 3 CHRONIC KIDNEY DISEASE, WITH LONG-TERM CURRENT USE OF INSULIN: ICD-10-CM

## 2017-07-12 DIAGNOSIS — N18.30 TYPE 2 DIABETES MELLITUS WITH STAGE 3 CHRONIC KIDNEY DISEASE, WITH LONG-TERM CURRENT USE OF INSULIN: ICD-10-CM

## 2017-07-12 DIAGNOSIS — E11.69 COMBINED HYPERLIPIDEMIA ASSOCIATED WITH TYPE 2 DIABETES MELLITUS: ICD-10-CM

## 2017-07-12 DIAGNOSIS — E11.22 TYPE 2 DIABETES MELLITUS WITH STAGE 3 CHRONIC KIDNEY DISEASE, WITH LONG-TERM CURRENT USE OF INSULIN: ICD-10-CM

## 2017-07-12 DIAGNOSIS — E11.29 DIABETES MELLITUS WITH MICROALBUMINURIA: ICD-10-CM

## 2017-07-12 DIAGNOSIS — R80.9 DIABETES MELLITUS WITH MICROALBUMINURIA: ICD-10-CM

## 2017-07-12 RX ORDER — INSULIN GLARGINE 100 [IU]/ML
INJECTION, SOLUTION SUBCUTANEOUS
Qty: 60 ML | Refills: 3 | Status: ON HOLD | OUTPATIENT
Start: 2017-07-12 | End: 2017-08-11

## 2017-07-12 RX ORDER — ATORVASTATIN CALCIUM 80 MG/1
80 TABLET, FILM COATED ORAL DAILY
Qty: 90 TABLET | Refills: 3 | Status: SHIPPED | OUTPATIENT
Start: 2017-07-12 | End: 2017-10-13 | Stop reason: SDUPTHER

## 2017-07-12 NOTE — PROGRESS NOTES
Find out if he is taking the zetia and lipitor 20 mg a day.   If he is taking them, he needs to increase the lipitor to 80 mg a day and continue the zetia 10 mg a day.  rf x 3 mo and recheck a lipid in 3 months.     The a1c is high also in addition the cholesterol above.   Change the lantus from 62 u a day to 70 u a day and recheck an a1c in 3 months.

## 2017-07-12 NOTE — TELEPHONE ENCOUNTER
I have signed for the following orders AND/OR meds.  Please call the patient and ask the patient to schedule the testing AND/OR inform about any medications that were sent.      Orders Placed This Encounter   Procedures    Hemoglobin A1c     Standing Status:   Future     Standing Expiration Date:   9/10/2018    Lipid panel     Standing Status:   Future     Standing Expiration Date:   9/10/2018    Hepatic function panel     Standing Status:   Future     Standing Expiration Date:   9/10/2018         Medications Ordered This Encounter      atorvastatin (LIPITOR) 80 MG tablet          Sig: Take 1 tablet (80 mg total) by mouth once daily.          Dispense:  90 tablet          Refill:  3      insulin glargine (LANTUS SOLOSTAR) 100 unit/mL (3 mL) InPn pen          Sig: INJECT 70 UNITS SUBCUTANEOUSLY EVERY DAY          Dispense:  60 mL          Refill:  3

## 2017-07-18 DIAGNOSIS — G91.2 NORMAL PRESSURE HYDROCEPHALUS: Primary | ICD-10-CM

## 2017-07-18 DIAGNOSIS — R79.1 ABNORMAL COAGULATION PROFILE: ICD-10-CM

## 2017-07-18 DIAGNOSIS — R82.90 ABNORMAL FINDING IN URINE: ICD-10-CM

## 2017-07-18 RX ORDER — SODIUM CHLORIDE 9 MG/ML
INJECTION, SOLUTION INTRAVENOUS CONTINUOUS
Status: CANCELLED | OUTPATIENT
Start: 2017-07-18

## 2017-07-20 ENCOUNTER — TELEPHONE (OUTPATIENT)
Dept: CARDIOLOGY | Facility: CLINIC | Age: 72
End: 2017-07-20

## 2017-07-20 ENCOUNTER — TELEPHONE (OUTPATIENT)
Dept: NEUROSURGERY | Facility: CLINIC | Age: 72
End: 2017-07-20

## 2017-07-20 NOTE — TELEPHONE ENCOUNTER
----- Message from Stewart Cervantes sent at 7/20/2017  9:36 AM CDT -----  Contact: pt  Pt has a procedure coming up and needs to know when he should stop taking his medication  Call Back#[phone]  Thanks

## 2017-07-20 NOTE — TELEPHONE ENCOUNTER
Pt called asking the time for surgery. Advised pt the sx will start at 7 and he will need to be there at 5 am.     Also per Dr. Oconnor note We have discussed the natural progression of the disease process. He will need to be off of anticoagulants for at least 7 days pre-op and post-op. He will need cardiac clearance to undergo surgery.     Pt calling to schedule cardiac clearance

## 2017-07-20 NOTE — TELEPHONE ENCOUNTER
Cardiac clearance: patient having surgery needs to hold PLAVIX, ASA, PRADAXA one week before and a week after procedure

## 2017-08-01 ENCOUNTER — CLINICAL SUPPORT (OUTPATIENT)
Dept: CARDIOLOGY | Facility: CLINIC | Age: 72
End: 2017-08-01
Payer: MEDICARE

## 2017-08-01 ENCOUNTER — OFFICE VISIT (OUTPATIENT)
Dept: CARDIOLOGY | Facility: CLINIC | Age: 72
End: 2017-08-01
Payer: MEDICARE

## 2017-08-01 VITALS
WEIGHT: 234.38 LBS | SYSTOLIC BLOOD PRESSURE: 155 MMHG | DIASTOLIC BLOOD PRESSURE: 75 MMHG | HEART RATE: 70 BPM | BODY MASS INDEX: 36.7 KG/M2

## 2017-08-01 DIAGNOSIS — N18.30 KIDNEY DISEASE, CHRONIC, STAGE III (GFR 30-59 ML/MIN): ICD-10-CM

## 2017-08-01 DIAGNOSIS — I49.01 VF (VENTRICULAR FIBRILLATION): ICD-10-CM

## 2017-08-01 DIAGNOSIS — Z95.810 CARDIAC DEFIBRILLATOR IN SITU: ICD-10-CM

## 2017-08-01 DIAGNOSIS — I50.32 CHRONIC DIASTOLIC CONGESTIVE HEART FAILURE: ICD-10-CM

## 2017-08-01 DIAGNOSIS — I25.810 CORONARY ARTERY DISEASE INVOLVING CORONARY BYPASS GRAFT OF NATIVE HEART WITHOUT ANGINA PECTORIS: Primary | ICD-10-CM

## 2017-08-01 DIAGNOSIS — E11.21 TYPE 2 DIABETES MELLITUS WITH DIABETIC NEPHROPATHY, WITHOUT LONG-TERM CURRENT USE OF INSULIN: ICD-10-CM

## 2017-08-01 DIAGNOSIS — I25.10 CORONARY ARTERY DISEASE INVOLVING NATIVE CORONARY ARTERY OF NATIVE HEART WITHOUT ANGINA PECTORIS: ICD-10-CM

## 2017-08-01 DIAGNOSIS — I48.0 PAROXYSMAL ATRIAL FIBRILLATION: ICD-10-CM

## 2017-08-01 PROCEDURE — 99999 PR PBB SHADOW E&M-EST. PATIENT-LVL II: CPT | Mod: PBBFAC,,, | Performed by: INTERNAL MEDICINE

## 2017-08-01 PROCEDURE — 1159F MED LIST DOCD IN RCRD: CPT | Mod: S$GLB,,, | Performed by: INTERNAL MEDICINE

## 2017-08-01 PROCEDURE — 1126F AMNT PAIN NOTED NONE PRSNT: CPT | Mod: S$GLB,,, | Performed by: INTERNAL MEDICINE

## 2017-08-01 PROCEDURE — 93284 PRGRMG EVAL IMPLANTABLE DFB: CPT | Mod: S$GLB,,, | Performed by: INTERNAL MEDICINE

## 2017-08-01 PROCEDURE — 99214 OFFICE O/P EST MOD 30 MIN: CPT | Mod: S$GLB,,, | Performed by: INTERNAL MEDICINE

## 2017-08-01 PROCEDURE — 3045F PR MOST RECENT HEMOGLOBIN A1C LEVEL 7.0-9.0%: CPT | Mod: S$GLB,,, | Performed by: INTERNAL MEDICINE

## 2017-08-01 PROCEDURE — 3066F NEPHROPATHY DOC TX: CPT | Mod: S$GLB,,, | Performed by: INTERNAL MEDICINE

## 2017-08-01 PROCEDURE — 99499 UNLISTED E&M SERVICE: CPT | Mod: S$GLB,,, | Performed by: INTERNAL MEDICINE

## 2017-08-01 NOTE — PROGRESS NOTES
Subjective:    Patient ID:  Abdullahi Salazar is a 71 y.o. male who presents for follow-up of cad    HPI  He comes for follow up with no major problems, no chest pain, no shortness of breath.      Review of Systems   Constitution: Negative for decreased appetite, weakness, malaise/fatigue, weight gain and weight loss.   Cardiovascular: Negative for chest pain, dyspnea on exertion, leg swelling, palpitations and syncope.   Respiratory: Negative for cough and shortness of breath.    Gastrointestinal: Negative.    All other systems reviewed and are negative.       Objective:    Physical Exam   Constitutional: He is oriented to person, place, and time. He appears well-developed and well-nourished.   HENT:   Head: Normocephalic.   Eyes: Pupils are equal, round, and reactive to light.   Neck: Normal range of motion. Neck supple. No JVD present. Carotid bruit is not present. No thyromegaly present.   Cardiovascular: Normal rate, regular rhythm, normal heart sounds, intact distal pulses and normal pulses.  PMI is not displaced.  Exam reveals no gallop.    No murmur heard.  Pulmonary/Chest: Effort normal and breath sounds normal.   Abdominal: Soft. Normal appearance. He exhibits no mass. There is no hepatosplenomegaly. There is no tenderness.   Musculoskeletal: Normal range of motion. He exhibits no edema.   Neurological: He is alert and oriented to person, place, and time. He has normal strength and normal reflexes. No sensory deficit.   Skin: Skin is warm and intact.   Psychiatric: He has a normal mood and affect.   Nursing note and vitals reviewed.        Assessment:       1. Coronary artery disease involving coronary bypass graft of native heart without angina pectoris    2. Coronary artery disease involving native coronary artery of native heart without angina pectoris    3. Paroxysmal atrial fibrillation    4. Type 2 diabetes mellitus with diabetic nephropathy, without long-term current use of insulin    5. Kidney disease,  chronic, stage III (GFR 30-59 ml/min)    6. Cardiac defibrillator in situ         Plan:     Continue all cardiac medications  Regular exercise program  Weight loss  9 m f/u with [facility]

## 2017-08-08 PROBLEM — F02.80 DEMENTIA ASSOCIATED WITH OTHER UNDERLYING DISEASE WITHOUT BEHAVIORAL DISTURBANCE: Chronic | Status: ACTIVE | Noted: 2017-08-08

## 2017-08-08 PROBLEM — G91.2 NORMAL PRESSURE HYDROCEPHALUS: Status: ACTIVE | Noted: 2017-08-08

## 2017-08-10 ENCOUNTER — TELEPHONE (OUTPATIENT)
Dept: NEPHROLOGY | Facility: CLINIC | Age: 72
End: 2017-08-10

## 2017-08-10 PROBLEM — N17.9 AKI (ACUTE KIDNEY INJURY): Status: ACTIVE | Noted: 2017-08-10

## 2017-08-10 NOTE — TELEPHONE ENCOUNTER
Mechelle from [facility]  TYLER on CKD  Dr. So  Room 422    Dr. Sosa informed and acknoweldged consult.

## 2017-08-12 ENCOUNTER — NURSE TRIAGE (OUTPATIENT)
Dept: ADMINISTRATIVE | Facility: CLINIC | Age: 72
End: 2017-08-12

## 2017-08-12 RX ORDER — TORSEMIDE 20 MG/1
20 TABLET ORAL DAILY
Qty: 3 TABLET | Refills: 0 | Status: ON HOLD | OUTPATIENT
Start: 2017-08-12 | End: 2017-08-17 | Stop reason: HOSPADM

## 2017-08-12 NOTE — TELEPHONE ENCOUNTER
Pt is out of med and Needs demadex rx called to [facility]. Ok per MD on call to restart 20 mg qd. Pt has appt Monday. Called in am 1151 to [facility]. Family notified. Call back with questions.     Reason for Disposition   Caller has medication question about med not prescribed by PCP and triager unable to answer question (e.g., compatibility with other med, storage)    Protocols used: ST MEDICATION QUESTION CALL-A-

## 2017-08-12 NOTE — TELEPHONE ENCOUNTER
"  Reason for Disposition   [1] Follow-up call to recent contact AND [2] information only call, no triage required     Call is forwarded to Urszula gonsales.  Radha will be call back shortly    Answer Assessment - Initial Assessment Questions  1. REASON FOR CALL or QUESTION: "What is your reason for calling today?" or "How can I best help you?" or "What question do you have that I can help answer?"       Nurse on call back to Urszula gonsales    Protocols used: ST INFORMATION ONLY CALL-A-AH    "

## 2017-08-12 NOTE — TELEPHONE ENCOUNTER
Pt has gained 20 lbs while in hosp. CHF. Currently SOB a little at rest. Daughter leaving to back to [place]. Edema of legs. Just discharged yest. Family wants to find out about meds. appt with neph on Monday. Family concerned that pt gained 20 lbs while in hosp and wants to know if pt should be on diuretic. rec ED if SOB, edema of legs.   Spoke with dr fabio bronson to restart demadex 20 mg once daily. Family notified. Call back with questions.     Reason for Disposition   Caller has URGENT medication question about med that PCP prescribed and triager unable to answer question    Protocols used: ST MEDICATION QUESTION CALL-A-

## 2017-08-12 NOTE — TELEPHONE ENCOUNTER
LM x2 on  RN VM at 1113.     Reason for Disposition   Message left on identified answering machine.    Protocols used: ST NO CONTACT OR DUPLICATE CONTACT CALL-A-AH

## 2017-08-14 ENCOUNTER — PATIENT MESSAGE (OUTPATIENT)
Dept: NEPHROLOGY | Facility: CLINIC | Age: 72
End: 2017-08-14

## 2017-08-14 ENCOUNTER — NURSE TRIAGE (OUTPATIENT)
Dept: ADMINISTRATIVE | Facility: CLINIC | Age: 72
End: 2017-08-14

## 2017-08-14 PROBLEM — I50.9 CONGESTIVE HEART FAILURE: Status: ACTIVE | Noted: 2017-08-14

## 2017-08-14 NOTE — TELEPHONE ENCOUNTER
Called back to daughter she states patient has losr 4.5 pounds when weighing this am.  When she asked how are you feeling he stated he isn't feeling well.

## 2017-08-14 NOTE — TELEPHONE ENCOUNTER
Called pt regarding message below. Pts daughter states she is bringing pt to the ER now. Bp is 180/90's and heart rate 70's. Pt denies headaches, dizziness. Had one episode of vomiting yesterday. Informed daughter he should go to ER to be evaluated due to his pressure being too high after his shunt surgery on 8/8. Daughter states she will call after evaluated with additional information.

## 2017-08-14 NOTE — TELEPHONE ENCOUNTER
"  Reason for Disposition   Difficulty breathing   [1] MODERATE difficulty breathing (e.g., speaks in phrases, SOB even at rest, pulse 100-120) AND [2] NEW-onset or WORSE than normal    Answer Assessment - Initial Assessment Questions  1. SYMPTOM: "What's the main symptom you're concerned about?" (e.g., pain, fever, vomiting)      Weight gain,sob on exertion,261/94 HR79  2. ONSET: "When did ________  start?"      friday  3. SURGERY: "What surgery was performed?"      shunt  4. DATE of SURGERY: "When was surgery performed?"       8/8  5. ANESTHESIA: " What type of anesthesia did you have?" (e.g., general, spinal, epidural, local)      general  6. PAIN: "Is there any pain?" If so, ask: "How bad is it?"  (Scale 1-10; or mild, moderate, severe)      no  7. FEVER: "Do you have a fever?" If so, ask: "What is your temperature, how was it measured, and when did it start?"      no  8. VOMITING: "Is there any vomiting?" If yes, ask: "How many times?"      Vomiting x 1  9. BLEEDING: "Is there any bleeding?" If so, ask: "How much?" and "Where?"      no  10. OTHER SYMPTOMS: "Do you have any other symptoms?" (e.g., drainage from wound, painful urination, constipation)        Weight gain,chest pressure    Answer Assessment - Initial Assessment Questions  1. RESPIRATORY STATUS: "Describe your breathing?" (e.g., wheezing, shortness of breath, unable to speak, severe coughing)       sob  2. ONSET: "When did this breathing problem begin?"       Post op  3. PATTERN "Does the difficult breathing come and go, or has it been constant since it started?"       Yes roscoe with movement  4. SEVERITY: "How bad is your breathing?" (e.g., mild, moderate, severe)     - MILD: No SOB at rest, mild SOB with walking, speaks normally in sentences, can lay down, no retractions, pulse < 100.     - MODERATE: SOB at rest, SOB with minimal exertion and prefers to sit, cannot lie down flat, speaks in phrases, mild retractions, audible wheezing, pulse 100-120. " "    - SEVERE: Very SOB at rest, speaks in single words, struggling to breathe, sitting hunched forward, retractions, pulse > 120       moderate  5. RECURRENT SYMPTOM: "Have you had difficulty breathing before?" If so, ask: "When was the last time?" and "What happened that time?"       no  6. CARDIAC HISTORY: "Do you have any history of heart disease?" (e.g., heart attack, angina, bypass surgery, angioplasty)       yes  7. LUNG HISTORY: "Do you have any history of lung disease?"  (e.g., pulmonary embolus, asthma, emphysema)      no  8. CAUSE: "What do you think is causing the breathing problem?"       Unsure but fluid increase  9. OTHER SYMPTOMS: "Do you have any other symptoms? (e.g., dizziness, runny nose, cough, chest pain, fever)      Coughing spells x 2  10. PREGNANCY: "Is there any chance you are pregnant?" "When was your last menstrual period?"        n/a  11. TRAVEL: "Have you traveled out of the country in the last month?" (e.g., travel history, exposures)        no    Protocols used: ST POST-OP SYMPTOMS AND UXAKPFRTC-T-DP, ST BREATHING DIFFICULTY-A-AH    "

## 2017-08-15 ENCOUNTER — PATIENT MESSAGE (OUTPATIENT)
Dept: FAMILY MEDICINE | Facility: CLINIC | Age: 72
End: 2017-08-15

## 2017-08-15 PROBLEM — I50.33 ACUTE ON CHRONIC DIASTOLIC CONGESTIVE HEART FAILURE: Status: ACTIVE | Noted: 2017-08-15

## 2017-08-17 PROBLEM — N18.9 ACUTE RENAL FAILURE SUPERIMPOSED ON CHRONIC KIDNEY DISEASE: Status: ACTIVE | Noted: 2017-08-17

## 2017-08-17 PROBLEM — I35.0 AORTIC STENOSIS, MODERATE: Status: ACTIVE | Noted: 2017-08-17

## 2017-08-17 PROBLEM — N17.9 ACUTE RENAL FAILURE SUPERIMPOSED ON CHRONIC KIDNEY DISEASE: Status: ACTIVE | Noted: 2017-08-17

## 2017-08-18 ENCOUNTER — CLINICAL SUPPORT (OUTPATIENT)
Dept: NEUROSURGERY | Facility: CLINIC | Age: 72
End: 2017-08-18
Payer: MEDICARE

## 2017-08-18 DIAGNOSIS — Z98.2 STATUS POST VENTRICULO-PERITONEAL SHUNT PLACEMENT: Primary | ICD-10-CM

## 2017-08-18 NOTE — PROGRESS NOTES
Pt is 12 days s/p  shunt insertion with Dr. Oconnor. No s/s of infection. Incision cleaned with chloraprep and staples, and sutures removed with no issue. Incision is warm, dry, intact. Pt reports post-operative pain level < pre-operative state. His gait has greatly improved.  Pt is not requesting medication refill today. Pt re-educated on narcotics policy. Educated patient on weight lifting status, bending/lifting/twisting, and to call with any changes or questions. Pt aware of imaging and f/u appt with provider. No further questions.

## 2017-08-30 ENCOUNTER — TELEPHONE (OUTPATIENT)
Dept: FAMILY MEDICINE | Facility: CLINIC | Age: 72
End: 2017-08-30

## 2017-08-30 ENCOUNTER — PATIENT MESSAGE (OUTPATIENT)
Dept: FAMILY MEDICINE | Facility: CLINIC | Age: 72
End: 2017-08-30

## 2017-08-30 NOTE — TELEPHONE ENCOUNTER
----- Message from Krysta Ruelas sent at 8/30/2017  9:41 AM CDT -----  Contact: wife  States the pt weight is 232, request a call at [phone]///[id number]

## 2017-09-06 ENCOUNTER — OFFICE VISIT (OUTPATIENT)
Dept: NEUROSURGERY | Facility: CLINIC | Age: 72
End: 2017-09-06
Payer: MEDICARE

## 2017-09-06 ENCOUNTER — HOSPITAL ENCOUNTER (OUTPATIENT)
Dept: RADIOLOGY | Facility: HOSPITAL | Age: 72
Discharge: HOME | End: 2017-09-06
Attending: NEUROLOGICAL SURGERY
Payer: MEDICARE

## 2017-09-06 VITALS
BODY MASS INDEX: 35.39 KG/M2 | WEIGHT: 225.5 LBS | HEART RATE: 69 BPM | HEIGHT: 67 IN | SYSTOLIC BLOOD PRESSURE: 144 MMHG | DIASTOLIC BLOOD PRESSURE: 68 MMHG

## 2017-09-06 DIAGNOSIS — Z98.2 STATUS POST VENTRICULO-PERITONEAL SHUNT PLACEMENT: ICD-10-CM

## 2017-09-06 DIAGNOSIS — G91.2 NORMAL PRESSURE HYDROCEPHALUS: ICD-10-CM

## 2017-09-06 DIAGNOSIS — Z98.2 STATUS POST VENTRICULO-PERITONEAL SHUNT PLACEMENT: Primary | ICD-10-CM

## 2017-09-06 PROCEDURE — 70450 CT HEAD/BRAIN W/O DYE: CPT | Mod: TC,PO

## 2017-09-06 PROCEDURE — 70450 CT HEAD/BRAIN W/O DYE: CPT | Mod: 26,,, | Performed by: RADIOLOGY

## 2017-09-06 PROCEDURE — 99024 POSTOP FOLLOW-UP VISIT: CPT | Mod: S$GLB,,, | Performed by: PHYSICIAN ASSISTANT

## 2017-09-06 PROCEDURE — 99499 UNLISTED E&M SERVICE: CPT | Mod: S$GLB,,, | Performed by: PHYSICIAN ASSISTANT

## 2017-09-06 NOTE — PROGRESS NOTES
Neurosurgery History & Physical    Patient ID: Abdullahi Salazar is a 71 y.o. male.    Chief Complaint   Patient presents with    Post-op Evaluation     4 wks s/p right frontal  shunt c bx of middle frontal       Review of Systems   Constitutional: Negative for activity change, chills, fatigue and unexpected weight change.   HENT: Negative for hearing loss, tinnitus, trouble swallowing and voice change.    Eyes: Negative for visual disturbance.   Respiratory: Negative for apnea, chest tightness and shortness of breath.    Cardiovascular: Negative for chest pain and palpitations.   Gastrointestinal: Negative for abdominal pain, constipation, diarrhea, nausea and vomiting.   Genitourinary: Negative for difficulty urinating, dysuria and frequency.   Musculoskeletal: Negative for back pain, gait problem, neck pain and neck stiffness.   Skin: Negative for wound.   Neurological: Negative for dizziness, tremors, seizures, facial asymmetry, speech difficulty, weakness, light-headedness, numbness and headaches.   Psychiatric/Behavioral: Negative for confusion and decreased concentration.       Past Medical History:   Diagnosis Date    Actinic keratoses     Altered mental status 3/22/2015    Anticoagulant long-term use     Atrial fibrillation     CAD (coronary artery disease)     stents after bypass    Carotid artery stenosis 3/31/2015    Colon polyps 2011    repeat colonoscopy 2014    Combined hyperlipidemia associated with type 2 diabetes mellitus     Diabetes mellitus type II, uncontrolled     dx 2004    GERD (gastroesophageal reflux disease)     Grand mal seizure     last 2/2017    Hard of hearing     History of cardiac pacemaker 3/31/2015    HTN (hypertension)     Mitral valve insufficiency     Presence of automatic (implantable) cardiac defibrillator     Sleep apnea with use of continuous positive airway pressure (CPAP)     patient states he does not use CPAP anymore    Syncope 3/30/2015    Wears  "dentures     upper and lower    Wears hearing aid     sometimes     Social History     Social History    Marital status:      Spouse name: N/A    Number of children: N/A    Years of education: N/A     Occupational History    Not on file.     Social History Main Topics    Smoking status: Former Smoker     Packs/day: 2.00     Years: 25.00     Types: Cigarettes     Quit date: 1/1/1983    Smokeless tobacco: Never Used      Comment: quit 1983     Alcohol use Yes      Comment: occasional    Drug use: No    Sexual activity: Not on file     Other Topics Concern    Not on file     Social History Narrative    No narrative on file     Family History   Problem Relation Age of Onset    Stomach cancer Mother     Lung cancer Father     Diabetes Sister     Hypertension Sister     Heart disease Neg Hx     Stroke Neg Hx      Review of patient's allergies indicates:  No Known Allergies    Current Outpatient Prescriptions:     ACCU-CHEK CLEOPATRA PLUS TEST STRP Strp, TEST BLOOD SUGARS 4 TIMES DAILY, Disp: 150 strip, Rfl: PRN    amlodipine (NORVASC) 10 MG tablet, Take 1 tablet (10 mg total) by mouth once daily., Disp: 90 tablet, Rfl: 3    aspirin (ECOTRIN) 81 MG EC tablet, Take 1 tablet (81 mg total) by mouth once daily. Every day. Restart 8/15/17, Disp: , Rfl: 0    atorvastatin (LIPITOR) 80 MG tablet, Take 1 tablet (80 mg total) by mouth once daily., Disp: 90 tablet, Rfl: 3    BD INSULIN PEN NEEDLE UF SHORT 31 gauge x 5/16" Ndle, USE FOUR TIMES DAILY, Disp: 360 each, Rfl: 3    carvedilol (COREG) 25 MG tablet, TAKE 1/2 TABLET TWICE DAILY WITH MEALS, Disp: 90 tablet, Rfl: 3    clopidogrel (PLAVIX) 75 mg tablet, Take 1 tablet (75 mg total) by mouth once daily. Restart 8/15, Disp: 90 tablet, Rfl: 3    dabigatran etexilate (PRADAXA) 75 mg Cap, Take 1 capsule (75 mg total) by mouth 2 (two) times daily. "Do NOT break, chew, or open capsules." Restart 7 days Post op. 8/15/17, Disp: 180 capsule, Rfl: 3    ezetimibe " "(ZETIA) 10 mg tablet, Take 1 tablet (10 mg total) by mouth once daily., Disp: 90 tablet, Rfl: 0    ferrous sulfate 325 mg (65 mg iron) Tab tablet, 325 mg 3 (three) times daily. , Disp: , Rfl:     hydrALAZINE (APRESOLINE) 25 MG tablet, Take 1 tablet (25 mg total) by mouth every 8 (eight) hours., Disp: 90 tablet, Rfl: 0    insulin aspart (NOVOLOG) 100 unit/mL InPn pen, Inject subcutaneously three times a day before meals per sliding scale, Disp: 90 mL, Rfl: 3    insulin glargine (LANTUS SOLOSTAR) 100 unit/mL (3 mL) InPn pen, Inject 60 units into the skin every evening., Disp: 18 mL, Rfl: 0    isosorbide mononitrate (IMDUR) 60 MG 24 hr tablet, TAKE 1 TABLET (60 MG TOTAL) BY MOUTH EVERY EVENING., Disp: 90 tablet, Rfl: 3    L.acidophil,parac-S.therm-Bif. (RISAQUAD) Cap capsule, Take 1 capsule by mouth once daily., Disp: , Rfl:     levetiracetam (KEPPRA) 500 MG Tab, Take 750 mg by mouth 2 (two) times daily. , Disp: , Rfl:     oxycodone-acetaminophen (PERCOCET) 7.5-325 mg per tablet, Take 1 tablet by mouth every 6 (six) hours as needed., Disp: 30 tablet, Rfl: 0    RANEXA 500 mg Tb12, TAKE 1 TABLET (500 MG TOTAL) BY MOUTH 2 (TWO) TIMES DAILY., Disp: 180 tablet, Rfl: 3    tamsulosin (FLOMAX) 0.4 mg Cp24, Take 1 capsule (0.4 mg total) by mouth once daily., Disp: 90 capsule, Rfl: 3    torsemide (DEMADEX) 20 MG Tab, Take 1 tablet (20 mg total) by mouth once daily., Disp: 30 tablet, Rfl: 11    TRUEPLUS LANCETS 28 gauge Misc, TEST TWO TIMES DAILY AS NEEDED, Disp: 100 each, Rfl: 3    nitroGLYCERIN (NITROSTAT) 0.4 MG SL tablet, Place 1 tablet (0.4 mg total) under the tongue every 5 (five) minutes as needed for Chest pain., Disp: 30 tablet, Rfl: 0    Vitals:    09/06/17 1347   BP: (!) 144/68   BP Location: Left arm   Patient Position: Sitting   BP Method: Medium (Automatic)   Pulse: 69   Weight: 102.3 kg (225 lb 8.5 oz)   Height: 5' 7" (1.702 m)       Physical Exam   Constitutional: He is oriented to person, place, and " time. He appears well-developed and well-nourished.   HENT:   Head: Normocephalic and atraumatic.   Eyes: Pupils are equal, round, and reactive to light.   Neck: Normal range of motion. Neck supple.   Cardiovascular: Normal rate.    Pulmonary/Chest: Effort normal.   Abdominal: He exhibits no distension.   Musculoskeletal: Normal range of motion. He exhibits no edema.   Neurological: He is alert and oriented to person, place, and time. He has a normal Finger-Nose-Finger Test, a normal Heel to Shin Test, a normal Romberg Test and a normal Tandem Gait Test. Gait normal.   Reflex Scores:       Tricep reflexes are 2+ on the right side and 2+ on the left side.       Bicep reflexes are 2+ on the right side and 2+ on the left side.       Brachioradialis reflexes are 2+ on the right side and 2+ on the left side.       Patellar reflexes are 2+ on the right side and 2+ on the left side.       Achilles reflexes are 2+ on the right side and 2+ on the left side.  Skin: Skin is warm and dry.   Psychiatric: He has a normal mood and affect. His speech is normal and behavior is normal. Judgment and thought content normal.   Nursing note and vitals reviewed.      Neurologic Exam     Mental Status   Oriented to person, place, and time.   Oriented to person.   Oriented to place.   Oriented to time.   Follows 3 step commands.   Attention: normal. Concentration: normal.   Speech: speech is normal   Level of consciousness: alert  Knowledge: consistent with education.   Able to name object. Able to read. Able to repeat. Able to write. Normal comprehension.     Cranial Nerves     CN II   Visual acuity: normal  Right visual field deficit: none  Left visual field deficit: none     CN III, IV, VI   Pupils are equal, round, and reactive to light.  Right pupil: Size: 3 mm. Shape: regular. Reactivity: brisk. Consensual response: intact.   Left pupil: Size: 3 mm. Shape: regular. Reactivity: brisk. Consensual response: intact.   CN III: no CN III  palsy  CN VI: no CN VI palsy  Nystagmus: none   Diplopia: none  Ophthalmoparesis: none  Conjugate gaze: present    CN V   Right facial sensation deficit: none  Left facial sensation deficit: none    CN VII   Right facial weakness: none  Left facial weakness: none    CN VIII   Hearing: intact    CN IX, X   CN IX normal.   CN X normal.     CN XI   Right sternocleidomastoid strength: normal  Left sternocleidomastoid strength: normal  Right trapezius strength: normal  Left trapezius strength: normal    CN XII   Fasciculations: absent  Tongue deviation: none    Motor Exam   Muscle bulk: normal  Overall muscle tone: normal  Right arm pronator drift: absent  Left arm pronator drift: absent    Strength   Right neck flexion: 5/5  Left neck flexion: 5/5  Right neck extension: 5/5  Left neck extension: 5/5  Right deltoid: 5/5  Left deltoid: 5/5  Right biceps: 5/5  Left biceps: 5/5  Right triceps: 5/5  Left triceps: 5/5  Right wrist flexion: 5/5  Left wrist flexion: 5/5  Right wrist extension: 5/5  Left wrist extension: 5/5  Right interossei: 5/5  Left interossei: 5/5  Right abdominals: 5/5  Left abdominals: 5/5  Right iliopsoas: 5/5  Left iliopsoas: 5/5  Right quadriceps: 5/5  Left quadriceps: 5/5  Right hamstrin/5  Left hamstrin/5  Right glutei: 5/5  Left glutei: 5/5  Right anterior tibial: 5/5  Left anterior tibial: 5/5  Right posterior tibial: 5/5  Left posterior tibial: 5/5  Right peroneal: 5/5  Left peroneal: 5/5  Right gastroc: 5/5  Left gastroc: 5/5    Sensory Exam   Right arm light touch: normal  Left arm light touch: normal  Right leg light touch: normal  Left leg light touch: normal  Right arm vibration: normal  Left arm vibration: normal  Right arm pinprick: normal  Left arm pinprick: normal    Gait, Coordination, and Reflexes     Gait  Gait: normal    Coordination   Romberg: negative  Finger to nose coordination: normal  Heel to shin coordination: normal  Tandem walking coordination: normal    Tremor    Resting tremor: absent  Intention tremor: absent  Action tremor: absent    Reflexes   Right brachioradialis: 2+  Left brachioradialis: 2+  Right biceps: 2+  Left biceps: 2+  Right triceps: 2+  Left triceps: 2+  Right patellar: 2+  Left patellar: 2+  Right achilles: 2+  Left achilles: 2+  Right Chavez: absent  Left Chavez: absent  Right ankle clonus: absent  Left ankle clonus: absent      Provider dictation:  The patient is a 75 year-old male following up 4 weeks s/p right frontal  shunt implantation due to NPH. He reports improvement in gait as well as urinary incontinence. He is very happy with his surgical outcome.     His Med[employer]onic Strata 2 (small valve) shunt is set at 1.5 P/L. CT head reviewed and reveals appropriate location of distal catheter tip within the ventricle. The ventricles remain enlarged, however there is a very mild decrease in size when compared to previous study.     The surgical incision is well-healed and the valve chamber easily compresses and refills. The incision site has a small abrasion where Mr. Salazar has been scratching due to itching.  He has been advised to place a bandaid over the area and to avoid further scratching the area.    From our standpoint he does not require any further scheduled follow-up. He has been instructed to follow-up as needed if he notices a decline in function. We will call him at 12 weeks post-op to see how he is coming along.       Visit Diagnosis:  Status post ventriculo-peritoneal shunt placement    Normal pressure hydrocephalus        Total time spent counseling greater than fifty percent of total visit time.  Counseling included discussion regarding imaging findings, diagnosis possibilities, treatment options, risks and benefits.   The patient had many questions regarding the options and long-term effects.

## 2017-09-11 ENCOUNTER — LAB VISIT (OUTPATIENT)
Dept: LAB | Facility: HOSPITAL | Age: 72
End: 2017-09-11
Attending: FAMILY MEDICINE
Payer: MEDICARE

## 2017-09-11 ENCOUNTER — TELEPHONE (OUTPATIENT)
Dept: FAMILY MEDICINE | Facility: CLINIC | Age: 72
End: 2017-09-11

## 2017-09-11 ENCOUNTER — OFFICE VISIT (OUTPATIENT)
Dept: FAMILY MEDICINE | Facility: CLINIC | Age: 72
End: 2017-09-11
Payer: MEDICARE

## 2017-09-11 VITALS
WEIGHT: 227.38 LBS | TEMPERATURE: 98 F | HEIGHT: 67 IN | DIASTOLIC BLOOD PRESSURE: 64 MMHG | HEART RATE: 72 BPM | BODY MASS INDEX: 35.69 KG/M2 | SYSTOLIC BLOOD PRESSURE: 136 MMHG

## 2017-09-11 DIAGNOSIS — E11.29 PROTEINURIA DUE TO TYPE 2 DIABETES MELLITUS: ICD-10-CM

## 2017-09-11 DIAGNOSIS — I15.2 HYPERTENSION ASSOCIATED WITH DIABETES: ICD-10-CM

## 2017-09-11 DIAGNOSIS — E11.21 TYPE 2 DIABETES MELLITUS WITH DIABETIC NEPHROPATHY, WITHOUT LONG-TERM CURRENT USE OF INSULIN: ICD-10-CM

## 2017-09-11 DIAGNOSIS — R80.9 PROTEINURIA DUE TO TYPE 2 DIABETES MELLITUS: ICD-10-CM

## 2017-09-11 DIAGNOSIS — N17.9 ACUTE RENAL FAILURE SUPERIMPOSED ON STAGE 3 CHRONIC KIDNEY DISEASE, UNSPECIFIED ACUTE RENAL FAILURE TYPE: ICD-10-CM

## 2017-09-11 DIAGNOSIS — N18.3 ACUTE RENAL FAILURE SUPERIMPOSED ON STAGE 3 CHRONIC KIDNEY DISEASE, UNSPECIFIED ACUTE RENAL FAILURE TYPE: ICD-10-CM

## 2017-09-11 DIAGNOSIS — I50.33 ACUTE ON CHRONIC DIASTOLIC CONGESTIVE HEART FAILURE: Primary | ICD-10-CM

## 2017-09-11 DIAGNOSIS — I11.0 HYPERTENSIVE HEART DISEASE WITH HEART FAILURE: ICD-10-CM

## 2017-09-11 DIAGNOSIS — E11.59 HYPERTENSION ASSOCIATED WITH DIABETES: ICD-10-CM

## 2017-09-11 DIAGNOSIS — I48.0 PAROXYSMAL ATRIAL FIBRILLATION: ICD-10-CM

## 2017-09-11 LAB
ANION GAP SERPL CALC-SCNC: 11 MMOL/L
BUN SERPL-MCNC: 17 MG/DL
CALCIUM SERPL-MCNC: 9 MG/DL
CHLORIDE SERPL-SCNC: 107 MMOL/L
CO2 SERPL-SCNC: 25 MMOL/L
CREAT SERPL-MCNC: 1.6 MG/DL
EST. GFR  (AFRICAN AMERICAN): 48.7 ML/MIN/1.73 M^2
EST. GFR  (NON AFRICAN AMERICAN): 42.1 ML/MIN/1.73 M^2
GLUCOSE SERPL-MCNC: 171 MG/DL
POTASSIUM SERPL-SCNC: 3.8 MMOL/L
SODIUM SERPL-SCNC: 143 MMOL/L

## 2017-09-11 PROCEDURE — 83036 HEMOGLOBIN GLYCOSYLATED A1C: CPT

## 2017-09-11 PROCEDURE — 3078F DIAST BP <80 MM HG: CPT | Mod: S$GLB,,, | Performed by: FAMILY MEDICINE

## 2017-09-11 PROCEDURE — 99999 PR PBB SHADOW E&M-EST. PATIENT-LVL V: CPT | Mod: PBBFAC,,, | Performed by: FAMILY MEDICINE

## 2017-09-11 PROCEDURE — 3045F PR MOST RECENT HEMOGLOBIN A1C LEVEL 7.0-9.0%: CPT | Mod: S$GLB,,, | Performed by: FAMILY MEDICINE

## 2017-09-11 PROCEDURE — 99499 UNLISTED E&M SERVICE: CPT | Mod: ,,, | Performed by: FAMILY MEDICINE

## 2017-09-11 PROCEDURE — 3075F SYST BP GE 130 - 139MM HG: CPT | Mod: S$GLB,,, | Performed by: FAMILY MEDICINE

## 2017-09-11 PROCEDURE — 99214 OFFICE O/P EST MOD 30 MIN: CPT | Mod: S$GLB,,, | Performed by: FAMILY MEDICINE

## 2017-09-11 PROCEDURE — 99499 UNLISTED E&M SERVICE: CPT | Mod: S$GLB,,, | Performed by: FAMILY MEDICINE

## 2017-09-11 PROCEDURE — 80048 BASIC METABOLIC PNL TOTAL CA: CPT

## 2017-09-11 PROCEDURE — 3066F NEPHROPATHY DOC TX: CPT | Mod: S$GLB,,, | Performed by: FAMILY MEDICINE

## 2017-09-11 PROCEDURE — 36415 COLL VENOUS BLD VENIPUNCTURE: CPT | Mod: PO

## 2017-09-11 PROCEDURE — 1126F AMNT PAIN NOTED NONE PRSNT: CPT | Mod: S$GLB,,, | Performed by: FAMILY MEDICINE

## 2017-09-11 PROCEDURE — 1159F MED LIST DOCD IN RCRD: CPT | Mod: S$GLB,,, | Performed by: FAMILY MEDICINE

## 2017-09-11 PROCEDURE — 3008F BODY MASS INDEX DOCD: CPT | Mod: S$GLB,,, | Performed by: FAMILY MEDICINE

## 2017-09-11 NOTE — PROGRESS NOTES
Subjective:      Patient ID: Abdullahi Salazar is a 71 y.o. male.    Chief Complaint: fu on hospital visit  HPI  He is following up from his recent hospitalization at [facility].  He was hospitalized for CHF and he was diuresed.  He has been followed by home health.      Discharge Summaries  Loyd Snow MD   [facility]      []Hide copied text  []Hover for attribution information  [facility] Medicine  Discharge Summary        Patient Name: Abdullahi Salazar  MRN: [id number]  Admission Date: 8/14/2017  Hospital Length of Stay: 0 days  Discharge Date and Time:  08/17/2017 12:24 PM  Attending Physician: Loyd Snow MD   Discharging Provider: Loyd Snow MD  Primary Care Provider: Maximo Hernandez MD        HPI:   Patient is a 71-year-old gentleman with a history of atrial fibrillation, CAD status post cardiac stents and CABG, hypertension, type 2 diabetes, followed by Dr. Rodney at [facility] with recent  shunt performed on August 8 by Dr. Oconnor for suspected normal pressure hydrocephalus. At that time patient complained of a 6 year history of urinary frequency, wide-based shuffling gait and ataxia along with memory loss. Patient had his Pradaxa and antiplatelets held 1 week prior to surgery. Patient's diuretics were also held during this time. Daughter reports 20 pound weight gain upon discharge from hospital on August 11. Patient also was using decreased dose of long-acting insulin, home regimen is 60 units and patient was seen around 35 units of Levemir. Patient presents with generalized weakness, increased work of breathing, swelling in legs and abdomen worse since last night. Daughter states that the home health nurse called cardiology over the weekend and patient took Demadex for past 3 days. He was seen in the emergency department and found to have acute CHF exacerbation with elevated proBNP mildly elevated troponin. No complaints of chest pain. He received IV  Lasix in the emergency department with improvement of his symptoms. Cardiology was called by ED physician. Hospital medicine consulted for admission.     * No surgery found *      Indwelling Lines/Drains at time of discharge:       Lines/Drains/Airways            No matching active lines, drains, or airways          Hospital Course:   After evaluation and stabilization in the emergency department he was admitted to the hospital and received bolus doses of IV diuretics. Initial weight was 108.5 kg. By the time of discharge it was down to 104.4 kg and he was significantly improved including resolution of peripheral edema. He was seen in consultation by the cardiology team.  2-D echocardiogram during the hospital stay showed an LVEF of 55-60% with aortic stenosis and a calculated RAFAL of 1.96 cm². Creatinine fluctuated but at the time of discharge was 1.56 and slightly below usual baseline.  Extensive care coordination arrangements were made and the patient was ready to transition from the hospital and have close outpatient follow-up in the Discharge Clinic      Consults:          Consults          Status Ordering Provider       Inpatient consult to Cardiology  Once     Provider:  Toño Haro MD     Acknowledged STACY ANTUNEZ       Inpatient consult to Hospitalist  Once     Provider:  (Not yet assigned)     Acknowledged STACY ANTUNEZ       IP consult to dietary  Once     Provider:  (Not yet assigned)     Completed GHADA GRIMES             Significant Diagnostic Studies: Labs:   BMP:   Recent Labs  Lab 08/16/17  0518 08/17/17  0516   * 291*    138   K 3.9 3.9    98   CO2 31 30   BUN 32* 31*   CREATININE 1.45* 1.56*   CALCIUM 9.2 9.1   MG 2.1  --    , CBC No results for input(s): WBC, HGB, HCT, PLT in the last 48 hours. and Troponin   Recent Labs  Lab 08/15/17  0515   TROPONINI 0.132*      Radiology: X-Ray: CXR: X-Ray Chest 1 View (CXR):       Results for orders placed or performed during  the hospital encounter of 08/14/17   X-Ray Chest 1 View     Narrative     Procedure: Single view chest radiograph.     History: Shortness of breath.     Comparison: Chest 8/10/17.     Findings: A single AP view of the chest demonstrates unchanged position of the ventricular shunt catheter tubing and left upper chest AICD. The ovary, is clear. There is no consolidation or effusion. There is no pneumothorax. Cardiac silhouette is enlarged. There is no acute osseous abnormality.     Impression      Cardiomegaly without acute cardiopulmonary abnormality.        Electronically signed by:         PRESTON AWAD MD  Date:                                            08/14/17  Time:                                           10:25     and X-Ray Chest PA and Lateral (CXR): No results found for this visit on 08/14/17.  Cardiac Graphics: Echocardiogram:   2D echo with color flow doppler:         Results for orders placed or performed during the hospital encounter of 08/14/17   2D echo with color flow doppler   Result Value Ref Range     EF 55 55 - 65     Mitral Valve Regurgitation MILD       Diastolic Dysfunction Yes (A)       Aortic Valve Stenosis TRIVIAL TO MILD       Est. PA Systolic Pressure 45.51 (A)       Tricuspid Valve Regurgitation MILD               Pending Diagnostic Studies:      None                  Final Active Diagnoses:     Diagnosis Date Noted POA    PRINCIPAL PROBLEM:  Acute on chronic diastolic congestive heart failure [I50.33] 08/15/2017 Yes    Aortic stenosis, moderate [I35.0] 08/17/2017 Yes    Hypertensive heart disease with heart failure [I11.0] 01/05/2015 Yes    Diabetes mellitus type II, uncontrolled [E11.65]   Yes    S/P coronary artery stent placement [Z95.5]   Not Applicable    S/P CABG (coronary artery bypass graft) [Z95.1]   Not Applicable    Acute renal failure superimposed on chronic kidney disease [N17.9, N18.9] 08/17/2017 Yes    CHF (congestive heart failure) [I50.9] 08/14/2017 Yes     "Dementia associated with other underlying disease without behavioral disturbance [F02.80] 08/08/2017 Yes       Chronic       Problems Resolved During this Admission:     Diagnosis Date Noted Date Resolved POA      No new Assessment & Plan notes have been filed under this hospital service since the last note was generated.  Service: Hospital Medicine        Discharged Condition: good     Disposition: Home-Health Care c     Follow Up:      Follow-up Information      [facility].    Specialty:  Transitional Care  Why:  Nurse to contact you within 24-48 hours to schedule follow up appointment  Contact information:  [address]  [facility] [address] [phone]                   Patient Instructions:          Ambulatory Referral to Transitional Care   Referral Priority: Routine Referral Type: Consultation   Referral Reason: Specialty Services Required     Number of Visits Requested: 1        Diet Diabetic 2200 Calories       Medications:  Reconciled Home Medications:         Current Discharge Medication List             CONTINUE these medications which have CHANGED     Details   torsemide (DEMADEX) 20 MG Tab Take 1 tablet (20 mg total) by mouth once daily.  Qty: 30 tablet, Refills: 11                 CONTINUE these medications which have NOT CHANGED     Details   ACCU-CHEK CLEOPATRA PLUS TEST STRP Strp TEST BLOOD SUGARS 4 TIMES DAILY  Qty: 150 strip, Refills: PRN       amlodipine (NORVASC) 10 MG tablet Take 1 tablet (10 mg total) by mouth once daily.  Qty: 90 tablet, Refills: 3     Associated Diagnoses: Essential hypertension       atorvastatin (LIPITOR) 80 MG tablet Take 1 tablet (80 mg total) by mouth once daily.  Qty: 90 tablet, Refills: 3       BD INSULIN PEN NEEDLE UF SHORT 31 gauge x 5/16" Ndle USE FOUR TIMES DAILY  Qty: 360 each, Refills: 3       carvedilol (COREG) 25 MG tablet TAKE 1/2 TABLET TWICE DAILY WITH MEALS  Qty: 90 tablet, Refills: 3       ezetimibe (ZETIA) 10 mg " tablet Take 1 tablet (10 mg total) by mouth once daily.  Qty: 90 tablet, Refills: 0     Associated Diagnoses: Combined hyperlipidemia associated with type 2 diabetes mellitus       ferrous sulfate 325 mg (65 mg iron) Tab tablet 325 mg 3 (three) times daily.        hydrALAZINE (APRESOLINE) 25 MG tablet Take 1 tablet (25 mg total) by mouth every 8 (eight) hours.  Qty: 90 tablet, Refills: 0       insulin aspart (NOVOLOG) 100 unit/mL InPn pen Inject subcutaneously three times a day before meals per sliding scale  Qty: 90 mL, Refills: 3       insulin glargine (LANTUS SOLOSTAR) 100 unit/mL (3 mL) InPn pen Inject 60 units into the skin every evening.  Qty: 18 mL, Refills: 0     Associated Diagnoses: Diabetes mellitus with microalbuminuria; Uncontrolled type 2 diabetes mellitus without complication, with long-term current use of insulin; Type 2 diabetes mellitus with stage 3 chronic kidney disease, with long-term current use of insulin       isosorbide mononitrate (IMDUR) 60 MG 24 hr tablet TAKE 1 TABLET (60 MG TOTAL) BY MOUTH EVERY EVENING.  Qty: 90 tablet, Refills: 3       L.acidophil,parac-S.therm-Bif. (RISAQUAD) Cap capsule Take 1 capsule by mouth once daily.       levetiracetam (KEPPRA) 500 MG Tab Take 750 mg by mouth 2 (two) times daily.        oxycodone-acetaminophen (PERCOCET) 7.5-325 mg per tablet Take 1 tablet by mouth every 6 (six) hours as needed.  Qty: 30 tablet, Refills: 0       RANEXA 500 mg Tb12 TAKE 1 TABLET (500 MG TOTAL) BY MOUTH 2 (TWO) TIMES DAILY.  Qty: 180 tablet, Refills: 3       tamsulosin (FLOMAX) 0.4 mg Cp24 Take 1 capsule (0.4 mg total) by mouth once daily.  Qty: 90 capsule, Refills: 3       TRUEPLUS LANCETS 28 gauge Misc TEST TWO TIMES DAILY AS NEEDED  Qty: 100 each, Refills: 3       aspirin (ECOTRIN) 81 MG EC tablet Take 1 tablet (81 mg total) by mouth once daily. Every day. Restart 8/15/17  Refills: 0       clopidogrel (PLAVIX) 75 mg tablet Take 1 tablet (75 mg total) by mouth once daily.  "Restart 8/15  Qty: 90 tablet, Refills: 3       dabigatran etexilate (PRADAXA) 75 mg Cap Take 1 capsule (75 mg total) by mouth 2 (two) times daily. "Do NOT break, chew, or open capsules." Restart 7 days Post op. 8/15/17  Qty: 180 capsule, Refills: 3       nitroGLYCERIN (NITROSTAT) 0.4 MG SL tablet Place 1 tablet (0.4 mg total) under the tongue every 5 (five) minutes as needed for Chest pain.  Qty: 30 tablet, Refills: 0                STOP taking these medications         doxycycline (VIBRA-TABS) 100 MG tablet Comments:   Reason for Stopping:                 Time spent on the discharge of patient: Greater than 30 minutes     HOS POC IP DISCHARGE SUMMARY     Loyd Snow MD  Department of Hospital Medicine  [facility]      Electronically signed by Loyd Snow MD at 8/17/2017 12:24 PM          The patient presents with essential hypertension.  The patient is tolerating the medication well and is in excellent compliance.  The patient is experiencing no side effects.  Counseling was offered regarding low salt diets.  The patient has a reduced salt intake.  The patient denies chest pain, palpitations, shortness of breath, dyspnea on exertion, left or murmur neck pain, nausea, vomiting, diaphoresis, paroxysmal nocturnal dyspnea, and orthopnea.   /64   Pulse 72   Temp 98 °F (36.7 °C) (Oral)   Ht 5' 7" (1.702 m)   Wt 103.1 kg (227 lb 6.4 oz)   BMI 35.62 kg/m²     The patient presents with diabetes.  The patient denies polyuria, polydipsia, polyphagia, hypoglycemia and paresthesias.  The patient's glucose control has been good.  Home glucose averages are routinely checked.  The patient is without retinopathy currently.  The patient has no history of neuropathy.  The patient currently complains of no podiatric problems.  The patient has excellent compliance.  Hemoglobin A1C   Date Value Ref Range Status   07/11/2017 8.0 (H) 4.0 - 5.6 % Final     Comment:     According to ADA guidelines, " hemoglobin A1c <7.0% represents  optimal control in non-pregnant diabetic patients. Different  metrics may apply to specific patient populations.   Standards of Medical Care in Diabetes-2016.  For the purpose of screening for the presence of diabetes:  <5.7%     Consistent with the absence of diabetes  5.7-6.4%  Consistent with increasing risk for diabetes   (prediabetes)  >or=6.5%  Consistent with diabetes  Currently, no consensus exists for use of hemoglobin A1c  for diagnosis of diabetes for children.  This Hemoglobin A1c assay has significant interference with fetal   hemoglobin   (HbF). The results are invalid for patients with abnormal amounts of   HbF,   including those with known Hereditary Persistence   of Fetal Hemoglobin. Heterozygous hemoglobin variants (HbAS, HbAC,   HbAD, HbAE, HbA2) do not significantly interfere with this assay;   however, presence of multiple variants in a sample may impact the %   interference.     06/08/2017 8.3 (H) 4.5 - 6.2 % Final     Comment:     According to ADA guidelines, hemoglobin A1C <7.0% represents  optimal control in non-pregnant diabetic patients.  Different  metrics may apply to specific populations.   Standards of Medical Care in Diabetes - 2016.  For the purpose of screening for the presence of diabetes:  <5.7%     Consistent with the absence of diabetes  5.7-6.4%  Consistent with increasing risk for diabetes   (prediabetes)  >or=6.5%  Consistent with diabetes  Currently no consensus exists for use of hemoglobin A1C  for diagnosis of diabetes for children.     03/01/2017 9.0 (H) 4.5 - 6.2 % Final     Comment:     According to ADA guidelines, hemoglobin A1C <7.0% represents  optimal control in non-pregnant diabetic patients.  Different  metrics may apply to specific populations.   Standards of Medical Care in Diabetes - 2016.  For the purpose of screening for the presence of diabetes:  <5.7%     Consistent with the absence of diabetes  5.7-6.4%  Consistent with  increasing risk for diabetes   (prediabetes)  >or=6.5%  Consistent with diabetes  Currently no consensus exists for use of hemoglobin A1C  for diagnosis of diabetes for children.       No results found for: VANE DUBOSE      He had renal problems in the hospital also and he has known ckd stage 3 with proteinuria from diabetes.   BMP  Lab Results   Component Value Date     08/17/2017    K 3.9 08/17/2017    CL 98 08/17/2017    CO2 30 08/17/2017    BUN 31 (H) 08/17/2017    CREATININE 1.56 (H) 08/17/2017    CALCIUM 9.1 08/17/2017    ANIONGAP 10 08/17/2017    ESTGFRAFRICA 50 (A) 08/17/2017    EGFRNONAA 43 (A) 08/17/2017         Health Maintenance Due   Topic Date Due    TETANUS VACCINE  11/12/1961    Influenza Vaccine  08/01/2017       Past Medical History:  Past Medical History:   Diagnosis Date    Actinic keratoses     Altered mental status 3/22/2015    Anticoagulant long-term use     Atrial fibrillation     CAD (coronary artery disease)     stents after bypass    Carotid artery stenosis 3/31/2015    Colon polyps 2011    repeat colonoscopy 2014    Combined hyperlipidemia associated with type 2 diabetes mellitus     Diabetes mellitus type II, uncontrolled     dx 2004    GERD (gastroesophageal reflux disease)     Grand mal seizure     last 2/2017    Hard of hearing     History of cardiac pacemaker 3/31/2015    HTN (hypertension)     Mitral valve insufficiency     Presence of automatic (implantable) cardiac defibrillator     Sleep apnea with use of continuous positive airway pressure (CPAP)     patient states he does not use CPAP anymore    Syncope 3/30/2015    Wears dentures     upper and lower    Wears hearing aid     sometimes     Past Surgical History:   Procedure Laterality Date    CARDIAC PACEMAKER PLACEMENT      CATARACT EXTRACTION W/ INTRAOCULAR LENS  IMPLANT, BILATERAL      CORONARY ARTERY BYPASS GRAFT  1993    three vessels    CORONARY STENT PLACEMENT      HERNIA REPAIR   "    as infant     Review of patient's allergies indicates:  No Known Allergies  Current Outpatient Prescriptions on File Prior to Visit   Medication Sig Dispense Refill    ACCU-CHEK CLEOPATRA PLUS TEST STRP Strp TEST BLOOD SUGARS 4 TIMES DAILY 150 strip PRN    amlodipine (NORVASC) 10 MG tablet Take 1 tablet (10 mg total) by mouth once daily. 90 tablet 3    aspirin (ECOTRIN) 81 MG EC tablet Take 1 tablet (81 mg total) by mouth once daily. Every day. Restart 8/15/17  0    atorvastatin (LIPITOR) 80 MG tablet Take 1 tablet (80 mg total) by mouth once daily. 90 tablet 3    BD INSULIN PEN NEEDLE UF SHORT 31 gauge x 5/16" Ndle USE FOUR TIMES DAILY 360 each 3    carvedilol (COREG) 25 MG tablet TAKE 1/2 TABLET TWICE DAILY WITH MEALS 90 tablet 3    clopidogrel (PLAVIX) 75 mg tablet Take 1 tablet (75 mg total) by mouth once daily. Restart 8/15 90 tablet 3    dabigatran etexilate (PRADAXA) 75 mg Cap Take 1 capsule (75 mg total) by mouth 2 (two) times daily. "Do NOT break, chew, or open capsules." Restart 7 days Post op. 8/15/17 180 capsule 3    ezetimibe (ZETIA) 10 mg tablet Take 1 tablet (10 mg total) by mouth once daily. 90 tablet 0    ferrous sulfate 325 mg (65 mg iron) Tab tablet 325 mg 3 (three) times daily.       hydrALAZINE (APRESOLINE) 25 MG tablet Take 1 tablet (25 mg total) by mouth every 8 (eight) hours. 90 tablet 0    insulin aspart (NOVOLOG) 100 unit/mL InPn pen Inject subcutaneously three times a day before meals per sliding scale 90 mL 3    insulin glargine (LANTUS SOLOSTAR) 100 unit/mL (3 mL) InPn pen Inject 60 units into the skin every evening. 18 mL 0    isosorbide mononitrate (IMDUR) 60 MG 24 hr tablet TAKE 1 TABLET (60 MG TOTAL) BY MOUTH EVERY EVENING. 90 tablet 3    L.acidophil,parac-S.therm-Bif. (RISAQUAD) Cap capsule Take 1 capsule by mouth once daily.      levetiracetam (KEPPRA) 500 MG Tab Take 750 mg by mouth 2 (two) times daily.       nitroGLYCERIN (NITROSTAT) 0.4 MG SL tablet Place 1 " tablet (0.4 mg total) under the tongue every 5 (five) minutes as needed for Chest pain. 30 tablet 0    oxycodone-acetaminophen (PERCOCET) 7.5-325 mg per tablet Take 1 tablet by mouth every 6 (six) hours as needed. 30 tablet 0    RANEXA 500 mg Tb12 TAKE 1 TABLET (500 MG TOTAL) BY MOUTH 2 (TWO) TIMES DAILY. 180 tablet 3    tamsulosin (FLOMAX) 0.4 mg Cp24 Take 1 capsule (0.4 mg total) by mouth once daily. 90 capsule 3    torsemide (DEMADEX) 20 MG Tab Take 1 tablet (20 mg total) by mouth once daily. 30 tablet 11    TRUEPLUS LANCETS 28 gauge Misc TEST TWO TIMES DAILY AS NEEDED 100 each 3     No current facility-administered medications on file prior to visit.      Social History     Social History    Marital status:      Spouse name: N/A    Number of children: N/A    Years of education: N/A     Occupational History    Not on file.     Social History Main Topics    Smoking status: Former Smoker     Packs/day: 2.00     Years: 25.00     Types: Cigarettes     Quit date: 1/1/1983    Smokeless tobacco: Never Used      Comment: quit 1983     Alcohol use Yes      Comment: occasional    Drug use: No    Sexual activity: Not on file     Other Topics Concern    Not on file     Social History Narrative    No narrative on file     Family History   Problem Relation Age of Onset    Stomach cancer Mother     Lung cancer Father     Diabetes Sister     Hypertension Sister     Heart disease Neg Hx     Stroke Neg Hx            Review of Systems   Constitutional: Positive for fatigue. Negative for chills, diaphoresis and fever.   HENT: Negative for congestion, hearing loss, mouth sores, postnasal drip and sore throat.    Eyes: Negative for pain and visual disturbance.   Respiratory: Negative for cough, chest tightness, shortness of breath and wheezing.    Cardiovascular: Negative for chest pain.   Gastrointestinal: Negative for abdominal pain, anal bleeding, blood in stool, constipation, diarrhea, nausea and  "vomiting.   Genitourinary: Negative for dysuria and hematuria.   Musculoskeletal: Negative for back pain, neck pain and neck stiffness.   Skin: Negative for rash.   Neurological: Negative for dizziness and weakness.       Objective:   /64   Pulse 72   Temp 98 °F (36.7 °C) (Oral)   Ht 5' 7" (1.702 m)   Wt 103.1 kg (227 lb 6.4 oz)   BMI 35.62 kg/m²     Physical Exam   Constitutional: He is oriented to person, place, and time. He appears well-developed and well-nourished.   HENT:   Head: Normocephalic and atraumatic.   Right Ear: External ear normal.   Left Ear: External ear normal.   Nose: Nose normal.   Mouth/Throat: Oropharynx is clear and moist. No oropharyngeal exudate.   Eyes: Conjunctivae and EOM are normal. Pupils are equal, round, and reactive to light. Right eye exhibits no discharge. Left eye exhibits no discharge. No scleral icterus.   Neck: Normal range of motion. Neck supple. No JVD present. No thyromegaly present.   Cardiovascular: Normal rate, regular rhythm, normal heart sounds and intact distal pulses.  Exam reveals no gallop and no friction rub.    No murmur heard.  Pulmonary/Chest: Effort normal and breath sounds normal. No respiratory distress. He has no wheezes. He has no rales. He exhibits no tenderness.   Abdominal: Soft. Bowel sounds are normal. He exhibits no distension and no mass. There is no tenderness. There is no rebound and no guarding.   Musculoskeletal: Normal range of motion. He exhibits no edema or tenderness.   Lymphadenopathy:     He has no cervical adenopathy.   Neurological: He is alert and oriented to person, place, and time. No cranial nerve deficit. Coordination normal.   Skin: Skin is warm and dry. He is not diaphoretic.   Psychiatric: He has a normal mood and affect.       Assessment:     1. Acute on chronic diastolic congestive heart failure    2. Acute renal failure superimposed on stage 3 chronic kidney disease, unspecified acute renal failure type    3. " Paroxysmal atrial fibrillation    4. Uncontrolled type 2 diabetes mellitus without complication, with long-term current use of insulin    5. Hypertension associated with diabetes    6. Hypertensive heart disease with heart failure    7. Type 2 diabetes mellitus with diabetic nephropathy, without long-term current use of insulin    8. Proteinuria due to type 2 diabetes mellitus        Plan:   Abdullahi was seen today for follow-up.    Diagnoses and all orders for this visit:    Acute on chronic diastolic congestive heart failure    Acute renal failure superimposed on stage 3 chronic kidney disease, unspecified acute renal failure type  -     Basic metabolic panel; Future    Paroxysmal atrial fibrillation    Uncontrolled type 2 diabetes mellitus without complication, with long-term current use of insulin  -     Hemoglobin A1c; Future    Hypertension associated with diabetes    Hypertensive heart disease with heart failure    Type 2 diabetes mellitus with diabetic nephropathy, without long-term current use of insulin  -     Hemoglobin A1c; Future    Proteinuria due to type 2 diabetes mellitus  -     Basic metabolic panel; Future      Cont bp meds. Cont diabetes meds and check his level and adjust as needed.    Cont his afib tx.   Check his kidney and electrolytes.

## 2017-09-11 NOTE — TELEPHONE ENCOUNTER
----- Message from Maximo Hernandez MD sent at 9/7/2017  6:37 AM CDT -----  His blood pressure was elevated at his surgeon's office yesterday.  Please get him to come and see Eladia today if possible for a recheck and to adjust meds if needed.   Dr. Marsh

## 2017-09-12 LAB
ESTIMATED AVG GLUCOSE: 197 MG/DL
HBA1C MFR BLD HPLC: 8.5 %

## 2017-09-12 NOTE — PROGRESS NOTES
Call and find out what novolog sliding scale is being used and confirm that the dose of the lantus is 60 u a day.      The a1c is abnormal and the insulin needs adjustment.

## 2017-09-16 NOTE — PROGRESS NOTES
Patient, Abdullahi Salazar (MRN #[id number]), presented with a recent Platelet count less than 150 K/uL consistent with the definition of thrombocytopenia (ICD10 - D69.6).    Platelets   Date Value Ref Range Status   08/15/2017 140 (L) 150 - 350 K/uL Final     The patient's thrombocytopenia was monitored, evaluated, addressed and/or treated. This addendum to the medical record is made on 09/15/2017.

## 2017-09-18 NOTE — PROGRESS NOTES
Change his humalog sliding scale RX to      Humalog Sliding Scale   150-200 give 4 units   201-250 give 6 units   251-300 give 8 units   301-350 give 10 units   > 350 give 12 units     Recheck the a1c in 3 months.

## 2017-09-19 ENCOUNTER — TELEPHONE (OUTPATIENT)
Dept: FAMILY MEDICINE | Facility: CLINIC | Age: 72
End: 2017-09-19

## 2017-09-19 DIAGNOSIS — E11.9 TYPE 2 DIABETES MELLITUS WITHOUT COMPLICATION, WITHOUT LONG-TERM CURRENT USE OF INSULIN: Primary | ICD-10-CM

## 2017-09-19 NOTE — TELEPHONE ENCOUNTER
I have signed for the following orders AND/OR meds.  Please call the patient and ask the patient to schedule the testing AND/OR inform about any medications that were sent.      Orders Placed This Encounter   Procedures    Hemoglobin A1c     Standing Status:   Future     Standing Expiration Date:   11/18/2018

## 2017-09-19 NOTE — TELEPHONE ENCOUNTER
----- Message from Maximo Hernandez MD sent at 9/18/2017  5:49 PM CDT -----  Change his humalog sliding scale RX to      Humalog Sliding Scale   150-200 give 4 units   201-250 give 6 units   251-300 give 8 units   301-350 give 10 units   > 350 give 12 units     Recheck the a1c in 3 months.

## 2017-09-20 ENCOUNTER — TELEPHONE (OUTPATIENT)
Dept: FAMILY MEDICINE | Facility: CLINIC | Age: 72
End: 2017-09-20

## 2017-09-20 RX ORDER — INSULIN ASPART 100 [IU]/ML
INJECTION, SOLUTION INTRAVENOUS; SUBCUTANEOUS
Qty: 90 ML | Refills: 3 | OUTPATIENT
Start: 2017-09-20 | End: 2018-05-11 | Stop reason: SDUPTHER

## 2017-09-21 DIAGNOSIS — E11.69 COMBINED HYPERLIPIDEMIA ASSOCIATED WITH TYPE 2 DIABETES MELLITUS: ICD-10-CM

## 2017-09-21 DIAGNOSIS — E78.2 COMBINED HYPERLIPIDEMIA ASSOCIATED WITH TYPE 2 DIABETES MELLITUS: ICD-10-CM

## 2017-09-21 NOTE — TELEPHONE ENCOUNTER
So as not to lose this and it not go again, would you please call the pharmacy to give a verbal on this RX so that he can get it today since I am not in [place].     insulin aspart (NOVOLOG) 100 unit/mL InPn pen          Sig: Humalog Sliding Scale tid before meals: 150-200 give 4 units 201-250 give 6 units 251-300 give 8 units 301-350 give 10 units > 350 give 12 units          Dispense:  90 mL          Refill:  3

## 2017-09-21 NOTE — TELEPHONE ENCOUNTER
Shows it was a printed rx, but it is not in our fax bin and the pt states he does not recall receiving the prescription when he was here for his office visit last week. Please reprint and give to nurses to fax.

## 2017-09-21 NOTE — TELEPHONE ENCOUNTER
Confirm that they received the following RX and if not give it to them verbally.   Confirm that the patient knows that it has been sent in and is using the med.     I have signed for the following orders AND/OR meds.  Please call the patient and ask the patient to schedule the testing AND/OR inform about any medications that were sent.      No orders of the defined types were placed in this encounter.        Medications Ordered This Encounter      insulin aspart (NOVOLOG) 100 unit/mL InPn pen          Sig: Humalog Sliding Scale tid before meals: 150-200 give 4 units 201-250 give 6 units 251-300 give 8 units 301-350 give 10 units > 350 give 12 units          Dispense:  90 mL          Refill:  3

## 2017-09-22 RX ORDER — EZETIMIBE 10 MG
TABLET ORAL
Qty: 90 TABLET | Refills: 0 | Status: SHIPPED | OUTPATIENT
Start: 2017-09-22 | End: 2017-10-13 | Stop reason: SDUPTHER

## 2017-10-12 ENCOUNTER — LAB VISIT (OUTPATIENT)
Dept: LAB | Facility: HOSPITAL | Age: 72
End: 2017-10-12
Attending: FAMILY MEDICINE
Payer: MEDICARE

## 2017-10-12 ENCOUNTER — OFFICE VISIT (OUTPATIENT)
Dept: NEUROLOGY | Facility: CLINIC | Age: 72
End: 2017-10-12
Payer: MEDICARE

## 2017-10-12 VITALS
RESPIRATION RATE: 20 BRPM | DIASTOLIC BLOOD PRESSURE: 70 MMHG | WEIGHT: 233.94 LBS | HEIGHT: 67 IN | HEART RATE: 73 BPM | SYSTOLIC BLOOD PRESSURE: 138 MMHG | BODY MASS INDEX: 36.72 KG/M2

## 2017-10-12 DIAGNOSIS — N18.30 KIDNEY DISEASE, CHRONIC, STAGE III (GFR 30-59 ML/MIN): ICD-10-CM

## 2017-10-12 DIAGNOSIS — G91.2 NPH (NORMAL PRESSURE HYDROCEPHALUS): ICD-10-CM

## 2017-10-12 DIAGNOSIS — I15.2 HYPERTENSION ASSOCIATED WITH DIABETES: ICD-10-CM

## 2017-10-12 DIAGNOSIS — E11.29 DIABETES MELLITUS WITH MICROALBUMINURIA: ICD-10-CM

## 2017-10-12 DIAGNOSIS — E55.9 VITAMIN D DEFICIENCY: ICD-10-CM

## 2017-10-12 DIAGNOSIS — E11.69 COMBINED HYPERLIPIDEMIA ASSOCIATED WITH TYPE 2 DIABETES MELLITUS: ICD-10-CM

## 2017-10-12 DIAGNOSIS — G40.909 SEIZURE DISORDER: Primary | ICD-10-CM

## 2017-10-12 DIAGNOSIS — E11.59 HYPERTENSION ASSOCIATED WITH DIABETES: ICD-10-CM

## 2017-10-12 DIAGNOSIS — Z98.2 S/P VP SHUNT: ICD-10-CM

## 2017-10-12 DIAGNOSIS — E78.2 COMBINED HYPERLIPIDEMIA ASSOCIATED WITH TYPE 2 DIABETES MELLITUS: ICD-10-CM

## 2017-10-12 DIAGNOSIS — R80.9 DIABETES MELLITUS WITH MICROALBUMINURIA: ICD-10-CM

## 2017-10-12 LAB
25(OH)D3+25(OH)D2 SERPL-MCNC: 21 NG/ML
ALBUMIN SERPL BCP-MCNC: 3.2 G/DL
ALBUMIN SERPL BCP-MCNC: 3.2 G/DL
ALP SERPL-CCNC: 101 U/L
ALT SERPL W/O P-5'-P-CCNC: 25 U/L
ANION GAP SERPL CALC-SCNC: 10 MMOL/L
AST SERPL-CCNC: 19 U/L
BILIRUB DIRECT SERPL-MCNC: 0.3 MG/DL
BILIRUB SERPL-MCNC: 0.7 MG/DL
BUN SERPL-MCNC: 15 MG/DL
CALCIUM SERPL-MCNC: 9 MG/DL
CHLORIDE SERPL-SCNC: 106 MMOL/L
CHOLEST SERPL-MCNC: 141 MG/DL
CHOLEST/HDLC SERPL: 3.8 {RATIO}
CO2 SERPL-SCNC: 26 MMOL/L
CREAT SERPL-MCNC: 1.4 MG/DL
EST. GFR  (AFRICAN AMERICAN): 57.2 ML/MIN/1.73 M^2
EST. GFR  (NON AFRICAN AMERICAN): 49.5 ML/MIN/1.73 M^2
ESTIMATED AVG GLUCOSE: 197 MG/DL
GLUCOSE SERPL-MCNC: 204 MG/DL
HBA1C MFR BLD HPLC: 8.5 %
HDLC SERPL-MCNC: 37 MG/DL
HDLC SERPL: 26.2 %
LDLC SERPL CALC-MCNC: 80.4 MG/DL
NONHDLC SERPL-MCNC: 104 MG/DL
PHOSPHATE SERPL-MCNC: 2.9 MG/DL
POTASSIUM SERPL-SCNC: 4 MMOL/L
PROT SERPL-MCNC: 6.5 G/DL
PTH-INTACT SERPL-MCNC: 114 PG/ML
SODIUM SERPL-SCNC: 142 MMOL/L
TRIGL SERPL-MCNC: 118 MG/DL

## 2017-10-12 PROCEDURE — 80069 RENAL FUNCTION PANEL: CPT

## 2017-10-12 PROCEDURE — 83036 HEMOGLOBIN GLYCOSYLATED A1C: CPT

## 2017-10-12 PROCEDURE — 83970 ASSAY OF PARATHORMONE: CPT

## 2017-10-12 PROCEDURE — 36415 COLL VENOUS BLD VENIPUNCTURE: CPT | Mod: PO

## 2017-10-12 PROCEDURE — 99215 OFFICE O/P EST HI 40 MIN: CPT | Mod: S$GLB,,, | Performed by: PSYCHIATRY & NEUROLOGY

## 2017-10-12 PROCEDURE — 99999 PR PBB SHADOW E&M-EST. PATIENT-LVL III: CPT | Mod: PBBFAC,,, | Performed by: PSYCHIATRY & NEUROLOGY

## 2017-10-12 PROCEDURE — 82306 VITAMIN D 25 HYDROXY: CPT

## 2017-10-12 PROCEDURE — 80076 HEPATIC FUNCTION PANEL: CPT

## 2017-10-12 PROCEDURE — 80061 LIPID PANEL: CPT

## 2017-10-12 NOTE — LETTER
October 13, 2017      Efe Greene MD  [address]  [facility] [address]           [facility]  [address] [facility] [address] [place] [address]  Phone: [phone]  Fax: [phone]          Patient: Abdullahi Salazar   MR Number: [id number]   YOB: 1945   Date of Visit: 10/12/2017       Dear Dr. Efe Greene:    Thank you for referring Abdullahi Salazar to me for evaluation. Attached you will find relevant portions of my assessment and plan of care.    If you have questions, please do not hesitate to call me. I look forward to following Abdullahi Salazar along with you.    Sincerely,    Damian Sapp Jr., MD    Enclosure  CC:  No Recipients    If you would like to receive this communication electronically, please contact [email or web address] or [phone] to request more information on [employer] access.    For providers and/or their staff who would like to refer a patient to [facility], please contact us through our one-stop-shop provider referral line, [facility], at [phone].    If you feel you have received this communication in error or would no longer like to receive these types of communications, please e-mail [email or web address]

## 2017-10-12 NOTE — PROGRESS NOTES
"Date of service:  10/12/2017    Chief complaint:  Seizures    History of present illness:  The patient is a 71 y.o. male referred for evaluation of episodes suspicious for seizures.  He has seen Dr. Greene for NPH previously.  This is my first time seeing him.  The events began "a couple of years back."  With respect to aura, the patient reports nothing.  His seizure is characterized by a loss consciousness.  Further details are not available as he is unconscious, and his wife has not witnessed one of these events.  He thinks that he may have had a seizure witnessed in [place] shortly before he started seeing Dr. Greene; however, review of records reveals no mention of this.  This component of this spell lasts for a relatively brief period of time.  Afterwards, he reports no clear postictal state.  The patient has had no such events since some time around February of this year, he believes.    The patient also mentions that his gait has improved dramatically since his shunt placement.  He also feels like his memory has improved somewhat since the shunting.    Epilepsy risk factors:  Pregnancy/Labor/Delivery: None  Febrile seizures: None  Head injury: None  CNS infection: None     Stroke: None  Family Hx of Sz: None  Developmental delay: None    Current AEDs:  Keppra (unclear on dose of medication, records show 750 mg BID)    Prior AEDs:  Denies    AEDs not tried:  acetazolamide (Diamox, AZM)  amantadine  brivaracetam (Briviact)  carbamazepine (Tegretol, CBZ)  clobezam (Onfi or Frizium, CLB)  ethosuximide (Zarontin, ESM)  eslicarbazine (Aptiom, ESL)  felbamate (felbatol, FBM)  gabapentin (Neurontin, GPN)  lacosamide (Vimpat, LCS)   lamotrigine (Lamictal, LTG)   methsuximide (Celontin, MSM)  methyphenytoin (Mesantion, MHT)  oxcarbazepine (Trileptal OXC)  perampanel (Fycompa, FCP)   phenobarbital (Pb)  phenytoin (Dilantin, PHT)  pregabalin (Lyrica, PGB)  primidone (Mysoline, PRM)  retigabine (Potiga, " RTG)  rufinamide (Banzel, RUF)  tiagabine (Gabatril,  TGB)  topiramate (Topamax, TPM)  viagabatrin, (Sabril, VGB)  vagal nerve stimulator (VNS)  valproic acid (Depakote, VPA)  zonisamide (Zonegran, ZNA)  Benzodiazepines  diazepam - rectal (Diastatl)  diazepam - oral (Valium, DZ)  clonazepam (Klonopin, CZP)  clorazepate (Tranxene, CLZ)  Ativan    Past Medical History:   Diagnosis Date    Actinic keratoses     Altered mental status 3/22/2015    Anticoagulant long-term use     Atrial fibrillation     CAD (coronary artery disease)     stents after bypass    Carotid artery stenosis 3/31/2015    Colon polyps 2011    repeat colonoscopy 2014    Combined hyperlipidemia associated with type 2 diabetes mellitus     Diabetes mellitus type II, uncontrolled     dx 2004    GERD (gastroesophageal reflux disease)     Grand mal seizure     last 2/2017    Hard of hearing     History of cardiac pacemaker 3/31/2015    HTN (hypertension)     Hydrocephalus 09/08/2017    Mitral valve insufficiency     Presence of automatic (implantable) cardiac defibrillator     Sleep apnea with use of continuous positive airway pressure (CPAP)     patient states he does not use CPAP anymore    Syncope 3/30/2015    Wears dentures     upper and lower    Wears hearing aid     sometimes       Past Surgical History:   Procedure Laterality Date    CARDIAC PACEMAKER PLACEMENT      CATARACT EXTRACTION W/ INTRAOCULAR LENS  IMPLANT, BILATERAL      CORONARY ARTERY BYPASS GRAFT  1993    three vessels    CORONARY STENT PLACEMENT      CSF SHUNT      HERNIA REPAIR      as infant       Family History   Problem Relation Age of Onset    Stomach cancer Mother     Lung cancer Father     Diabetes Sister     Hypertension Sister     Heart disease Neg Hx     Stroke Neg Hx        Social History     Social History    Marital status:      Spouse name: N/A    Number of children: N/A    Years of education: N/A     Occupational History     "Not on file.     Social History Main Topics    Smoking status: Former Smoker     Packs/day: 2.00     Years: 25.00     Types: Cigarettes     Quit date: 1/1/1983    Smokeless tobacco: Never Used      Comment: quit 1983     Alcohol use Yes      Comment: occasional    Drug use: No    Sexual activity: Not on file     Other Topics Concern    Not on file     Social History Narrative    No narrative on file        Review of patient's allergies indicates:  No Known Allergies     Review of Systems   General/Constitutional:  No unintentional weight loss, No change in appetite  Eyes/Vision:  No change in vision, No double vision  ENT:  No frequent nose bleeds, No ringing in the ears  Respiratory:  No cough, No wheezing  Cardiovascular:  No chest pain, No palpitations  Gastrointestinal:  No jaundice, No nausea/vomiting  Genitourinary:  No incontinence, No burning with urination  Hematologic/Lymphatic:  + easy bruising/bleeding, + night sweats  Neurological:  No numbness, No weakness  Endocrine:  No fatigue, No heat/cold intolerance  Allergy/Immunologic:  No fevers, No chills  Musculoskeletal:  No muscle pain, No joint pain   Psychiatric:  No thoughts of harming self/others, No depression  Integumentary:  No rashes, No sores that do not heal     Physical exam:  /70   Pulse 73   Resp 20   Ht 5' 7" (1.702 m)   Wt 106.1 kg (233 lb 14.5 oz)   BMI 36.64 kg/m²   General: Well developed, well nourished.  No acute distress.  HEENT: Atraumatic, normocephalic.  Neck: Supple, trachea midline.  Cardiovascular: Regular rate and rhythm.  Pulmonary: No increased work of breathing.  Abdomen/GI: No guarding.  Musculoskeletal: No obvious joint deformities, moves all extremities well.    Neurological exam:  Mental status: Awake and alert.  Oriented x4.  Speech fluent and appropriate.  Recent and remote memory appear to be intact.  Fund of knowledge normal.  Cranial nerves: Pupils equal round and reactive to light, extraocular " "movements intact, facial strength and sensation intact bilaterally, palate and tongue midline, hearing grossly intact bilaterally.  Motor: 5 out of 5 strength throughout the upper and lower extremities bilaterally. Normal bulk and tone.  Sensation: Intact to light touch and temperature bilaterally.  DTR: 1+ at the knees and biceps bilaterally.  Coordination: Finger-nose-finger testing intact bilaterally.  Gait: Normal gait aside from mildly decreased left arm swing. Mild difficulty with tandem.    Data base:  Notes of the referring physician were reviewed.  Briefly summarized, these discussed his NPH and history of seizures.    Labs (9/17):  BMP: includes cr=1.6    CT brain (8/17, MRI not possible due to pacemaker):  "Stable appearance of the brain and ventricles in this patient with a right frontal ventriculostomy catheter in place and underlying moderate ventriculomegaly."  I independently visualized and interpreted this study.     EEG (3/17):  "IMPRESSION:  This is an abnormal EEG during wakefulness, drowsiness and sleep.  Left temporal focal slowing was noted.  CLINICAL CORRELATION:  The patient is a 71-year-old male who is being evaluated for history of seizures, who is currently maintained on Keppra.  This is an abnormal EEG during wakefulness, drowsiness and sleep.  The presence of focal slowing in the left temporal region is suggestive of focal neuronal dysfunction in this region.  There is no evidence of an epileptic process on this recording.  No seizures were recorded during this study.  Of note, the EKG channel revealed sinus rhythm with occasional PVCs."    Assessment and plan:  The patient is a 71 y.o. male referred for evaluation for events worrisome for seizures. Unfortunately, the available history is rather vague, resulting in a broad range of possibilities.  The differential includes seizures (most likely focal onset), syncope, and NEE. From a practical standpoint, the events are not presently " occurring, so we will continue his Keppra.  He is to call us with his dosage.  We will need to watch his renal function over time and may need to reduce his dose should this worsen.  Should the events return, we will consider further evaluation, possibly including inpatient vEEG monitoring.  State law as it pertains to driving for individuals with seizures was discussed.  The patient was also counseled on seizure safety.     The patient's NPH appears to have been successfully treated with  shunting.  Further workup/treatment is not needed at this time.    We will plan on seeing the patient back in a few months.

## 2017-10-13 DIAGNOSIS — E11.69 COMBINED HYPERLIPIDEMIA ASSOCIATED WITH TYPE 2 DIABETES MELLITUS: ICD-10-CM

## 2017-10-13 DIAGNOSIS — E78.2 COMBINED HYPERLIPIDEMIA ASSOCIATED WITH TYPE 2 DIABETES MELLITUS: ICD-10-CM

## 2017-10-13 RX ORDER — ATORVASTATIN CALCIUM 80 MG/1
80 TABLET, FILM COATED ORAL DAILY
Qty: 90 TABLET | Refills: 3 | Status: SHIPPED | OUTPATIENT
Start: 2017-10-13 | End: 2017-10-23 | Stop reason: SDUPTHER

## 2017-10-13 RX ORDER — EZETIMIBE 10 MG/1
TABLET ORAL
Qty: 90 TABLET | Refills: 3 | Status: SHIPPED | OUTPATIENT
Start: 2017-10-13 | End: 2017-10-23 | Stop reason: SDUPTHER

## 2017-10-13 NOTE — TELEPHONE ENCOUNTER
----- Message from Maximo Hernandez MD sent at 10/12/2017  7:30 PM CDT -----  The Lipid/Liver Panel are at target.   Book the patient for a lipid and liver panel in 12 months.  Notify the patient of the appointment.   Refill the patient's lipid medicines for 12 months.       The patient's health maintenance that is due is below:  TETANUS VACCINE due on 11/12/1961  Influenza Vaccine due on 08/01/2017    PLEASE CALL HIM AND BOOK THE FLU SHOT FOR HIM.      The A1c is high.    Change his lantus to 70 u a day and change the novalog to increase 1 unit at each sliding scale level.  Put it in the medcard for me to sign it as it will need to be faxed.    Recheck the a1c the week between Christmas and New Years.   Also, get him with his log in to see me in 10 days to review it and to make decisions on changes from there.

## 2017-10-20 ENCOUNTER — OFFICE VISIT (OUTPATIENT)
Dept: NEPHROLOGY | Facility: CLINIC | Age: 72
End: 2017-10-20
Payer: MEDICARE

## 2017-10-20 VITALS
WEIGHT: 239 LBS | OXYGEN SATURATION: 98 % | HEART RATE: 69 BPM | DIASTOLIC BLOOD PRESSURE: 72 MMHG | BODY MASS INDEX: 37.51 KG/M2 | HEIGHT: 67 IN | SYSTOLIC BLOOD PRESSURE: 158 MMHG

## 2017-10-20 DIAGNOSIS — E11.21 TYPE 2 DIABETES MELLITUS WITH DIABETIC NEPHROPATHY, WITHOUT LONG-TERM CURRENT USE OF INSULIN: ICD-10-CM

## 2017-10-20 DIAGNOSIS — E11.29 PROTEINURIA DUE TO TYPE 2 DIABETES MELLITUS: ICD-10-CM

## 2017-10-20 DIAGNOSIS — Z95.810 ICD (IMPLANTABLE CARDIOVERTER-DEFIBRILLATOR) IN PLACE: ICD-10-CM

## 2017-10-20 DIAGNOSIS — Z95.5 S/P CORONARY ARTERY STENT PLACEMENT: ICD-10-CM

## 2017-10-20 DIAGNOSIS — N18.30 CKD (CHRONIC KIDNEY DISEASE) STAGE 3, GFR 30-59 ML/MIN: Primary | ICD-10-CM

## 2017-10-20 DIAGNOSIS — N18.30 STAGE 3 CHRONIC KIDNEY DISEASE: ICD-10-CM

## 2017-10-20 DIAGNOSIS — Z23 NEED FOR PROPHYLACTIC VACCINATION AND INOCULATION AGAINST INFLUENZA: Primary | ICD-10-CM

## 2017-10-20 DIAGNOSIS — E66.09 OBESITY DUE TO EXCESS CALORIES WITH SERIOUS COMORBIDITY, UNSPECIFIED CLASSIFICATION: ICD-10-CM

## 2017-10-20 DIAGNOSIS — R80.9 PROTEINURIA DUE TO TYPE 2 DIABETES MELLITUS: ICD-10-CM

## 2017-10-20 DIAGNOSIS — I35.0 AORTIC STENOSIS, MODERATE: ICD-10-CM

## 2017-10-20 DIAGNOSIS — Z95.1 S/P CABG (CORONARY ARTERY BYPASS GRAFT): ICD-10-CM

## 2017-10-20 PROCEDURE — 99499 UNLISTED E&M SERVICE: CPT | Mod: S$GLB,,, | Performed by: INTERNAL MEDICINE

## 2017-10-20 PROCEDURE — 99999 PR PBB SHADOW E&M-EST. PATIENT-LVL III: CPT | Mod: PBBFAC,,, | Performed by: INTERNAL MEDICINE

## 2017-10-20 PROCEDURE — 99214 OFFICE O/P EST MOD 30 MIN: CPT | Mod: S$GLB,,, | Performed by: INTERNAL MEDICINE

## 2017-10-20 RX ORDER — CARVEDILOL 25 MG/1
25 TABLET ORAL 2 TIMES DAILY WITH MEALS
Qty: 180 TABLET | Refills: 3 | Status: SHIPPED | OUTPATIENT
Start: 2017-10-20 | End: 2018-04-13 | Stop reason: SDUPTHER

## 2017-10-23 DIAGNOSIS — I10 ESSENTIAL HYPERTENSION: ICD-10-CM

## 2017-10-23 DIAGNOSIS — E78.2 COMBINED HYPERLIPIDEMIA ASSOCIATED WITH TYPE 2 DIABETES MELLITUS: ICD-10-CM

## 2017-10-23 DIAGNOSIS — E11.69 COMBINED HYPERLIPIDEMIA ASSOCIATED WITH TYPE 2 DIABETES MELLITUS: ICD-10-CM

## 2017-10-23 RX ORDER — AMLODIPINE BESYLATE 10 MG/1
10 TABLET ORAL DAILY
Qty: 90 TABLET | Refills: 3 | Status: ON HOLD | OUTPATIENT
Start: 2017-10-23 | End: 2018-03-02 | Stop reason: HOSPADM

## 2017-10-23 RX ORDER — TAMSULOSIN HYDROCHLORIDE 0.4 MG/1
0.4 CAPSULE ORAL DAILY
Qty: 90 CAPSULE | Refills: 3 | Status: SHIPPED | OUTPATIENT
Start: 2017-10-23 | End: 2018-05-22 | Stop reason: SDUPTHER

## 2017-10-23 RX ORDER — ISOSORBIDE MONONITRATE 60 MG/1
TABLET, EXTENDED RELEASE ORAL
Qty: 90 TABLET | Refills: 3 | Status: ON HOLD | OUTPATIENT
Start: 2017-10-23 | End: 2018-03-02 | Stop reason: HOSPADM

## 2017-10-23 RX ORDER — EZETIMIBE 10 MG/1
TABLET ORAL
Qty: 90 TABLET | Refills: 3 | Status: SHIPPED | OUTPATIENT
Start: 2017-10-23 | End: 2018-05-22 | Stop reason: SDUPTHER

## 2017-10-23 RX ORDER — ATORVASTATIN CALCIUM 80 MG/1
80 TABLET, FILM COATED ORAL DAILY
Qty: 90 TABLET | Refills: 3 | Status: ON HOLD | OUTPATIENT
Start: 2017-10-23 | End: 2018-07-30 | Stop reason: HOSPADM

## 2017-10-25 ENCOUNTER — PATIENT MESSAGE (OUTPATIENT)
Dept: NEPHROLOGY | Facility: CLINIC | Age: 72
End: 2017-10-25

## 2017-10-25 PROBLEM — N17.9 AKI (ACUTE KIDNEY INJURY): Status: RESOLVED | Noted: 2017-08-10 | Resolved: 2017-10-25

## 2017-10-25 PROBLEM — N18.9 ACUTE RENAL FAILURE SUPERIMPOSED ON CHRONIC KIDNEY DISEASE: Status: RESOLVED | Noted: 2017-08-17 | Resolved: 2017-10-25

## 2017-10-25 PROBLEM — N17.9 ACUTE RENAL FAILURE SUPERIMPOSED ON CHRONIC KIDNEY DISEASE: Status: RESOLVED | Noted: 2017-08-17 | Resolved: 2017-10-25

## 2017-10-25 NOTE — PROGRESS NOTES
Subjective:       Patient ID: Abdullahi Salazar is a 71 y.o. White male who presents for follow-up evaluation of Chronic Kidney Disease    HPI     He is doing very well after his  shunt was placed. His gait has drastically improved as well as taste. His last Hba1c was 8.5.He notes his BP is running high, 's. His benazepril was stopped.  No HA, CP or SOB.       Review of Systems   Constitutional: Negative for activity change, appetite change, fatigue and unexpected weight change.   HENT: Negative for facial swelling.    Respiratory: Negative for cough and shortness of breath.    Cardiovascular: Negative for chest pain and leg swelling.   Gastrointestinal: Negative for constipation and diarrhea.   Genitourinary: Negative for difficulty urinating, dysuria and hematuria.   Musculoskeletal: Positive for arthralgias and gait problem.   Neurological: Positive for weakness. Negative for light-headedness and headaches.       Objective:      Physical Exam   Constitutional: He is oriented to person, place, and time. He appears well-developed and well-nourished.   Neck: No JVD present.   Cardiovascular: S1 normal and S2 normal.  Exam reveals no friction rub.    Pulmonary/Chest: Breath sounds normal. He has no wheezes. He has no rales.   Abdominal: Soft.   Musculoskeletal: He exhibits no edema.   Neurological: He is alert and oriented to person, place, and time.   Skin: Skin is warm and dry.   Psychiatric: He has a normal mood and affect.   Vitals reviewed.      Assessment:       1. CKD (chronic kidney disease) stage 3, GFR 30-59 ml/min    2. Stage 3 chronic kidney disease    3. S/P CABG (coronary artery bypass graft)    4. S/P coronary artery stent placement    5. Aortic stenosis, moderate    6. ICD (implantable cardioverter-defibrillator) in place    7. Uncontrolled type 2 diabetes mellitus without complication, with long-term current use of insulin    8. Proteinuria due to type 2 diabetes mellitus    9. Type 2 diabetes  mellitus with diabetic nephropathy, without long-term current use of insulin    10. Obesity due to excess calories with serious comorbidity, unspecified classification        Plan:             CKD stage 3 with stable kidney function.  Off benazepril--unclear why    HTN--increase am Coreg to 25mg. BP log in one week     MBD--PTH elevated due to SHPT    DM--poorly controlled. Continue endocrine and PCP follow up    CAD--continue cardiology follow up      RTC 4 months with labs prior in [place]

## 2017-10-26 ENCOUNTER — PATIENT MESSAGE (OUTPATIENT)
Dept: NEUROSURGERY | Facility: CLINIC | Age: 72
End: 2017-10-26

## 2017-10-31 NOTE — TELEPHONE ENCOUNTER
Spoke with patient.  Patient states he hasn't been checking it, but is feeling well.  He will check it for 3-4 days or a week and let us know.

## 2017-11-06 ENCOUNTER — CLINICAL SUPPORT (OUTPATIENT)
Dept: CARDIOLOGY | Facility: CLINIC | Age: 72
End: 2017-11-06
Attending: INTERNAL MEDICINE
Payer: MEDICARE

## 2017-11-06 DIAGNOSIS — I49.01 VF (VENTRICULAR FIBRILLATION): ICD-10-CM

## 2017-11-06 DIAGNOSIS — Z95.810 CARDIAC DEFIBRILLATOR IN SITU: ICD-10-CM

## 2017-11-06 DIAGNOSIS — Z95.810 CARDIAC DEFIBRILLATOR IN SITU: Primary | ICD-10-CM

## 2017-11-06 PROCEDURE — 93295 DEV INTERROG REMOTE 1/2/MLT: CPT | Mod: S$GLB,,, | Performed by: INTERNAL MEDICINE

## 2017-11-06 PROCEDURE — 93296 REM INTERROG EVL PM/IDS: CPT | Mod: S$GLB,,, | Performed by: INTERNAL MEDICINE

## 2017-12-07 ENCOUNTER — CLINICAL SUPPORT (OUTPATIENT)
Dept: FAMILY MEDICINE | Facility: CLINIC | Age: 72
End: 2017-12-07
Payer: MEDICARE

## 2017-12-07 DIAGNOSIS — Z23 NEED FOR PROPHYLACTIC VACCINATION AND INOCULATION AGAINST INFLUENZA: Primary | ICD-10-CM

## 2017-12-07 PROCEDURE — 99999 PR PBB SHADOW E&M-EST. PATIENT-LVL II: CPT | Mod: PBBFAC,,,

## 2017-12-07 PROCEDURE — G0008 ADMIN INFLUENZA VIRUS VAC: HCPCS | Mod: S$GLB,,, | Performed by: FAMILY MEDICINE

## 2017-12-07 PROCEDURE — 90662 IIV NO PRSV INCREASED AG IM: CPT | Mod: S$GLB,,, | Performed by: FAMILY MEDICINE

## 2017-12-11 ENCOUNTER — TELEPHONE (OUTPATIENT)
Dept: FAMILY MEDICINE | Facility: CLINIC | Age: 72
End: 2017-12-11

## 2017-12-11 NOTE — TELEPHONE ENCOUNTER
----- Message from Vernon Senior sent at 12/11/2017  2:07 PM CST -----  Contact: Pt  Please give pt a call at ..[phone] (home) regarding his medication

## 2017-12-26 ENCOUNTER — TELEPHONE (OUTPATIENT)
Dept: FAMILY MEDICINE | Facility: CLINIC | Age: 72
End: 2017-12-26

## 2017-12-26 NOTE — TELEPHONE ENCOUNTER
----- Message from Estefani Marley sent at 12/26/2017  3:11 PM CST -----  Contact: pt daughter Maritza  Pt is in [place], [place] and has became very ill with flu like symptoms and her son has been  Diagnosed with the flu and think pt may have caught it as well and wanted to know if you can call in Rx for flu for pt in [place]  And please advise with [phone]

## 2017-12-26 NOTE — TELEPHONE ENCOUNTER
I could call this in but given his other medical problems with congestive heart failure and coronary artery disease, I feel that it would be best if he would be seen at a walk in clinic to have other issues rule out.  An early pneumonia that can be seen with the flu could be devastating if this diagnosis is delayed because we just called in a medicine for him.  Please go to the nearest walk in clinic.

## 2017-12-28 ENCOUNTER — TELEPHONE (OUTPATIENT)
Dept: CARDIOLOGY | Facility: CLINIC | Age: 72
End: 2017-12-28

## 2017-12-28 ENCOUNTER — PATIENT MESSAGE (OUTPATIENT)
Dept: FAMILY MEDICINE | Facility: CLINIC | Age: 72
End: 2017-12-28

## 2017-12-28 NOTE — TELEPHONE ENCOUNTER
----- Message from Milagro Aparicio sent at 12/28/2017 11:04 AM CST -----  Contact: Patient's daughter, Leyla Salazar  Patient's daughter is calling to find out the last time that the patient had a heart catherization because the patient is in the hospital in [place] ([facility]) and they are wanting to know.  Call Back#[phone] or   Thanks

## 2018-01-02 ENCOUNTER — HOSPITAL ENCOUNTER (OUTPATIENT)
Dept: RADIOLOGY | Facility: HOSPITAL | Age: 73
Discharge: HOME | End: 2018-01-02
Attending: FAMILY MEDICINE
Payer: MEDICARE

## 2018-01-02 ENCOUNTER — OFFICE VISIT (OUTPATIENT)
Dept: FAMILY MEDICINE | Facility: CLINIC | Age: 73
End: 2018-01-02
Payer: MEDICARE

## 2018-01-02 VITALS
HEIGHT: 67 IN | WEIGHT: 232.81 LBS | DIASTOLIC BLOOD PRESSURE: 76 MMHG | SYSTOLIC BLOOD PRESSURE: 122 MMHG | HEART RATE: 74 BPM | TEMPERATURE: 98 F | BODY MASS INDEX: 36.54 KG/M2

## 2018-01-02 DIAGNOSIS — I15.2 HYPERTENSION ASSOCIATED WITH DIABETES: ICD-10-CM

## 2018-01-02 DIAGNOSIS — E78.2 COMBINED HYPERLIPIDEMIA ASSOCIATED WITH TYPE 2 DIABETES MELLITUS: ICD-10-CM

## 2018-01-02 DIAGNOSIS — R79.89 ELEVATED TROPONIN: ICD-10-CM

## 2018-01-02 DIAGNOSIS — N17.9 ACUTE RENAL FAILURE, UNSPECIFIED ACUTE RENAL FAILURE TYPE: ICD-10-CM

## 2018-01-02 DIAGNOSIS — J10.1 INFLUENZA A: ICD-10-CM

## 2018-01-02 DIAGNOSIS — I25.810 CORONARY ARTERY DISEASE INVOLVING CORONARY BYPASS GRAFT OF NATIVE HEART WITHOUT ANGINA PECTORIS: ICD-10-CM

## 2018-01-02 DIAGNOSIS — Z79.4 TYPE 2 DIABETES MELLITUS WITH STAGE 3 CHRONIC KIDNEY DISEASE, WITH LONG-TERM CURRENT USE OF INSULIN: ICD-10-CM

## 2018-01-02 DIAGNOSIS — E11.69 COMBINED HYPERLIPIDEMIA ASSOCIATED WITH TYPE 2 DIABETES MELLITUS: ICD-10-CM

## 2018-01-02 DIAGNOSIS — E11.59 HYPERTENSION ASSOCIATED WITH DIABETES: ICD-10-CM

## 2018-01-02 DIAGNOSIS — J18.9 PNEUMONIA OF RIGHT LOWER LOBE DUE TO INFECTIOUS ORGANISM: Primary | ICD-10-CM

## 2018-01-02 DIAGNOSIS — N18.30 TYPE 2 DIABETES MELLITUS WITH STAGE 3 CHRONIC KIDNEY DISEASE, WITH LONG-TERM CURRENT USE OF INSULIN: ICD-10-CM

## 2018-01-02 DIAGNOSIS — I48.0 PAROXYSMAL ATRIAL FIBRILLATION: ICD-10-CM

## 2018-01-02 DIAGNOSIS — M62.82 NON-TRAUMATIC RHABDOMYOLYSIS: ICD-10-CM

## 2018-01-02 DIAGNOSIS — I50.30 CHF WITH LEFT VENTRICULAR DIASTOLIC DYSFUNCTION, NYHA CLASS 2: ICD-10-CM

## 2018-01-02 DIAGNOSIS — R80.9 PROTEINURIA DUE TO TYPE 2 DIABETES MELLITUS: ICD-10-CM

## 2018-01-02 DIAGNOSIS — E11.29 PROTEINURIA DUE TO TYPE 2 DIABETES MELLITUS: ICD-10-CM

## 2018-01-02 DIAGNOSIS — I11.0 HYPERTENSIVE HEART DISEASE WITH HEART FAILURE: ICD-10-CM

## 2018-01-02 DIAGNOSIS — I70.213 ATHEROSCLEROSIS OF NATIVE ARTERY OF BOTH LOWER EXTREMITIES WITH INTERMITTENT CLAUDICATION: ICD-10-CM

## 2018-01-02 DIAGNOSIS — E11.22 TYPE 2 DIABETES MELLITUS WITH STAGE 3 CHRONIC KIDNEY DISEASE, WITH LONG-TERM CURRENT USE OF INSULIN: ICD-10-CM

## 2018-01-02 DIAGNOSIS — G40.909 SEIZURE DISORDER: ICD-10-CM

## 2018-01-02 DIAGNOSIS — E11.21 TYPE 2 DIABETES MELLITUS WITH DIABETIC NEPHROPATHY, WITHOUT LONG-TERM CURRENT USE OF INSULIN: ICD-10-CM

## 2018-01-02 PROCEDURE — 99999 PR PBB SHADOW E&M-EST. PATIENT-LVL V: CPT | Mod: PBBFAC,,, | Performed by: FAMILY MEDICINE

## 2018-01-02 PROCEDURE — 71046 X-RAY EXAM CHEST 2 VIEWS: CPT | Mod: ,,, | Performed by: RADIOLOGY

## 2018-01-02 PROCEDURE — 99499 UNLISTED E&M SERVICE: CPT | Mod: S$GLB,,, | Performed by: FAMILY MEDICINE

## 2018-01-02 PROCEDURE — 96372 THER/PROPH/DIAG INJ SC/IM: CPT | Mod: S$GLB,,, | Performed by: FAMILY MEDICINE

## 2018-01-02 PROCEDURE — 99215 OFFICE O/P EST HI 40 MIN: CPT | Mod: 25,S$GLB,, | Performed by: FAMILY MEDICINE

## 2018-01-02 PROCEDURE — 71046 X-RAY EXAM CHEST 2 VIEWS: CPT | Mod: TC,PO

## 2018-01-02 RX ORDER — OSELTAMIVIR PHOSPHATE 30 MG/1
CAPSULE ORAL
COMMUNITY
Start: 2017-12-29 | End: 2018-02-21

## 2018-01-02 RX ORDER — NITROGLYCERIN 0.4 MG/1
TABLET SUBLINGUAL
COMMUNITY
Start: 2017-11-11 | End: 2018-05-11 | Stop reason: SDUPTHER

## 2018-01-02 RX ORDER — AZITHROMYCIN 250 MG/1
TABLET, FILM COATED ORAL
Qty: 6 TABLET | Refills: 0 | Status: SHIPPED | OUTPATIENT
Start: 2018-01-02 | End: 2018-02-21

## 2018-01-02 RX ORDER — CEFTRIAXONE 1 G/1
1 INJECTION, POWDER, FOR SOLUTION INTRAMUSCULAR; INTRAVENOUS ONCE
Status: COMPLETED | OUTPATIENT
Start: 2018-01-02 | End: 2018-01-02

## 2018-01-02 RX ORDER — CEFACLOR 500 MG
500 CAPSULE ORAL 3 TIMES DAILY
Qty: 30 CAPSULE | Refills: 0 | Status: SHIPPED | OUTPATIENT
Start: 2018-01-02 | End: 2018-01-12

## 2018-01-02 RX ORDER — LIDOCAINE HYDROCHLORIDE 10 MG/ML
2.1 INJECTION INFILTRATION; PERINEURAL ONCE
Status: COMPLETED | OUTPATIENT
Start: 2018-01-02 | End: 2018-01-02

## 2018-01-02 RX ADMIN — CEFTRIAXONE 1 G: 1 INJECTION, POWDER, FOR SOLUTION INTRAMUSCULAR; INTRAVENOUS at 10:01

## 2018-01-02 RX ADMIN — LIDOCAINE HYDROCHLORIDE 2.1 ML: 10 INJECTION INFILTRATION; PERINEURAL at 10:01

## 2018-01-02 NOTE — PROGRESS NOTES
"Subjective:      Patient ID: Abdullahi Salazar is a 72 y.o. male.    Chief Complaint: Hospital Follow Up    HPI    He got influenza a recently and was in the hospital in [place].  He had rhabdomyolyisis and acute kidney injury.  He also had an increased troponin.  He had a flu shot in early December.  He continues to have a cough and he has not had fever noted.  He is not really complaining of being SOB.  /76   Pulse 74   Temp 97.8 °F (36.6 °C) (Oral)   Ht 5' 7" (1.702 m)   Wt 105.6 kg (232 lb 12.9 oz)   BMI 36.46 kg/m²            I did a CXR on him today.      Narrative     Comparison: 08/14/2017    2 views    Findings:    Developing peripheral coalescent airspace easily posterior lateral margin of the RIGHT base.  Appearance is suggestive of developing pneumonia and/or partial atelectasis.    Stable cardiomegaly without failure.  Pulmonary vasculature is within normal limits.  Trachea is midline.  Postsurgical changes sternotomy.  Multilead pacemaker in place the LEFT subclavian.  CP angles are clear.  Osteopenia and spondylosis.   Impression           Developing airspace disease suspicious for pneumonia  RIGHT lower lobe.     Followup recommended to complete clearance.    Remainder of the exam is stable.  See above.          Electronically signed by: GATITO CELIS III, MD  Date: 01/02/18  Time: 09:51        Problem List Items Addressed This Visit     Acute renal failure syndrome    Overview     He had acute renal failure in the hospital.    BMP  Lab Results   Component Value Date     10/12/2017    K 4.0 10/12/2017     10/12/2017    CO2 26 10/12/2017    BUN 15 10/12/2017    CREATININE 1.4 10/12/2017    CALCIUM 9.0 10/12/2017    ANIONGAP 10 10/12/2017    ESTGFRAFRICA 57.2 (A) 10/12/2017    EGFRNONAA 49.5 (A) 10/12/2017     He has chronic renal insufficiency already.  This is being tracked.           Current Assessment & Plan     The current medical regimen is effective;  continue present plan " and medications.           Relevant Orders    Comprehensive metabolic panel    Atherosclerosis of native artery of both lower extremities with intermittent claudication    Overview     He has atherosclerosis of his lower extremities.  He cannot walk that far.  He has had treatment of this.  He is on plavix and pradaxa at the direction of his cardiologist.           Current Assessment & Plan     The current medical regimen is effective;  continue present plan and medications.           Atrial fibrillation    CHF with left ventricular diastolic dysfunction, NYHA class 2    Overview     He has CHF that is compensated.  He is on a diretic for this.           Current Assessment & Plan     The current medical regimen is effective;  continue present plan and medications.           Combined hyperlipidemia associated with type 2 diabetes mellitus    Overview     The patient presents with hyperlipidemia.  The patient reports tolerating the medication well and is in excellent compliance.  There have been no medication side effects.  The patient denies chest pain, neuropathy, and myalgias.  The patient has reduced fat intake and has been exercising.  Current treatment has included the medications listed in the med card.    Lab Results   Component Value Date    CHOL 141 10/12/2017    CHOL 253 (H) 07/11/2017    CHOL 217 (H) 09/21/2016       Lab Results   Component Value Date    HDL 37 (L) 10/12/2017    HDL 39 (L) 07/11/2017    HDL 38 (L) 09/21/2016       Lab Results   Component Value Date    LDLCALC 80.4 10/12/2017    LDLCALC 172.2 (H) 07/11/2017    LDLCALC 151.8 09/21/2016       Lab Results   Component Value Date    TRIG 118 10/12/2017    TRIG 209 (H) 07/11/2017    TRIG 136 09/21/2016       Lab Results   Component Value Date    CHOLHDL 26.2 10/12/2017    CHOLHDL 15.4 (L) 07/11/2017    CHOLHDL 17.5 (L) 09/21/2016     Lab Results   Component Value Date    ALT 25 10/12/2017    AST 19 10/12/2017    ALKPHOS 101 10/12/2017    BILITOT  "0.7 10/12/2017              Current Assessment & Plan     The current medical regimen is effective;  continue present plan and medications.           Coronary artery disease involving coronary bypass graft of native heart without angina pectoris    Overview     The patient presents with coronary artery disease.  Denies chest pain, shortness of breath, left arm or neck pain, diaphoresis, nausea, vomiting, palpitations, paroxysmal nocturnal dyspnea, orthopnea, claudication or decreased exercise tolerance.  The patient reports excellent compliance.  Current treatment has included medications that are listed in medication list.  The cannot identify any exacerbating factors.    He has imdur and has nitroglycerin.           Current Assessment & Plan     The current medical regimen is effective;  continue present plan and medications.           Diabetes mellitus type II, uncontrolled    Relevant Orders    Comprehensive metabolic panel    Hemoglobin A1c    Elevated troponin    Overview     Unlikely true cardiac event         Hypertension associated with diabetes    Overview     The patient presents with essential hypertension.  The patient is tolerating the medication well and is in excellent compliance.  The patient is experiencing no side effects.  Counseling was offered regarding low salt diets.  The patient has a reduced salt intake.  The patient denies chest pain, palpitations, shortness of breath, dyspnea on exertion, left or murmur neck pain, nausea, vomiting, diaphoresis, paroxysmal nocturnal dyspnea, and orthopnea.   /76   Pulse 74   Temp 97.8 °F (36.6 °C) (Oral)   Ht 5' 7" (1.702 m)   Wt 105.6 kg (232 lb 12.9 oz)   BMI 36.46 kg/m²            Current Assessment & Plan     The current medical regimen is effective;  continue present plan and medications.           Hypertensive heart disease with heart failure    Influenza A    Overview     He got influenza a recently and was in the hospital in [place].  He " had rhabdomyolyisis and acute kidney injury.  He also had an increased troponin.  He had a flu shot in early December.         Relevant Orders    X-Ray Chest PA And Lateral (Completed)    Non-traumatic rhabdomyolysis    Current Assessment & Plan     Check labs.         Relevant Orders    CK    Proteinuria due to type 2 diabetes mellitus    Overview     He has chronic proteinuria due to the diabetes and he has followed with a nephrologist.  He also has CKD.          Seizure disorder    Overview     Last seizure 2/2017 occurred while on Keppra, Keppra increased to 750 BID at that time. CT brain with and without contrast negative for evidence of L temporal lobe mass. Persistent slowing on EEG 3/9/17 L temporal.            Current Assessment & Plan     The current medical regimen is effective;  continue present plan and medications.           Type 2 diabetes mellitus with renal complication    Relevant Orders    Comprehensive metabolic panel    Hemoglobin A1c    Comprehensive metabolic panel    Hemoglobin A1c    Type 2 diabetes mellitus with stage 3 chronic kidney disease    Relevant Orders    Comprehensive metabolic panel    Hemoglobin A1c      Other Visit Diagnoses     Pneumonia of right lower lobe due to infectious organism    -  Primary    Relevant Medications    cefTRIAXone injection 1 g (Completed)    lidocaine HCL 10 mg/ml (1%) injection 2.1 mL (Completed)    cefaclor (CECLOR) 500 mg Cap    azithromycin (Z-MICHELLE) 250 MG tablet    Other Relevant Orders    CBC auto differential    X-Ray Chest PA And Lateral        He needs to get labs done.         The patient presents with diabetes.  The patient denies polyuria, polydipsia, polyphagia, hypoglycemia and paresthesias.  The patient's glucose control has been good.  Home glucose averages are routinely checked.  The patient is without retinopathy currently.  The patient has no history of neuropathy.  The patient currently complains of no podiatric problems.  The patient  has excellent compliance.  Diabetes Management Status    Statin: Taking  ACE/ARB: Not taking    Screening or Prevention Patient's value Goal Complete/Controlled?   HgA1C Testing and Control   Lab Results   Component Value Date    HGBA1C 8.5 (H) 10/12/2017      Annually/Less than 8% No   Lipid profile : 10/12/2017 Annually Yes   LDL control Lab Results   Component Value Date    LDLCALC 80.4 10/12/2017    Annually/Less than 100 mg/dl  Yes   Nephropathy screening Lab Results   Component Value Date    LABMICR 524.0 02/22/2017     Lab Results   Component Value Date    PROTEINUA 2+ (A) 08/10/2017    Annually Yes   Blood pressure BP Readings from Last 1 Encounters:   01/02/18 122/76    Less than 140/90 No   Dilated retinal exam : 04/12/2017 Annually Yes   Foot exam   : 05/25/2017 Annually Yes       The patient presents with coronary artery disease.  Denies chest pain, shortness of breath, left arm or neck pain, diaphoresis, nausea, vomiting, palpitations, paroxysmal nocturnal dyspnea, orthopnea, claudication or decreased exercise tolerance.  The patient reports excellent compliance.  Current treatment has included medications that are listed in medication list.  The cannot identify any exacerbating factors.    He has afib and he has been followed by Dr. Jacobs.  He is on plavix and ASA.  He did have an increased troponin with this illness.  He has blockages in his legs and he can't walk very far due to his recent illness but can usually walk 100 feet.      The patient presents with essential hypertension.  The patient is tolerating the medication well and is in excellent compliance.  The patient is experiencing no side effects.  Counseling was offered regarding low salt diets.  The patient has a reduced salt intake.  The patient denies chest pain, palpitations, shortness of breath, dyspnea on exertion, left or murmur neck pain, nausea, vomiting, diaphoresis, paroxysmal nocturnal dyspnea, and orthopnea.   /76    "Pulse 74   Temp 97.8 °F (36.6 °C) (Oral)   Ht 5' 7" (1.702 m)   Wt 105.6 kg (232 lb 12.9 oz)   BMI 36.46 kg/m²         The patient presents with hyperlipidemia.  The patient reports tolerating the medication well and is in excellent compliance.  There have been no medication side effects.  The patient denies chest pain, neuropathy, and myalgias.  The patient has reduced fat intake and has been exercising.  Current treatment has included the medications listed in the med card.    Lab Results   Component Value Date    CHOL 141 10/12/2017    CHOL 253 (H) 07/11/2017    CHOL 217 (H) 09/21/2016     Lab Results   Component Value Date    HDL 37 (L) 10/12/2017    HDL 39 (L) 07/11/2017    HDL 38 (L) 09/21/2016     Lab Results   Component Value Date    LDLCALC 80.4 10/12/2017    LDLCALC 172.2 (H) 07/11/2017    LDLCALC 151.8 09/21/2016     Lab Results   Component Value Date    TRIG 118 10/12/2017    TRIG 209 (H) 07/11/2017    TRIG 136 09/21/2016     Lab Results   Component Value Date    CHOLHDL 26.2 10/12/2017    CHOLHDL 15.4 (L) 07/11/2017    CHOLHDL 17.5 (L) 09/21/2016     He was kept on a statin in his discharge from the hospital and he was put on 80 mg atorvastatin.  He is due for a recheck on this. He was also kept on zetia.      He had a history of vertebral artery stenosis and is on blood thinners but he has had a seizure in the past and is on keppra.     Health Maintenance Due   Topic Date Due    TETANUS VACCINE  11/12/1963       Past Medical History:  Past Medical History:   Diagnosis Date    Actinic keratoses     Altered mental status 3/22/2015    Anticoagulant long-term use     Atrial fibrillation     CAD (coronary artery disease)     stents after bypass    Carotid artery stenosis 3/31/2015    Colon polyps 2011    repeat colonoscopy 2014    Combined hyperlipidemia associated with type 2 diabetes mellitus     Diabetes mellitus type II, uncontrolled     dx 2004    GERD (gastroesophageal reflux disease)  " "   Grand mal seizure     last 2/2017    Hard of hearing     History of cardiac pacemaker 3/31/2015    HTN (hypertension)     Hydrocephalus 09/08/2017    Influenza A 1/2/2018    He got influenza a recently and was in the hospital in [place].  He had rhabdomyolyisis and acute kidney injury.  He also had an increased troponin.  He had a flu shot in early December.    Mitral valve insufficiency     Presence of automatic (implantable) cardiac defibrillator     Sleep apnea with use of continuous positive airway pressure (CPAP)     patient states he does not use CPAP anymore    Syncope 3/30/2015    Wears dentures     upper and lower    Wears hearing aid     sometimes     Past Surgical History:   Procedure Laterality Date    CARDIAC PACEMAKER PLACEMENT      CATARACT EXTRACTION W/ INTRAOCULAR LENS  IMPLANT, BILATERAL      CORONARY ARTERY BYPASS GRAFT  1993    three vessels    CORONARY STENT PLACEMENT      CSF SHUNT      HERNIA REPAIR      as infant     Review of patient's allergies indicates:  No Known Allergies  Current Outpatient Prescriptions on File Prior to Visit   Medication Sig Dispense Refill    ACCU-CHEK CLEOPATRA PLUS TEST STRP Strp TEST BLOOD SUGARS 4 TIMES DAILY 150 strip PRN    amlodipine (NORVASC) 10 MG tablet Take 1 tablet (10 mg total) by mouth once daily. 90 tablet 3    aspirin (ECOTRIN) 81 MG EC tablet Take 1 tablet (81 mg total) by mouth once daily. Every day. Restart 8/15/17  0    atorvastatin (LIPITOR) 80 MG tablet Take 1 tablet (80 mg total) by mouth once daily. 90 tablet 3    BD INSULIN PEN NEEDLE UF SHORT 31 gauge x 5/16" Ndle USE FOUR TIMES DAILY 360 each 3    carvedilol (COREG) 25 MG tablet Take 1 tablet (25 mg total) by mouth 2 (two) times daily with meals. 180 tablet 3    clopidogrel (PLAVIX) 75 mg tablet Take 1 tablet (75 mg total) by mouth once daily. Restart 8/15 90 tablet 3    dabigatran etexilate (PRADAXA) 75 mg Cap Take 1 capsule (75 mg total) by mouth 2 (two) times " "daily. "Do NOT break, chew, or open capsules." Restart 7 days Post op. 8/15/17 180 capsule 3    ezetimibe (ZETIA) 10 mg tablet TAKE 1 TABLET ONE TIME DAILY 90 tablet 3    hydrALAZINE (APRESOLINE) 25 MG tablet Take 1 tablet (25 mg total) by mouth every 8 (eight) hours. 90 tablet 0    insulin aspart (NOVOLOG) 100 unit/mL InPn pen Humalog Sliding Scale tid before meals: 150-200 give 4 units 201-250 give 6 units 251-300 give 8 units 301-350 give 10 units > 350 give 12 units 90 mL 3    insulin glargine (LANTUS SOLOSTAR) 100 unit/mL (3 mL) InPn pen Inject 60 units into the skin every evening. 18 mL 0    isosorbide mononitrate (IMDUR) 60 MG 24 hr tablet TAKE 1 TABLET (60 MG TOTAL) BY MOUTH EVERY EVENING. 90 tablet 3    levetiracetam (KEPPRA) 500 MG Tab Take 750 mg by mouth 2 (two) times daily.       tamsulosin (FLOMAX) 0.4 mg Cp24 Take 1 capsule (0.4 mg total) by mouth once daily. 90 capsule 3    torsemide (DEMADEX) 20 MG Tab Take 1 tablet (20 mg total) by mouth once daily. 30 tablet 11    TRUEPLUS LANCETS 28 gauge Misc TEST TWO TIMES DAILY AS NEEDED 100 each 3     No current facility-administered medications on file prior to visit.      Social History     Social History    Marital status:      Spouse name: N/A    Number of children: N/A    Years of education: N/A     Occupational History    Not on file.     Social History Main Topics    Smoking status: Former Smoker     Packs/day: 2.00     Years: 25.00     Types: Cigarettes     Quit date: 1/1/1983    Smokeless tobacco: Never Used      Comment: quit 1983     Alcohol use Yes      Comment: occasional    Drug use: No    Sexual activity: Not on file     Other Topics Concern    Not on file     Social History Narrative    No narrative on file     Family History   Problem Relation Age of Onset    Stomach cancer Mother     Lung cancer Father     Diabetes Sister     Hypertension Sister     Heart disease Neg Hx     Stroke Neg Hx            Review of " "Systems   Constitutional: Negative for appetite change, chills and fever.   HENT: Negative for rhinorrhea and sore throat.    Respiratory: Positive for cough. Negative for shortness of breath and wheezing.    Cardiovascular: Negative for chest pain and palpitations.   Gastrointestinal: Negative for abdominal pain.   Genitourinary: Negative for dysuria and hematuria.        He has had some intermittent incontinence noted.        Objective:   /76   Pulse 74   Temp 97.8 °F (36.6 °C) (Oral)   Ht 5' 7" (1.702 m)   Wt 105.6 kg (232 lb 12.9 oz)   BMI 36.46 kg/m²     Physical Exam   Constitutional: He is oriented to person, place, and time. He appears well-developed and well-nourished.   He appears to not be feeling that well.    HENT:   Head: Normocephalic and atraumatic.   Eyes: EOM are normal. Pupils are equal, round, and reactive to light.   Cardiovascular: Normal rate, regular rhythm and normal heart sounds.  Exam reveals no gallop and no friction rub.    No murmur heard.  Pulmonary/Chest: Effort normal. No respiratory distress. He has no wheezes. He has rales.   Abdominal: Soft. He exhibits no distension.   Musculoskeletal: He exhibits no edema.   Neurological: He is alert and oriented to person, place, and time.       Assessment:     1. Pneumonia of right lower lobe due to infectious organism    2. Non-traumatic rhabdomyolysis    3. Influenza A    4. CHF with left ventricular diastolic dysfunction, NYHA class 2    5. Paroxysmal atrial fibrillation    6. Combined hyperlipidemia associated with type 2 diabetes mellitus    7. Coronary artery disease involving coronary bypass graft of native heart without angina pectoris    8. Uncontrolled type 2 diabetes mellitus without complication, with long-term current use of insulin    9. Elevated troponin    10. Hypertension associated with diabetes    11. Hypertensive heart disease with heart failure    12. Proteinuria due to type 2 diabetes mellitus    13. Type 2 " diabetes mellitus with stage 3 chronic kidney disease, with long-term current use of insulin    14. Type 2 diabetes mellitus with diabetic nephropathy, without long-term current use of insulin    15. Acute renal failure, unspecified acute renal failure type    16. Atherosclerosis of native artery of both lower extremities with intermittent claudication    17. Seizure disorder        Plan:   Abdullahi was seen today for hospital follow up.    Diagnoses and all orders for this visit:    Pneumonia of right lower lobe due to infectious organism  -     CBC auto differential; Future  -     cefTRIAXone injection 1 g; Inject 1 g into the muscle once.  -     lidocaine HCL 10 mg/ml (1%) injection 2.1 mL; Inject 2.1 mLs into the muscle once.  -     cefaclor (CECLOR) 500 mg Cap; Take 1 capsule (500 mg total) by mouth 3 (three) times daily.  -     azithromycin (Z-MICHELLE) 250 MG tablet; Take as directed.  -     X-Ray Chest PA And Lateral; Future    Non-traumatic rhabdomyolysis  -     CK; Future    Influenza A  -     X-Ray Chest PA And Lateral; Future    CHF with left ventricular diastolic dysfunction, NYHA class 2  Cont meds  Paroxysmal atrial fibrillation  Cont meds.  Combined hyperlipidemia associated with type 2 diabetes mellitus  Cont meds.  Coronary artery disease involving coronary bypass graft of native heart without angina pectoris  Cont meds.  Uncontrolled type 2 diabetes mellitus without complication, with long-term current use of insulin  -     Comprehensive metabolic panel; Future  -     Hemoglobin A1c; Future    Elevated troponin  Cont heart meds.  Hypertension associated with diabetes  Cont meds.  Hypertensive heart disease with heart failure  Cont meds.  Proteinuria due to type 2 diabetes mellitus  Cont meds.  Type 2 diabetes mellitus with stage 3 chronic kidney disease, with long-term current use of insulin  -     Comprehensive metabolic panel; Future  -     Hemoglobin A1c; Future    Type 2 diabetes mellitus with diabetic  nephropathy, without long-term current use of insulin  -     Comprehensive metabolic panel; Future  -     Hemoglobin A1c; Future    Acute renal failure, unspecified acute renal failure type  -     Comprehensive metabolic panel; Future    Atherosclerosis of native artery of both lower extremities with intermittent claudication  Cont f/u with his cardiologist.    Seizure disorder  Cont meds.    Due to the pneumonia, we will treat this accordingly but keep a close eye on him.  He does not fit criteria for admission right now as his bp, pulse, etc are all ok and he is not tachypneic and he is tolerating meds. He is eating and drinking fluids without problems.  He was instructed, along with his wife, that if anything changes to let me know.  With his permission I called an dspoke with his daughter Karen Araujo and told her the situation and to monitor him daily.  consiser stopping the statin and zetia if the rhabdo is not improved.

## 2018-01-03 ENCOUNTER — PATIENT MESSAGE (OUTPATIENT)
Dept: FAMILY MEDICINE | Facility: CLINIC | Age: 73
End: 2018-01-03

## 2018-01-09 PROCEDURE — 99499 UNLISTED E&M SERVICE: CPT | Mod: S$GLB,,, | Performed by: FAMILY MEDICINE

## 2018-01-10 NOTE — PROGRESS NOTES
Patient, Abdullahi Salazar (MRN #[id number]), presented with a recent Platelet count less than 150 K/uL consistent with the definition of thrombocytopenia (ICD10 - D69.6).    Platelets   Date Value Ref Range Status   08/15/2017 140 (L) 150 - 350 K/uL Final     The patient's thrombocytopenia was monitored, evaluated, addressed and/or treated. This addendum to the medical record is made on 01/09/2018.

## 2018-01-10 NOTE — PROGRESS NOTES
Patient, Abdullahi Salazar (MRN #[id number]), presented with a recorded BMI of 36.46 kg/m^2 and a documented comorbidity(s):  - Diabetes Mellitus Type 2  - Hypertension  - Hyperlipidemia  - Atrial Fibrillation  - Atrial Fibrillation  to which the severe obesity is a contributing factor. This is consistent with the definition of severe obesity (BMI 35.0-35.9) with comorbidity (ICD-10 E66.01, Z68.35). The patient's severe obesity was monitored, evaluated, addressed and/or treated. This addendum to the medical record is made on 01/09/2018.

## 2018-01-22 ENCOUNTER — PES CALL (OUTPATIENT)
Dept: ADMINISTRATIVE | Facility: CLINIC | Age: 73
End: 2018-01-22

## 2018-02-05 ENCOUNTER — LAB VISIT (OUTPATIENT)
Dept: LAB | Facility: HOSPITAL | Age: 73
End: 2018-02-05
Attending: FAMILY MEDICINE
Payer: MEDICARE

## 2018-02-05 DIAGNOSIS — N18.30 TYPE 2 DIABETES MELLITUS WITH STAGE 3 CHRONIC KIDNEY DISEASE, WITH LONG-TERM CURRENT USE OF INSULIN: ICD-10-CM

## 2018-02-05 DIAGNOSIS — E11.9 TYPE 2 DIABETES MELLITUS WITHOUT COMPLICATION, WITHOUT LONG-TERM CURRENT USE OF INSULIN: ICD-10-CM

## 2018-02-05 DIAGNOSIS — M62.82 NON-TRAUMATIC RHABDOMYOLYSIS: ICD-10-CM

## 2018-02-05 DIAGNOSIS — J18.9 PNEUMONIA OF RIGHT LOWER LOBE DUE TO INFECTIOUS ORGANISM: ICD-10-CM

## 2018-02-05 DIAGNOSIS — Z79.4 TYPE 2 DIABETES MELLITUS WITH STAGE 3 CHRONIC KIDNEY DISEASE, WITH LONG-TERM CURRENT USE OF INSULIN: ICD-10-CM

## 2018-02-05 DIAGNOSIS — N17.9 ACUTE RENAL FAILURE, UNSPECIFIED ACUTE RENAL FAILURE TYPE: ICD-10-CM

## 2018-02-05 DIAGNOSIS — E11.22 TYPE 2 DIABETES MELLITUS WITH STAGE 3 CHRONIC KIDNEY DISEASE, WITH LONG-TERM CURRENT USE OF INSULIN: ICD-10-CM

## 2018-02-05 DIAGNOSIS — E11.21 TYPE 2 DIABETES MELLITUS WITH DIABETIC NEPHROPATHY, WITHOUT LONG-TERM CURRENT USE OF INSULIN: ICD-10-CM

## 2018-02-05 LAB
ALBUMIN SERPL BCP-MCNC: 3.5 G/DL
ALP SERPL-CCNC: 106 U/L
ALT SERPL W/O P-5'-P-CCNC: 39 U/L
ANION GAP SERPL CALC-SCNC: 10 MMOL/L
AST SERPL-CCNC: 35 U/L
BASOPHILS # BLD AUTO: 0.04 K/UL
BASOPHILS NFR BLD: 0.6 %
BILIRUB SERPL-MCNC: 0.8 MG/DL
BUN SERPL-MCNC: 11 MG/DL
CALCIUM SERPL-MCNC: 9.1 MG/DL
CHLORIDE SERPL-SCNC: 105 MMOL/L
CK SERPL-CCNC: 85 U/L
CO2 SERPL-SCNC: 28 MMOL/L
CREAT SERPL-MCNC: 1.5 MG/DL
DIFFERENTIAL METHOD: ABNORMAL
EOSINOPHIL # BLD AUTO: 0.1 K/UL
EOSINOPHIL NFR BLD: 2 %
ERYTHROCYTE [DISTWIDTH] IN BLOOD BY AUTOMATED COUNT: 13.9 %
EST. GFR  (AFRICAN AMERICAN): 52.3 ML/MIN/1.73 M^2
EST. GFR  (NON AFRICAN AMERICAN): 45.2 ML/MIN/1.73 M^2
ESTIMATED AVG GLUCOSE: 235 MG/DL
ESTIMATED AVG GLUCOSE: 235 MG/DL
GLUCOSE SERPL-MCNC: 109 MG/DL
HBA1C MFR BLD HPLC: 9.8 %
HBA1C MFR BLD HPLC: 9.8 %
HCT VFR BLD AUTO: 38.8 %
HGB BLD-MCNC: 12.7 G/DL
IMM GRANULOCYTES # BLD AUTO: 0.02 K/UL
IMM GRANULOCYTES NFR BLD AUTO: 0.3 %
LYMPHOCYTES # BLD AUTO: 1.9 K/UL
LYMPHOCYTES NFR BLD: 29.6 %
MCH RBC QN AUTO: 31.2 PG
MCHC RBC AUTO-ENTMCNC: 32.7 G/DL
MCV RBC AUTO: 95 FL
MONOCYTES # BLD AUTO: 0.8 K/UL
MONOCYTES NFR BLD: 11.9 %
NEUTROPHILS # BLD AUTO: 3.6 K/UL
NEUTROPHILS NFR BLD: 55.6 %
NRBC BLD-RTO: 0 /100 WBC
PLATELET # BLD AUTO: 178 K/UL
PMV BLD AUTO: 10.7 FL
POTASSIUM SERPL-SCNC: 3.9 MMOL/L
PROT SERPL-MCNC: 7.5 G/DL
RBC # BLD AUTO: 4.07 M/UL
SODIUM SERPL-SCNC: 143 MMOL/L
WBC # BLD AUTO: 6.49 K/UL

## 2018-02-05 PROCEDURE — 36415 COLL VENOUS BLD VENIPUNCTURE: CPT | Mod: PO

## 2018-02-05 PROCEDURE — 80053 COMPREHEN METABOLIC PANEL: CPT

## 2018-02-05 PROCEDURE — 83036 HEMOGLOBIN GLYCOSYLATED A1C: CPT

## 2018-02-05 PROCEDURE — 82550 ASSAY OF CK (CPK): CPT

## 2018-02-05 PROCEDURE — 85025 COMPLETE CBC W/AUTO DIFF WBC: CPT

## 2018-02-06 NOTE — PROGRESS NOTES
The creatinine is now back to normal.  Please continue follow up with the kidney doctor.    The CPk is now normal so there is no sign of muscle breakdown.      Anemia persists but is stable compare to the past six months.  Recheck the CBC in 6 months along with a bmp.    The diabetes is very out of control.  Schedule an appointment with the diabetic educator for further treatment.  In the meantime, change LANTUS TO 70 units a day from 60 units a day.  Keep a log of the glucoses.  Check an a1c in 3 months.

## 2018-02-09 ENCOUNTER — TELEPHONE (OUTPATIENT)
Dept: FAMILY MEDICINE | Facility: CLINIC | Age: 73
End: 2018-02-09

## 2018-02-09 DIAGNOSIS — E11.9 TYPE 2 DIABETES MELLITUS WITHOUT COMPLICATION, WITHOUT LONG-TERM CURRENT USE OF INSULIN: Primary | ICD-10-CM

## 2018-02-09 DIAGNOSIS — D64.9 ANEMIA, UNSPECIFIED TYPE: ICD-10-CM

## 2018-02-09 NOTE — TELEPHONE ENCOUNTER
----- Message from Maximo Hernandez MD sent at 2/6/2018 12:08 PM CST -----  The creatinine is now back to normal.  Please continue follow up with the kidney doctor.    The CPk is now normal so there is no sign of muscle breakdown.      Anemia persists but is stable compare to the past six months.  Recheck the CBC in 6 months along with a bmp.    The diabetes is very out of control.  Schedule an appointment with the diabetic educator for further treatment.  In the meantime, change LANTUS TO 70 units a day from 60 units a day.  Keep a log of the glucoses.  Check an a1c in 3 months.

## 2018-02-16 ENCOUNTER — HOSPITAL ENCOUNTER (OUTPATIENT)
Dept: RADIOLOGY | Facility: HOSPITAL | Age: 73
Discharge: HOME | End: 2018-02-16
Attending: FAMILY MEDICINE
Payer: MEDICARE

## 2018-02-16 DIAGNOSIS — J18.9 PNEUMONIA OF RIGHT LOWER LOBE DUE TO INFECTIOUS ORGANISM: ICD-10-CM

## 2018-02-16 PROCEDURE — 71046 X-RAY EXAM CHEST 2 VIEWS: CPT | Mod: 26,,, | Performed by: RADIOLOGY

## 2018-02-16 PROCEDURE — 71046 X-RAY EXAM CHEST 2 VIEWS: CPT | Mod: TC,PO

## 2018-02-19 RX ORDER — LEVETIRACETAM 500 MG/1
750 TABLET ORAL 2 TIMES DAILY
Qty: 270 TABLET | Refills: 0 | Status: SHIPPED | OUTPATIENT
Start: 2018-02-19 | End: 2018-04-13 | Stop reason: SDUPTHER

## 2018-02-20 ENCOUNTER — CLINICAL SUPPORT (OUTPATIENT)
Dept: CARDIOLOGY | Facility: CLINIC | Age: 73
End: 2018-02-20
Attending: INTERNAL MEDICINE
Payer: MEDICARE

## 2018-02-20 DIAGNOSIS — Z95.810 CARDIAC DEFIBRILLATOR IN SITU: ICD-10-CM

## 2018-02-20 DIAGNOSIS — I49.01 VF (VENTRICULAR FIBRILLATION): ICD-10-CM

## 2018-02-20 PROCEDURE — 93284 PRGRMG EVAL IMPLANTABLE DFB: CPT | Mod: S$GLB,,, | Performed by: INTERNAL MEDICINE

## 2018-02-20 NOTE — PROGRESS NOTES
The pneumonia changes have resolved.  No further workup is needed.     TETANUS VACCINE due on [birth year]12/1963  Please get a tetanus vaccine at your pharmacy and have them alert us to you getting it.

## 2018-02-21 PROBLEM — I10 ACCELERATED HYPERTENSION: Status: ACTIVE | Noted: 2018-02-21

## 2018-02-21 PROBLEM — R06.02 SOB (SHORTNESS OF BREATH): Status: ACTIVE | Noted: 2018-02-21

## 2018-02-25 PROBLEM — I25.5 ISCHEMIC CARDIOMYOPATHY: Status: ACTIVE | Noted: 2018-02-25

## 2018-02-27 ENCOUNTER — NURSE TRIAGE (OUTPATIENT)
Dept: ADMINISTRATIVE | Facility: CLINIC | Age: 73
End: 2018-02-27

## 2018-02-27 ENCOUNTER — TELEPHONE (OUTPATIENT)
Dept: CARDIOLOGY | Facility: CLINIC | Age: 73
End: 2018-02-27

## 2018-02-27 PROBLEM — I50.32 CHRONIC DIASTOLIC HEART FAILURE: Status: ACTIVE | Noted: 2017-08-15

## 2018-02-27 NOTE — TELEPHONE ENCOUNTER
Reason for Disposition   SEVERE difficulty breathing (e.g., struggling for each breath, speaks in single words, pulse > 120)    Protocols used: ST BREATHING DIFFICULTY-A-OH    Mr. Salazar is severely short of breath. Patient advised to call 911. He states he will have his wife drive him to the ED.

## 2018-02-27 NOTE — TELEPHONE ENCOUNTER
----- Message from Jeanette Chandler sent at 2/27/2018 12:20 PM CST -----  Contact: self  Patient is complaining of shortness of breath when he walks from one end of his home to the other. He would like to know what to do because it keeps happening. Call back number [phone] (home)

## 2018-03-01 PROBLEM — I48.0 PAROXYSMAL ATRIAL FIBRILLATION: Status: ACTIVE | Noted: 2018-03-01

## 2018-03-01 PROBLEM — I50.32 CHRONIC DIASTOLIC HEART FAILURE: Status: ACTIVE | Noted: 2018-03-01

## 2018-03-02 ENCOUNTER — TELEPHONE (OUTPATIENT)
Dept: CARDIOLOGY | Facility: CLINIC | Age: 73
End: 2018-03-02

## 2018-03-02 NOTE — TELEPHONE ENCOUNTER
Patient has been scheduled for a follow up appointment with Dr wyatt he has been given his date and time

## 2018-03-02 NOTE — TELEPHONE ENCOUNTER
----- Message from Jessenia Gutierrez sent at 3/2/2018  9:37 AM CST -----  Lala with [facility] Case [facility] at [phone], calling for a two week follow up from today . Patient was in for CHF. Please advise patient of appointment. thanks.

## 2018-03-07 ENCOUNTER — OFFICE VISIT (OUTPATIENT)
Dept: FAMILY MEDICINE | Facility: CLINIC | Age: 73
End: 2018-03-07
Payer: MEDICARE

## 2018-03-07 VITALS
BODY MASS INDEX: 36.34 KG/M2 | HEIGHT: 67 IN | TEMPERATURE: 98 F | WEIGHT: 231.5 LBS | DIASTOLIC BLOOD PRESSURE: 68 MMHG | SYSTOLIC BLOOD PRESSURE: 136 MMHG

## 2018-03-07 DIAGNOSIS — E11.59 HYPERTENSION ASSOCIATED WITH DIABETES: Primary | ICD-10-CM

## 2018-03-07 DIAGNOSIS — I15.2 HYPERTENSION ASSOCIATED WITH DIABETES: Primary | ICD-10-CM

## 2018-03-07 DIAGNOSIS — I25.5 ISCHEMIC CARDIOMYOPATHY: ICD-10-CM

## 2018-03-07 PROCEDURE — 99999 PR PBB SHADOW E&M-EST. PATIENT-LVL IV: CPT | Mod: PBBFAC,,, | Performed by: FAMILY MEDICINE

## 2018-03-07 PROCEDURE — 99214 OFFICE O/P EST MOD 30 MIN: CPT | Mod: S$GLB,,, | Performed by: FAMILY MEDICINE

## 2018-03-07 PROCEDURE — 99499 UNLISTED E&M SERVICE: CPT | Mod: S$GLB,,, | Performed by: FAMILY MEDICINE

## 2018-03-07 RX ORDER — LISINOPRIL 40 MG/1
40 TABLET ORAL DAILY
Qty: 90 TABLET | Refills: 3 | Status: SHIPPED | OUTPATIENT
Start: 2018-03-07 | End: 2018-04-09 | Stop reason: SDUPTHER

## 2018-03-07 NOTE — PROGRESS NOTES
Subjective:      Patient ID: Abdullahi Salazar is a 72 y.o. male.    Chief Complaint: Hospital Follow Up      The patient was recently hospitalized at [facility] for chest pain and htn.  The discharge summary from the hospitalization is copied below for reference and completeness.      Transitional Care Note    Family and/or Caretaker present at visit?  No.  Diagnostic tests reviewed/disposition: No diagnosic tests pending after this hospitalization.  Disease/illness education: he understands what he had.  Home health/community services discussion/referrals: Patient does not have home health established from hospital visit.  They do not need home health.  If needed, we will set up home health for the patient.   Establishment or re-establishment of referral orders for community resources: No other necessary community resources.   Discussion with other health care providers: No discussion with other health care providers necessary.     Problem List Items Addressed This Visit     None      Discharge Summaries  Gregorio Rodgers MD   [facility] Medicine      []Hide copied text  [facility] Medicine  Discharge Summary        Patient Name: Abdullahi Salazar  MRN: [id number]  Admission Date: 2/27/2018  Hospital Length of Stay: 1 days  Discharge Date and Time:  03/02/2018 9:18 AM  Attending Physician: Gregorio Rodgers MD   Discharging Provider: Gregorio Rodgers MD  Primary Care Provider: Maximo Hernandez MD        HPI:   72-year-old male with a history of HTN, HLD, DM, CAD, and CHF here with complaints of chest pain, shortness of breath, and cough ×2-3 days. He was just discharged last week with treatment for what looked like at that time for acute on chronic diastolic failure. He was on Demadex 40 mg daily at home. Prior to that admission. We kept him on Lasix IV every 12 hours and then he was transitioned back to demadex.  He felt good at discharge, hisshortness of breath did improve, and then 2 days later  "after his discharge, he started having the same shortness of breath he had going on the last admission except worst .His lungs are clear. He does have 2-3 pitting edema. We need to evaluate for pulmonary causes including pulmonary hypertension,  cardiologist in consultation with plans for right heart catheterization         He reports the cough is dry with no other URI complaints including congestion, sore throat, or fever. He has no associated nausea, vomiting, or diarrhea. He states his bilateral lower extremity edema is mildly worse than baseline but denies any pain in his legs. His chest pain is described as "bloating and pressure" radiating from his epigastric region into his central chest. Patient states current symptoms are consistent with prior stent placement. His cardiologist is Dr. Escamilla. He is anticoagulated with Plavix and uses Demadex for his diuretic. Baby aspirin taken this morning. States symptoms are worsened with exertion and relieved with rest.   very high bp 200s/100s, my impression is that his bp is not well controlled at home and driving some of the symptoms, not to worried about his diet. He is compliant     * No surgery found *       Hospital Course:   Need to evaluate his pulmonary cause. Because last admission. We did tighten up his cardiac status pretty well, per echo. He has an elevated pressure and EF remains at 50%  Evaluation with pulmonary much appreciated, does have pulm htn with etiology of diastolic chf and uncontrolled bp.       Per 's note 3/1/18:  Stress test depicts inferior scar mild inf/lateral ischemia. Known small vessels in this area.   15 minute conversation with patient and wife regarding options at this time. He is not a optimal coronary intervention candidate and area of ischemia nor a CABG candidate. If patient is requiring nitroglycerin on a very frequent basis than candidate PCI will probably be balloon only with a greater than 75% chance of restenosis within 6 " months. Patient requires very strict systolic blood pressure control goal of systolic less than 140 continue current medications ambulate tonight stable can be discharged home tomorrow morning. Systolic grade 140s had Cardura 2 mg by mouth daily at bedtime. Continue other current medications (see orders).  No further CV w/u needed at this time. Will sign off for now.  Follow up in clinic 4-6 weeks.       Consults:          Consults          Status Ordering Provider       Inpatient consult to Cardiology  Once     Provider:  Toño Haro MD    Acknowledged FLIP AGUDELO       Inpatient consult to Cardiology  Once     Provider:  Toño Haro MD    Acknowledged FLIP AGUDELO       Inpatient consult to Pulmonology  Once     Provider:  Deon Winchester MD    Completed [facility]       Inpatient consult to Registered Dietitian/Nutritionist  Once     Provider:  (Not yet assigned)    Acknowledged SUNSHINE SYLVESTER                 * Acute on chronic diastolic congestive heart failure     Acute on chronic decompensated diastolic heart failure, present on admission.  Patient was on Lasix 20 mg IV 3 times for one day.  Resumed oral Demadex yesterday.  Cardiology followed.             Coronary artery disease involving coronary bypass graft of native heart without angina pectoris     Patient denies any chest pain at this time.  Scheduled for Lexiscan stress test on 3/1/18.  Per Dr. Haro's note:   Stress test depicts inferior scar mild inf/lateral ischemia. Known small vessels in this area.   15 minute conversation with patient and wife regarding options at this time. He is not a optimal coronary intervention candidate and area of ischemia nor a CABG candidate. If patient is requiring nitroglycerin on a very frequent basis than candidate PCI will probably be balloon only with a greater than 75% chance of restenosis within 6 months. Patient requires very strict systolic blood pressure control goal of systolic  less than 140 continue current medications ambulate tonight stable can be discharged home tomorrow morning. Systolic grade 140s had Cardura 2 mg by mouth daily at bedtime. Continue other current medications (see orders).  No further CV w/u needed at this time. Will sign off for now.  Follow up in clinic 4-6 weeks.    Continued aspirin, Plavix, beta blocker, ACEI, statin.             Accelerated hypertension     Improving.  Continued current antihypertensive agents, monitor closely.                    Final Active Diagnoses:     Diagnosis Date Noted POA    PRINCIPAL PROBLEM:  Acute on chronic diastolic congestive heart failure [I50.33] 08/15/2017 Yes    Coronary artery disease involving coronary bypass graft of native heart without angina pectoris [I25.810] 02/17/2016 Yes    Accelerated hypertension [I10] 02/21/2018 Yes    Diabetes mellitus type II, uncontrolled [E11.65]   Yes    Paroxysmal atrial fibrillation [I48.0] 03/01/2018 Yes    Chronic diastolic heart failure [I50.32] 03/01/2018 Yes    Edema of both legs [R60.0]   Yes    Aortic stenosis, moderate [I35.0] 08/17/2017 Yes    S/P CABG (coronary artery bypass graft) [Z95.1]   Not Applicable    S/P coronary artery stent placement [Z95.5]   Not Applicable    Stage 3 chronic kidney disease [N18.3] 09/22/2016 Yes    Vertebral artery stenosis [I65.09] 04/20/2015 Yes    Biventricular cardiac pacemaker in situ [Z95.0] 03/31/2015 Yes    Carotid artery stenosis [I65.29] 03/31/2015 Yes    Hypertension associated with diabetes [E11.59, I10] 01/05/2015 Yes       Problems Resolved During this Admission:     Diagnosis Date Noted Date Resolved POA         Discharged Condition: stable     [place] [facility] [place]     Follow Up:      Follow-up Information      Maximo Hernandez MD. Schedule an appointment as soon as possible for a visit in 1 week.    Specialty:  Family Medicine  Contact information:  [address] [facility]  [address]  [phone]                 [facility]. Schedule an appointment as soon as possible for a visit in 1 week.    Contact information:  [address]  [facility] [address]  [phone]                 Toño Haro MD In 2 weeks.    Specialties:  INTERVENTIONAL CARDIOLOGY, Cardiology  Contact information:  [address] [facility] [address]  [phone]                      Patient Instructions:          Ambulatory Referral to Discharge Clinic   Referral Priority: Routine Referral Type: Consultation   Referral Reason: Specialty Services Required     Number of Visits Requested: 1            Ambulatory Referral to Transitional Care   Referral Priority: Routine Referral Type: Consultation   Referral Reason: Specialty Services Required     Number of Visits Requested: 1        Diet Cardiac      Activity as tolerated      Notify your health care provider if you experience any of the following:  difficulty breathing or increased cough      Notify your health care provider if you experience any of the following:  persistent dizziness, light-headedness, or visual disturbances          Significant Diagnostic Studies: Labs:   BMP:   Recent Labs  Lab 03/02/18  0446   *      K 3.9      CO2 27   BUN 33*   CREATININE 1.47*   CALCIUM 8.4   MG 2.2   , CMP   Recent Labs  Lab 03/02/18  0446      K 3.9      CO2 27   *   BUN 33*   CREATININE 1.47*   CALCIUM 8.4   PROT 6.8   ALBUMIN 3.6   BILITOT 0.9   ALKPHOS 80   AST 28   ALT 39   ANIONGAP 11   ESTGFRAFRICA 54*   EGFRNONAA 46*   , CBC   Recent Labs  Lab 03/02/18  0446   WBC 5.16   HGB 10.2*   HCT 31.4*   *   , INR         Lab Results   Component Value Date     INR 1.0 07/25/2017     INR 1.1 05/22/2017     INR 1.1 03/10/2017   , Lipid Panel         Lab Results   Component Value Date     CHOL 141 10/12/2017     HDL 37 (L) 10/12/2017     LDLCALC 80.4 10/12/2017     TRIG 118 10/12/2017     CHOLHDL 26.2  10/12/2017   , Troponin   Recent Labs  Lab 02/28/18  0438   TROPONINI 0.046*   , A1C:   Recent Labs  Lab 09/11/17  1035 10/12/17  0730 02/05/18  1025   HGBA1C 8.5* 8.5* 9.8*  9.8*    and All labs within the past 24 hours have been reviewed             Pending Diagnostic Studies:      Procedure Component Value Units Date/Time     NM Myocardial Perfusion Spect Multi Pharmacologic [[id number]] Resulted:  03/01/18 0940     Order Status:  Sent Lab Status:  In process Updated:  03/01/18 1250              Imaging Results                   CTA Chest Non-Coronary (PE Study) (Final result)  Result time 02/27/18 17:17:27                Final result by Kamaljit Ponce MD (02/27/18 17:17:27)                               Impression:           1. No evidence of pulmonary embolism.   2.  Moderate right and small left pleural effusions.  3.  Mild enlargement of main pulmonary arteries may reflect pulmonary arterial hypertension.  4.  Indeterminate 4 mm left upper lobe pulmonary nodule.  The absence of previous imaging for comparison, would recommend 12 month followup.  5.  Additional findings as above.        Electronically signed by:     KAMALJIT PONCE MD  Date:                                            02/27/18  Time:                                   17:17                      Narrative:     CT CHEST WITH CONTRAST - PE Protocol     CPT: 88962     Clinical data: Chest pain, shortness of breath, hypertension     Comparison: No CT comparison.     Technique: Axial CTA slices through the chest obtained after the administration of intravenous contrast. Coronal and sagittal MIPS were created. Total DLP for the study is approximately 232 mGy-cm.  Automated exposure control was utilized to reduce radiation dose.        Findings:   There is mild enlargement of the main pulmonary arteries may reflect component of pulmonary tear hypertension.  No filling defects are demonstrated through the segmental level to suggest pulmonary embolus.   There is no pericardial effusion.  Atherosclerotic calcifications are noted.  Thoracic aorta is non-aneurysmal.  Changes of CABG noted.  There is nonspecific mildly enlarged subcarinal lymph node measuring up to 1.5 cm short axis diameter.  There is bilateral gynecomastia.  There is moderate right and small left pleural effusion.  There is atelectasis marginating the pleural fluid.  There is no lobar consolidation or pneumothorax.  There is a 4 mm left upper lobe pulmonary nodule image 34.  The central airways appear patent.  There is  shunt catheter.     No acute displaced fractures identified.  Degenerative changes are noted within the spine.  Included portions of the upper abdomen demonstrate no acute process.                                               X-Ray Chest 1 View (Final result)  Result time 02/27/18 14:02:08                Final result by Edwar Ponce MD (02/27/18 14:02:08)                               Impression:      Faint increased markings bilaterally not appreciably changed from the previous film.        Electronically signed by:     EDWAR PONCE MD  Date:                                            02/27/18  Time:                                   14:02                      Narrative:     Chest one view     Conclusion shortness of breath     This is compared to previous study from 2/21/18     There are mild increased markings bilaterally. Heart is enlarged. Pacing device is in place. Patient has had a previous sternotomy.                                             Medications:  Reconciled Home Medications:        Current Discharge Medication List            CONTINUE these medications which have CHANGED     Details   amLODIPine (NORVASC) 5 MG tablet Take 1 tablet (5 mg total) by mouth every evening.  Qty: 30 tablet, Refills: 2       hydrALAZINE (APRESOLINE) 100 MG tablet Take 1 tablet (100 mg total) by mouth 2 (two) times daily.  Qty: 60 tablet, Refills: 2       isosorbide mononitrate  "(IMDUR) 30 MG 24 hr tablet Take 3 tablets (90 mg total) by mouth once daily.  Qty: 90 tablet, Refills: 2                CONTINUE these medications which have NOT CHANGED     Details   ACCU-CHEK CLEOPATRA PLUS TEST STRP Strp TEST BLOOD SUGARS 4 TIMES DAILY  Qty: 150 strip, Refills: PRN       aspirin (ECOTRIN) 81 MG EC tablet Take 1 tablet (81 mg total) by mouth once daily.  Refills: 0       atorvastatin (LIPITOR) 80 MG tablet Take 1 tablet (80 mg total) by mouth once daily.  Qty: 90 tablet, Refills: 3       BD INSULIN PEN NEEDLE UF SHORT 31 gauge x 5/16" Ndle USE FOUR TIMES DAILY  Qty: 360 each, Refills: 3       carvedilol (COREG) 25 MG tablet Take 1 tablet (25 mg total) by mouth 2 (two) times daily with meals.  Qty: 180 tablet, Refills: 3       clopidogrel (PLAVIX) 75 mg tablet Take 1 tablet (75 mg total) by mouth once daily.  Qty: 90 tablet, Refills: 3       dabigatran etexilate (PRADAXA) 75 mg Cap Take 1 capsule (75 mg total) by mouth 2 (two) times daily. "Do NOT break, chew, or open capsules."  Qty: 180 capsule, Refills: 3       ezetimibe (ZETIA) 10 mg tablet TAKE 1 TABLET ONE TIME DAILY  Qty: 90 tablet, Refills: 3     Associated Diagnoses: Combined hyperlipidemia associated with type 2 diabetes mellitus       insulin aspart (NOVOLOG) 100 unit/mL InPn pen Humalog Sliding Scale tid before meals: 150-200 give 4 units 201-250 give 6 units 251-300 give 8 units 301-350 give 10 units > 350 give 12 units  Qty: 90 mL, Refills: 3       insulin glargine (LANTUS SOLOSTAR) 100 unit/mL (3 mL) InPn pen Inject 60 units into the skin every evening.  Qty: 18 mL, Refills: 0     Associated Diagnoses: Diabetes mellitus with microalbuminuria; Uncontrolled type 2 diabetes mellitus without complication, with long-term current use of insulin; Type 2 diabetes mellitus with stage 3 chronic kidney disease, with long-term current use of insulin       levETIRAcetam (KEPPRA) 500 MG Tab Take 1.5 tablets (750 mg total) by mouth 2 (two) times " daily.  Qty: 270 tablet, Refills: 0       lisinopril (PRINIVIL,ZESTRIL) 20 MG tablet Take 1 tablet (20 mg total) by mouth once daily.  Qty: 90 tablet, Refills: 3       potassium chloride (KLOR-CON) 10 MEQ TbSR Take 1 tablet (10 mEq total) by mouth once daily.  Qty: 30 tablet, Refills: 11       tamsulosin (FLOMAX) 0.4 mg Cp24 Take 1 capsule (0.4 mg total) by mouth once daily.  Qty: 90 capsule, Refills: 3       torsemide (DEMADEX) 20 MG Tab Take 1 tablet (20 mg total) by mouth once daily.  Qty: 30 tablet, Refills: 11       TRUEPLUS LANCETS 28 gauge Misc TEST TWO TIMES DAILY AS NEEDED  Qty: 100 each, Refills: 3       nitroGLYCERIN (NITROSTAT) 0.4 MG SL tablet                   Indwelling Lines/Drains at time of discharge:       Lines/Drains/Airways            No matching active lines, drains, or airways             Time spent on the discharge of patient: 35 minutes  Patient was seen and examined on the date of discharge and determined to be suitable for discharge.           Gregorio Rodgers MD  Department of Hospital Medicine  [facility]      Electronically signed by Gregorio Rodgers MD at 3/2/2018  9:19 AM          Past Medical History:  Past Medical History:   Diagnosis Date    Actinic keratoses     Altered mental status 3/22/2015    Anticoagulant long-term use     Atrial fibrillation     CAD (coronary artery disease)     stents after bypass    Carotid artery stenosis 3/31/2015    CHF (congestive heart failure) 03/2018    Colon polyps 2011    repeat colonoscopy 2014    Combined hyperlipidemia associated with type 2 diabetes mellitus     Diabetes mellitus type II, uncontrolled     dx 2004    GERD (gastroesophageal reflux disease)     Grand mal seizure     last 2/2017    Hard of hearing     History of cardiac pacemaker 3/31/2015    HTN (hypertension)     Hydrocephalus 09/08/2017    Influenza A 1/2/2018    He got influenza a recently and was in the hospital in [place].  He had rhabdomyolyisis  "and acute kidney injury.  He also had an increased troponin.  He had a flu shot in early December.    Mitral valve insufficiency     Presence of automatic (implantable) cardiac defibrillator     Sleep apnea with use of continuous positive airway pressure (CPAP)     patient states he does not use CPAP anymore    Syncope 3/30/2015    Wears dentures     upper and lower    Wears hearing aid     sometimes     Past Surgical History:   Procedure Laterality Date    CARDIAC PACEMAKER PLACEMENT      CATARACT EXTRACTION W/ INTRAOCULAR LENS  IMPLANT, BILATERAL      CORONARY ARTERY BYPASS GRAFT  1993    three vessels    CORONARY STENT PLACEMENT      CSF SHUNT      HERNIA REPAIR      as infant     Review of patient's allergies indicates:  No Known Allergies  Current Outpatient Prescriptions on File Prior to Visit   Medication Sig Dispense Refill    ACCU-CHEK CLEOPATRA PLUS TEST STRP Strp TEST BLOOD SUGARS 4 TIMES DAILY 150 strip PRN    amLODIPine (NORVASC) 5 MG tablet Take 1 tablet (5 mg total) by mouth every evening. 30 tablet 2    aspirin (ECOTRIN) 81 MG EC tablet Take 1 tablet (81 mg total) by mouth once daily.  0    atorvastatin (LIPITOR) 80 MG tablet Take 1 tablet (80 mg total) by mouth once daily. 90 tablet 3    BD INSULIN PEN NEEDLE UF SHORT 31 gauge x 5/16" Ndle USE FOUR TIMES DAILY 360 each 3    carvedilol (COREG) 25 MG tablet Take 1 tablet (25 mg total) by mouth 2 (two) times daily with meals. 180 tablet 3    clopidogrel (PLAVIX) 75 mg tablet Take 1 tablet (75 mg total) by mouth once daily. 90 tablet 3    dabigatran etexilate (PRADAXA) 75 mg Cap Take 1 capsule (75 mg total) by mouth 2 (two) times daily. "Do NOT break, chew, or open capsules." 180 capsule 3    ezetimibe (ZETIA) 10 mg tablet TAKE 1 TABLET ONE TIME DAILY 90 tablet 3    hydrALAZINE (APRESOLINE) 100 MG tablet Take 1 tablet (100 mg total) by mouth 2 (two) times daily. 60 tablet 2    insulin aspart (NOVOLOG) 100 unit/mL InPn pen Humalog " Sliding Scale tid before meals: 150-200 give 4 units 201-250 give 6 units 251-300 give 8 units 301-350 give 10 units > 350 give 12 units 90 mL 3    insulin glargine (LANTUS SOLOSTAR) 100 unit/mL (3 mL) InPn pen Inject 60 units into the skin every evening. (Patient taking differently: 70 Units. Inject 60 units into the skin every evening.) 18 mL 0    isosorbide mononitrate (IMDUR) 30 MG 24 hr tablet Take 3 tablets (90 mg total) by mouth once daily. 90 tablet 2    levETIRAcetam (KEPPRA) 500 MG Tab Take 1.5 tablets (750 mg total) by mouth 2 (two) times daily. 270 tablet 0    lisinopril (PRINIVIL,ZESTRIL) 20 MG tablet Take 1 tablet (20 mg total) by mouth once daily. 90 tablet 3    nitroGLYCERIN (NITROSTAT) 0.4 MG SL tablet       potassium chloride (KLOR-CON) 10 MEQ TbSR Take 1 tablet (10 mEq total) by mouth once daily. 30 tablet 11    tamsulosin (FLOMAX) 0.4 mg Cp24 Take 1 capsule (0.4 mg total) by mouth once daily. 90 capsule 3    torsemide (DEMADEX) 20 MG Tab Take 1 tablet (20 mg total) by mouth once daily. 30 tablet 11    TRUEPLUS LANCETS 28 gauge Misc TEST TWO TIMES DAILY AS NEEDED 100 each 3     No current facility-administered medications on file prior to visit.      Social History     Social History    Marital status:      Spouse name: N/A    Number of children: N/A    Years of education: N/A     Occupational History    Not on file.     Social History Main Topics    Smoking status: Former Smoker     Packs/day: 2.00     Years: 25.00     Types: Cigarettes     Quit date: 1/1/1983    Smokeless tobacco: Never Used      Comment: quit 1983     Alcohol use Yes      Comment: occasional    Drug use: No    Sexual activity: Not on file     Other Topics Concern    Not on file     Social History Narrative    No narrative on file     Family History   Problem Relation Age of Onset    Stomach cancer Mother     Lung cancer Father     Diabetes Sister     Hypertension Sister     Heart disease Neg Hx   "   Stroke Neg Hx        Review of Systems   Constitutional: Negative.  Negative for chills, diaphoresis and fever.   HENT: Negative for congestion, hearing loss, mouth sores, postnasal drip and sore throat.    Eyes: Negative for pain and visual disturbance.   Respiratory: Negative for cough, chest tightness, shortness of breath and wheezing.    Cardiovascular: Negative for chest pain.   Gastrointestinal: Negative for abdominal pain, anal bleeding, blood in stool, constipation, diarrhea, nausea and vomiting.   Genitourinary: Negative for dysuria and hematuria.   Musculoskeletal: Negative for back pain, neck pain and neck stiffness.   Skin: Negative for rash.   Neurological: Negative for dizziness and weakness.       Objective:     /68   Temp 97.6 °F (36.4 °C) (Oral)   Ht 5' 7" (1.702 m)   Wt 105 kg (231 lb 7.7 oz)   BMI 36.26 kg/m²     Physical Exam   Constitutional: He is oriented to person, place, and time. He appears well-developed and well-nourished.   HENT:   Head: Normocephalic and atraumatic.   Right Ear: External ear normal.   Left Ear: External ear normal.   Nose: Nose normal.   Mouth/Throat: Oropharynx is clear and moist. No oropharyngeal exudate.   Eyes: Conjunctivae and EOM are normal. Pupils are equal, round, and reactive to light. Right eye exhibits no discharge. Left eye exhibits no discharge. No scleral icterus.   Neck: Normal range of motion. Neck supple. No JVD present. No thyromegaly present.   Cardiovascular: Normal rate, regular rhythm, normal heart sounds and intact distal pulses.  Exam reveals no gallop and no friction rub.    No murmur heard.  Pulmonary/Chest: Effort normal and breath sounds normal. No respiratory distress. He has no wheezes. He has no rales. He exhibits no tenderness.   Abdominal: Soft. Bowel sounds are normal. He exhibits no distension and no mass. There is no tenderness. There is no rebound and no guarding.   Musculoskeletal: Normal range of motion. He exhibits " no edema or tenderness.   Lymphadenopathy:     He has no cervical adenopathy.   Neurological: He is alert and oriented to person, place, and time. No cranial nerve deficit. Coordination normal.   Skin: Skin is warm and dry. He is not diaphoretic.   Psychiatric: He has a normal mood and affect.     Assessment:     1. Hypertension associated with diabetes    2. Ischemic cardiomyopathy        Plan:     Problem List Items Addressed This Visit     Hypertension associated with diabetes - Primary    Ischemic cardiomyopathy        No Follow-up on file.  Abdullahi was seen today for hospital follow up.    Diagnoses and all orders for this visit:    Hypertension associated with diabetes    Ischemic cardiomyopathy    Other orders  -     lisinopril (PRINIVIL,ZESTRIL) 40 MG tablet; Take 1 tablet (40 mg total) by mouth once daily.      I am going to increase his bp med to keep him well below 130 systolic if possible.

## 2018-03-22 ENCOUNTER — PES CALL (OUTPATIENT)
Dept: ADMINISTRATIVE | Facility: CLINIC | Age: 73
End: 2018-03-22

## 2018-04-04 ENCOUNTER — TELEPHONE (OUTPATIENT)
Dept: CARDIOLOGY | Facility: CLINIC | Age: 73
End: 2018-04-04

## 2018-04-04 NOTE — TELEPHONE ENCOUNTER
----- Message from Nelida Elias sent at 4/4/2018  4:01 PM CDT -----  Contact:  [facility] for Dr Dago Loza is requesting a doctor to doctor call  with Dr Escamilla, call back at [phone].

## 2018-04-04 NOTE — TELEPHONE ENCOUNTER
----- Message from Gonzales Harris sent at 4/4/2018  1:03 PM CDT -----  Contact: Abdullahi  SOB and can barely walk anywhere. He has been feeling this way for a few days.  Transferred to clin

## 2018-04-05 ENCOUNTER — OFFICE VISIT (OUTPATIENT)
Dept: DIABETES | Facility: CLINIC | Age: 73
End: 2018-04-05
Payer: MEDICARE

## 2018-04-05 ENCOUNTER — TELEPHONE (OUTPATIENT)
Dept: CARDIOLOGY | Facility: CLINIC | Age: 73
End: 2018-04-05

## 2018-04-05 VITALS
SYSTOLIC BLOOD PRESSURE: 138 MMHG | HEIGHT: 67 IN | DIASTOLIC BLOOD PRESSURE: 82 MMHG | BODY MASS INDEX: 36.07 KG/M2 | WEIGHT: 229.81 LBS

## 2018-04-05 DIAGNOSIS — E66.09 OBESITY DUE TO EXCESS CALORIES WITH SERIOUS COMORBIDITY, UNSPECIFIED CLASSIFICATION: ICD-10-CM

## 2018-04-05 DIAGNOSIS — E11.69 COMBINED HYPERLIPIDEMIA ASSOCIATED WITH TYPE 2 DIABETES MELLITUS: ICD-10-CM

## 2018-04-05 DIAGNOSIS — N18.30 TYPE 2 DIABETES MELLITUS WITH STAGE 3 CHRONIC KIDNEY DISEASE, WITH LONG-TERM CURRENT USE OF INSULIN: Primary | ICD-10-CM

## 2018-04-05 DIAGNOSIS — Z79.4 TYPE 2 DIABETES MELLITUS WITH STAGE 3 CHRONIC KIDNEY DISEASE, WITH LONG-TERM CURRENT USE OF INSULIN: Primary | ICD-10-CM

## 2018-04-05 DIAGNOSIS — E78.2 COMBINED HYPERLIPIDEMIA ASSOCIATED WITH TYPE 2 DIABETES MELLITUS: ICD-10-CM

## 2018-04-05 DIAGNOSIS — E11.59 HYPERTENSION ASSOCIATED WITH DIABETES: ICD-10-CM

## 2018-04-05 DIAGNOSIS — E11.22 TYPE 2 DIABETES MELLITUS WITH STAGE 3 CHRONIC KIDNEY DISEASE, WITH LONG-TERM CURRENT USE OF INSULIN: Primary | ICD-10-CM

## 2018-04-05 DIAGNOSIS — I15.2 HYPERTENSION ASSOCIATED WITH DIABETES: ICD-10-CM

## 2018-04-05 LAB — GLUCOSE SERPL-MCNC: 202 MG/DL (ref 70–110)

## 2018-04-05 PROCEDURE — 3079F DIAST BP 80-89 MM HG: CPT | Mod: CPTII,S$GLB,, | Performed by: NURSE PRACTITIONER

## 2018-04-05 PROCEDURE — 3046F HEMOGLOBIN A1C LEVEL >9.0%: CPT | Mod: CPTII,S$GLB,, | Performed by: NURSE PRACTITIONER

## 2018-04-05 PROCEDURE — 99999 PR PBB SHADOW E&M-EST. PATIENT-LVL IV: CPT | Mod: PBBFAC,,, | Performed by: NURSE PRACTITIONER

## 2018-04-05 PROCEDURE — 82948 REAGENT STRIP/BLOOD GLUCOSE: CPT | Mod: S$GLB,,, | Performed by: NURSE PRACTITIONER

## 2018-04-05 PROCEDURE — 3075F SYST BP GE 130 - 139MM HG: CPT | Mod: CPTII,S$GLB,, | Performed by: NURSE PRACTITIONER

## 2018-04-05 PROCEDURE — 99214 OFFICE O/P EST MOD 30 MIN: CPT | Mod: S$GLB,,, | Performed by: NURSE PRACTITIONER

## 2018-04-05 RX ORDER — AMLODIPINE BESYLATE 5 MG/1
5 TABLET ORAL NIGHTLY
Qty: 90 TABLET | Refills: 1 | Status: SHIPPED | OUTPATIENT
Start: 2018-04-05 | End: 2018-05-22 | Stop reason: SDUPTHER

## 2018-04-05 RX ORDER — INSULIN GLARGINE 300 [IU]/ML
70 INJECTION, SOLUTION SUBCUTANEOUS DAILY
Qty: 6 SYRINGE | Refills: 0 | Status: SHIPPED | OUTPATIENT
Start: 2018-04-05 | End: 2018-04-13 | Stop reason: SDUPTHER

## 2018-04-05 NOTE — LETTER
April 5, 2018      Maximo Hernandez MD  [address] [facility] [address]           [place] [facility]  [address] [facility] [address]  Phone: [phone]  Fax: [phone]          Patient: Abdullahi Salazar   MR Number: [id number]   YOB: 1945   Date of Visit: 4/5/2018       Dear Dr. Maximo Hernandez:    Thank you for referring Abdullahi Salazar to me for evaluation. Attached you will find relevant portions of my assessment and plan of care.    If you have questions, please do not hesitate to call me. I look forward to following Abdullahi Salazar along with you.    Sincerely,    Cielo Trujillo, PhD, NP-C    Enclosure  CC:  No Recipients    If you would like to receive this communication electronically, please contact [email or web address] or [phone] to request more information on [employer] access.    For providers and/or their staff who would like to refer a patient to [facility], please contact us through our one-stop-shop provider referral line, [facility] , at [phone].    If you feel you have received this communication in error or would no longer like to receive these types of communications, please e-mail [email or web address]

## 2018-04-05 NOTE — PROGRESS NOTES
"PCP: Maximo Hernandez MD    Subjective:     Chief Complaint: Diabetes, follow up    HISTORY OF PRESENT ILLNESS: 72 year old  male presenting, with wife, for diabetes follow up. Patient has had Type II diabetes since 2002 and has the following complications: cardiovascular disease, neuropathy.  He has a complex medical history including HF s/p ICD, AF, and carotid stenosis.  He was seen in ER yesterday for SOB and diuresed.    He has attended diabetes education classes in the past. He did not bring readings or meter today.  Per recall, random BGs are 90 s - 130 s.  He has occasional hypoglycemia, does not check BG during those times, but does treat symptoms with honey.      Height: 5' 7" (170.2 cm)  //  Weight: 104.2 kg (229 lb 12.8 oz), Body mass index is 35.99 kg/m².  Home Blood Glucose reading this AM: Not Taken  His blood sugar in clinic today is: 202 mg/dl at 1:08 pm    Labs Reviewed. ADA recommends A1C of less than 7 %. His most recent A1C is:     Lab Results   Component Value Date    HGBA1C 9.8 (H) 02/05/2018      DM MEDICATIONS:   Lantus 70 units at bedtime  Novolog 30 - 33 units before meals    Review of Systems   Constitutional: Negative for appetite change, diaphoresis, fatigue and unexpected weight change.   HENT: Negative for congestion, hearing loss, rhinorrhea, sneezing and sore throat.    Eyes: Negative for visual disturbance.   Respiratory: Negative for cough, shortness of breath and wheezing.    Cardiovascular: Negative for leg swelling.   Gastrointestinal: Negative for abdominal pain, constipation, diarrhea, nausea and vomiting.   Endocrine: Negative for cold intolerance, heat intolerance, polydipsia, polyphagia and polyuria.   Genitourinary: Negative for difficulty urinating, dysuria and urgency.   Skin: Negative for color change, pallor and wound.   Neurological: Positive for numbness. Negative for dizziness, seizures, syncope and headaches.   Psychiatric/Behavioral: Negative for " confusion and decreased concentration. The patient is not nervous/anxious.        STANDARDS OF CARE:  Current Ophthalmologist / Optometrist: Dr. Lassiter in [place]. Last exam as noted, 09 / 2017   Current Podiatrist: None.   Recommend regular exams and denies gums bleeding.  ACE / ARB: Yes  Statin: Yes    ACTIVITY LEVEL: Rarely Active - performs occasional chair exercises   EXERCISE:  None  MEAL PLANNING: Patient reports number of meals per day to be 3 and number of snacks per day to be 2.  Breakfast can be eggs, slice of toast OR cereal.  Lunch / Dinner can bread, rice, beans, with fish or chicken, and vegetables.  Beverages include diet soda, milk, and water. Patient is encouraged to consume 45 - 60 grams of carbohydrates in each meal, and 1800 k / cayden per day.  Per dietary recall, patient is not limiting carbohydrates, saturated fats and sodium.     BLOOD GLUCOSE TESTING:  ACCU-CHEK meter  SOCIAL HISTORY: .  Lives with spouse.  Former smoker.  Has 3 girls.     Objective:      Physical Exam   Constitutional: He is oriented to person, place, and time. He appears well-developed and well-nourished.   HENT:   Head: Normocephalic and atraumatic.   Eyes: EOM are normal. Pupils are equal, round, and reactive to light.   Neck: Normal range of motion. No tracheal deviation present.   Cardiovascular: Normal rate, regular rhythm and intact distal pulses.  Exam reveals no friction rub.    Murmur heard.  Pulses:       Dorsalis pedis pulses are 2+ on the right side, and 2+ on the left side.   Pulmonary/Chest: Effort normal and breath sounds normal. He has no wheezes.   Abdominal: Soft. Bowel sounds are normal. He exhibits no distension. There is no tenderness.   Musculoskeletal: Normal range of motion. He exhibits no edema or deformity.        Right foot: There is no deformity.        Left foot: There is no deformity.   Feet:   Right Foot:   Protective Sensation: 6 sites tested. 6 sites sensed.   Skin Integrity: Negative  for ulcer, blister, skin breakdown, erythema, warmth, callus or dry skin.   Left Foot:   Protective Sensation: 6 sites tested. 6 sites sensed.   Skin Integrity: Negative for ulcer, blister, skin breakdown, erythema, warmth, callus or dry skin.   Neurological: He is alert and oriented to person, place, and time.   Skin: Skin is warm and dry. No rash noted.   Psychiatric: He has a normal mood and affect. His behavior is normal. Judgment and thought content normal.       Assessment / Plan:     1.) Type 2 diabetes mellitus with stage 3 chronic kidney disease, with long-term current use of insulin, neuropathy  Comments:  - We are going to switch patient to a more concentrated basal insulin. Stop Lantus. I discussed MOA, onset, side effects, dosage of Toujeo. Sample voucher given. He agrees to take  Toujeo 70 units daily, same time every day.  He never did start carb counting and instead has been taking anywhere from 28 - 30 units of Novolog with meals.  At this time, he wishes to  continue with set doses of Novolog.    Orders:  -     POCT glucose    2.) Hypertension associated with diabetes - continue Hydralazine, IMDUR, Lisinopril, - he has been out of Norvasc, and is requesting a refill.    3.) Combined hyperlipidemia associated with type 2 diabetes mellitus - continue Lipitor, Zetia    4.) Obesity with serious comorbidity    Additional Plan Details:    1.) Patient was instructed to monitor blood glucose 3 x daily, fasting and ac dinner or at bedtime. Discussed ADA goal for fasting blood sugar, 80 - 130 mg/dL; pp blood sugars below 180 mg/dl. Also, discussed prevention of hypoglycemia and the need to adjust goals to higher levels if persistent hypoglycemia.  Reminded to bring BG records or meter to each visit for review.  2.) Reviewed pathophysiology of diabetes, complications related to the disease, importance of annual dilated eye exam and daily foot examination.  3.) We discussed the ADA recommendations: Hemoglobin  A1c below 7.0 %.  All patients with diabetes should be on statins unless contraindicated.  ACE or ARB therapy if not contraindicated.    4.) Meal planning teaching: Carbohydrate definition - one serving is 15 gms. Carbohydrate spacing - carbohydrates should be spaced into approximately 3 meals with 2 snacks ( of one carbohydrate ) between meals or at bedtime. Increase vegetable intake to 2 or more cups of vegetables per day as well as 2 fruit servings. Recommended low saturated fat, low sodium diet to aid in control of hypertension and cholesterol.  5.) Discussed activity, benefits, methods, and precautions. Recommended patient start or continue some form of exercise and increase as tolerated to 30 minutes per day to facilitate weight loss and aid in control of BGs.  6.) He was explained the above plan and given opportunity to ask questions. Advised to call clinic with any further questions or concerns.     Cielo Trujillo, NP-C, CDE    A total of 30 minutes was spent in face to face time, of which over 50 % was spent in counseling patient on disease process, complications, treatment, and side effects of medications.

## 2018-04-05 NOTE — TELEPHONE ENCOUNTER
----- Message from Abhay Pierre sent at 4/5/2018 10:01 AM CDT -----  Contact: self   Patient needs to schedule a hospital follow up appointment within 5 days, please call back at [phone] (home)

## 2018-04-09 ENCOUNTER — TELEPHONE (OUTPATIENT)
Dept: DIABETES | Facility: CLINIC | Age: 73
End: 2018-04-09

## 2018-04-09 RX ORDER — BENAZEPRIL HYDROCHLORIDE 40 MG/1
TABLET ORAL
Qty: 90 TABLET | Refills: 0 | Status: SHIPPED | OUTPATIENT
Start: 2018-04-09 | End: 2018-05-11 | Stop reason: SDUPTHER

## 2018-04-09 RX ORDER — CLOPIDOGREL BISULFATE 75 MG/1
TABLET ORAL
Qty: 90 TABLET | Refills: 3 | Status: SHIPPED | OUTPATIENT
Start: 2018-04-09 | End: 2018-05-22 | Stop reason: SDUPTHER

## 2018-04-09 RX ORDER — DABIGATRAN ETEXILATE MESYLATE 75 MG/1
CAPSULE ORAL
Qty: 180 CAPSULE | Refills: 3 | Status: SHIPPED | OUTPATIENT
Start: 2018-04-09 | End: 2018-05-22 | Stop reason: SDUPTHER

## 2018-04-09 RX ORDER — TORSEMIDE 20 MG/1
TABLET ORAL
Qty: 90 TABLET | Refills: 3 | Status: SHIPPED | OUTPATIENT
Start: 2018-04-09 | End: 2018-05-22 | Stop reason: SDUPTHER

## 2018-04-09 RX ORDER — ISOSORBIDE MONONITRATE 60 MG/1
TABLET, EXTENDED RELEASE ORAL
Qty: 90 TABLET | Refills: 3 | Status: SHIPPED | OUTPATIENT
Start: 2018-04-09 | End: 2018-05-22 | Stop reason: SDUPTHER

## 2018-04-09 NOTE — TELEPHONE ENCOUNTER
----- Message from Sander Gutierrez sent at 4/9/2018  9:41 AM CDT -----  Contact: pt  He's calling in regards to his Rx medication, pls advise, [phone] (cell)

## 2018-04-11 ENCOUNTER — OFFICE VISIT (OUTPATIENT)
Dept: CARDIOLOGY | Facility: CLINIC | Age: 73
End: 2018-04-11
Payer: MEDICARE

## 2018-04-11 VITALS
DIASTOLIC BLOOD PRESSURE: 89 MMHG | HEART RATE: 70 BPM | HEIGHT: 67 IN | BODY MASS INDEX: 36.5 KG/M2 | SYSTOLIC BLOOD PRESSURE: 210 MMHG | WEIGHT: 232.56 LBS

## 2018-04-11 DIAGNOSIS — I70.213 ATHEROSCLEROSIS OF NATIVE ARTERY OF BOTH LOWER EXTREMITIES WITH INTERMITTENT CLAUDICATION: ICD-10-CM

## 2018-04-11 DIAGNOSIS — I25.810 CORONARY ARTERY DISEASE INVOLVING CORONARY BYPASS GRAFT OF NATIVE HEART WITHOUT ANGINA PECTORIS: Primary | ICD-10-CM

## 2018-04-11 DIAGNOSIS — I15.2 HYPERTENSION ASSOCIATED WITH DIABETES: ICD-10-CM

## 2018-04-11 DIAGNOSIS — E11.59 HYPERTENSION ASSOCIATED WITH DIABETES: ICD-10-CM

## 2018-04-11 DIAGNOSIS — Z95.0 BIVENTRICULAR CARDIAC PACEMAKER IN SITU: ICD-10-CM

## 2018-04-11 PROCEDURE — 3079F DIAST BP 80-89 MM HG: CPT | Mod: CPTII,S$GLB,, | Performed by: INTERNAL MEDICINE

## 2018-04-11 PROCEDURE — 99214 OFFICE O/P EST MOD 30 MIN: CPT | Mod: S$GLB,,, | Performed by: INTERNAL MEDICINE

## 2018-04-11 PROCEDURE — 3046F HEMOGLOBIN A1C LEVEL >9.0%: CPT | Mod: CPTII,S$GLB,, | Performed by: INTERNAL MEDICINE

## 2018-04-11 PROCEDURE — 99999 PR PBB SHADOW E&M-EST. PATIENT-LVL II: CPT | Mod: PBBFAC,,, | Performed by: INTERNAL MEDICINE

## 2018-04-11 PROCEDURE — 3077F SYST BP >= 140 MM HG: CPT | Mod: CPTII,S$GLB,, | Performed by: INTERNAL MEDICINE

## 2018-04-11 PROCEDURE — 99499 UNLISTED E&M SERVICE: CPT | Mod: S$PBB,,, | Performed by: INTERNAL MEDICINE

## 2018-04-11 NOTE — PROGRESS NOTES
Subjective:    Patient ID:  Abdullahi Salazar is a 72 y.o. male who presents for follow-up of cad    HPI  He comes for follow up with no major problems, no chest pain, no shortness of breath.  FC II  BP ok at home    Review of Systems   Constitution: Negative for decreased appetite, weakness, malaise/fatigue, weight gain and weight loss.   Cardiovascular: Negative for chest pain, dyspnea on exertion, leg swelling, palpitations and syncope.   Respiratory: Negative for cough and shortness of breath.    Gastrointestinal: Negative.    All other systems reviewed and are negative.       Objective:    Physical Exam   Constitutional: He is oriented to person, place, and time. He appears well-developed and well-nourished.   HENT:   Head: Normocephalic.   Eyes: Pupils are equal, round, and reactive to light.   Neck: Normal range of motion. Neck supple. No JVD present. Carotid bruit is not present. No thyromegaly present.   Cardiovascular: Normal rate, regular rhythm, normal heart sounds, intact distal pulses and normal pulses.  PMI is not displaced.  Exam reveals no gallop.    No murmur heard.  Pulmonary/Chest: Effort normal and breath sounds normal.   Abdominal: Soft. Normal appearance. He exhibits no mass. There is no hepatosplenomegaly. There is no tenderness.   Musculoskeletal: Normal range of motion. He exhibits no edema.   Neurological: He is alert and oriented to person, place, and time. He has normal strength and normal reflexes. No sensory deficit.   Skin: Skin is warm and intact.   Psychiatric: He has a normal mood and affect.   Nursing note and vitals reviewed.        Assessment:       1. Coronary artery disease involving coronary bypass graft of native heart without angina pectoris    2. Atherosclerosis of native artery of both lower extremities with intermittent claudication    3. Biventricular cardiac pacemaker in situ    4. Hypertension associated with diabetes         Plan:     Continue all cardiac  medications  Regular exercise program  Weight loss  6 m f/u

## 2018-04-13 RX ORDER — CARVEDILOL 25 MG/1
25 TABLET ORAL 2 TIMES DAILY WITH MEALS
Qty: 180 TABLET | Refills: 3 | Status: SHIPPED | OUTPATIENT
Start: 2018-04-13 | End: 2018-05-22 | Stop reason: SDUPTHER

## 2018-04-13 RX ORDER — INSULIN GLARGINE 300 [IU]/ML
70 INJECTION, SOLUTION SUBCUTANEOUS DAILY
Qty: 6 SYRINGE | Refills: 0 | Status: SHIPPED | OUTPATIENT
Start: 2018-04-13 | End: 2018-04-16

## 2018-04-13 RX ORDER — LEVETIRACETAM 500 MG/1
750 TABLET ORAL 2 TIMES DAILY
Qty: 270 TABLET | Refills: 0 | Status: SHIPPED | OUTPATIENT
Start: 2018-04-13 | End: 2018-05-11 | Stop reason: SDUPTHER

## 2018-04-13 NOTE — TELEPHONE ENCOUNTER
----- Message from Rayna Anjana sent at 4/13/2018  1:00 PM CDT -----  Pt at [phone]//states he was given a prescription and has misplaced it//med is Toujeo  Insulin//uses//[facility] in [place], [place]//please call when sent in//floresita/cesar

## 2018-04-16 RX ORDER — INSULIN GLARGINE 300 [IU]/ML
70 INJECTION, SOLUTION SUBCUTANEOUS DAILY
Qty: 6 SYRINGE | Refills: 0 | OUTPATIENT
Start: 2018-04-16 | End: 2018-05-22 | Stop reason: SDUPTHER

## 2018-04-25 ENCOUNTER — TELEPHONE (OUTPATIENT)
Dept: FAMILY MEDICINE | Facility: CLINIC | Age: 73
End: 2018-04-25

## 2018-04-25 NOTE — TELEPHONE ENCOUNTER
----- Message from Jackeline Olivera sent at 4/25/2018  3:46 PM CDT -----  Contact: Casie-pt's daughter  She's calling to see if pt can get his current medication list sent to [facility], please advise  [phone]

## 2018-04-25 NOTE — TELEPHONE ENCOUNTER
Advised pt's daughter [employer] uses the same computer program we use therefore they should be able to see his medication record. She states she was told they were not able to and could it be faxed to [phone]. Documents faxed.

## 2018-04-27 ENCOUNTER — OUTSIDE PLACE OF SERVICE (OUTPATIENT)
Dept: ADMINISTRATIVE | Facility: OTHER | Age: 73
End: 2018-04-27
Payer: MEDICARE

## 2018-04-27 PROCEDURE — 99232 SBSQ HOSP IP/OBS MODERATE 35: CPT | Mod: ,,, | Performed by: THORACIC SURGERY (CARDIOTHORACIC VASCULAR SURGERY)

## 2018-04-27 RX ORDER — LANCETS
1 EACH MISCELLANEOUS 2 TIMES DAILY
Qty: 100 EACH | Refills: 11 | Status: SHIPPED | OUTPATIENT
Start: 2018-04-27 | End: 2018-05-22 | Stop reason: SDUPTHER

## 2018-05-01 ENCOUNTER — PATIENT OUTREACH (OUTPATIENT)
Dept: ADMINISTRATIVE | Facility: CLINIC | Age: 73
End: 2018-05-01

## 2018-05-01 ENCOUNTER — TELEPHONE (OUTPATIENT)
Dept: FAMILY MEDICINE | Facility: CLINIC | Age: 73
End: 2018-05-01

## 2018-05-01 NOTE — TELEPHONE ENCOUNTER
I usually like to see my own but I am tightly booked this week so let him see Dr. Huntley as scheduled.

## 2018-05-01 NOTE — TELEPHONE ENCOUNTER
Patient has a TCC scheduled with Dr. Huntley tomorrow. I know the physicians want to see the TCC patients, but does it have to be the patient's PCP that sees them? If so, when would you like to see this patient?

## 2018-05-01 NOTE — PATIENT INSTRUCTIONS
Symptoms of a Stroke     A sudden feeling of weakness on one side of your body may be a sign that you are having a stroke.   During a stroke, blood stops flowing to part of the brain. This can damage areas in the brain that control the rest of the body. Get help right away if any of these symptoms come on suddenly, even if the symptoms dont last.  Know the symptoms of a stroke  · Weakness. You may feel a sudden weakness, tingling, or a loss of feeling on 1 side of your face or body including your arm or leg.   · Vision problems. You may have sudden double vision or trouble seeing in 1 or both eyes.  · Speech problems. You may have sudden trouble talking, slurred speech, or problems understanding others.  · Headache. You may have a sudden, severe headache.  · Movement problems. You may have sudden trouble walking, dizziness, a feeling of spinning, a loss of balance, a feeling of falling, or blackouts.  · Seizure. You may also have a seizure with a large or hemorrhagic stroke.   Remember: If you have any of these symptoms, call 911 and your doctor as soon as possible.  F.A.S.T. is an easy way to remember the signs of a stroke. When you see these signs, you will know that you need to call 911 fast.   F.A.S.T. stands for:  · F is for face drooping - One side of the face is drooping or numb. When the person smiles, the smile is uneven.  · A is for arm weakness - One arm is weak or numb. When the person lifts both arms at the same time, one arm may drift downward.  · S is for speech difficulty - You may notice slurred speech or difficulty speaking. The person can't repeat a simple sentence correctly when asked.  · T is for time to dial 911 - If someone shows any of these symptoms, even if they go away, call 911 immediately. Make note of the time the symptoms first appeared.   Date Last Reviewed: 8/26/2015 © 2000-2017 [employer]. [address] [place] [address], [place] [address]. All rights reserved. This  information is not intended as a substitute for professional medical care. Always follow your healthcare professional's instructions.

## 2018-05-01 NOTE — TELEPHONE ENCOUNTER
Patient preferred to see you. Spoke with Juanita Wasserman who stated the patient didn't have to be seen this week. I rescheduled him for next week with you. Thank you for clarifying that for me.

## 2018-05-02 DIAGNOSIS — G91.9 HYDROCEPHALUS: ICD-10-CM

## 2018-05-03 ENCOUNTER — HOSPITAL ENCOUNTER (OUTPATIENT)
Dept: RADIOLOGY | Facility: HOSPITAL | Age: 73
Discharge: HOME | End: 2018-05-03
Attending: PHYSICIAN ASSISTANT
Payer: MEDICARE

## 2018-05-03 ENCOUNTER — OFFICE VISIT (OUTPATIENT)
Dept: NEUROSURGERY | Facility: CLINIC | Age: 73
End: 2018-05-03
Payer: MEDICARE

## 2018-05-03 ENCOUNTER — HOSPITAL ENCOUNTER (OUTPATIENT)
Dept: RADIOLOGY | Facility: HOSPITAL | Age: 73
Discharge: HOME | End: 2018-05-03
Attending: NEUROLOGICAL SURGERY
Payer: MEDICARE

## 2018-05-03 VITALS
HEIGHT: 67 IN | HEART RATE: 86 BPM | SYSTOLIC BLOOD PRESSURE: 149 MMHG | WEIGHT: 222.44 LBS | BODY MASS INDEX: 34.91 KG/M2 | DIASTOLIC BLOOD PRESSURE: 80 MMHG

## 2018-05-03 DIAGNOSIS — G91.2 NORMAL PRESSURE HYDROCEPHALUS: Primary | ICD-10-CM

## 2018-05-03 DIAGNOSIS — Z98.2 VENTRICULOPLEURAL SHUNT STATUS: ICD-10-CM

## 2018-05-03 DIAGNOSIS — G91.2 NORMAL PRESSURE HYDROCEPHALUS: ICD-10-CM

## 2018-05-03 DIAGNOSIS — S06.5XAA SUBDURAL HEMATOMA: ICD-10-CM

## 2018-05-03 DIAGNOSIS — G91.9 HYDROCEPHALUS: ICD-10-CM

## 2018-05-03 PROCEDURE — 99999 PR PBB SHADOW E&M-EST. PATIENT-LVL IV: CPT | Mod: PBBFAC,,, | Performed by: PHYSICIAN ASSISTANT

## 2018-05-03 PROCEDURE — 74018 RADEX ABDOMEN 1 VIEW: CPT | Mod: 26,,, | Performed by: RADIOLOGY

## 2018-05-03 PROCEDURE — 76000 FLUOROSCOPY <1 HR PHYS/QHP: CPT | Mod: TC,PO

## 2018-05-03 PROCEDURE — 99213 OFFICE O/P EST LOW 20 MIN: CPT | Mod: S$GLB,,, | Performed by: PHYSICIAN ASSISTANT

## 2018-05-03 PROCEDURE — 70250 X-RAY EXAM OF SKULL: CPT | Mod: 26,,, | Performed by: RADIOLOGY

## 2018-05-03 PROCEDURE — 71045 X-RAY EXAM CHEST 1 VIEW: CPT | Mod: 26,,, | Performed by: RADIOLOGY

## 2018-05-03 PROCEDURE — 70450 CT HEAD/BRAIN W/O DYE: CPT | Mod: TC,PO

## 2018-05-03 PROCEDURE — 71045 X-RAY EXAM CHEST 1 VIEW: CPT | Mod: TC,FY,PO

## 2018-05-03 PROCEDURE — 76000 FLUOROSCOPY <1 HR PHYS/QHP: CPT | Mod: 26,,, | Performed by: RADIOLOGY

## 2018-05-03 PROCEDURE — 99499 UNLISTED E&M SERVICE: CPT | Mod: S$PBB,,, | Performed by: PHYSICIAN ASSISTANT

## 2018-05-03 PROCEDURE — 70450 CT HEAD/BRAIN W/O DYE: CPT | Mod: 26,,, | Performed by: RADIOLOGY

## 2018-05-03 PROCEDURE — 72020 X-RAY EXAM OF SPINE 1 VIEW: CPT | Mod: 26,,, | Performed by: RADIOLOGY

## 2018-05-03 PROCEDURE — 74018 RADEX ABDOMEN 1 VIEW: CPT | Mod: TC,FY,PO

## 2018-05-03 NOTE — PROGRESS NOTES
Neurosurgery History & Physical    Patient ID: Abdullahi Salazar is a 72 y.o. male.    Chief Complaint   Patient presents with    Shunt Problem     Reports issues with his gait that started a couple of weeks ago. He was experiencing intermittent dizziness and occasional headaches but those issues have improved.        Review of Systems   Constitutional: Negative for activity change, chills, fatigue and unexpected weight change.   HENT: Negative for hearing loss, tinnitus, trouble swallowing and voice change.    Eyes: Negative for visual disturbance.   Respiratory: Negative for apnea, chest tightness and shortness of breath.    Cardiovascular: Negative for chest pain and palpitations.   Gastrointestinal: Negative for abdominal pain, constipation, diarrhea, nausea and vomiting.   Genitourinary: Negative for difficulty urinating, dysuria and frequency.   Musculoskeletal: Negative for back pain, gait problem, neck pain and neck stiffness.   Skin: Negative for wound.   Neurological: Negative for dizziness, tremors, seizures, facial asymmetry, speech difficulty, weakness, light-headedness, numbness and headaches.   Psychiatric/Behavioral: Negative for confusion and decreased concentration.       Past Medical History:   Diagnosis Date    Actinic keratoses     Altered mental status 3/22/2015    Anticoagulant long-term use     Atrial fibrillation     CAD (coronary artery disease)     stents after bypass    Carotid artery stenosis 3/31/2015    CHF (congestive heart failure) 03/2018    Colon polyps 2011    repeat colonoscopy 2014    Combined hyperlipidemia associated with type 2 diabetes mellitus     Diabetes mellitus type II, uncontrolled     dx 2004    GERD (gastroesophageal reflux disease)     Grand mal seizure     last 2/2017    Hard of hearing     History of cardiac pacemaker 3/31/2015    HTN (hypertension)     Hydrocephalus 09/08/2017    Influenza A 1/2/2018    He got influenza a recently and was in the  [facility] in [place].  He had rhabdomyolyisis and acute kidney injury.  He also had an increased troponin.  He had a flu shot in early December.    Mitral valve insufficiency     Presence of automatic (implantable) cardiac defibrillator     Sleep apnea with use of continuous positive airway pressure (CPAP)     patient states he does not use CPAP anymore    Syncope 3/30/2015    Wears dentures     upper and lower    Wears hearing aid     sometimes     Social History     Social History    Marital status:      Spouse name: N/A    Number of children: N/A    Years of education: N/A     Occupational History    Not on file.     Social History Main Topics    Smoking status: Former Smoker     Packs/day: 2.00     Years: 25.00     Types: Cigarettes     Quit date: 1/1/1983    Smokeless tobacco: Never Used      Comment: quit 1983     Alcohol use Yes      Comment: occasional    Drug use: No    Sexual activity: Not on file     Other Topics Concern    Not on file     Social History Narrative    No narrative on file     Family History   Problem Relation Age of Onset    Stomach cancer Mother     Lung cancer Father     Diabetes Sister     Hypertension Sister     Heart disease Neg Hx     Stroke Neg Hx      Review of patient's allergies indicates:  No Known Allergies    Current Outpatient Prescriptions:     amLODIPine (NORVASC) 5 MG tablet, Take 1 tablet (5 mg total) by mouth every evening., Disp: 90 tablet, Rfl: 1    aspirin (ECOTRIN) 81 MG EC tablet, Take 1 tablet (81 mg total) by mouth once daily., Disp: , Rfl: 0    atorvastatin (LIPITOR) 80 MG tablet, Take 1 tablet (80 mg total) by mouth once daily., Disp: 90 tablet, Rfl: 3    BD INSULIN PEN NEEDLE UF SHORT 31 gauge x 5/16" Ndle, USE FOUR TIMES DAILY, Disp: 360 each, Rfl: 3    benazepril (LOTENSIN) 40 MG tablet, TAKE 1 TABLET EVERY DAY, Disp: 90 tablet, Rfl: 0    blood sugar diagnostic (ACCU-CHEK CLEOPATRA PLUS TEST STRP) Strp, TEST BLOOD SUGARS 4 TIMES " "DAILY, Disp: 150 strip, Rfl: PRN    carvedilol (COREG) 25 MG tablet, Take 1 tablet (25 mg total) by mouth 2 (two) times daily with meals., Disp: 180 tablet, Rfl: 3    clopidogrel (PLAVIX) 75 mg tablet, TAKE 1 TABLET ONE TIME DAILY, Disp: 90 tablet, Rfl: 3    ezetimibe (ZETIA) 10 mg tablet, TAKE 1 TABLET ONE TIME DAILY, Disp: 90 tablet, Rfl: 3    hydrALAZINE (APRESOLINE) 100 MG tablet, Take 1 tablet (100 mg total) by mouth 2 (two) times daily., Disp: 60 tablet, Rfl: 2    insulin aspart (NOVOLOG) 100 unit/mL InPn pen, Humalog Sliding Scale tid before meals: 150-200 give 4 units 201-250 give 6 units 251-300 give 8 units 301-350 give 10 units > 350 give 12 units, Disp: 90 mL, Rfl: 3    insulin glargine, TOUJEO, (TOUJEO SOLOSTAR U-300 INSULIN) 300 unit/mL (1.5 mL) InPn pen, Inject 70 Units into the skin once daily., Disp: 6 Syringe, Rfl: 0    isosorbide mononitrate (IMDUR) 60 MG 24 hr tablet, TAKE 1 TABLET EVERY EVENING., Disp: 90 tablet, Rfl: 3    lancets (ACCU-CHEK SOFTCLIX LANCETS) Misc, 1 each by Misc.(Non-Drug; Combo Route) route 2 (two) times daily., Disp: 100 each, Rfl: 11    levETIRAcetam (KEPPRA) 500 MG Tab, Take 1.5 tablets (750 mg total) by mouth 2 (two) times daily., Disp: 270 tablet, Rfl: 0    nitroGLYCERIN (NITROSTAT) 0.4 MG SL tablet, , Disp: , Rfl:     potassium chloride (KLOR-CON) 10 MEQ TbSR, Take 1 tablet (10 mEq total) by mouth once daily., Disp: 30 tablet, Rfl: 11    PRADAXA 75 mg Cap, TAKE 1 CAPSULE TWICE DAILY, Disp: 180 capsule, Rfl: 3    tamsulosin (FLOMAX) 0.4 mg Cp24, Take 1 capsule (0.4 mg total) by mouth once daily., Disp: 90 capsule, Rfl: 3    torsemide (DEMADEX) 20 MG Tab, TAKE 1 TABLET EVERY MORNING, Disp: 90 tablet, Rfl: 3    TRUEPLUS LANCETS 28 gauge Misc, TEST TWO TIMES DAILY AS NEEDED, Disp: 100 each, Rfl: 3    Vitals:    05/03/18 1350   BP: (!) 149/80   Pulse: 86   Weight: 100.9 kg (222 lb 7.1 oz)   Height: 5' 7" (1.702 m)       Physical Exam   Constitutional: He is " oriented to person, place, and time. He appears well-developed and well-nourished.   HENT:   Head: Normocephalic and atraumatic.   Eyes: Pupils are equal, round, and reactive to light.   Neck: Normal range of motion. Neck supple.   Cardiovascular: Normal rate.    Pulmonary/Chest: Effort normal.   Abdominal: He exhibits no distension.   Musculoskeletal: Normal range of motion. He exhibits no edema.   Neurological: He is alert and oriented to person, place, and time. He has an abnormal Tandem Gait Test. He has a normal Finger-Nose-Finger Test, a normal Heel to Villeda Test and a normal Romberg Test. Gait normal.   Reflex Scores:       Tricep reflexes are 2+ on the right side and 2+ on the left side.       Bicep reflexes are 2+ on the right side and 2+ on the left side.       Brachioradialis reflexes are 2+ on the right side and 2+ on the left side.       Patellar reflexes are 2+ on the right side and 2+ on the left side.       Achilles reflexes are 2+ on the right side and 2+ on the left side.  Skin: Skin is warm and dry.   Psychiatric: He has a normal mood and affect. His speech is normal and behavior is normal. Judgment and thought content normal.   Nursing note and vitals reviewed.      Neurologic Exam     Mental Status   Oriented to person, place, and time.   Oriented to person.   Oriented to place.   Oriented to time.   Follows 3 step commands.   Attention: normal. Concentration: normal.   Speech: speech is normal   Level of consciousness: alert  Knowledge: consistent with education.   Able to name object. Able to read. Able to repeat. Able to write. Normal comprehension.     Cranial Nerves     CN II   Visual acuity: normal  Right visual field deficit: none  Left visual field deficit: none     CN III, IV, VI   Pupils are equal, round, and reactive to light.  Right pupil: Size: 3 mm. Shape: regular. Reactivity: brisk. Consensual response: intact.   Left pupil: Size: 3 mm. Shape: regular. Reactivity: brisk. Consensual  response: intact.   CN III: no CN III palsy  CN VI: no CN VI palsy  Nystagmus: none   Diplopia: none  Ophthalmoparesis: none  Conjugate gaze: present    CN V   Right facial sensation deficit: none  Left facial sensation deficit: none    CN VII   Right facial weakness: none  Left facial weakness: none    CN VIII   Hearing: intact    CN IX, X   CN IX normal.   CN X normal.     CN XI   Right sternocleidomastoid strength: normal  Left sternocleidomastoid strength: normal  Right trapezius strength: normal  Left trapezius strength: normal    CN XII   Fasciculations: absent  Tongue deviation: none    Motor Exam   Muscle bulk: normal  Overall muscle tone: normal  Right arm pronator drift: absent  Left arm pronator drift: absent    Strength   Right neck flexion: 5/5  Left neck flexion: 5/5  Right neck extension: 5/5  Left neck extension: 5/5  Right deltoid: 5/5  Left deltoid: 5/5  Right biceps: 5/5  Left biceps: 5/5  Right triceps: 5/5  Left triceps: 5/5  Right wrist flexion: 5/5  Left wrist flexion: 5/5  Right wrist extension: 5/5  Left wrist extension: 5/5  Right interossei: 5/5  Left interossei: 5/5  Right abdominals: 5/5  Left abdominals: 5/5  Right iliopsoas: 5/5  Left iliopsoas: 5/5  Right quadriceps: 5/5  Left quadriceps: 5/5  Right hamstrin/5  Left hamstrin/5  Right glutei: 5/5  Left glutei: 5/5  Right anterior tibial: 5/5  Left anterior tibial: 5/5  Right posterior tibial: 5/5  Left posterior tibial: 5/5  Right peroneal: 5/5  Left peroneal: 5/5  Right gastroc: 5/5  Left gastroc: 5/5    Sensory Exam   Right arm light touch: normal  Left arm light touch: normal  Right leg light touch: normal  Left leg light touch: normal  Right arm vibration: normal  Left arm vibration: normal  Right arm pinprick: normal  Left arm pinprick: normal    Gait, Coordination, and Reflexes     Gait  Gait: normal    Coordination   Romberg: negative  Finger to nose coordination: normal  Heel to shin coordination: normal  Tandem walking  coordination: abnormal    Tremor   Resting tremor: absent  Intention tremor: absent  Action tremor: absent    Reflexes   Right brachioradialis: 2+  Left brachioradialis: 2+  Right biceps: 2+  Left biceps: 2+  Right triceps: 2+  Left triceps: 2+  Right patellar: 2+  Left patellar: 2+  Right achilles: 2+  Left achilles: 2+  Right Chavez: absent  Left Chavez: absent  Right ankle clonus: absent  Left ankle clonus: absent      Provider dictation:    Mr. Salazar presents is a known patient in our clinic.  He is 9 months status post  shunt placement for NPH.  He reports several weeks ago he was having some episodes of gait disturbance and dizziness and occasional headaches however those issues have completely resolved.  He denies any headache blurry vision nausea or vomiting.  He reports functionally he has much improved since discharge has been placed.  His wife is present with him today and agrees.  They're here for evaluation.  He denies any trauma.    On physical examination, he has an abnormal tandem walk and a mildly positive Romberg.  He has no dysmetria.  The incision from the shunt is clean dry and intact.  The shunt reservoir refills appropriately.  Speech is fluent.  Cognition is normal.    CT of the head done today demonstrates bilateral left worse than right chronic subdural hematomas measuring 6 mm on the left and 2.5 mm on the right.  There is no midline shift.    Overall the patient is doing well.  I do not appreciate any abnormalities on his exam today that would cause me to adjust shunt.  I'm hesitant to make any adjustments to his shot with these new subdural fluid collections.  I would like to monitor them.  They appear subacute in nature.  I will see him back in a month with a repeat CT scan.  I would consider adjusting his shot to 1.0 if he begins to have worsening symptoms of NPH however at this time I do not feel it is appropriate to decrease the shunt for risk of worsening the subdural fluid  collections.  I also like him to establish care with neurology for management of his NPH.  He expressed understanding and will follow up with me in a month.  Given the chronicity of these subdurals he may continue his Plavix at this time.    Visit Diagnosis:  Normal pressure hydrocephalus  -     CT Head Without Contrast; Future; Expected date: 05/03/2018  -     Ambulatory Referral to Neurology    Subdural hematoma

## 2018-05-09 ENCOUNTER — TELEPHONE (OUTPATIENT)
Dept: FAMILY MEDICINE | Facility: CLINIC | Age: 73
End: 2018-05-09

## 2018-05-09 NOTE — TELEPHONE ENCOUNTER
----- Message from Uyen Cortez sent at 5/9/2018  3:31 PM CDT -----  Contact: Patient  Patient would like to have his rx's transferred to [facility] @  [address] [address]. Phone # [phone]//// Fax # [phone].  Please call and advise.  Thanks/MARK

## 2018-05-09 NOTE — TELEPHONE ENCOUNTER
----- Message from Uyen Cortez sent at 5/9/2018  3:31 PM CDT -----  Contact: Patient  Patient would like to have his rx's transferred to [facility] @  [address] [address]. Phone # [phone]//// Fax # [phone].  Please call and advise.  Thanks/MARK

## 2018-05-09 NOTE — TELEPHONE ENCOUNTER
Pt states he doesn't need any refills right now but he would like to change his pharmacy to [facility] in [place].  Pharmacy changed in pt's chart.

## 2018-05-11 RX ORDER — INSULIN ASPART 100 [IU]/ML
INJECTION, SOLUTION INTRAVENOUS; SUBCUTANEOUS
Qty: 90 ML | Refills: 3 | Status: SHIPPED | OUTPATIENT
Start: 2018-05-11 | End: 2018-05-22 | Stop reason: SDUPTHER

## 2018-05-11 RX ORDER — NITROGLYCERIN 0.4 MG/1
0.4 TABLET SUBLINGUAL EVERY 5 MIN PRN
Qty: 100 TABLET | Refills: 11 | Status: SHIPPED | OUTPATIENT
Start: 2018-05-11 | End: 2018-05-22 | Stop reason: SDUPTHER

## 2018-05-11 RX ORDER — BENAZEPRIL HYDROCHLORIDE 40 MG/1
40 TABLET ORAL DAILY
Qty: 90 TABLET | Refills: 3 | Status: SHIPPED | OUTPATIENT
Start: 2018-05-11 | End: 2018-05-22 | Stop reason: SDUPTHER

## 2018-05-11 RX ORDER — LEVETIRACETAM 500 MG/1
750 TABLET ORAL 2 TIMES DAILY
Qty: 270 TABLET | Refills: 0 | Status: SHIPPED | OUTPATIENT
Start: 2018-05-11 | End: 2018-05-22 | Stop reason: SDUPTHER

## 2018-05-11 RX ORDER — POTASSIUM CHLORIDE 750 MG/1
10 TABLET, EXTENDED RELEASE ORAL DAILY
Qty: 30 TABLET | Refills: 11 | Status: SHIPPED | OUTPATIENT
Start: 2018-05-11 | End: 2018-05-22 | Stop reason: SDUPTHER

## 2018-05-14 ENCOUNTER — TELEPHONE (OUTPATIENT)
Dept: FAMILY MEDICINE | Facility: CLINIC | Age: 73
End: 2018-05-14

## 2018-05-14 NOTE — TELEPHONE ENCOUNTER
Called pt to verify which pharmacy he needs his insulin Rx faxed to. Pt has 4 different pharmacies listed on his profile.

## 2018-05-17 ENCOUNTER — TELEPHONE (OUTPATIENT)
Dept: FAMILY MEDICINE | Facility: CLINIC | Age: 73
End: 2018-05-17

## 2018-05-17 NOTE — TELEPHONE ENCOUNTER
----- Message from Gina Taylor sent at 5/17/2018  9:12 AM CDT -----  Contact: Deepak----[facility] in [place]   Deepak called in regards to insulin prescription for  novlog.        [phone]

## 2018-05-22 DIAGNOSIS — E11.69 COMBINED HYPERLIPIDEMIA ASSOCIATED WITH TYPE 2 DIABETES MELLITUS: ICD-10-CM

## 2018-05-22 DIAGNOSIS — Z79.4 TYPE 2 DIABETES MELLITUS WITH STAGE 3 CHRONIC KIDNEY DISEASE, WITH LONG-TERM CURRENT USE OF INSULIN: ICD-10-CM

## 2018-05-22 DIAGNOSIS — E78.2 COMBINED HYPERLIPIDEMIA ASSOCIATED WITH TYPE 2 DIABETES MELLITUS: ICD-10-CM

## 2018-05-22 DIAGNOSIS — E11.22 TYPE 2 DIABETES MELLITUS WITH STAGE 3 CHRONIC KIDNEY DISEASE, WITH LONG-TERM CURRENT USE OF INSULIN: ICD-10-CM

## 2018-05-22 DIAGNOSIS — N18.30 TYPE 2 DIABETES MELLITUS WITH STAGE 3 CHRONIC KIDNEY DISEASE, WITH LONG-TERM CURRENT USE OF INSULIN: ICD-10-CM

## 2018-05-22 RX ORDER — CARVEDILOL 25 MG/1
25 TABLET ORAL 2 TIMES DAILY WITH MEALS
Qty: 180 TABLET | Refills: 3 | Status: SHIPPED | OUTPATIENT
Start: 2018-05-22

## 2018-05-22 RX ORDER — TORSEMIDE 20 MG/1
20 TABLET ORAL EVERY MORNING
Qty: 90 TABLET | Refills: 3 | Status: ON HOLD | OUTPATIENT
Start: 2018-05-22 | End: 2018-12-07 | Stop reason: HOSPADM

## 2018-05-22 RX ORDER — LEVETIRACETAM 500 MG/1
750 TABLET ORAL 2 TIMES DAILY
Qty: 270 TABLET | Refills: 0 | Status: SHIPPED | OUTPATIENT
Start: 2018-05-22 | End: 2019-03-20 | Stop reason: SDUPTHER

## 2018-05-22 RX ORDER — INSULIN GLARGINE 300 [IU]/ML
70 INJECTION, SOLUTION SUBCUTANEOUS DAILY
Qty: 6 SYRINGE | Refills: 0 | Status: SHIPPED | OUTPATIENT
Start: 2018-05-22 | End: 2018-05-23 | Stop reason: SDUPTHER

## 2018-05-22 RX ORDER — HYDRALAZINE HYDROCHLORIDE 100 MG/1
100 TABLET, FILM COATED ORAL 2 TIMES DAILY
Qty: 60 TABLET | Refills: 2 | Status: SHIPPED | OUTPATIENT
Start: 2018-05-22 | End: 2018-10-27 | Stop reason: SDUPTHER

## 2018-05-22 RX ORDER — POTASSIUM CHLORIDE 750 MG/1
10 TABLET, EXTENDED RELEASE ORAL DAILY
Qty: 30 TABLET | Refills: 11 | Status: SHIPPED | OUTPATIENT
Start: 2018-05-22 | End: 2019-02-14 | Stop reason: SDUPTHER

## 2018-05-22 RX ORDER — BENAZEPRIL HYDROCHLORIDE 40 MG/1
40 TABLET ORAL DAILY
Qty: 90 TABLET | Refills: 3 | Status: SHIPPED | OUTPATIENT
Start: 2018-05-22 | End: 2019-03-22

## 2018-05-22 RX ORDER — DABIGATRAN ETEXILATE 75 MG/1
75 CAPSULE ORAL 2 TIMES DAILY
Qty: 180 CAPSULE | Refills: 3 | Status: SHIPPED | OUTPATIENT
Start: 2018-05-22

## 2018-05-22 RX ORDER — EZETIMIBE 10 MG/1
TABLET ORAL
Qty: 90 TABLET | Refills: 3 | Status: SHIPPED | OUTPATIENT
Start: 2018-05-22 | End: 2019-02-14 | Stop reason: SDUPTHER

## 2018-05-22 RX ORDER — INSULIN ASPART 100 [IU]/ML
INJECTION, SOLUTION INTRAVENOUS; SUBCUTANEOUS
Qty: 90 ML | Refills: 3 | Status: SHIPPED | OUTPATIENT
Start: 2018-05-22 | End: 2018-05-23 | Stop reason: SDUPTHER

## 2018-05-22 RX ORDER — NITROGLYCERIN 0.4 MG/1
0.4 TABLET SUBLINGUAL EVERY 5 MIN PRN
Qty: 100 TABLET | Refills: 11 | Status: SHIPPED | OUTPATIENT
Start: 2018-05-22

## 2018-05-22 RX ORDER — AMLODIPINE BESYLATE 5 MG/1
5 TABLET ORAL NIGHTLY
Qty: 90 TABLET | Refills: 1 | Status: ON HOLD | OUTPATIENT
Start: 2018-05-22 | End: 2018-06-21 | Stop reason: HOSPADM

## 2018-05-22 RX ORDER — PEN NEEDLE, DIABETIC 30 GX3/16"
NEEDLE, DISPOSABLE MISCELLANEOUS
Qty: 360 EACH | Refills: 3 | Status: SHIPPED | OUTPATIENT
Start: 2018-05-22 | End: 2019-06-24 | Stop reason: SDUPTHER

## 2018-05-22 RX ORDER — ISOSORBIDE MONONITRATE 60 MG/1
60 TABLET, EXTENDED RELEASE ORAL NIGHTLY
Qty: 90 TABLET | Refills: 3 | Status: SHIPPED | OUTPATIENT
Start: 2018-05-22 | End: 2018-07-27

## 2018-05-22 RX ORDER — LANCETS
1 EACH MISCELLANEOUS 2 TIMES DAILY
Qty: 100 EACH | Refills: 11 | Status: SHIPPED | OUTPATIENT
Start: 2018-05-22

## 2018-05-22 RX ORDER — CLOPIDOGREL BISULFATE 75 MG/1
TABLET ORAL
Qty: 90 TABLET | Refills: 3 | Status: SHIPPED | OUTPATIENT
Start: 2018-05-22 | End: 2018-06-17 | Stop reason: SINTOL

## 2018-05-22 RX ORDER — TAMSULOSIN HYDROCHLORIDE 0.4 MG/1
0.4 CAPSULE ORAL DAILY
Qty: 90 CAPSULE | Refills: 3 | Status: SHIPPED | OUTPATIENT
Start: 2018-05-22 | End: 2019-02-14 | Stop reason: SDUPTHER

## 2018-05-22 NOTE — TELEPHONE ENCOUNTER
----- Message from García Contreras sent at 5/22/2018  9:32 AM CDT -----  Contact: self [phone]  Pt needs all of his meds sent to [facility]/pls call/thanks.

## 2018-05-23 ENCOUNTER — TELEPHONE (OUTPATIENT)
Dept: FAMILY MEDICINE | Facility: CLINIC | Age: 73
End: 2018-05-23

## 2018-05-23 RX ORDER — INSULIN GLARGINE 300 [IU]/ML
70 INJECTION, SOLUTION SUBCUTANEOUS DAILY
Qty: 6 SYRINGE | Refills: 0 | Status: ON HOLD | OUTPATIENT
Start: 2018-05-23 | End: 2018-06-21

## 2018-05-23 RX ORDER — INSULIN ASPART 100 [IU]/ML
INJECTION, SOLUTION INTRAVENOUS; SUBCUTANEOUS
Qty: 15 ML | Refills: 0 | Status: SHIPPED | OUTPATIENT
Start: 2018-05-23 | End: 2018-11-29 | Stop reason: ALTCHOICE

## 2018-05-23 NOTE — TELEPHONE ENCOUNTER
Pt came into to clinic today and states he is out of insulin and needs a prescription sen to [facility] pharmacy in [place].

## 2018-05-23 NOTE — TELEPHONE ENCOUNTER
----- Message from Candace Ruelas sent at 5/23/2018 11:29 AM CDT -----  Patient returning the nurse call. Please adv/call [phone].//[email or web address]

## 2018-05-24 ENCOUNTER — NURSE TRIAGE (OUTPATIENT)
Dept: ADMINISTRATIVE | Facility: CLINIC | Age: 73
End: 2018-05-24

## 2018-05-28 ENCOUNTER — CLINICAL SUPPORT (OUTPATIENT)
Dept: CARDIOLOGY | Facility: CLINIC | Age: 73
End: 2018-05-28
Attending: INTERNAL MEDICINE
Payer: MEDICARE

## 2018-05-28 DIAGNOSIS — I49.01 VF (VENTRICULAR FIBRILLATION): ICD-10-CM

## 2018-05-28 DIAGNOSIS — Z95.810 CARDIAC DEFIBRILLATOR IN SITU: ICD-10-CM

## 2018-05-28 DIAGNOSIS — Z95.810 CARDIAC DEFIBRILLATOR IN SITU: Primary | ICD-10-CM

## 2018-05-28 PROCEDURE — 93296 REM INTERROG EVL PM/IDS: CPT | Mod: S$GLB,,, | Performed by: INTERNAL MEDICINE

## 2018-05-28 PROCEDURE — 93295 DEV INTERROG REMOTE 1/2/MLT: CPT | Mod: S$GLB,,, | Performed by: INTERNAL MEDICINE

## 2018-05-29 ENCOUNTER — DOCUMENTATION ONLY (OUTPATIENT)
Dept: CARDIOLOGY | Facility: CLINIC | Age: 73
End: 2018-05-29

## 2018-05-29 NOTE — PROGRESS NOTES
Rec'd notification from [employer] home monitoring for an ongoing atrial episode/increasing atrial burden.  Attempted to contact patient, left VM to return call to pacemaker clinic.  Per patients med profile, pt is on Pradaxa.  Dr. Rodney notified, new orders rec'd to schedule consult with Dr. Sofia if patient is still having atrial arrhythmia on Monday 6/4/18, or if patient is symptomatic.  Will continue to monitor.

## 2018-06-07 ENCOUNTER — HOSPITAL ENCOUNTER (OUTPATIENT)
Dept: RADIOLOGY | Facility: HOSPITAL | Age: 73
Discharge: HOME | End: 2018-06-07
Attending: PHYSICIAN ASSISTANT
Payer: MEDICARE

## 2018-06-07 ENCOUNTER — OFFICE VISIT (OUTPATIENT)
Dept: NEUROSURGERY | Facility: CLINIC | Age: 73
End: 2018-06-07
Payer: MEDICARE

## 2018-06-07 VITALS
HEIGHT: 67 IN | WEIGHT: 214.31 LBS | SYSTOLIC BLOOD PRESSURE: 171 MMHG | HEART RATE: 80 BPM | DIASTOLIC BLOOD PRESSURE: 98 MMHG | BODY MASS INDEX: 33.64 KG/M2

## 2018-06-07 DIAGNOSIS — G91.2 NORMAL PRESSURE HYDROCEPHALUS: ICD-10-CM

## 2018-06-07 DIAGNOSIS — G91.2 HYDROCEPHALUS, IDIOPATHIC NORMAL PRESSURE: ICD-10-CM

## 2018-06-07 DIAGNOSIS — S06.5XAA SUBDURAL HEMATOMA: Primary | ICD-10-CM

## 2018-06-07 PROCEDURE — 3077F SYST BP >= 140 MM HG: CPT | Mod: CPTII,S$GLB,, | Performed by: PHYSICIAN ASSISTANT

## 2018-06-07 PROCEDURE — 99214 OFFICE O/P EST MOD 30 MIN: CPT | Mod: S$GLB,,, | Performed by: PHYSICIAN ASSISTANT

## 2018-06-07 PROCEDURE — 70450 CT HEAD/BRAIN W/O DYE: CPT | Mod: 26,,, | Performed by: RADIOLOGY

## 2018-06-07 PROCEDURE — 99499 UNLISTED E&M SERVICE: CPT | Mod: S$PBB,,, | Performed by: PHYSICIAN ASSISTANT

## 2018-06-07 PROCEDURE — 70450 CT HEAD/BRAIN W/O DYE: CPT | Mod: TC,PO

## 2018-06-07 PROCEDURE — 62252 CSF SHUNT REPROGRAM: CPT | Mod: S$GLB,,, | Performed by: PHYSICIAN ASSISTANT

## 2018-06-07 PROCEDURE — 99999 PR PBB SHADOW E&M-EST. PATIENT-LVL V: CPT | Mod: PBBFAC,,, | Performed by: PHYSICIAN ASSISTANT

## 2018-06-07 PROCEDURE — 3080F DIAST BP >= 90 MM HG: CPT | Mod: CPTII,S$GLB,, | Performed by: PHYSICIAN ASSISTANT

## 2018-06-08 ENCOUNTER — TELEPHONE (OUTPATIENT)
Dept: NEUROSURGERY | Facility: CLINIC | Age: 73
End: 2018-06-08

## 2018-06-08 ENCOUNTER — TELEPHONE (OUTPATIENT)
Dept: CARDIOLOGY | Facility: CLINIC | Age: 73
End: 2018-06-08

## 2018-06-08 NOTE — TELEPHONE ENCOUNTER
----- Message from Amisha Keith PA-C sent at 6/8/2018  1:03 PM CDT -----  If the CT is good, I need to adjust the shunt. If the CT is the same or worse I don't. So I guess double book ?  ----- Message -----  From: Jo Lopez LPN  Sent: 6/8/2018  10:10 AM  To: YESSI Ferrer. I know you want to see him in two weeks with a repeat CT but you are super booked. Can you call him with CT results? Or do I need to double book him?

## 2018-06-11 ENCOUNTER — TELEPHONE (OUTPATIENT)
Dept: CARDIOLOGY | Facility: CLINIC | Age: 73
End: 2018-06-11

## 2018-06-11 NOTE — TELEPHONE ENCOUNTER
----- Message from Charis Mata sent at 6/8/2018  4:36 PM CDT -----  Contact: patient   Patient returning a missed call. Please advise. Call to pod. No answer.   Call back [phone]  Thanks!

## 2018-06-13 ENCOUNTER — TELEPHONE (OUTPATIENT)
Dept: CARDIOLOGY | Facility: CLINIC | Age: 73
End: 2018-06-13

## 2018-06-13 NOTE — TELEPHONE ENCOUNTER
----- Message from Ebony Odom sent at 6/13/2018  1:06 PM CDT -----  Contact: PT  Type:  Patient Returning Call    Who Called:  Abdullahi Salazar  Who Left Message for Patient:  Anh   Does the patient know what this is regarding?:  n/a  Best Call Back Number:    Additional Information:  n/a

## 2018-06-18 NOTE — PROGRESS NOTES
Neurosurgery History & Physical    Patient ID: Abdullahi Salazar is a 72 y.o. male.    Chief Complaint   Patient presents with    Follow-up     NPH follow up with repeat CT> Denies changes since his previous visit.        Review of Systems   Constitutional: Negative for activity change, chills, fatigue and unexpected weight change.   HENT: Negative for hearing loss, tinnitus, trouble swallowing and voice change.    Eyes: Negative for visual disturbance.   Respiratory: Negative for apnea, chest tightness and shortness of breath.    Cardiovascular: Negative for chest pain and palpitations.   Gastrointestinal: Negative for abdominal pain, constipation, diarrhea, nausea and vomiting.   Genitourinary: Negative for difficulty urinating, dysuria and frequency.   Musculoskeletal: Negative for back pain, gait problem, neck pain and neck stiffness.   Skin: Negative for wound.   Neurological: Negative for dizziness, tremors, seizures, facial asymmetry, speech difficulty, weakness, light-headedness, numbness and headaches.   Psychiatric/Behavioral: Negative for confusion and decreased concentration.       Past Medical History:   Diagnosis Date    Actinic keratoses     Altered mental status 3/22/2015    Anticoagulant long-term use     Atrial fibrillation     CAD (coronary artery disease)     stents after bypass    Carotid artery stenosis 3/31/2015    CHF (congestive heart failure) 03/2018    Colon polyps 2011    repeat colonoscopy 2014    Combined hyperlipidemia associated with type 2 diabetes mellitus     Diabetes mellitus type II, uncontrolled     dx 2004    GERD (gastroesophageal reflux disease)     Grand mal seizure     last 2/2017    Hard of hearing     History of cardiac pacemaker 3/31/2015    HTN (hypertension)     Hydrocephalus 09/08/2017    Influenza A 1/2/2018    He got influenza a recently and was in the hospital in [place].  He had rhabdomyolyisis and acute kidney injury.  He also had an increased  troponin.  He had a flu shot in early December.    Mitral valve insufficiency     Presence of automatic (implantable) cardiac defibrillator     Sleep apnea with use of continuous positive airway pressure (CPAP)     patient states he does not use CPAP anymore    Syncope 3/30/2015    Wears dentures     upper and lower    Wears hearing aid     sometimes     Social History     Social History    Marital status:      Spouse name: N/A    Number of children: N/A    Years of education: N/A     Occupational History    Not on file.     Social History Main Topics    Smoking status: Former Smoker     Packs/day: 2.00     Years: 25.00     Types: Cigarettes     Quit date: 1/1/1983    Smokeless tobacco: Never Used      Comment: quit 1983     Alcohol use Yes      Comment: occasional    Drug use: No    Sexual activity: Not on file     Other Topics Concern    Not on file     Social History Narrative    No narrative on file     Family History   Problem Relation Age of Onset    Stomach cancer Mother     Lung cancer Father     Diabetes Sister     Hypertension Sister     Heart disease Neg Hx     Stroke Neg Hx      Review of patient's allergies indicates:  No Known Allergies    Current Outpatient Prescriptions:     amLODIPine (NORVASC) 5 MG tablet, Take 1 tablet (5 mg total) by mouth every evening., Disp: 90 tablet, Rfl: 1    aspirin (ECOTRIN) 81 MG EC tablet, Take 1 tablet (81 mg total) by mouth once daily., Disp: , Rfl: 0    atorvastatin (LIPITOR) 80 MG tablet, Take 1 tablet (80 mg total) by mouth once daily., Disp: 90 tablet, Rfl: 3    benazepril (LOTENSIN) 40 MG tablet, Take 1 tablet (40 mg total) by mouth once daily., Disp: 90 tablet, Rfl: 3    blood sugar diagnostic (ACCU-CHEK CLEOPATRA PLUS TEST STRP) Strp, TEST BLOOD SUGARS 4 TIMES DAILY, Disp: 150 strip, Rfl: PRN    carvedilol (COREG) 25 MG tablet, Take 1 tablet (25 mg total) by mouth 2 (two) times daily with meals., Disp: 180 tablet, Rfl: 3     "dabigatran etexilate (PRADAXA) 75 mg Cap, Take 1 capsule (75 mg total) by mouth 2 (two) times daily. "Do NOT break, chew, or open capsules.", Disp: 180 capsule, Rfl: 3    ezetimibe (ZETIA) 10 mg tablet, TAKE 1 TABLET ONE TIME DAILY, Disp: 90 tablet, Rfl: 3    hydrALAZINE (APRESOLINE) 100 MG tablet, Take 1 tablet (100 mg total) by mouth 2 (two) times daily., Disp: 60 tablet, Rfl: 2    insulin aspart U-100 (NOVOLOG) 100 unit/mL InPn pen, Humalog Sliding Scale tid before meals: 150-200 give 4 units 201-250 give 6 units 251-300 give 8 units 301-350 give 10 units > 350 give 12 units, Disp: 15 mL, Rfl: 0    insulin glargine, TOUJEO, (TOUJEO SOLOSTAR U-300 INSULIN) 300 unit/mL (1.5 mL) InPn pen, Inject 70 Units into the skin once daily., Disp: 6 Syringe, Rfl: 0    isosorbide mononitrate (IMDUR) 60 MG 24 hr tablet, Take 1 tablet (60 mg total) by mouth every evening., Disp: 90 tablet, Rfl: 3    lancets (ACCU-CHEK SOFTCLIX LANCETS) Misc, 1 each by Misc.(Non-Drug; Combo Route) route 2 (two) times daily., Disp: 100 each, Rfl: 11    levETIRAcetam (KEPPRA) 500 MG Tab, Take 1.5 tablets (750 mg total) by mouth 2 (two) times daily., Disp: 270 tablet, Rfl: 0    nitroGLYCERIN (NITROSTAT) 0.4 MG SL tablet, Place 1 tablet (0.4 mg total) under the tongue every 5 (five) minutes as needed for Chest pain., Disp: 100 tablet, Rfl: 11    pen needle, diabetic (BD ULTRA-FINE SHORT PEN NEEDLE) 31 gauge x 5/16" Ndle, USE FOUR TIMES DAILY, Disp: 360 each, Rfl: 3    potassium chloride (KLOR-CON) 10 MEQ TbSR, Take 1 tablet (10 mEq total) by mouth once daily., Disp: 30 tablet, Rfl: 11    tamsulosin (FLOMAX) 0.4 mg Cp24, Take 1 capsule (0.4 mg total) by mouth once daily., Disp: 90 capsule, Rfl: 3    torsemide (DEMADEX) 20 MG Tab, Take 1 tablet (20 mg total) by mouth every morning., Disp: 90 tablet, Rfl: 3    TRUEPLUS LANCETS 28 gauge Misc, TEST TWO TIMES DAILY AS NEEDED, Disp: 100 each, Rfl: 3    Vitals:    06/07/18 1255   BP: (!) 171/98 " "  Pulse: 80   Weight: 97.2 kg (214 lb 4.6 oz)   Height: 5' 7" (1.702 m)       Physical Exam   Constitutional: He is oriented to person, place, and time. He appears well-developed and well-nourished.   HENT:   Head: Normocephalic and atraumatic.   Eyes: Pupils are equal, round, and reactive to light.   Neck: Normal range of motion. Neck supple.   Cardiovascular: Normal rate.    Pulmonary/Chest: Effort normal.   Abdominal: He exhibits no distension.   Musculoskeletal: Normal range of motion. He exhibits no edema.   Neurological: He is alert and oriented to person, place, and time. He has an abnormal Tandem Gait Test. He has a normal Finger-Nose-Finger Test, a normal Heel to Villeda Test and a normal Romberg Test. Gait normal.   Reflex Scores:       Tricep reflexes are 2+ on the right side and 2+ on the left side.       Bicep reflexes are 2+ on the right side and 2+ on the left side.       Brachioradialis reflexes are 2+ on the right side and 2+ on the left side.       Patellar reflexes are 2+ on the right side and 2+ on the left side.       Achilles reflexes are 2+ on the right side and 2+ on the left side.  Skin: Skin is warm and dry.   Psychiatric: He has a normal mood and affect. His speech is normal and behavior is normal. Judgment and thought content normal.   Nursing note and vitals reviewed.      Neurologic Exam     Mental Status   Oriented to person, place, and time.   Oriented to person.   Oriented to place.   Oriented to time.   Follows 3 step commands.   Attention: normal. Concentration: normal.   Speech: speech is normal   Level of consciousness: alert  Knowledge: consistent with education.   Able to name object. Able to read. Able to repeat. Able to write. Normal comprehension.     Cranial Nerves     CN II   Visual acuity: normal  Right visual field deficit: none  Left visual field deficit: none     CN III, IV, VI   Pupils are equal, round, and reactive to light.  Right pupil: Size: 3 mm. Shape: regular. " Reactivity: brisk. Consensual response: intact.   Left pupil: Size: 3 mm. Shape: regular. Reactivity: brisk. Consensual response: intact.   CN III: no CN III palsy  CN VI: no CN VI palsy  Nystagmus: none   Diplopia: none  Ophthalmoparesis: none  Conjugate gaze: present    CN V   Right facial sensation deficit: none  Left facial sensation deficit: none    CN VII   Right facial weakness: none  Left facial weakness: none    CN VIII   Hearing: intact    CN IX, X   CN IX normal.   CN X normal.     CN XI   Right sternocleidomastoid strength: normal  Left sternocleidomastoid strength: normal  Right trapezius strength: normal  Left trapezius strength: normal    CN XII   Fasciculations: absent  Tongue deviation: none    Motor Exam   Muscle bulk: normal  Overall muscle tone: normal  Right arm pronator drift: absent  Left arm pronator drift: absent    Strength   Right neck flexion: 5/5  Left neck flexion: 5/5  Right neck extension: 5/5  Left neck extension: 5/5  Right deltoid: 5/5  Left deltoid: 5/5  Right biceps: 5/5  Left biceps: 5/5  Right triceps: 5/5  Left triceps: 5/5  Right wrist flexion: 5/5  Left wrist flexion: 5/5  Right wrist extension: 5/5  Left wrist extension: 5/5  Right interossei: 5/5  Left interossei: 5/5  Right abdominals: 5/5  Left abdominals: 5/5  Right iliopsoas: 5/5  Left iliopsoas: 5/5  Right quadriceps: 5/5  Left quadriceps: 5/5  Right hamstrin/5  Left hamstrin/5  Right glutei: 5/5  Left glutei: 5/5  Right anterior tibial: 5/5  Left anterior tibial: 5/5  Right posterior tibial: 5/5  Left posterior tibial: 5/5  Right peroneal: 5/5  Left peroneal: 5/5  Right gastroc: 5/5  Left gastroc: 5/5    Sensory Exam   Right arm light touch: normal  Left arm light touch: normal  Right leg light touch: normal  Left leg light touch: normal  Right arm vibration: normal  Left arm vibration: normal  Right arm pinprick: normal  Left arm pinprick: normal    Gait, Coordination, and Reflexes     Gait  Gait:  normal    Coordination   Romberg: negative  Finger to nose coordination: normal  Heel to shin coordination: normal  Tandem walking coordination: abnormal    Tremor   Resting tremor: absent  Intention tremor: absent  Action tremor: absent    Reflexes   Right brachioradialis: 2+  Left brachioradialis: 2+  Right biceps: 2+  Left biceps: 2+  Right triceps: 2+  Left triceps: 2+  Right patellar: 2+  Left patellar: 2+  Right achilles: 2+  Left achilles: 2+  Right Chavez: absent  Left Chavez: absent  Right ankle clonus: absent  Left ankle clonus: absent      Provider dictation:    Mr. Salazar presents is a known patient in our clinic.  He is 10 months status post  shunt for NPH.  He is following up due to chronic subdural hematomas present bilaterally last office visit.  He had continued aspirin and Plavix due to chronicity of the fluid collections.  He reports today with his wife with no new complications.  Denies headache denies any worsening of his cognition memory gait and urinary incontinence.  He is here today only for follow-up at my recommendation.    On physical examination, he has an abnormal tandem walk and a magnetic gate. He had an episode of incontinence while in the clinic.  He has a positive Romberg.      CT done on 6/7/2018 demonstrates resolution of right subdural hematoma, however left subdural hematoma has changed in chronicity to be a more acute 5 mm collection in the left frontal.    I discussed with Mr. Salazar and his wife my concerns with new bleed in the left frontal.  I discussed the case with Dr. Oconnor.  We have recommended him to stop aspirin and Plavix for the next 2 weeks.  I've increased shunt to 2.5.  We will repeat CT of the head in 2 weeks.  If left subdural hematoma is stable without any acute hemorrhage, he will be able to start aspirin 325.  We will follow him closely to ensure resolution of left frontal hematoma.  Patient is currently asymptomatic however we discussed new image  findings and ER precautions if he has any worsening symptoms.  He is currently on levetiracetam and will continue that for seizure prophylaxis.  I'll follow up with him in 2 weeks.    Visit Diagnosis:  Subdural hematoma    Hydrocephalus, idiopathic normal pressure  -     CT Head Without Contrast; Future; Expected date: 06/08/2018

## 2018-06-19 PROBLEM — I62.03 CHRONIC SUBDURAL HEMATOMA: Status: ACTIVE | Noted: 2018-06-19

## 2018-06-19 PROBLEM — G45.9 TRANSIENT CEREBRAL ISCHEMIA: Status: ACTIVE | Noted: 2018-06-19

## 2018-06-20 ENCOUNTER — TELEPHONE (OUTPATIENT)
Dept: NEUROLOGY | Facility: CLINIC | Age: 73
End: 2018-06-20

## 2018-06-20 NOTE — TELEPHONE ENCOUNTER
----- Message from Funmi Henry sent at 6/19/2018  4:11 PM CDT -----  Type: Needs Medical Advice    Who Called:  DaughterDominique Salazar  Symptoms (please be specific):  Na  How long has patient had these symptoms:  Tana  Pharmacy name and phone #:  [facility]  Best Call Back Number:[phone]  Additional Information: Went to [place] on April 26th, and currently at [facility], right side dropping down, eyes are continuous blinking. Wanted to see if Dr. Sapp would come see the patient

## 2018-06-21 ENCOUNTER — TELEPHONE (OUTPATIENT)
Dept: NEUROSURGERY | Facility: CLINIC | Age: 73
End: 2018-06-21

## 2018-06-21 ENCOUNTER — TELEPHONE (OUTPATIENT)
Dept: FAMILY MEDICINE | Facility: CLINIC | Age: 73
End: 2018-06-21

## 2018-06-21 ENCOUNTER — TELEPHONE (OUTPATIENT)
Dept: NEUROLOGY | Facility: CLINIC | Age: 73
End: 2018-06-21

## 2018-06-21 DIAGNOSIS — I62.00 SUBDURAL HEMORRHAGE: ICD-10-CM

## 2018-06-21 NOTE — TELEPHONE ENCOUNTER
----- Message from Rayna Marley sent at 6/21/2018  2:13 PM CDT -----  Pt is being discharged from [facility] today / will need a 2 week follow up appt (fot TIA ) / call [phone] or  [phone]

## 2018-06-21 NOTE — TELEPHONE ENCOUNTER
----- Message from Amisha Keith PA-C sent at 6/21/2018 10:26 AM CDT -----  I sent a message about CT head and CEA in 1 month, but can you also order him a head CT in 7-10 days please. We restarted his ASA and I am concerned about waiting a month to find out if his subdural got bigger. His is being d/c from hospital today.     So CT head 7-10 days  CT head 1 month  CEA in 1 month

## 2018-06-21 NOTE — TELEPHONE ENCOUNTER
----- Message from Jackeline Olivera sent at 6/21/2018  2:18 PM CDT -----  Contact: [facility]  She's calling to have a 1 week hospital follow up scheduled, would like pt fit into schedule, please advise [phone] (home)

## 2018-06-25 PROBLEM — I65.23 BILATERAL CAROTID ARTERY STENOSIS: Status: ACTIVE | Noted: 2018-06-25

## 2018-06-27 ENCOUNTER — TELEPHONE (OUTPATIENT)
Dept: NEUROLOGY | Facility: CLINIC | Age: 73
End: 2018-06-27

## 2018-06-27 NOTE — TELEPHONE ENCOUNTER
----- Message from Miri Saenz sent at 6/27/2018 12:33 PM CDT -----  Contact: Self  Type:  Patient Returning Call    Who Called:  Patient   Who Left Message for Patient:  Meli Sung  Does the patient know what this is regarding?:    Best Call Back Number:  [phone] (home)     Additional Information:

## 2018-07-05 DIAGNOSIS — I65.21 CAROTID STENOSIS, RIGHT: Primary | ICD-10-CM

## 2018-07-05 RX ORDER — SODIUM CHLORIDE, SODIUM LACTATE, POTASSIUM CHLORIDE, CALCIUM CHLORIDE 600; 310; 30; 20 MG/100ML; MG/100ML; MG/100ML; MG/100ML
INJECTION, SOLUTION INTRAVENOUS CONTINUOUS
Status: CANCELLED | OUTPATIENT
Start: 2018-07-05

## 2018-07-05 RX ORDER — LIDOCAINE HYDROCHLORIDE 10 MG/ML
1 INJECTION, SOLUTION EPIDURAL; INFILTRATION; INTRACAUDAL; PERINEURAL ONCE
Status: CANCELLED | OUTPATIENT
Start: 2018-07-05 | End: 2018-07-05

## 2018-07-09 ENCOUNTER — TELEPHONE (OUTPATIENT)
Dept: NEUROSURGERY | Facility: CLINIC | Age: 73
End: 2018-07-09

## 2018-07-09 NOTE — TELEPHONE ENCOUNTER
Called patient regarding upcoming surgery on 7/30/18 with Dr. Oconnor. Pre and post-operative appointments reviewed. Patient aware of stopping all anti-coagulant, anti-inflammatory, anti-platelet medications for 1 week prior to surgery. Address confirmed and appointment reminder letter and post-operative instructions mailed to patient. Informed patient to call with any further questions. Patient verbalized understanding.

## 2018-07-16 ENCOUNTER — CLINICAL SUPPORT (OUTPATIENT)
Dept: CARDIOLOGY | Facility: CLINIC | Age: 73
End: 2018-07-16
Attending: INTERNAL MEDICINE
Payer: MEDICARE

## 2018-07-16 ENCOUNTER — OFFICE VISIT (OUTPATIENT)
Dept: CARDIOLOGY | Facility: CLINIC | Age: 73
End: 2018-07-16
Payer: MEDICARE

## 2018-07-16 VITALS
BODY MASS INDEX: 34.33 KG/M2 | HEIGHT: 67 IN | WEIGHT: 218.69 LBS | SYSTOLIC BLOOD PRESSURE: 100 MMHG | HEART RATE: 81 BPM | DIASTOLIC BLOOD PRESSURE: 57 MMHG

## 2018-07-16 DIAGNOSIS — I11.0 HYPERTENSIVE HEART DISEASE WITH HEART FAILURE: ICD-10-CM

## 2018-07-16 DIAGNOSIS — G45.8 OTHER SPECIFIED TRANSIENT CEREBRAL ISCHEMIAS: ICD-10-CM

## 2018-07-16 DIAGNOSIS — E11.69 COMBINED HYPERLIPIDEMIA ASSOCIATED WITH TYPE 2 DIABETES MELLITUS: ICD-10-CM

## 2018-07-16 DIAGNOSIS — I35.0 AORTIC STENOSIS, MODERATE: ICD-10-CM

## 2018-07-16 DIAGNOSIS — I50.32 CHRONIC DIASTOLIC HEART FAILURE: ICD-10-CM

## 2018-07-16 DIAGNOSIS — I25.5 ISCHEMIC CARDIOMYOPATHY: ICD-10-CM

## 2018-07-16 DIAGNOSIS — I49.01 VF (VENTRICULAR FIBRILLATION): ICD-10-CM

## 2018-07-16 DIAGNOSIS — G47.30 SLEEP APNEA IN ADULT: ICD-10-CM

## 2018-07-16 DIAGNOSIS — I25.810 CORONARY ARTERY DISEASE INVOLVING CORONARY BYPASS GRAFT OF NATIVE HEART WITHOUT ANGINA PECTORIS: ICD-10-CM

## 2018-07-16 DIAGNOSIS — I65.09 VERTEBRAL ARTERY STENOSIS, UNSPECIFIED LATERALITY: ICD-10-CM

## 2018-07-16 DIAGNOSIS — I48.0 PAROXYSMAL ATRIAL FIBRILLATION: Primary | ICD-10-CM

## 2018-07-16 DIAGNOSIS — N18.30 STAGE 3 CHRONIC KIDNEY DISEASE: ICD-10-CM

## 2018-07-16 DIAGNOSIS — Z98.2 S/P VP SHUNT: ICD-10-CM

## 2018-07-16 DIAGNOSIS — I49.5 SINUS NODE DYSFUNCTION: ICD-10-CM

## 2018-07-16 DIAGNOSIS — I65.23 BILATERAL CAROTID ARTERY STENOSIS: ICD-10-CM

## 2018-07-16 DIAGNOSIS — G91.2 NORMAL PRESSURE HYDROCEPHALUS: ICD-10-CM

## 2018-07-16 DIAGNOSIS — Z95.810 PRESENCE OF BIVENTRICULAR AICD: ICD-10-CM

## 2018-07-16 DIAGNOSIS — E11.21 TYPE 2 DIABETES MELLITUS WITH DIABETIC NEPHROPATHY, WITHOUT LONG-TERM CURRENT USE OF INSULIN: ICD-10-CM

## 2018-07-16 DIAGNOSIS — E78.2 COMBINED HYPERLIPIDEMIA ASSOCIATED WITH TYPE 2 DIABETES MELLITUS: ICD-10-CM

## 2018-07-16 DIAGNOSIS — Z95.1 S/P CABG (CORONARY ARTERY BYPASS GRAFT): ICD-10-CM

## 2018-07-16 DIAGNOSIS — I70.213 ATHEROSCLEROSIS OF NATIVE ARTERY OF BOTH LOWER EXTREMITIES WITH INTERMITTENT CLAUDICATION: ICD-10-CM

## 2018-07-16 DIAGNOSIS — Z95.810 CARDIAC DEFIBRILLATOR IN SITU: ICD-10-CM

## 2018-07-16 PROCEDURE — 93284 PRGRMG EVAL IMPLANTABLE DFB: CPT | Mod: S$GLB,,, | Performed by: INTERNAL MEDICINE

## 2018-07-16 PROCEDURE — 3046F HEMOGLOBIN A1C LEVEL >9.0%: CPT | Mod: CPTII,S$GLB,, | Performed by: INTERNAL MEDICINE

## 2018-07-16 PROCEDURE — 99205 OFFICE O/P NEW HI 60 MIN: CPT | Mod: S$GLB,,, | Performed by: INTERNAL MEDICINE

## 2018-07-16 PROCEDURE — 3074F SYST BP LT 130 MM HG: CPT | Mod: CPTII,S$GLB,, | Performed by: INTERNAL MEDICINE

## 2018-07-16 PROCEDURE — 99999 PR PBB SHADOW E&M-EST. PATIENT-LVL III: CPT | Mod: PBBFAC,,, | Performed by: INTERNAL MEDICINE

## 2018-07-16 PROCEDURE — 3078F DIAST BP <80 MM HG: CPT | Mod: CPTII,S$GLB,, | Performed by: INTERNAL MEDICINE

## 2018-07-16 NOTE — LETTER
July 16, 2018      Gordy Rodney MD  [address] [facility] [address] [place] [address]           [place] [facility]  [address] [facility] [address] [place] [address]  Phone: [phone]          Patient: Abdullahi Salazar   MR Number: [id number]   YOB: 1945   Date of Visit: 7/16/2018       Dear Dr. Gordy Rodney:    Thank you for referring Abdullahi Salazar to me for evaluation. Attached you will find relevant portions of my assessment and plan of care.    If you have questions, please do not hesitate to call me. I look forward to following Abdullahi Salazar along with you.    Sincerely,    Allan Sofia MD    Enclosure  CC:  No Recipients    If you would like to receive this communication electronically, please contact [email or web address] or [phone] to request more information on [employer] Link access.    For providers and/or their staff who would like to refer a patient to [facility], please contact us through our one-stop-shop provider referral line, [facility], at [phone].    If you feel you have received this communication in error or would no longer like to receive these types of communications, please e-mail [email or web address]

## 2018-07-16 NOTE — PROGRESS NOTES
Subjective:    Patient ID:  Abdullahi Salazar is a 72 y.o. male who presents for evaluation of Consult (Ref by Dr. Rodney - [facility] )      HPI 71 yo male with atrial fibrillation, TIA, CAD s/p CABG, CRT-D, Vertebral and Carotid artery stenosis, Htn, sleep apnea,  shunt for hydrocephalus, subdural hematoma, CKD III, CHF with preserved EF.  Primary cardiologist is Dr. Rodney.  Underwent  shunt by Dr. Oconnor 2/17 for normal pressure hydrocephalus.  Complicated by subdural hematoma, which is being managed with observation.  Aspirin and Plavix were discontinued.  Has been on Pradaxa 75 mg BID.  6/18 presented with TIA.  Also known to have Carotid artery and vertebral artery disease.  Seen by Neurosurgery.  Pradaxa 75 mg BID continued.  Aspirin 325 mg daily resumed.  Plan is for right CEA 7/30/18    Echo 6/20/18 EF 55% LA diameter 4.6 cm  3/11/16 Generator change (CRT-D).  Has capped RV lead.  Atrial fibrillation with DCCV 2/15.  Device interrogations have revealed stable function of leads, has been in atrial fibrillation since 5/25/18, with decrease in % biventricular pacing to 82%.  He feels well.  Denies shortness of breath, fatigue.      Review of Systems   Constitution: Negative. Negative for weakness and malaise/fatigue.   Cardiovascular: Negative for chest pain, dyspnea on exertion, irregular heartbeat, leg swelling, near-syncope, orthopnea, palpitations, paroxysmal nocturnal dyspnea and syncope.   Respiratory: Negative for cough and shortness of breath.    Neurological: Negative for dizziness and light-headedness.   All other systems reviewed and are negative.       Objective:    Physical Exam   Constitutional: He is oriented to person, place, and time. He appears well-developed and well-nourished.   Eyes: Conjunctivae are normal. No scleral icterus.   Neck: No JVD present. No tracheal deviation present.   Cardiovascular: Normal rate and normal heart sounds.  An irregularly irregular rhythm present. PMI is not  displaced.    Pulmonary/Chest: Effort normal and breath sounds normal. No respiratory distress.   Abdominal: Soft. There is no hepatosplenomegaly. There is no tenderness.   Musculoskeletal: He exhibits no edema (lower extremity) or tenderness.   Neurological: He is alert and oriented to person, place, and time.   Skin: Skin is warm and dry. No rash noted.   Psychiatric: He has a normal mood and affect. His behavior is normal.         Assessment:       1. Paroxysmal atrial fibrillation    2. Ischemic cardiomyopathy    3. Hypertensive heart disease with heart failure    4. Coronary artery disease involving coronary bypass graft of native heart without angina pectoris    5. Combined hyperlipidemia associated with type 2 diabetes mellitus    6. Chronic diastolic heart failure    7. Bilateral carotid artery stenosis    8. Atherosclerosis of native artery of both lower extremities with intermittent claudication    9. Aortic stenosis, moderate    10. Other specified transient cerebral ischemias    11. Normal pressure hydrocephalus    12. S/P CABG (coronary artery bypass graft)    13. Sinus node dysfunction    14. Vertebral artery stenosis, unspecified laterality    15. Stage 3 chronic kidney disease    16. Type 2 diabetes mellitus with diabetic nephropathy, without long-term current use of insulin    17. Sleep apnea in adult    18. S/P  shunt    19. Presence of biventricular AICD         Plan:       Persistent atrial fibrillation.  He is asymptomatic.  There has been decrease in % biventricular pacing.  However, the initial indication is unclear to me, as he has a normal EF (it may have normalized with biventricular pacing).  He is on sub-optimal dosing of anticoagulation, and presented with TIA on the Pradaxa 75 mg BID.    I would recommend switching to Eliquis 5 mg BID.  I will ask Dr. Oconnor to comment on the safety/feasibility of this, given the subdural hematoma, and whether we would need to continue aspirin at 325 mg  daily.  After his CEA: repeat echo in 6 months.  If EF remains normal and he remains asymptomatic, rate control strategy would be reasonable. If not, consider DCCV as then possibly PVI or anti-arrhythmic agent.

## 2018-07-18 ENCOUNTER — HOSPITAL ENCOUNTER (OUTPATIENT)
Dept: RADIOLOGY | Facility: HOSPITAL | Age: 73
Discharge: HOME | End: 2018-07-18
Attending: PHYSICIAN ASSISTANT
Payer: MEDICARE

## 2018-07-18 ENCOUNTER — TELEPHONE (OUTPATIENT)
Dept: FAMILY MEDICINE | Facility: CLINIC | Age: 73
End: 2018-07-18

## 2018-07-18 ENCOUNTER — PATIENT OUTREACH (OUTPATIENT)
Dept: ADMINISTRATIVE | Facility: HOSPITAL | Age: 73
End: 2018-07-18

## 2018-07-18 DIAGNOSIS — I25.810 CORONARY ARTERY DISEASE INVOLVING CORONARY BYPASS GRAFT OF NATIVE HEART WITHOUT ANGINA PECTORIS: ICD-10-CM

## 2018-07-18 DIAGNOSIS — I62.00 SUBDURAL HEMORRHAGE: ICD-10-CM

## 2018-07-18 DIAGNOSIS — Z79.4 LONG TERM CURRENT USE OF INSULIN: ICD-10-CM

## 2018-07-18 DIAGNOSIS — F02.80 DEMENTIA ASSOCIATED WITH OTHER UNDERLYING DISEASE WITHOUT BEHAVIORAL DISTURBANCE: Primary | Chronic | ICD-10-CM

## 2018-07-18 PROCEDURE — 70450 CT HEAD/BRAIN W/O DYE: CPT | Mod: 26,,, | Performed by: RADIOLOGY

## 2018-07-18 PROCEDURE — 70450 CT HEAD/BRAIN W/O DYE: CPT | Mod: TC,PO

## 2018-07-18 NOTE — TELEPHONE ENCOUNTER
----- Message from Gypsy Sharp sent at 7/18/2018  8:32 AM CDT -----  Contact: [facility]  State they're calling to speak with the doctor and can be reached at [phone] Thanks

## 2018-07-18 NOTE — PROGRESS NOTES
[facility] reviewed. Pt has multi diagnoses and  A1c dated 7/17/18 is 12.4, prior to that date was 13.1. OPCM referral ordered.

## 2018-07-18 NOTE — TELEPHONE ENCOUNTER
Calling to inform pt will be having surgery on 07/30/18 with Dr. Oconnor and will need a clearance. Spoke with pt, pt scheduled with Delfino Abdul NP 07/19/18 @ 8:00

## 2018-07-19 ENCOUNTER — TELEPHONE (OUTPATIENT)
Dept: NEPHROLOGY | Facility: CLINIC | Age: 73
End: 2018-07-19

## 2018-07-19 ENCOUNTER — TELEPHONE (OUTPATIENT)
Dept: NEUROSURGERY | Facility: CLINIC | Age: 73
End: 2018-07-19

## 2018-07-19 ENCOUNTER — TELEPHONE (OUTPATIENT)
Dept: FAMILY MEDICINE | Facility: CLINIC | Age: 73
End: 2018-07-19

## 2018-07-19 ENCOUNTER — OFFICE VISIT (OUTPATIENT)
Dept: CARDIOLOGY | Facility: CLINIC | Age: 73
End: 2018-07-19
Payer: MEDICARE

## 2018-07-19 ENCOUNTER — OFFICE VISIT (OUTPATIENT)
Dept: FAMILY MEDICINE | Facility: CLINIC | Age: 73
End: 2018-07-19
Payer: MEDICARE

## 2018-07-19 ENCOUNTER — OUTPATIENT CASE MANAGEMENT (OUTPATIENT)
Dept: ADMINISTRATIVE | Facility: OTHER | Age: 73
End: 2018-07-19

## 2018-07-19 VITALS
WEIGHT: 222.69 LBS | DIASTOLIC BLOOD PRESSURE: 84 MMHG | HEIGHT: 67 IN | BODY MASS INDEX: 34.95 KG/M2 | HEART RATE: 79 BPM | SYSTOLIC BLOOD PRESSURE: 159 MMHG

## 2018-07-19 VITALS
TEMPERATURE: 98 F | HEIGHT: 67 IN | HEART RATE: 80 BPM | SYSTOLIC BLOOD PRESSURE: 119 MMHG | DIASTOLIC BLOOD PRESSURE: 59 MMHG | BODY MASS INDEX: 34.84 KG/M2 | WEIGHT: 222 LBS

## 2018-07-19 DIAGNOSIS — Z95.1 S/P CABG (CORONARY ARTERY BYPASS GRAFT): ICD-10-CM

## 2018-07-19 DIAGNOSIS — E11.59 HYPERTENSION ASSOCIATED WITH DIABETES: ICD-10-CM

## 2018-07-19 DIAGNOSIS — Z01.818 PREOPERATIVE CLEARANCE: Primary | ICD-10-CM

## 2018-07-19 DIAGNOSIS — I62.03 CHRONIC SUBDURAL HEMATOMA: ICD-10-CM

## 2018-07-19 DIAGNOSIS — I34.0 MITRAL VALVE INSUFFICIENCY, UNSPECIFIED ETIOLOGY: ICD-10-CM

## 2018-07-19 DIAGNOSIS — I65.23 BILATERAL CAROTID ARTERY STENOSIS: ICD-10-CM

## 2018-07-19 DIAGNOSIS — N18.30 TYPE 2 DIABETES MELLITUS WITH STAGE 3 CHRONIC KIDNEY DISEASE, WITH LONG-TERM CURRENT USE OF INSULIN: ICD-10-CM

## 2018-07-19 DIAGNOSIS — Z79.4 TYPE 2 DIABETES MELLITUS WITH STAGE 3 CHRONIC KIDNEY DISEASE, WITH LONG-TERM CURRENT USE OF INSULIN: ICD-10-CM

## 2018-07-19 DIAGNOSIS — G45.8 OTHER SPECIFIED TRANSIENT CEREBRAL ISCHEMIAS: ICD-10-CM

## 2018-07-19 DIAGNOSIS — E78.2 COMBINED HYPERLIPIDEMIA ASSOCIATED WITH TYPE 2 DIABETES MELLITUS: ICD-10-CM

## 2018-07-19 DIAGNOSIS — I15.2 HYPERTENSION ASSOCIATED WITH DIABETES: ICD-10-CM

## 2018-07-19 DIAGNOSIS — I35.0 AORTIC STENOSIS, MODERATE: ICD-10-CM

## 2018-07-19 DIAGNOSIS — Z95.5 S/P CORONARY ARTERY STENT PLACEMENT: Primary | ICD-10-CM

## 2018-07-19 DIAGNOSIS — Z95.810 PRESENCE OF BIVENTRICULAR AICD: ICD-10-CM

## 2018-07-19 DIAGNOSIS — Z95.5 S/P CORONARY ARTERY STENT PLACEMENT: ICD-10-CM

## 2018-07-19 DIAGNOSIS — Z01.810 PREOP CARDIOVASCULAR EXAM: ICD-10-CM

## 2018-07-19 DIAGNOSIS — I50.30 CHF WITH LEFT VENTRICULAR DIASTOLIC DYSFUNCTION, NYHA CLASS 2: ICD-10-CM

## 2018-07-19 DIAGNOSIS — I25.5 ISCHEMIC CARDIOMYOPATHY: ICD-10-CM

## 2018-07-19 DIAGNOSIS — E11.69 COMBINED HYPERLIPIDEMIA ASSOCIATED WITH TYPE 2 DIABETES MELLITUS: ICD-10-CM

## 2018-07-19 DIAGNOSIS — I48.0 PAROXYSMAL ATRIAL FIBRILLATION: ICD-10-CM

## 2018-07-19 DIAGNOSIS — Z79.4 LONG TERM CURRENT USE OF INSULIN: ICD-10-CM

## 2018-07-19 DIAGNOSIS — I11.0 HYPERTENSIVE HEART DISEASE WITH HEART FAILURE: ICD-10-CM

## 2018-07-19 DIAGNOSIS — I50.32 CHRONIC DIASTOLIC HEART FAILURE: ICD-10-CM

## 2018-07-19 DIAGNOSIS — I25.810 CORONARY ARTERY DISEASE INVOLVING CORONARY BYPASS GRAFT OF NATIVE HEART WITHOUT ANGINA PECTORIS: ICD-10-CM

## 2018-07-19 DIAGNOSIS — N18.30 STAGE 3 CHRONIC KIDNEY DISEASE: ICD-10-CM

## 2018-07-19 DIAGNOSIS — I49.5 SINUS NODE DYSFUNCTION: ICD-10-CM

## 2018-07-19 DIAGNOSIS — I65.21 STENOSIS OF RIGHT CAROTID ARTERY: ICD-10-CM

## 2018-07-19 DIAGNOSIS — Z98.2 S/P VP SHUNT: ICD-10-CM

## 2018-07-19 DIAGNOSIS — E11.22 TYPE 2 DIABETES MELLITUS WITH STAGE 3 CHRONIC KIDNEY DISEASE, WITH LONG-TERM CURRENT USE OF INSULIN: ICD-10-CM

## 2018-07-19 DIAGNOSIS — N18.30 CKD (CHRONIC KIDNEY DISEASE) STAGE 3, GFR 30-59 ML/MIN: Primary | ICD-10-CM

## 2018-07-19 DIAGNOSIS — I65.09 VERTEBRAL ARTERY STENOSIS, UNSPECIFIED LATERALITY: ICD-10-CM

## 2018-07-19 DIAGNOSIS — R79.1 ABNORMAL COAGULATION PROFILE: Primary | ICD-10-CM

## 2018-07-19 DIAGNOSIS — G40.909 SEIZURE DISORDER: ICD-10-CM

## 2018-07-19 PROCEDURE — 99214 OFFICE O/P EST MOD 30 MIN: CPT | Mod: S$GLB,,, | Performed by: INTERNAL MEDICINE

## 2018-07-19 PROCEDURE — 3078F DIAST BP <80 MM HG: CPT | Mod: CPTII,S$GLB,, | Performed by: NURSE PRACTITIONER

## 2018-07-19 PROCEDURE — 99215 OFFICE O/P EST HI 40 MIN: CPT | Mod: S$GLB,,, | Performed by: NURSE PRACTITIONER

## 2018-07-19 PROCEDURE — 3074F SYST BP LT 130 MM HG: CPT | Mod: CPTII,S$GLB,, | Performed by: NURSE PRACTITIONER

## 2018-07-19 PROCEDURE — 3074F SYST BP LT 130 MM HG: CPT | Mod: CPTII,S$GLB,, | Performed by: INTERNAL MEDICINE

## 2018-07-19 PROCEDURE — 99999 PR PBB SHADOW E&M-EST. PATIENT-LVL IV: CPT | Mod: PBBFAC,,, | Performed by: NURSE PRACTITIONER

## 2018-07-19 PROCEDURE — 3079F DIAST BP 80-89 MM HG: CPT | Mod: CPTII,S$GLB,, | Performed by: INTERNAL MEDICINE

## 2018-07-19 PROCEDURE — 99999 PR PBB SHADOW E&M-EST. PATIENT-LVL II: CPT | Mod: PBBFAC,,, | Performed by: INTERNAL MEDICINE

## 2018-07-19 PROCEDURE — 3046F HEMOGLOBIN A1C LEVEL >9.0%: CPT | Mod: CPTII,S$GLB,, | Performed by: NURSE PRACTITIONER

## 2018-07-19 NOTE — PROGRESS NOTES
Subjective:    Patient ID:  Abdullahi Salazar is a 72 y.o. male who presents for follow-up of Carotid Artery Disease (Clearance for Carotid surgery on 07/30/18) and Coronary Artery Disease      HPI  He comes for follow up with no major problems, no chest pain, no shortness of breath.  Having carotid surgery soon    Review of Systems   Constitution: Negative for decreased appetite, weakness, malaise/fatigue, weight gain and weight loss.   Cardiovascular: Negative for chest pain, dyspnea on exertion, leg swelling, palpitations and syncope.   Respiratory: Negative for cough and shortness of breath.    Gastrointestinal: Negative.    All other systems reviewed and are negative.       Objective:    Physical Exam   Constitutional: He is oriented to person, place, and time. He appears well-developed and well-nourished.   HENT:   Head: Normocephalic.   Eyes: Pupils are equal, round, and reactive to light.   Neck: Normal range of motion. Neck supple. No JVD present. Carotid bruit is not present. No thyromegaly present.   Cardiovascular: Normal rate, regular rhythm, normal heart sounds, intact distal pulses and normal pulses.  PMI is not displaced.  Exam reveals no gallop.    No murmur heard.  Pulmonary/Chest: Effort normal and breath sounds normal.   Abdominal: Soft. Normal appearance. He exhibits no mass. There is no hepatosplenomegaly. There is no tenderness.   Musculoskeletal: Normal range of motion. He exhibits no edema.   Neurological: He is alert and oriented to person, place, and time. He has normal strength and normal reflexes. No sensory deficit.   Skin: Skin is warm and intact.   Psychiatric: He has a normal mood and affect.   Nursing note and vitals reviewed.        Assessment:       1. S/P coronary artery stent placement    2. Preop cardiovascular exam    3. S/P CABG (coronary artery bypass graft)    4. Ischemic cardiomyopathy    5. Paroxysmal atrial fibrillation    6. Bilateral carotid artery stenosis         Plan:    No contraindication for surgery/anesthesia from cardiac standpoint  Continue all cardiac medications  Regular exercise program  Weight loss  6-9 m f/u

## 2018-07-19 NOTE — TELEPHONE ENCOUNTER
This pt is overdue to see me. Needs labs prior. I will not clear for surgery until I see him. Orders in for labs. Thank you

## 2018-07-19 NOTE — TELEPHONE ENCOUNTER
Pt is scheduled for a CEA on 7/30. He has abnormal A1c of 12.4. Also, abnormal urinalysis but normal urine culture. And abnormal pt/inr of 1.8. Pt has not stopped pradaxa yet. Want to repeat INR for 7/27? Please advise.

## 2018-07-19 NOTE — TELEPHONE ENCOUNTER
----- Message from Jo Murphy RN sent at 7/19/2018  4:29 PM CDT -----  Regarding: Surgical Clearance  Just reaching out to verify that all our moving parts are working in sync.     Internal Medicine will see him again in a week to check his glucose log for compliance with new dose of Tujeo. Will clear or not based on that visit.- Recommend cardiology clearance and nephrology clearance.    Cardiology has seen and cleared patient for surgery.    Nephrology still to see patient    Neuro- UA also abnormal, note in system says no Bactrim or Macrobid due to renal function, and plan for insulin drip while in hospital.    Any additional comments?    Thanks!  Jo

## 2018-07-19 NOTE — TELEPHONE ENCOUNTER
----- Message from Linda Samuel LPN sent at 7/19/2018  4:55 PM CDT -----  Regarding: FW: Surgical Clearance      ----- Message -----  From: Jo Murphy RN  Sent: 7/19/2018   4:29 PM  To: Anastasia MELGOZA Staff, Chantell Saleh Staff, #  Subject: Surgical Clearance                               Just reaching out to verify that all our moving parts are working in sync.     Internal Medicine will see him again in a week to check his glucose log for compliance with new dose of Tujeo. Will clear or not based on that visit.- Recommend cardiology clearance and nephrology clearance.    Cardiology has seen and cleared patient for surgery.    Nephrology still to see patient    Neuro- UA also abnormal, note in system says no Bactrim or Macrobid due to renal function, and plan for insulin drip while in hospital.    Any additional comments?    Thanks!  Jo

## 2018-07-19 NOTE — PROGRESS NOTES
Subjective:      Patient ID: Abdullahi Salazar is a 72 y.o. male.    Chief Complaint: Pre-op Exam    HPI   Patient with Afib, TID, CAD s/p CABG, vertebral and carotid artery stenosis, HTN, sleep apnea,  shunt for hydrocephalus, chronic subdural hematoma, CKD 3, CHF, and uncontrolled diabetes to clinic for preop exam prior to having left CEA.  Dr. Oconnor is planning for left CEA on July 30th, 2018.  He is on Pradaxa 75 mg BID, but was seen for TIA 6/18.  In the cardiology note, Dr. Sofia, cardiologist, recommended the patient be switched from Pradaxa to Eliquis due to TIA on Pradaxa, but requested Dr. Oconnor's recommendation on this due to the patient's subdural hematoma.  So far, patient is still on Pradaxa.      Looking at the patient's labs from Dr. Oconnor, his renal function has significantly decreased.  His A1C is out of control.  His is only taking 70 units of Toujeo, but it was recommended Dr. Charles at the hospital that he increase Toujeo to 80 units daily.  He has not done this.  He is also not eating a diabetic diet.      The patient states that there has not been previous problems with sedation.    He reports he feels very well and has no complaints currently.    Past Medical History:  Past Medical History:   Diagnosis Date    Actinic keratoses     Altered mental status 3/22/2015    Anticoagulant long-term use     Atrial fibrillation     CAD (coronary artery disease)     stents after bypass    Carotid artery stenosis 3/31/2015    CHF (congestive heart failure) 03/2018    Colon polyps 2011    repeat colonoscopy 2014    Combined hyperlipidemia associated with type 2 diabetes mellitus     Diabetes mellitus type II, uncontrolled     dx 2004    GERD (gastroesophageal reflux disease)     Grand mal seizure     last 2/2017    Hard of hearing     History of cardiac pacemaker 3/31/2015    HTN (hypertension)     Hydrocephalus 09/08/2017    Influenza A 1/2/2018    He got influenza a recently and was in the  [facility] in [place].  He had rhabdomyolyisis and acute kidney injury.  He also had an increased troponin.  He had a flu shot in early December.    Mitral valve insufficiency     Presence of automatic (implantable) cardiac defibrillator     Sleep apnea with use of continuous positive airway pressure (CPAP)     patient states he does not use CPAP anymore    Syncope 3/30/2015    Wears dentures     upper and lower    Wears hearing aid     sometimes     Past Surgical History:   Procedure Laterality Date    CARDIAC PACEMAKER PLACEMENT      CATARACT EXTRACTION W/ INTRAOCULAR LENS  IMPLANT, BILATERAL      CORONARY ARTERY BYPASS GRAFT  1993    three vessels    CORONARY STENT PLACEMENT      CSF SHUNT      HERNIA REPAIR      as infant     Review of patient's allergies indicates:  No Known Allergies  Current Outpatient Prescriptions on File Prior to Visit   Medication Sig Dispense Refill    aspirin 325 MG tablet Take 1 tablet (325 mg total) by mouth once daily.  0    atorvastatin (LIPITOR) 80 MG tablet Take 1 tablet (80 mg total) by mouth once daily. 90 tablet 3    benazepril (LOTENSIN) 40 MG tablet Take 1 tablet (40 mg total) by mouth once daily. 90 tablet 3    blood sugar diagnostic (ACCU-CHEK CLEOPATRA PLUS TEST STRP) Strp TEST BLOOD SUGARS 4 TIMES DAILY 150 strip PRN    carvedilol (COREG) 25 MG tablet Take 1 tablet (25 mg total) by mouth 2 (two) times daily with meals. 180 tablet 3    [START ON 7/28/2018] chlorhexidine (PERIDEX) 0.12 % solution Use as directed 10 mLs in the mouth or throat 2 (two) times daily.      dabigatran etexilate (PRADAXA) 75 mg Cap Take 1 capsule (75 mg total) by mouth 2 (two) times daily. "Do NOT break, chew, or open capsules." 180 capsule 3    ezetimibe (ZETIA) 10 mg tablet TAKE 1 TABLET ONE TIME DAILY 90 tablet 3    hydrALAZINE (APRESOLINE) 100 MG tablet Take 1 tablet (100 mg total) by mouth 2 (two) times daily. 60 tablet 2    insulin aspart U-100 (NOVOLOG) 100 unit/mL InPn pen " "Humalog Sliding Scale tid before meals: 150-200 give 4 units 201-250 give 6 units 251-300 give 8 units 301-350 give 10 units > 350 give 12 units 15 mL 0    insulin glargine, TOUJEO, (TOUJEO SOLOSTAR U-300 INSULIN) 300 unit/mL (1.5 mL) InPn pen Inject 80 Units into the skin once daily. (Patient taking differently: Inject 80 Units into the skin once daily. ) 6 Syringe 0    isosorbide mononitrate (IMDUR) 60 MG 24 hr tablet Take 1 tablet (60 mg total) by mouth every evening. 90 tablet 3    lancets (ACCU-CHEK SOFTCLIX LANCETS) Misc 1 each by Misc.(Non-Drug; Combo Route) route 2 (two) times daily. 100 each 11    levETIRAcetam (KEPPRA) 500 MG Tab Take 1.5 tablets (750 mg total) by mouth 2 (two) times daily. 270 tablet 0    [START ON 7/28/2018] mupirocin (BACTROBAN) 2 % ointment 1 g by Nasal route 2 (two) times daily.      nitroGLYCERIN (NITROSTAT) 0.4 MG SL tablet Place 1 tablet (0.4 mg total) under the tongue every 5 (five) minutes as needed for Chest pain. 100 tablet 11    pen needle, diabetic (BD ULTRA-FINE SHORT PEN NEEDLE) 31 gauge x 5/16" Ndle USE FOUR TIMES DAILY 360 each 3    potassium chloride (KLOR-CON) 10 MEQ TbSR Take 1 tablet (10 mEq total) by mouth once daily. 30 tablet 11    tamsulosin (FLOMAX) 0.4 mg Cp24 Take 1 capsule (0.4 mg total) by mouth once daily. 90 capsule 3    torsemide (DEMADEX) 20 MG Tab Take 1 tablet (20 mg total) by mouth every morning. 90 tablet 3     No current facility-administered medications on file prior to visit.      Social History     Social History    Marital status:      Spouse name: N/A    Number of children: N/A    Years of education: N/A     Occupational History    Not on file.     Social History Main Topics    Smoking status: Former Smoker     Packs/day: 2.00     Years: 25.00     Types: Cigarettes     Quit date: 1/1/1983    Smokeless tobacco: Never Used      Comment: quit 1983     Alcohol use Yes      Comment: occasional    Drug use: No    Sexual " "activity: Not on file     Other Topics Concern    Not on file     Social History Narrative    No narrative on file     Family History   Problem Relation Age of Onset    Stomach cancer Mother     Lung cancer Father     Diabetes Sister     Hypertension Sister     Heart disease Neg Hx     Stroke Neg Hx           Review of Systems   Constitutional: Negative for chills, fatigue and fever.   Respiratory: Negative for cough, shortness of breath and wheezing.    Cardiovascular: Negative for chest pain, palpitations and leg swelling.   Skin: Negative for rash and wound.   Neurological: Negative for weakness and numbness.   Psychiatric/Behavioral: The patient is not nervous/anxious.        Objective:     BP (!) 119/59   Pulse 80   Temp 98.3 °F (36.8 °C) (Oral)   Ht 5' 7" (1.702 m)   Wt 100.7 kg (222 lb)   BMI 34.77 kg/m²     Physical Exam   Constitutional: He is oriented to person, place, and time. He appears well-developed and well-nourished.   HENT:   Head: Normocephalic.       Eyes: Pupils are equal, round, and reactive to light.   Neck: Normal range of motion. Neck supple. No JVD present. Carotid bruit is present (bilaterally).   Cardiovascular: Normal rate, regular rhythm and normal heart sounds.    Pulmonary/Chest: Effort normal and breath sounds normal. No respiratory distress. He has no decreased breath sounds. He has no wheezes. He has no rhonchi. He has no rales.   Neurological: He is alert and oriented to person, place, and time.   Skin: Skin is warm and dry. No rash noted.   Psychiatric: He has a normal mood and affect. His behavior is normal. Judgment and thought content normal.   Vitals reviewed.    Assessment:     1. Preoperative clearance    2. CHF with left ventricular diastolic dysfunction, NYHA class 2    3. Mitral valve insufficiency, unspecified etiology    4. Stenosis of right carotid artery    5. Presence of biventricular AICD    6. Vertebral artery stenosis, unspecified laterality    7. " Coronary artery disease involving coronary bypass graft of native heart without angina pectoris    8. Combined hyperlipidemia associated with type 2 diabetes mellitus    9. S/P CABG (coronary artery bypass graft)    10. S/P coronary artery stent placement    11. Aortic stenosis, moderate    12. Ischemic cardiomyopathy    13. Paroxysmal atrial fibrillation    14. Chronic diastolic heart failure    15. Bilateral carotid artery stenosis    16. Sinus node dysfunction    17. Hypertensive heart disease with heart failure    18. Hypertension associated with diabetes    19. S/P  shunt    20. Chronic subdural hematoma    21. Other specified transient cerebral ischemias    22. Seizure disorder    23. Stage 3 chronic kidney disease    24. Uncontrolled type 2 diabetes mellitus with complication, with long-term current use of insulin    25. Long term current use of insulin    26. Type 2 diabetes mellitus with stage 3 chronic kidney disease, with long-term current use of insulin        Plan:     Problem List Items Addressed This Visit        Neuro    Seizure disorder    Relevant Orders    Ambulatory referral to Nephrology    TIA (transient ischemic attack)    Relevant Orders    Ambulatory referral to Nephrology    Chronic subdural hematoma    Relevant Orders    Ambulatory referral to Nephrology    S/P  shunt    Relevant Orders    Ambulatory referral to Nephrology       Cardiac/Vascular    Sinus node dysfunction    Relevant Orders    Ambulatory referral to Cardiology    Ambulatory referral to Nephrology    Hypertensive heart disease with heart failure    Relevant Orders    Ambulatory referral to Cardiology    Ambulatory referral to Nephrology    Hypertension associated with diabetes    Relevant Orders    Ambulatory referral to Cardiology    Ambulatory referral to Nephrology    CHF with left ventricular diastolic dysfunction, NYHA class 2    Relevant Orders    Ambulatory referral to Cardiology    Ambulatory referral to  Nephrology    Mitral valve insufficiency    Relevant Orders    Ambulatory referral to Cardiology    Ambulatory referral to Nephrology    Stenosis of right carotid artery    Relevant Orders    Ambulatory referral to Cardiology    Ambulatory referral to Nephrology    Presence of biventricular AICD    Relevant Orders    Ambulatory referral to Cardiology    Ambulatory referral to Nephrology    Vertebral artery stenosis    Relevant Orders    Ambulatory referral to Cardiology    Ambulatory referral to Nephrology    Coronary artery disease involving coronary bypass graft of native heart without angina pectoris    Relevant Orders    Ambulatory referral to Cardiology    Ambulatory referral to Nephrology    Combined hyperlipidemia associated with type 2 diabetes mellitus    Relevant Orders    Ambulatory referral to Cardiology    Ambulatory referral to Nephrology    S/P CABG (coronary artery bypass graft)    Relevant Orders    Ambulatory referral to Cardiology    Ambulatory referral to Nephrology    S/P coronary artery stent placement    Relevant Orders    Ambulatory referral to Cardiology    Ambulatory referral to Nephrology    Aortic stenosis, moderate    Relevant Orders    Ambulatory referral to Cardiology    Ambulatory referral to Nephrology    Ischemic cardiomyopathy    Relevant Orders    Ambulatory referral to Cardiology    Ambulatory referral to Nephrology    Paroxysmal atrial fibrillation    Relevant Orders    Ambulatory referral to Cardiology    Ambulatory referral to Nephrology    Chronic diastolic heart failure    Relevant Orders    Ambulatory referral to Cardiology    Ambulatory referral to Nephrology    Bilateral carotid artery stenosis    Relevant Orders    Ambulatory referral to Cardiology    Ambulatory referral to Nephrology       Renal/    Stage 3 chronic kidney disease    Relevant Orders    Ambulatory referral to Nephrology       Endocrine    Diabetes mellitus type II, uncontrolled    Relevant Orders     Ambulatory referral to Nephrology    Long term current use of insulin    Relevant Orders    Ambulatory referral to Nephrology    Type 2 diabetes mellitus with stage 3 chronic kidney disease    Relevant Orders    Ambulatory referral to Nephrology      Other Visit Diagnoses     Preoperative clearance    -  Primary    Relevant Orders    Ambulatory referral to Cardiology    Ambulatory referral to Nephrology      At this time, I recommended the patient see Cardiology for preop clearance from cardiology standpoint.  I am also recommending he see Dr. trav perez or someone with Nephrology prior to surgery to obtain clearance from a renal standpoint.  I am increasing his Toujeo to 73 units once daily for 3-4 days and having him check his glucose and record it.  After approximately 4 days, if there are no low glucose readings, he is to increase the Toujeo to 76 units per day.  He is to follow up with one of us here in the clinic next week to review glucose readings.      Follow-up if symptoms worsen or fail to improve.        Parts of this note was dictated using voice recognition software. Please excuse any grammatical or typographical errors.

## 2018-07-19 NOTE — PROGRESS NOTES
Please note the following patient has been assigned to Delfino Baeza RN in Outpatient Case Management for Diabetes Disease Management with a hbA1C greater than 10.0.    Other referral diagnoses:  Dementia associated with other underlying disease without behavioral disturbance  Uncontrolled type 2 diabetes mellitus with complication, with long-term current use of insulin  Long term current use of insulin  Coronary artery disease involving coronary bypass graft of native heart without angina pectoris    Please contact [facility] at [phone] with any questions.    Thank you,    Fariba Brink    Outpatient Case Management

## 2018-07-19 NOTE — TELEPHONE ENCOUNTER
Pt had an appointment with cardiology today, has followup appointments within the next week with nephrology and neurology. He does have a follow up appointment with Delfino Abdul NP in one week.

## 2018-07-19 NOTE — PATIENT INSTRUCTIONS
Follow a DIABETIC DIET    Increase Toujeo to 73 units daily for 3 to 4 days, if tolerating increase without low glucose readings at that time, increase Toujeo to 76 units daily    Record blood glucose twice daily and bring recording to next appointment

## 2018-07-20 ENCOUNTER — LAB VISIT (OUTPATIENT)
Dept: LAB | Facility: HOSPITAL | Age: 73
End: 2018-07-20
Attending: PATHOLOGY
Payer: MEDICARE

## 2018-07-20 ENCOUNTER — TELEPHONE (OUTPATIENT)
Dept: FAMILY MEDICINE | Facility: CLINIC | Age: 73
End: 2018-07-20

## 2018-07-20 ENCOUNTER — TELEPHONE (OUTPATIENT)
Dept: CARDIOLOGY | Facility: CLINIC | Age: 73
End: 2018-07-20

## 2018-07-20 ENCOUNTER — TELEPHONE (OUTPATIENT)
Dept: NEUROSURGERY | Facility: CLINIC | Age: 73
End: 2018-07-20

## 2018-07-20 DIAGNOSIS — N18.30 CKD (CHRONIC KIDNEY DISEASE) STAGE 3, GFR 30-59 ML/MIN: ICD-10-CM

## 2018-07-20 LAB
ALBUMIN SERPL BCP-MCNC: 3.5 G/DL
ANION GAP SERPL CALC-SCNC: 10 MMOL/L
BUN SERPL-MCNC: 43 MG/DL
CALCIUM SERPL-MCNC: 8.8 MG/DL
CHLORIDE SERPL-SCNC: 104 MMOL/L
CO2 SERPL-SCNC: 27 MMOL/L
CREAT SERPL-MCNC: 2.3 MG/DL
EST. GFR  (AFRICAN AMERICAN): 31.2 ML/MIN/1.73 M^2
EST. GFR  (NON AFRICAN AMERICAN): 27 ML/MIN/1.73 M^2
GLUCOSE SERPL-MCNC: 220 MG/DL
PHOSPHATE SERPL-MCNC: 4.1 MG/DL
POTASSIUM SERPL-SCNC: 4.2 MMOL/L
SODIUM SERPL-SCNC: 141 MMOL/L

## 2018-07-20 PROCEDURE — 80069 RENAL FUNCTION PANEL: CPT

## 2018-07-20 PROCEDURE — 36415 COLL VENOUS BLD VENIPUNCTURE: CPT | Mod: PO

## 2018-07-20 NOTE — TELEPHONE ENCOUNTER
----- Message from Silke Wild sent at 7/20/2018  8:12 AM CDT -----  Contact: self  Pt is calling in regards to having issues walking since the shunt has been turned off. Pt wanted Dr. Oconnor to be aware because his surgery is coming up. Pt would like a call back in regards to this matter.    Pt can be reached at [phone].    Thank you

## 2018-07-23 NOTE — TELEPHONE ENCOUNTER
Information relayed to pt per ARIELLA Keith. Pt states he just wanted the providers to be updated on his situation prior to his surgery. Instructed pt to call back for any additional questions or concerns.

## 2018-07-24 ENCOUNTER — OFFICE VISIT (OUTPATIENT)
Dept: NEPHROLOGY | Facility: CLINIC | Age: 73
End: 2018-07-24
Payer: MEDICARE

## 2018-07-24 VITALS — HEART RATE: 81 BPM | SYSTOLIC BLOOD PRESSURE: 138 MMHG | DIASTOLIC BLOOD PRESSURE: 72 MMHG | OXYGEN SATURATION: 98 %

## 2018-07-24 DIAGNOSIS — I34.0 MITRAL VALVE INSUFFICIENCY, UNSPECIFIED ETIOLOGY: ICD-10-CM

## 2018-07-24 DIAGNOSIS — I10 ACCELERATED HYPERTENSION: ICD-10-CM

## 2018-07-24 DIAGNOSIS — I65.09 VERTEBRAL ARTERY STENOSIS, UNSPECIFIED LATERALITY: ICD-10-CM

## 2018-07-24 DIAGNOSIS — Z98.2 S/P VP SHUNT: ICD-10-CM

## 2018-07-24 DIAGNOSIS — R80.9 PROTEINURIA DUE TO TYPE 2 DIABETES MELLITUS: ICD-10-CM

## 2018-07-24 DIAGNOSIS — E11.21 TYPE 2 DIABETES MELLITUS WITH DIABETIC NEPHROPATHY, WITHOUT LONG-TERM CURRENT USE OF INSULIN: ICD-10-CM

## 2018-07-24 DIAGNOSIS — N18.4 CKD (CHRONIC KIDNEY DISEASE) STAGE 4, GFR 15-29 ML/MIN: ICD-10-CM

## 2018-07-24 DIAGNOSIS — I48.0 PAROXYSMAL ATRIAL FIBRILLATION: ICD-10-CM

## 2018-07-24 DIAGNOSIS — I65.21 STENOSIS OF RIGHT CAROTID ARTERY: ICD-10-CM

## 2018-07-24 DIAGNOSIS — Z95.1 S/P CABG (CORONARY ARTERY BYPASS GRAFT): ICD-10-CM

## 2018-07-24 DIAGNOSIS — Z86.79 HX OF SUBDURAL HEMATOMA: ICD-10-CM

## 2018-07-24 DIAGNOSIS — I50.33 ACUTE ON CHRONIC DIASTOLIC CHF (CONGESTIVE HEART FAILURE): ICD-10-CM

## 2018-07-24 DIAGNOSIS — R60.0 EDEMA OF BOTH LEGS: ICD-10-CM

## 2018-07-24 DIAGNOSIS — I11.0 HYPERTENSIVE HEART DISEASE WITH HEART FAILURE: ICD-10-CM

## 2018-07-24 DIAGNOSIS — E66.01 CLASS 2 SEVERE OBESITY DUE TO EXCESS CALORIES WITH SERIOUS COMORBIDITY AND BODY MASS INDEX (BMI) OF 36.0 TO 36.9 IN ADULT: ICD-10-CM

## 2018-07-24 DIAGNOSIS — E11.29 PROTEINURIA DUE TO TYPE 2 DIABETES MELLITUS: ICD-10-CM

## 2018-07-24 DIAGNOSIS — I50.32 CHRONIC DIASTOLIC HEART FAILURE: ICD-10-CM

## 2018-07-24 DIAGNOSIS — E11.59 HYPERTENSION ASSOCIATED WITH DIABETES: ICD-10-CM

## 2018-07-24 DIAGNOSIS — N18.30 CKD (CHRONIC KIDNEY DISEASE) STAGE 3, GFR 30-59 ML/MIN: Primary | ICD-10-CM

## 2018-07-24 DIAGNOSIS — I25.5 ISCHEMIC CARDIOMYOPATHY: ICD-10-CM

## 2018-07-24 DIAGNOSIS — I15.2 HYPERTENSION ASSOCIATED WITH DIABETES: ICD-10-CM

## 2018-07-24 PROCEDURE — 99214 OFFICE O/P EST MOD 30 MIN: CPT | Mod: S$GLB,,, | Performed by: INTERNAL MEDICINE

## 2018-07-24 PROCEDURE — 99999 PR PBB SHADOW E&M-EST. PATIENT-LVL III: CPT | Mod: PBBFAC,,, | Performed by: INTERNAL MEDICINE

## 2018-07-24 PROCEDURE — 3078F DIAST BP <80 MM HG: CPT | Mod: CPTII,S$GLB,, | Performed by: INTERNAL MEDICINE

## 2018-07-24 PROCEDURE — 3046F HEMOGLOBIN A1C LEVEL >9.0%: CPT | Mod: CPTII,S$GLB,, | Performed by: INTERNAL MEDICINE

## 2018-07-24 PROCEDURE — 99499 UNLISTED E&M SERVICE: CPT | Mod: HCNC,S$GLB,, | Performed by: INTERNAL MEDICINE

## 2018-07-24 PROCEDURE — 3075F SYST BP GE 130 - 139MM HG: CPT | Mod: CPTII,S$GLB,, | Performed by: INTERNAL MEDICINE

## 2018-07-25 ENCOUNTER — OFFICE VISIT (OUTPATIENT)
Dept: NEUROLOGY | Facility: CLINIC | Age: 73
End: 2018-07-25
Payer: MEDICARE

## 2018-07-25 ENCOUNTER — PATIENT OUTREACH (OUTPATIENT)
Dept: ADMINISTRATIVE | Facility: HOSPITAL | Age: 73
End: 2018-07-25

## 2018-07-25 ENCOUNTER — TELEPHONE (OUTPATIENT)
Dept: NEUROLOGY | Facility: CLINIC | Age: 73
End: 2018-07-25

## 2018-07-25 VITALS
BODY MASS INDEX: 36.34 KG/M2 | HEIGHT: 67 IN | HEART RATE: 80 BPM | RESPIRATION RATE: 16 BRPM | SYSTOLIC BLOOD PRESSURE: 180 MMHG | WEIGHT: 231.5 LBS | DIASTOLIC BLOOD PRESSURE: 86 MMHG

## 2018-07-25 DIAGNOSIS — G91.2 NPH (NORMAL PRESSURE HYDROCEPHALUS): ICD-10-CM

## 2018-07-25 DIAGNOSIS — R56.9 SEIZURES: Primary | ICD-10-CM

## 2018-07-25 DIAGNOSIS — N18.9 CHRONIC KIDNEY DISEASE, UNSPECIFIED CKD STAGE: ICD-10-CM

## 2018-07-25 PROCEDURE — 99214 OFFICE O/P EST MOD 30 MIN: CPT | Mod: S$GLB,,, | Performed by: PSYCHIATRY & NEUROLOGY

## 2018-07-25 PROCEDURE — 99999 PR PBB SHADOW E&M-EST. PATIENT-LVL III: CPT | Mod: PBBFAC,,, | Performed by: PSYCHIATRY & NEUROLOGY

## 2018-07-25 PROCEDURE — 3079F DIAST BP 80-89 MM HG: CPT | Mod: CPTII,S$GLB,, | Performed by: PSYCHIATRY & NEUROLOGY

## 2018-07-25 PROCEDURE — 3077F SYST BP >= 140 MM HG: CPT | Mod: CPTII,S$GLB,, | Performed by: PSYCHIATRY & NEUROLOGY

## 2018-07-25 NOTE — TELEPHONE ENCOUNTER
"Per Dr. Sapp- The patient state's," that when he was in the hospital he thinks his shunt was cut off. "  His gait has been worse since then.  Please call the patient.  "

## 2018-07-25 NOTE — PROGRESS NOTES
"Date of service:  7/25/2018    Chief complaint:  Seizures    Interval history:  The patient is a 72 y.o. male seen previously for seizures.  He reports no further seizures since his last visit.  He was seen for suspected stroke at [facility] by Dr. Marley in June.  He was found to have carotid stenosis and is scheduled to undergo CEA next week.  Also of note, he has NPH S/P shunt placement.  He reports that his walking has worsened since his last shunt adjustment by neurosurgery.    History of present illness:  The patient is a 72 y.o. male referred for evaluation of episodes suspicious for seizures.  He has seen Dr. Greene for NPH previously.  This is my first time seeing him.  The events began "a couple of years back."  With respect to aura, the patient reports nothing.  His seizure is characterized by a loss consciousness.  Further details are not available as he is unconscious, and his wife has not witnessed one of these events.  He thinks that he may have had a seizure witnessed in [facility] shortly before he started seeing Dr. Greene; however, review of records reveals no mention of this.  This component of this spell lasts for a relatively brief period of time.  Afterwards, he reports no clear postictal state.  The patient has had no such events since some time around February of this year, he believes.    The patient also mentions that his gait has improved dramatically since his shunt placement.  He also feels like his memory has improved somewhat since the shunting.    Epilepsy risk factors:  Pregnancy/Labor/Delivery: None  Febrile seizures: None  Head injury: None  CNS infection: None     Stroke: None  Family Hx of Sz: None  Developmental delay: None    Current AEDs:  Keppra (unclear on dose of medication, records show 750 mg BID)    Prior AEDs:  Denies    AEDs not tried:  acetazolamide (Diamox, AZM)  amantadine  brivaracetam (Briviact)  carbamazepine (Tegretol, CBZ)  clobezam (Onfi or Frizium, " CLB)  ethosuximide (Zarontin, ESM)  eslicarbazine (Aptiom, ESL)  felbamate (felbatol, FBM)  gabapentin (Neurontin, GPN)  lacosamide (Vimpat, LCS)   lamotrigine (Lamictal, LTG)   methsuximide (Celontin, MSM)  methyphenytoin (Mesantion, MHT)  oxcarbazepine (Trileptal OXC)  perampanel (Fycompa, FCP)   phenobarbital (Pb)  phenytoin (Dilantin, PHT)  pregabalin (Lyrica, PGB)  primidone (Mysoline, PRM)  retigabine (Potiga, RTG)  rufinamide (Banzel, RUF)  tiagabine (Gabatril,  TGB)  topiramate (Topamax, TPM)  viagabatrin, (Sabril, VGB)  vagal nerve stimulator (VNS)  valproic acid (Depakote, VPA)  zonisamide (Zonegran, ZNA)  Benzodiazepines  diazepam - rectal (Diastatl)  diazepam - oral (Valium, DZ)  clonazepam (Klonopin, CZP)  clorazepate (Tranxene, CLZ)  Ativan    Past Medical History:   Diagnosis Date    Actinic keratoses     Altered mental status 3/22/2015    Anticoagulant long-term use     Atrial fibrillation     CAD (coronary artery disease)     stents after bypass    Carotid artery stenosis 3/31/2015    CHF (congestive heart failure) 03/2018    Colon polyps 2011    repeat colonoscopy 2014    Combined hyperlipidemia associated with type 2 diabetes mellitus     Diabetes mellitus type II, uncontrolled     dx 2004    GERD (gastroesophageal reflux disease)     Grand mal seizure     last 2/2017    Hard of hearing     History of cardiac pacemaker 3/31/2015    HTN (hypertension)     Hydrocephalus 09/08/2017    Influenza A 1/2/2018    He got influenza a recently and was in the hospital in [place].  He had rhabdomyolyisis and acute kidney injury.  He also had an increased troponin.  He had a flu shot in early December.    Mitral valve insufficiency     Presence of automatic (implantable) cardiac defibrillator     Sleep apnea with use of continuous positive airway pressure (CPAP)     patient states he does not use CPAP anymore    Syncope 3/30/2015    Wears dentures     upper and lower    Wears hearing aid      sometimes       Past Surgical History:   Procedure Laterality Date    CARDIAC PACEMAKER PLACEMENT      CATARACT EXTRACTION W/ INTRAOCULAR LENS  IMPLANT, BILATERAL      CORONARY ARTERY BYPASS GRAFT  1993    three vessels    CORONARY STENT PLACEMENT      CSF SHUNT      HERNIA REPAIR      as infant       Family History   Problem Relation Age of Onset    Stomach cancer Mother     Lung cancer Father     Diabetes Sister     Hypertension Sister     Heart disease Neg Hx     Stroke Neg Hx        Social History     Social History    Marital status:      Spouse name: N/A    Number of children: N/A    Years of education: N/A     Occupational History    Not on file.     Social History Main Topics    Smoking status: Former Smoker     Packs/day: 2.00     Years: 25.00     Types: Cigarettes     Quit date: 1/1/1983    Smokeless tobacco: Never Used      Comment: quit 1983     Alcohol use Yes      Comment: occasional    Drug use: No    Sexual activity: Not on file     Other Topics Concern    Not on file     Social History Narrative    No narrative on file        Review of patient's allergies indicates:  No Known Allergies     Review of Systems   General/Constitutional:  No unintentional weight loss, No change in appetite  Eyes/Vision:  No change in vision, No double vision  ENT:  No frequent nose bleeds, No ringing in the ears  Respiratory:  No cough, No wheezing  Cardiovascular:  No chest pain, No palpitations  Gastrointestinal:  No jaundice, No nausea/vomiting  Genitourinary:  No incontinence, No burning with urination  Hematologic/Lymphatic:  + easy bruising/bleeding, + night sweats  Neurological:  No numbness, No weakness  Endocrine:  No fatigue, No heat/cold intolerance  Allergy/Immunologic:  No fevers, No chills  Musculoskeletal:  No muscle pain, No joint pain   Psychiatric:  No thoughts of harming self/others, No depression  Integumentary:  No rashes, No sores that do not heal     Physical  "exam:  BP (!) 180/86 (BP Location: Left arm, Patient Position: Sitting, BP Method: Large (Automatic))   Pulse 80   Resp 16   Ht 5' 7" (1.702 m)   Wt 105 kg (231 lb 8 oz)   BMI 36.26 kg/m²   General: Well developed, well nourished.  No acute distress.  HEENT: Atraumatic, normocephalic.  Neck: Supple, trachea midline.  Cardiovascular: Regular rate and rhythm.  Pulmonary: No increased work of breathing.  Abdomen/GI: No guarding.  Musculoskeletal: No obvious joint deformities, moves all extremities well.    Neurological exam:  Mental status: Awake and alert.  Oriented x4.  Speech fluent and appropriate.  Recent and remote memory appear to be intact.  Fund of knowledge normal.  Cranial nerves: Pupils equal round and reactive to light, extraocular movements intact, facial strength and sensation intact bilaterally, palate and tongue midline, hearing grossly intact bilaterally.  Motor: 5 out of 5 strength throughout the upper and lower extremities bilaterally. Normal bulk and tone.  Sensation: Intact to light touch and temperature bilaterally.  DTR: 1+ at the knees and biceps bilaterally.  Coordination: Finger-nose-finger testing intact bilaterally.  Gait: Normal gait aside from mildly decreased left arm swing. Mild difficulty with tandem.    Data base:  Notes of the referring physician were reviewed.  Briefly summarized, these discussed his NPH and history of seizures.    Labs (7/18):  BMP: includes cr=2.3    CT brain (8/17, MRI not possible due to pacemaker):  "Stable appearance of the brain and ventricles in this patient with a right frontal ventriculostomy catheter in place and underlying moderate ventriculomegaly."  I independently visualized and interpreted this study.     EEG (3/17):  "IMPRESSION:  This is an abnormal EEG during wakefulness, drowsiness and sleep.  Left temporal focal slowing was noted.  CLINICAL CORRELATION:  The patient is a 71-year-old male who is being evaluated for history of seizures, who " "is currently maintained on Keppra.  This is an abnormal EEG during wakefulness, drowsiness and sleep.  The presence of focal slowing in the left temporal region is suggestive of focal neuronal dysfunction in this region.  There is no evidence of an epileptic process on this recording.  No seizures were recorded during this study.  Of note, the EKG channel revealed sinus rhythm with occasional PVCs."    Assessment and plan:  The patient is a 72 y.o. male referred for evaluation for events worrisome for seizures. Unfortunately, the available history is rather vague, resulting in a broad range of possibilities.  The differential includes seizures (most likely focal onset), syncope, and NEE. From a practical standpoint, the events are not presently occurring, so we will continue his Keppra.  We will need to watch his renal function over time and may need to reduce his dose should this worsen.  Should the events return, we will consider further evaluation, possibly including inpatient vEEG monitoring.  State law as it pertains to driving for individuals with seizures was discussed.  The patient was also counseled on seizure safety.     The patient's NPH appears to have been successfully treated with  shunting; however, he states that following the last adjustment of this device, his gait has worsened.  We will contact neurosurgery with this information in order to allow them to address this however they feel is most appropriate.    We will plan on seeing the patient back in a few months.    "

## 2018-07-25 NOTE — TELEPHONE ENCOUNTER
The patient was seen at [facility] on 6/24. He has a subdural hematoma and the shunt was increased to 2.5 to prevent worsening of subdural. Amisha discussed with the family that this may worsen NPH symptoms. I have also spoke with the patient on the phone a few days ago to explain this as well. I will call the patient again to explain. He is scheduled for a right CEA with Dr. Oconnor on 7/30.

## 2018-07-26 ENCOUNTER — HOSPITAL ENCOUNTER (OUTPATIENT)
Dept: RADIOLOGY | Facility: HOSPITAL | Age: 73
Discharge: HOME | End: 2018-07-26
Attending: PHYSICIAN ASSISTANT
Payer: MEDICARE

## 2018-07-26 ENCOUNTER — OFFICE VISIT (OUTPATIENT)
Dept: FAMILY MEDICINE | Facility: CLINIC | Age: 73
End: 2018-07-26
Payer: MEDICARE

## 2018-07-26 VITALS
DIASTOLIC BLOOD PRESSURE: 82 MMHG | SYSTOLIC BLOOD PRESSURE: 170 MMHG | WEIGHT: 233 LBS | HEART RATE: 80 BPM | TEMPERATURE: 99 F | HEIGHT: 67 IN | BODY MASS INDEX: 36.57 KG/M2

## 2018-07-26 DIAGNOSIS — I25.5 ISCHEMIC CARDIOMYOPATHY: ICD-10-CM

## 2018-07-26 DIAGNOSIS — E11.59 HYPERTENSION ASSOCIATED WITH DIABETES: ICD-10-CM

## 2018-07-26 DIAGNOSIS — Z01.818 PREOPERATIVE CLEARANCE: Primary | ICD-10-CM

## 2018-07-26 DIAGNOSIS — I65.23 BILATERAL CAROTID ARTERY STENOSIS: ICD-10-CM

## 2018-07-26 DIAGNOSIS — Z98.2 S/P VP SHUNT: ICD-10-CM

## 2018-07-26 DIAGNOSIS — I50.30 CHF WITH LEFT VENTRICULAR DIASTOLIC DYSFUNCTION, NYHA CLASS 2: ICD-10-CM

## 2018-07-26 DIAGNOSIS — E11.69 COMBINED HYPERLIPIDEMIA ASSOCIATED WITH TYPE 2 DIABETES MELLITUS: ICD-10-CM

## 2018-07-26 DIAGNOSIS — Z79.4 LONG TERM CURRENT USE OF INSULIN: ICD-10-CM

## 2018-07-26 DIAGNOSIS — Z95.1 S/P CABG (CORONARY ARTERY BYPASS GRAFT): ICD-10-CM

## 2018-07-26 DIAGNOSIS — I25.810 CORONARY ARTERY DISEASE INVOLVING CORONARY BYPASS GRAFT OF NATIVE HEART WITHOUT ANGINA PECTORIS: ICD-10-CM

## 2018-07-26 DIAGNOSIS — G40.909 SEIZURE DISORDER: ICD-10-CM

## 2018-07-26 DIAGNOSIS — I62.00 SUBDURAL HEMORRHAGE: ICD-10-CM

## 2018-07-26 DIAGNOSIS — I15.2 HYPERTENSION ASSOCIATED WITH DIABETES: ICD-10-CM

## 2018-07-26 DIAGNOSIS — I34.0 MITRAL VALVE INSUFFICIENCY, UNSPECIFIED ETIOLOGY: ICD-10-CM

## 2018-07-26 DIAGNOSIS — Z95.5 S/P CORONARY ARTERY STENT PLACEMENT: ICD-10-CM

## 2018-07-26 DIAGNOSIS — I65.09 VERTEBRAL ARTERY STENOSIS, UNSPECIFIED LATERALITY: ICD-10-CM

## 2018-07-26 DIAGNOSIS — Z95.810 PRESENCE OF BIVENTRICULAR AICD: ICD-10-CM

## 2018-07-26 DIAGNOSIS — I48.0 PAROXYSMAL ATRIAL FIBRILLATION: ICD-10-CM

## 2018-07-26 DIAGNOSIS — G45.8 OTHER SPECIFIED TRANSIENT CEREBRAL ISCHEMIAS: ICD-10-CM

## 2018-07-26 DIAGNOSIS — I62.03 CHRONIC SUBDURAL HEMATOMA: ICD-10-CM

## 2018-07-26 DIAGNOSIS — I11.0 HYPERTENSIVE HEART DISEASE WITH HEART FAILURE: ICD-10-CM

## 2018-07-26 DIAGNOSIS — E78.2 COMBINED HYPERLIPIDEMIA ASSOCIATED WITH TYPE 2 DIABETES MELLITUS: ICD-10-CM

## 2018-07-26 PROCEDURE — 3079F DIAST BP 80-89 MM HG: CPT | Mod: CPTII,S$GLB,, | Performed by: NURSE PRACTITIONER

## 2018-07-26 PROCEDURE — 3046F HEMOGLOBIN A1C LEVEL >9.0%: CPT | Mod: CPTII,S$GLB,, | Performed by: NURSE PRACTITIONER

## 2018-07-26 PROCEDURE — 3077F SYST BP >= 140 MM HG: CPT | Mod: CPTII,S$GLB,, | Performed by: NURSE PRACTITIONER

## 2018-07-26 PROCEDURE — 70450 CT HEAD/BRAIN W/O DYE: CPT | Mod: TC,PO

## 2018-07-26 PROCEDURE — 99215 OFFICE O/P EST HI 40 MIN: CPT | Mod: S$GLB,,, | Performed by: NURSE PRACTITIONER

## 2018-07-26 PROCEDURE — 70450 CT HEAD/BRAIN W/O DYE: CPT | Mod: 26,,, | Performed by: RADIOLOGY

## 2018-07-26 PROCEDURE — 99999 PR PBB SHADOW E&M-EST. PATIENT-LVL III: CPT | Mod: PBBFAC,,, | Performed by: NURSE PRACTITIONER

## 2018-07-26 NOTE — PROGRESS NOTES
Subjective:      Patient ID: Abdullahi Salazar is a 72 y.o. male.    Chief Complaint: Follow-up    HPI   Patient with Afib, TID, CAD s/p CABG, vertebral and carotid artery stenosis, HTN, sleep apnea,  shunt for hydrocephalus, chronic subdural hematoma, CKD 3, CHF, and uncontrolled diabetes to clinic for preop exam prior to having left CEA.  Dr. Oconnor is planning for left CEA on July 30th, 2018.  He has been cleared by cardiology and nephrology at this time.  His Toujeo was increased at last visit and he is currently at 73 units daily.  He forgot to bring his glucose readings but reports his fasting glucose was 140 this morning.  He is tolerating the increase well.  He has not taken any blood pressure medications this morning and his blood pressure reflects this.  He denies chest pain, palpitations, shortness of breath, headache, blurred vision.    The patient states that there has not been previous problems with sedation.     Past Medical History:  Past Medical History:   Diagnosis Date    Actinic keratoses     Altered mental status 3/22/2015    Anticoagulant long-term use     Atrial fibrillation     CAD (coronary artery disease)     stents after bypass    Carotid artery stenosis 3/31/2015    CHF (congestive heart failure) 03/2018    Colon polyps 2011    repeat colonoscopy 2014    Combined hyperlipidemia associated with type 2 diabetes mellitus     Diabetes mellitus type II, uncontrolled     dx 2004    GERD (gastroesophageal reflux disease)     Grand mal seizure     last 2/2017    Hard of hearing     History of cardiac pacemaker 3/31/2015    HTN (hypertension)     Hydrocephalus 09/08/2017    Influenza A 1/2/2018    He got influenza a recently and was in the hospital in [place].  He had rhabdomyolyisis and acute kidney injury.  He also had an increased troponin.  He had a flu shot in early December.    Mitral valve insufficiency     Presence of automatic (implantable) cardiac defibrillator     Sleep  "apnea with use of continuous positive airway pressure (CPAP)     patient states he does not use CPAP anymore    Syncope 3/30/2015    Wears dentures     upper and lower    Wears hearing aid     sometimes     Past Surgical History:   Procedure Laterality Date    CARDIAC PACEMAKER PLACEMENT      CATARACT EXTRACTION W/ INTRAOCULAR LENS  IMPLANT, BILATERAL      CORONARY ARTERY BYPASS GRAFT  1993    three vessels    CORONARY STENT PLACEMENT      CSF SHUNT      HERNIA REPAIR      as infant     Review of patient's allergies indicates:  No Known Allergies  Current Outpatient Prescriptions on File Prior to Visit   Medication Sig Dispense Refill    aspirin 325 MG tablet Take 1 tablet (325 mg total) by mouth once daily.  0    atorvastatin (LIPITOR) 80 MG tablet Take 1 tablet (80 mg total) by mouth once daily. 90 tablet 3    benazepril (LOTENSIN) 40 MG tablet Take 1 tablet (40 mg total) by mouth once daily. 90 tablet 3    blood sugar diagnostic (ACCU-CHEK CLEOPATRA PLUS TEST STRP) Strp TEST BLOOD SUGARS 4 TIMES DAILY 150 strip PRN    carvedilol (COREG) 25 MG tablet Take 1 tablet (25 mg total) by mouth 2 (two) times daily with meals. 180 tablet 3    [START ON 7/28/2018] chlorhexidine (PERIDEX) 0.12 % solution Use as directed 10 mLs in the mouth or throat 2 (two) times daily.      dabigatran etexilate (PRADAXA) 75 mg Cap Take 1 capsule (75 mg total) by mouth 2 (two) times daily. "Do NOT break, chew, or open capsules." 180 capsule 3    ezetimibe (ZETIA) 10 mg tablet TAKE 1 TABLET ONE TIME DAILY 90 tablet 3    hydrALAZINE (APRESOLINE) 100 MG tablet Take 1 tablet (100 mg total) by mouth 2 (two) times daily. 60 tablet 2    insulin aspart U-100 (NOVOLOG) 100 unit/mL InPn pen Humalog Sliding Scale tid before meals: 150-200 give 4 units 201-250 give 6 units 251-300 give 8 units 301-350 give 10 units > 350 give 12 units 15 mL 0    insulin glargine, TOUJEO, (TOUJEO SOLOSTAR U-300 INSULIN) 300 unit/mL (1.5 mL) InPn pen " "Inject 80 Units into the skin once daily. (Patient taking differently: Inject 80 Units into the skin once daily. ) 6 Syringe 0    isosorbide mononitrate (IMDUR) 60 MG 24 hr tablet Take 1 tablet (60 mg total) by mouth every evening. 90 tablet 3    lancets (ACCU-CHEK SOFTCLIX LANCETS) Misc 1 each by Misc.(Non-Drug; Combo Route) route 2 (two) times daily. 100 each 11    levETIRAcetam (KEPPRA) 500 MG Tab Take 1.5 tablets (750 mg total) by mouth 2 (two) times daily. 270 tablet 0    multivitamin capsule Take 1 capsule by mouth once daily.      [START ON 7/28/2018] mupirocin (BACTROBAN) 2 % ointment 1 g by Nasal route 2 (two) times daily.      nitroGLYCERIN (NITROSTAT) 0.4 MG SL tablet Place 1 tablet (0.4 mg total) under the tongue every 5 (five) minutes as needed for Chest pain. 100 tablet 11    pen needle, diabetic (BD ULTRA-FINE SHORT PEN NEEDLE) 31 gauge x 5/16" Ndle USE FOUR TIMES DAILY 360 each 3    potassium chloride (KLOR-CON) 10 MEQ TbSR Take 1 tablet (10 mEq total) by mouth once daily. 30 tablet 11    tamsulosin (FLOMAX) 0.4 mg Cp24 Take 1 capsule (0.4 mg total) by mouth once daily. 90 capsule 3    torsemide (DEMADEX) 20 MG Tab Take 1 tablet (20 mg total) by mouth every morning. 90 tablet 3     No current facility-administered medications on file prior to visit.      Social History     Social History    Marital status:      Spouse name: N/A    Number of children: N/A    Years of education: N/A     Occupational History    Not on file.     Social History Main Topics    Smoking status: Former Smoker     Packs/day: 2.00     Years: 25.00     Types: Cigarettes     Quit date: 1/1/1983    Smokeless tobacco: Never Used      Comment: quit 1983     Alcohol use Yes      Comment: occasional    Drug use: No    Sexual activity: Not on file     Other Topics Concern    Not on file     Social History Narrative    No narrative on file     Family History   Problem Relation Age of Onset    Stomach cancer " "Mother     Lung cancer Father     Diabetes Sister     Hypertension Sister     Heart disease Neg Hx     Stroke Neg Hx        Review of Systems   Constitutional: Negative for chills, fatigue and fever.   Respiratory: Negative for cough, shortness of breath and wheezing.    Cardiovascular: Negative for chest pain, palpitations and leg swelling.   Skin: Negative for rash and wound.   Neurological: Negative for weakness and numbness.   Psychiatric/Behavioral: The patient is not nervous/anxious.        Objective:     BP (!) 170/82 Comment: manual bp--has not taken any medications this morning  Pulse 80   Temp 98.8 °F (37.1 °C) (Oral)   Ht 5' 7" (1.702 m)   Wt 105.7 kg (233 lb)   BMI 36.49 kg/m²     Physical Exam   Constitutional: He is oriented to person, place, and time. He appears well-developed and well-nourished.   HENT:   Head: Normocephalic.       Eyes: Pupils are equal, round, and reactive to light.   Neck: Normal range of motion. Neck supple. Carotid bruit is present (bilaterally).   Cardiovascular: Normal rate, regular rhythm and normal heart sounds.    Pulmonary/Chest: Effort normal and breath sounds normal. No respiratory distress. He has no decreased breath sounds. He has no wheezes. He has no rhonchi. He has no rales.   Musculoskeletal: He exhibits edema (2+ BLE ).   Neurological: He is alert and oriented to person, place, and time.   Skin: Skin is warm and dry. No rash noted.   Psychiatric: He has a normal mood and affect. His behavior is normal. Judgment and thought content normal.   Vitals reviewed.    Assessment:     1. Preoperative clearance    2. Bilateral carotid artery stenosis    3. Hypertension associated with diabetes    4. Mitral valve insufficiency, unspecified etiology    5. S/P coronary artery stent placement    6. Coronary artery disease involving coronary bypass graft of native heart without angina pectoris    7. Combined hyperlipidemia associated with type 2 diabetes mellitus    8. " Ischemic cardiomyopathy    9. Paroxysmal atrial fibrillation    10. Vertebral artery stenosis, unspecified laterality    11. Chronic subdural hematoma    12. S/P  shunt    13. Seizure disorder    14. Other specified transient cerebral ischemias    15. Hypertensive heart disease with heart failure    16. CHF with left ventricular diastolic dysfunction, NYHA class 2    17. Presence of biventricular AICD    18. Uncontrolled type 2 diabetes mellitus with complication, with long-term current use of insulin    19. Long term current use of insulin    20. S/P CABG (coronary artery bypass graft)        Plan:     Problem List Items Addressed This Visit        Neuro    Seizure disorder    TIA (transient ischemic attack)    Chronic subdural hematoma    S/P  shunt       Cardiac/Vascular    Hypertensive heart disease with heart failure    Hypertension associated with diabetes    CHF with left ventricular diastolic dysfunction, NYHA class 2    Mitral valve insufficiency    Presence of biventricular AICD    Vertebral artery stenosis    Coronary artery disease involving coronary bypass graft of native heart without angina pectoris    Combined hyperlipidemia associated with type 2 diabetes mellitus    S/P CABG (coronary artery bypass graft)    S/P coronary artery stent placement    Ischemic cardiomyopathy    Paroxysmal atrial fibrillation    Bilateral carotid artery stenosis       Endocrine    Long term current use of insulin      Other Visit Diagnoses     Preoperative clearance    -  Primary    Uncontrolled type 2 diabetes mellitus with complication, with long-term current use of insulin          Patient has been cleared by Cardiology to undergo medically necessary procedure.  He has been seen by Dr. Sosa with Nephrology and has been cleared to undergo medically necessary procedure.  He will increase his Toujeo to 76 units and monitor his glucose daily.  Dr. Oconnor plans for insulin drip during surgery if necessary.  He is to  go home and take his blood pressure medications and return to clinic tomorrow for blood pressure recheck with the nurse.  He is cleared from an internal medicine standpoint for this medically necessary procedure.  Encouraged him to follow reduced sodium, diabetic diet, and exercise.  RTC in one month.    Follow-up in about 1 month (around 8/26/2018), or if symptoms worsen or fail to improve.        Parts of this note was dictated using voice recognition software. Please excuse any grammatical or typographical errors.

## 2018-07-27 ENCOUNTER — TELEPHONE (OUTPATIENT)
Dept: NEPHROLOGY | Facility: CLINIC | Age: 73
End: 2018-07-27

## 2018-07-27 ENCOUNTER — TELEPHONE (OUTPATIENT)
Dept: FAMILY MEDICINE | Facility: CLINIC | Age: 73
End: 2018-07-27

## 2018-07-27 ENCOUNTER — LAB VISIT (OUTPATIENT)
Dept: LAB | Facility: HOSPITAL | Age: 73
End: 2018-07-27
Attending: PHYSICIAN ASSISTANT
Payer: MEDICARE

## 2018-07-27 ENCOUNTER — PATIENT MESSAGE (OUTPATIENT)
Dept: FAMILY MEDICINE | Facility: CLINIC | Age: 73
End: 2018-07-27

## 2018-07-27 ENCOUNTER — CLINICAL SUPPORT (OUTPATIENT)
Dept: FAMILY MEDICINE | Facility: CLINIC | Age: 73
End: 2018-07-27
Payer: MEDICARE

## 2018-07-27 VITALS — HEART RATE: 80 BPM | SYSTOLIC BLOOD PRESSURE: 148 MMHG | DIASTOLIC BLOOD PRESSURE: 82 MMHG

## 2018-07-27 DIAGNOSIS — R79.1 ABNORMAL COAGULATION PROFILE: ICD-10-CM

## 2018-07-27 DIAGNOSIS — Z01.30 BLOOD PRESSURE CHECK: Primary | ICD-10-CM

## 2018-07-27 LAB
APTT BLDCRRT: 34.3 SEC
INR PPP: 1.2
PROTHROMBIN TIME: 12.2 SEC

## 2018-07-27 PROCEDURE — 36415 COLL VENOUS BLD VENIPUNCTURE: CPT | Mod: PO

## 2018-07-27 PROCEDURE — 99499 UNLISTED E&M SERVICE: CPT | Mod: S$GLB,,, | Performed by: FAMILY MEDICINE

## 2018-07-27 PROCEDURE — 99999 PR PBB SHADOW E&M-EST. PATIENT-LVL III: CPT | Mod: PBBFAC,,,

## 2018-07-27 PROCEDURE — 85610 PROTHROMBIN TIME: CPT | Mod: PO

## 2018-07-27 PROCEDURE — 85730 THROMBOPLASTIN TIME PARTIAL: CPT | Mod: PO

## 2018-07-27 RX ORDER — ISOSORBIDE MONONITRATE 120 MG/1
120 TABLET, EXTENDED RELEASE ORAL DAILY
Qty: 30 TABLET | Refills: 1 | Status: SHIPPED | OUTPATIENT
Start: 2018-07-27 | End: 2019-06-24 | Stop reason: SDUPTHER

## 2018-07-27 NOTE — TELEPHONE ENCOUNTER
----- Message from Alo Lewis sent at 7/27/2018  4:40 PM CDT -----  Contact: nirmal (daughter)  Pt needs to discuss pt's Rx before surgery           [phone]

## 2018-07-27 NOTE — TELEPHONE ENCOUNTER
----- Message from Angle Taylor sent at 7/27/2018  4:19 PM CDT -----  Contact: nirmal/daughter  returned call rg pt....[phone] (home)

## 2018-07-27 NOTE — PROGRESS NOTES
Pt here for blood pressure check. Taken to left arm with automatic bp machine 155/82 p81. Waited 5 minutes then taken with manual 148/82 to left arm.

## 2018-07-27 NOTE — TELEPHONE ENCOUNTER
----- Message from Madeleine Pena sent at 7/27/2018  3:38 PM CDT -----  Contact: Leydi/daughter [phone]  States that she is returning call. Please call back at [phone]//thank you acc

## 2018-07-27 NOTE — TELEPHONE ENCOUNTER
From a nephrology standpoint there is no contraindication for his upcoming CEA.    Please avoid NSAIDs, Jamison-2 inhibitors and Demerol for pain control in the setting of his Chronic Kidney Disease.

## 2018-07-27 NOTE — TELEPHONE ENCOUNTER
----- Message from Jo Murphy RN sent at 7/27/2018  8:28 AM CDT -----  Regarding: Nephrology Clearance  Family medicine states that patient has been cleared by your office in their notes. Can I get verification? I've been unable to find it in writing from your office. Thank you! ~Jo

## 2018-07-28 ENCOUNTER — NURSE TRIAGE (OUTPATIENT)
Dept: ADMINISTRATIVE | Facility: CLINIC | Age: 73
End: 2018-07-28

## 2018-07-29 PROBLEM — Z86.79 HX OF SUBDURAL HEMATOMA: Status: ACTIVE | Noted: 2018-07-29

## 2018-07-29 PROBLEM — I50.33 ACUTE ON CHRONIC DIASTOLIC CHF (CONGESTIVE HEART FAILURE): Status: ACTIVE | Noted: 2018-07-29

## 2018-07-29 NOTE — TELEPHONE ENCOUNTER
"surg on Monday. Gained almost 20 lbs over past 9 days.     Reason for Disposition   [1] Difficulty breathing with exertion (e.g., walking) AND [2] new onset or worsening    Answer Assessment - Initial Assessment Questions  1. ONSET: "When did the swelling start?" (e.g., minutes, hours, days)    Swollen, all over, stoic. Pt wants surg. +SOB with exertion   2. LOCATION: "What part of the leg is swollen?"  "Are both legs swollen or just one leg?"     All over   3. SEVERITY: "How bad is the swelling?" (e.g., localized; mild, moderate, severe)   - Localized - small area of swelling localized to one leg   - MILD pedal edema - swelling limited to foot and ankle, pitting edema < 1/4 inch (6 mm) deep, rest and elevation eliminate most or all swelling   - MODERATE edema - swelling of lower leg to knee, pitting edema > 1/4 inch (6 mm) deep, rest and elevation only partially reduce swelling   - SEVERE edema - swelling extends above knee, facial or hand swelling present      Severe , mostly in bed   4. REDNESS: "Does the swelling look red or infected?"       5. PAIN: "Is the swelling painful to touch?" If so, ask: "How painful is it?"   (Scale 1-10; mild, moderate or severe)      Doesn't complain, doesn't eat much   6. FEVER: "Do you have a fever?" If so, ask: "What is it, how was it measured, and when did it start?"      afeb   7. CAUSE: "What do you think is causing the leg swelling?"     Heart   8. MEDICAL HISTORY: "Do you have a history of heart failure, kidney disease, liver failure, or cancer?"     Heart, kidney, DM,   9. RECURRENT SYMPTOM: "Have you had leg swelling before?" If so, ask: "When was the last time?" "What happened that time?"     Yes , admitted 9 times over past year   10. OTHER SYMPTOMS: "Do you have any other symptoms?" (e.g., chest pain, difficulty breathing)      SOB    Protocols used:  LEG SWELLING AND EDEMA-A-  rec ED due to edema, SOB with exertion. Son in law states that he will take pt to [facility]. " Harry back with questions.

## 2018-07-30 ENCOUNTER — OUTPATIENT CASE MANAGEMENT (OUTPATIENT)
Dept: ADMINISTRATIVE | Facility: OTHER | Age: 73
End: 2018-07-30

## 2018-07-30 ENCOUNTER — TELEPHONE (OUTPATIENT)
Dept: NEUROSURGERY | Facility: CLINIC | Age: 73
End: 2018-07-30

## 2018-07-30 DIAGNOSIS — E11.69 TYPE 2 DIABETES MELLITUS WITH OTHER SPECIFIED COMPLICATION, WITH LONG-TERM CURRENT USE OF INSULIN: ICD-10-CM

## 2018-07-30 DIAGNOSIS — Z79.4 TYPE 2 DIABETES MELLITUS WITH OTHER SPECIFIED COMPLICATION, WITH LONG-TERM CURRENT USE OF INSULIN: ICD-10-CM

## 2018-07-30 DIAGNOSIS — I50.9 CONGESTIVE HEART FAILURE, UNSPECIFIED HF CHRONICITY, UNSPECIFIED HEART FAILURE TYPE: Primary | ICD-10-CM

## 2018-07-30 PROBLEM — E66.01 CLASS 2 SEVERE OBESITY DUE TO EXCESS CALORIES WITH SERIOUS COMORBIDITY AND BODY MASS INDEX (BMI) OF 36.0 TO 36.9 IN ADULT: Status: ACTIVE | Noted: 2017-04-12

## 2018-07-30 LAB
AV DELAY - LONGEST: 140 MSEC
AV DELAY - SHORTEST: 110 MSEC
BATTERY VOLTAGE (V): 2.92 V
CHARGE TIME (SEC): 9.6 SEC
HV IMPEDANCE (OHM): 79 OHM
IMPEDANCE RA LEAD (DONOR): 348 OHMS
IMPEDANCE RA LEAD (NATIVE): 652 OHMS
IMPEDANCE RA LEAD: 580 OHMS
OHS CV DC PP MS1: 0.4 MS
OHS CV DC PP MS2: 0.4 MS
OHS CV DC PP MS3: 0.4 MS
OHS CV DC PP V1: NORMAL V
OHS CV DC PP V2: NORMAL V
OHS CV DC PP V3: NORMAL V
P/R-WAVE RA LEAD (DONOR): 24.4 MV
P/R-WAVE RA LEAD (NATIVE): 24.2 MV
P/R-WAVE RA LEAD: NORMAL MV
PV DELAY - LONGEST: 110 MSEC
PV DELAY - SHORTEST: 80 MSEC
THRESHOLD MS RA LEAD (NATIVE): 0.4 MS
THRESHOLD MS RA LEAD: 0.4 MS
THRESHOLD V RA LEAD (NATIVE): 1.4 V
THRESHOLD V RA LEAD: 0.4 V
VV DELAY: 0 MSEC

## 2018-07-30 NOTE — PROGRESS NOTES
7/30/18-1st Attempt to complete initial assessment for Outpatient Care Management. Chart review completed and patient currently admitted to [facility] at this time. Will attempt to contact patient at a later date.

## 2018-07-30 NOTE — TELEPHONE ENCOUNTER
----- Message from Amisha Keith PA-C sent at 7/30/2018 11:26 AM CDT -----  Wondering if we can get cardiac clearance in the next 2 weeks and reschedule him in a month. Patient right CEA cancelled today due to CHF exacerbation. Please make sure he is in with acrdiology and has a clearance chart note in EPIC before we proceed with rescheduling.

## 2018-07-31 NOTE — TELEPHONE ENCOUNTER
You can contact him to let him know he has been scheduled this Friday 8/3/18 with Dr Silva at 9:30

## 2018-07-31 NOTE — TELEPHONE ENCOUNTER
Dr Escamilla stated he needs an appointment before he is cleared. He has an appointment on 8/22. If that is too late we can get him scheduled with one of the other providers

## 2018-08-01 PROBLEM — N17.9 ACUTE RENAL FAILURE SYNDROME: Status: RESOLVED | Noted: 2018-01-02 | Resolved: 2018-08-01

## 2018-08-03 ENCOUNTER — OFFICE VISIT (OUTPATIENT)
Dept: CARDIOLOGY | Facility: CLINIC | Age: 73
End: 2018-08-03
Payer: MEDICARE

## 2018-08-03 VITALS
WEIGHT: 230.19 LBS | HEIGHT: 67 IN | DIASTOLIC BLOOD PRESSURE: 103 MMHG | BODY MASS INDEX: 36.13 KG/M2 | SYSTOLIC BLOOD PRESSURE: 201 MMHG | HEART RATE: 79 BPM

## 2018-08-03 DIAGNOSIS — I50.33 ACUTE ON CHRONIC DIASTOLIC CHF (CONGESTIVE HEART FAILURE): ICD-10-CM

## 2018-08-03 DIAGNOSIS — Z95.5 S/P CORONARY ARTERY STENT PLACEMENT: ICD-10-CM

## 2018-08-03 DIAGNOSIS — I15.2 HYPERTENSION ASSOCIATED WITH DIABETES: ICD-10-CM

## 2018-08-03 DIAGNOSIS — I25.5 ISCHEMIC CARDIOMYOPATHY: ICD-10-CM

## 2018-08-03 DIAGNOSIS — E66.01 CLASS 2 SEVERE OBESITY DUE TO EXCESS CALORIES WITH SERIOUS COMORBIDITY AND BODY MASS INDEX (BMI) OF 36.0 TO 36.9 IN ADULT: ICD-10-CM

## 2018-08-03 DIAGNOSIS — E11.59 HYPERTENSION ASSOCIATED WITH DIABETES: ICD-10-CM

## 2018-08-03 DIAGNOSIS — I25.810 CORONARY ARTERY DISEASE INVOLVING CORONARY BYPASS GRAFT OF NATIVE HEART WITHOUT ANGINA PECTORIS: ICD-10-CM

## 2018-08-03 DIAGNOSIS — I10 ACCELERATED HYPERTENSION: ICD-10-CM

## 2018-08-03 DIAGNOSIS — Z98.2 S/P VP SHUNT: ICD-10-CM

## 2018-08-03 DIAGNOSIS — Z01.810 PREOP CARDIOVASCULAR EXAM: ICD-10-CM

## 2018-08-03 DIAGNOSIS — I62.03 CHRONIC SUBDURAL HEMATOMA: Primary | ICD-10-CM

## 2018-08-03 DIAGNOSIS — I35.0 AORTIC STENOSIS, MODERATE: ICD-10-CM

## 2018-08-03 DIAGNOSIS — Z95.1 S/P CABG (CORONARY ARTERY BYPASS GRAFT): ICD-10-CM

## 2018-08-03 DIAGNOSIS — I48.0 PAROXYSMAL ATRIAL FIBRILLATION: ICD-10-CM

## 2018-08-03 DIAGNOSIS — Z95.810 PRESENCE OF BIVENTRICULAR AICD: ICD-10-CM

## 2018-08-03 PROCEDURE — 3077F SYST BP >= 140 MM HG: CPT | Mod: CPTII,S$GLB,, | Performed by: INTERNAL MEDICINE

## 2018-08-03 PROCEDURE — 99999 PR PBB SHADOW E&M-EST. PATIENT-LVL III: CPT | Mod: PBBFAC,,, | Performed by: INTERNAL MEDICINE

## 2018-08-03 PROCEDURE — 99214 OFFICE O/P EST MOD 30 MIN: CPT | Mod: S$GLB,,, | Performed by: INTERNAL MEDICINE

## 2018-08-03 PROCEDURE — 3080F DIAST BP >= 90 MM HG: CPT | Mod: CPTII,S$GLB,, | Performed by: INTERNAL MEDICINE

## 2018-08-03 PROCEDURE — 3046F HEMOGLOBIN A1C LEVEL >9.0%: CPT | Mod: CPTII,S$GLB,, | Performed by: INTERNAL MEDICINE

## 2018-08-03 NOTE — PROGRESS NOTES
Subjective:    Patient ID:  Abdullahi Salazar is a 72 y.o. male who presents for evaluation of cardiac clearance (CEA - pt of Dr. Rodney ); Shortness of Breath; Congestive Heart Failure; and Hypertension      HPI72 yo WM with multiple medical issues recently in the hospital with fluid retention, accelerated BP and renal insufficiency. Has multiple vascular problems including CABG, coronary stents, and carotid disease. Patient tentatively schedule for carotid enterotomy. Still has intermittent CP and chronic SOB. BP high today and did not take any of his medications.    Review of Systems   Constitution: Negative for decreased appetite, fever, weakness, malaise/fatigue, weight gain and weight loss.   HENT: Negative for hearing loss and nosebleeds.    Eyes: Negative for visual disturbance.   Cardiovascular: Positive for chest pain and leg swelling. Negative for claudication, cyanosis, dyspnea on exertion, irregular heartbeat, near-syncope, orthopnea, palpitations, paroxysmal nocturnal dyspnea and syncope.   Respiratory: Positive for shortness of breath. Negative for cough, hemoptysis, sleep disturbances due to breathing, snoring and wheezing.    Endocrine: Negative for cold intolerance, heat intolerance, polydipsia and polyuria.   Hematologic/Lymphatic: Negative for adenopathy and bleeding problem. Does not bruise/bleed easily.   Skin: Negative for color change, itching, poor wound healing, rash and suspicious lesions.   Musculoskeletal: Negative for arthritis, back pain, falls, joint pain, joint swelling, muscle cramps, muscle weakness and myalgias.   Gastrointestinal: Negative for bloating, abdominal pain, change in bowel habit, constipation, flatus, heartburn, hematemesis, hematochezia, hemorrhoids, jaundice, melena, nausea and vomiting.   Genitourinary: Negative for bladder incontinence, decreased libido, frequency, hematuria, hesitancy and urgency.   Neurological: Negative for brief paralysis, difficulty with  "concentration, excessive daytime sleepiness, dizziness, focal weakness, headaches, light-headedness, loss of balance, numbness and vertigo.   Psychiatric/Behavioral: Positive for memory loss. Negative for altered mental status and depression. The patient does not have insomnia and is not nervous/anxious.    Allergic/Immunologic: Negative for environmental allergies, hives and persistent infections.        Objective:    Physical Exam   Constitutional: He is oriented to person, place, and time. He appears well-developed and well-nourished. No distress.   BP (!) 201/103 (BP Location: Left arm, Patient Position: Sitting, BP Method: Large (Automatic))   Pulse 79   Ht 5' 7" (1.702 m)   Wt 104.4 kg (230 lb 2.6 oz)   BMI 36.05 kg/m²      HENT:   Head: Normocephalic and atraumatic.   Eyes: Conjunctivae and lids are normal. Pupils are equal, round, and reactive to light. Right eye exhibits no discharge. No scleral icterus.   Neck: Normal range of motion. Neck supple. No JVD present. No tracheal deviation present. No thyromegaly present.   Cardiovascular: Normal rate, regular rhythm, S1 normal, S2 normal, normal heart sounds and intact distal pulses.  Exam reveals no gallop and no friction rub.    No murmur heard.  Pulses:       Carotid pulses are 2+ on the right side, and 2+ on the left side.       Radial pulses are 2+ on the right side, and 2+ on the left side.        Femoral pulses are 2+ on the right side, and 2+ on the left side.       Popliteal pulses are 2+ on the right side, and 2+ on the left side.        Dorsalis pedis pulses are 2+ on the right side, and 2+ on the left side.        Posterior tibial pulses are 2+ on the right side, and 2+ on the left side.   Pulmonary/Chest: Effort normal and breath sounds normal. No respiratory distress. He has no wheezes. He has no rales. He exhibits no tenderness.   AICD  Sternal scar   Abdominal: Soft. Bowel sounds are normal. He exhibits no distension and no mass. There is " no hepatosplenomegaly or hepatomegaly. There is no tenderness. There is no rebound and no guarding.   Musculoskeletal: Normal range of motion. He exhibits edema. He exhibits no tenderness.   Trace   Lymphadenopathy:     He has no cervical adenopathy.   Neurological: He is alert and oriented to person, place, and time. He has normal reflexes. No cranial nerve deficit. Coordination normal.   Skin: Skin is warm and dry. No rash noted. He is not diaphoretic. No erythema. No pallor.   Psychiatric: He has a normal mood and affect. His speech is normal and behavior is normal. Judgment and thought content normal. Cognition and memory are normal.         Assessment:       1. Chronic subdural hematoma    2. S/P  shunt    3. Accelerated hypertension    4. Acute on chronic diastolic CHF (congestive heart failure)    5. Aortic stenosis, moderate    6. Coronary artery disease involving coronary bypass graft of native heart without angina pectoris    7. Hypertension associated with diabetes    8. Ischemic cardiomyopathy    9. Paroxysmal atrial fibrillation    10. Preop cardiovascular exam    11. Presence of biventricular AICD    12. S/P CABG (coronary artery bypass graft)    13. S/P coronary artery stent placement    14. Class 2 severe obesity due to excess calories with serious comorbidity and body mass index (BMI) of 36.0 to 36.9 in adult         Plan:     This patient is high risk for surgery do to multiple medical and vascular issues including long term anticoagulation and recurrent HTN and fluid issues    Cannot adjust meds because did not take them this AM    Need to strongly consider risk vs benefit because regardless of optimal medical care patient will be at risk for complications    No orders of the defined types were placed in this encounter.    Follow-up if symptoms worsen or fail to improve.

## 2018-08-04 NOTE — PROGRESS NOTES
Subjective:       Patient ID: Abdullahi Salazar is a 72 y.o. White male who presents for follow-up evaluation of Chronic Kidney Disease    HPI     He is seen for surgical clearance for CEA. He reports he is doing well. LE about the same. No LUTS. Good appetite with last Hba1c of 12.4      Review of Systems   Constitutional: Negative for activity change, appetite change, fatigue and unexpected weight change.   HENT: Negative for facial swelling.    Respiratory: Negative for cough and shortness of breath.    Cardiovascular: Positive for leg swelling. Negative for chest pain.   Gastrointestinal: Negative for constipation and diarrhea.   Genitourinary: Positive for frequency. Negative for difficulty urinating, dysuria and hematuria.   Musculoskeletal: Positive for arthralgias and gait problem.   Neurological: Positive for weakness. Negative for light-headedness and headaches.       Objective:      Physical Exam   Constitutional: He is oriented to person, place, and time. He appears well-developed and well-nourished.   Neck: No JVD present.   Cardiovascular: S1 normal and S2 normal.  Exam reveals no friction rub.    Pulmonary/Chest: Breath sounds normal. He has no wheezes. He has no rales.   Abdominal: Soft.   Musculoskeletal: He exhibits edema.   Neurological: He is alert and oriented to person, place, and time.   Skin: Skin is warm and dry.   Psychiatric: He has a normal mood and affect.   Vitals reviewed.      Assessment:       1. CKD (chronic kidney disease) stage 3, GFR 30-59 ml/min    2. CKD (chronic kidney disease) stage 4, GFR 15-29 ml/min    3. Uncontrolled type 2 diabetes mellitus with complication, with long-term current use of insulin    4. Proteinuria due to type 2 diabetes mellitus    5. Type 2 diabetes mellitus with diabetic nephropathy, without long-term current use of insulin    6. Ischemic cardiomyopathy    7. Paroxysmal atrial fibrillation    8. Accelerated hypertension    9. S/P CABG (coronary artery  bypass graft)    10. Hypertension associated with diabetes    11. Hypertensive heart disease with heart failure    12. S/P  shunt    13. Hx of subdural hematoma    14. Edema of both legs    15. Class 2 severe obesity due to excess calories with serious comorbidity and body mass index (BMI) of 36.0 to 36.9 in adult    16. Acute on chronic diastolic CHF (congestive heart failure)    17. Chronic diastolic heart failure    18. Vertebral artery stenosis, unspecified laterality    19. Mitral valve insufficiency, unspecified etiology    20. Stenosis of right carotid artery        Plan:             CKD stage 3/4 with stable kidney function.  Continue RAAS blockade (benazepril) for renal preservation    HTN--controlled    MBD--PTH elevated due to SHPT    DM--poorly controlled. Continue endocrine and PCP follow up    CAD--continue cardiology follow up    From a nephrology standpoint there is no contraindication for upcoming CEA      RTC 4 months with labs prior in [place]

## 2018-08-06 ENCOUNTER — OUTPATIENT CASE MANAGEMENT (OUTPATIENT)
Dept: ADMINISTRATIVE | Facility: OTHER | Age: 73
End: 2018-08-06

## 2018-08-06 NOTE — PROGRESS NOTES
8/6/18-2nd attempt to complete initial assessment for OPCM. Unable to leave message as voicemail box is full. Will attempt to contact patient at a later date.

## 2018-08-07 ENCOUNTER — OUTPATIENT CASE MANAGEMENT (OUTPATIENT)
Dept: ADMINISTRATIVE | Facility: OTHER | Age: 73
End: 2018-08-07

## 2018-08-07 NOTE — PROGRESS NOTES
8/7/18-3rd attempt to complete initial assessment for OPCM. Unable to leave message as voicemail box is full. As this is my third unsuccessful attempt to complete initial assessment for OPCM,I will close case.  I will mail a letter with OPCM contact information.

## 2018-08-07 NOTE — LETTER
August 7, 2018    Abdullahi J Marie  [address]  [place] [address]             [facility] [address] [place] [address] Dear  Marie,    My name is Delfino Millet. I work with [facility] Outpatient Case Management Department. We received a referral to call you to discuss your medical history. These services are free of charge and are offered to [facility] patients who have recently been discharged from any of our facilities or who have complex medical conditions that may require skill of a nurse to assist with management.    I am a Registered Nurse who specializes in connecting patients with available medical and financial resources as well as addressing any educational needs that may be indicated.    I attempted to reach you by telephone, but I was unsuccessful. Please call our department so that we can go over some questions regarding your health.     The Outpatient Case Management Department can be reached at [phone] from 8:00AM to 4:30PM on Monday thru Friday. [facility] also has a program where a nurse is available 24/7 to answer questions or provide medical advice: their number is [phone].      Thank You,      Delfino Baeza, RN  Outpatient Case Management

## 2018-08-08 ENCOUNTER — OFFICE VISIT (OUTPATIENT)
Dept: FAMILY MEDICINE | Facility: CLINIC | Age: 73
End: 2018-08-08
Payer: MEDICARE

## 2018-08-08 VITALS
TEMPERATURE: 98 F | BODY MASS INDEX: 35.31 KG/M2 | WEIGHT: 225 LBS | HEIGHT: 67 IN | SYSTOLIC BLOOD PRESSURE: 228 MMHG | HEART RATE: 84 BPM | DIASTOLIC BLOOD PRESSURE: 100 MMHG

## 2018-08-08 DIAGNOSIS — R06.09 DYSPNEA ON EXERTION: Primary | ICD-10-CM

## 2018-08-08 DIAGNOSIS — I11.0 HYPERTENSIVE HEART DISEASE WITH HEART FAILURE: ICD-10-CM

## 2018-08-08 DIAGNOSIS — R11.10 VOMITING, INTRACTABILITY OF VOMITING NOT SPECIFIED, PRESENCE OF NAUSEA NOT SPECIFIED, UNSPECIFIED VOMITING TYPE: ICD-10-CM

## 2018-08-08 PROCEDURE — 3077F SYST BP >= 140 MM HG: CPT | Mod: CPTII,S$GLB,, | Performed by: NURSE PRACTITIONER

## 2018-08-08 PROCEDURE — 99999 PR PBB SHADOW E&M-EST. PATIENT-LVL III: CPT | Mod: PBBFAC,,, | Performed by: NURSE PRACTITIONER

## 2018-08-08 PROCEDURE — 3080F DIAST BP >= 90 MM HG: CPT | Mod: CPTII,S$GLB,, | Performed by: NURSE PRACTITIONER

## 2018-08-08 PROCEDURE — 99215 OFFICE O/P EST HI 40 MIN: CPT | Mod: S$GLB,,, | Performed by: NURSE PRACTITIONER

## 2018-08-08 NOTE — PROGRESS NOTES
Subjective:       Patient ID: Abdullahi Salazar is a 72 y.o. male.    Chief Complaint: Shortness of Breath and Emesis    Pt comes into the office accompanied by his wife.  Shortness of Breath   This is a new problem. The current episode started in the past 7 days. The problem occurs daily (several episodes daily). The problem has been waxing and waning. Associated symptoms include headaches and vomiting. Pertinent negatives include no abdominal pain, chest pain, ear pain, fever, leg swelling, rash, sore throat or sputum production. The symptoms are aggravated by any activity. Associated symptoms comments: weakness. He has tried rest for the symptoms. The treatment provided no relief. His past medical history is significant for CAD and a heart failure.   Emesis    This is a new problem. The current episode started in the past 7 days. The problem occurs 2 to 4 times per day. The problem has been waxing and waning. Associated symptoms include headaches. Pertinent negatives include no abdominal pain, arthralgias, chest pain, chills, coughing, diarrhea, dizziness, fever or myalgias. Risk factors: chronic subdural hematoma, hypertension. He has tried bed rest for the symptoms. The treatment provided no relief.   Recently discharged for CHF    Review of Systems   Constitutional: Positive for activity change and appetite change. Negative for chills, fatigue, fever and unexpected weight change.   HENT: Negative for ear pain and sore throat.    Eyes: Negative.  Negative for pain and visual disturbance.   Respiratory: Positive for shortness of breath. Negative for cough and sputum production.    Cardiovascular: Negative for chest pain, palpitations and leg swelling.   Gastrointestinal: Positive for vomiting. Negative for abdominal pain, diarrhea and nausea.   Genitourinary: Negative for dysuria and frequency.   Musculoskeletal: Negative for arthralgias and myalgias.   Skin: Negative for color change and rash.   Neurological:  "Positive for weakness and headaches. Negative for dizziness.   Psychiatric/Behavioral: Negative for sleep disturbance. The patient is not nervous/anxious.        Vitals:    08/08/18 0715   BP: (!) 228/100   Pulse: 84   Temp: 98.4 °F (36.9 °C)       Objective:     Current Outpatient Prescriptions   Medication Sig Dispense Refill    aspirin (ECOTRIN) 81 MG EC tablet Take 1 tablet (81 mg total) by mouth once daily.  0    atorvastatin (LIPITOR) 40 MG tablet Take 1 tablet (40 mg total) by mouth once daily. 90 tablet 3    benazepril (LOTENSIN) 40 MG tablet Take 1 tablet (40 mg total) by mouth once daily. 90 tablet 3    blood sugar diagnostic (ACCU-CHEK CLEOPATRA PLUS TEST STRP) Strp TEST BLOOD SUGARS 4 TIMES DAILY 150 strip PRN    carvedilol (COREG) 25 MG tablet Take 1 tablet (25 mg total) by mouth 2 (two) times daily with meals. 180 tablet 3    dabigatran etexilate (PRADAXA) 75 mg Cap Take 1 capsule (75 mg total) by mouth 2 (two) times daily. "Do NOT break, chew, or open capsules." 180 capsule 3    ezetimibe (ZETIA) 10 mg tablet TAKE 1 TABLET ONE TIME DAILY 90 tablet 3    hydrALAZINE (APRESOLINE) 100 MG tablet Take 1 tablet (100 mg total) by mouth 2 (two) times daily. 60 tablet 2    insulin aspart U-100 (NOVOLOG) 100 unit/mL InPn pen Humalog Sliding Scale tid before meals: 150-200 give 4 units 201-250 give 6 units 251-300 give 8 units 301-350 give 10 units > 350 give 12 units 15 mL 0    insulin glargine, TOUJEO, (TOUJEO SOLOSTAR U-300 INSULIN) 300 unit/mL (1.5 mL) InPn pen Inject 48 Units into the skin once daily. 6 Syringe 0    isosorbide mononitrate (IMDUR) 120 MG 24 hr tablet Take 1 tablet (120 mg total) by mouth once daily. 30 tablet 1    lancets (ACCU-CHEK SOFTCLIX LANCETS) Misc 1 each by Misc.(Non-Drug; Combo Route) route 2 (two) times daily. 100 each 11    levETIRAcetam (KEPPRA) 500 MG Tab Take 1.5 tablets (750 mg total) by mouth 2 (two) times daily. 270 tablet 0    multivitamin capsule Take 1 capsule by " "mouth once daily.      nitroGLYCERIN (NITROSTAT) 0.4 MG SL tablet Place 1 tablet (0.4 mg total) under the tongue every 5 (five) minutes as needed for Chest pain. 100 tablet 11    pen needle, diabetic (BD ULTRA-FINE SHORT PEN NEEDLE) 31 gauge x 5/16" Ndle USE FOUR TIMES DAILY 360 each 3    polyethylene glycol (GLYCOLAX) 17 gram PwPk Take 17 g by mouth once daily. 30 packet 0    potassium chloride (KLOR-CON) 10 MEQ TbSR Take 1 tablet (10 mEq total) by mouth once daily. 30 tablet 11    tamsulosin (FLOMAX) 0.4 mg Cp24 Take 1 capsule (0.4 mg total) by mouth once daily. 90 capsule 3    torsemide (DEMADEX) 20 MG Tab Take 1 tablet (20 mg total) by mouth every morning. 90 tablet 3     No current facility-administered medications for this visit.        Physical Exam   Constitutional: He is oriented to person, place, and time. He appears well-developed. No distress.   HENT:   Head: Normocephalic and atraumatic.   Eyes: EOM are normal. Pupils are equal, round, and reactive to light.   Neck: Normal range of motion. Neck supple. No JVD present. Carotid bruit is not present.   Cardiovascular: Normal rate and regular rhythm.    Pulmonary/Chest: Effort normal. He has decreased breath sounds.   Pulse ox 95% on RA   Abdominal: Soft. Normal appearance and bowel sounds are normal.   Musculoskeletal: Normal range of motion.   Neurological: He is alert and oriented to person, place, and time.   Skin: Skin is warm and dry. No rash noted.   Psychiatric: He has a normal mood and affect. Thought content normal.   Nursing note and vitals reviewed.      Assessment:       1. Dyspnea on exertion    2. Vomiting, intractability of vomiting not specified, presence of nausea not specified, unspecified vomiting type    3. Hypertensive heart disease with heart failure        Plan:   Dyspnea on exertion    Vomiting, intractability of vomiting not specified, presence of nausea not specified, unspecified vomiting type    Hypertensive heart disease " with heart failure      Pt instructed to go to the ER at [facility] for workup  POC discussed with Dr Hernandez  No Follow-up on file.    There are no Patient Instructions on file for this visit.

## 2018-08-09 ENCOUNTER — LAB VISIT (OUTPATIENT)
Dept: LAB | Facility: HOSPITAL | Age: 73
End: 2018-08-09
Attending: FAMILY MEDICINE
Payer: MEDICARE

## 2018-08-09 ENCOUNTER — TELEPHONE (OUTPATIENT)
Dept: FAMILY MEDICINE | Facility: CLINIC | Age: 73
End: 2018-08-09

## 2018-08-09 DIAGNOSIS — D64.9 ANEMIA, UNSPECIFIED TYPE: ICD-10-CM

## 2018-08-09 LAB
ANION GAP SERPL CALC-SCNC: 11 MMOL/L
BASOPHILS # BLD AUTO: 0.06 K/UL
BASOPHILS NFR BLD: 0.9 %
BUN SERPL-MCNC: 35 MG/DL
CALCIUM SERPL-MCNC: 9.3 MG/DL
CHLORIDE SERPL-SCNC: 102 MMOL/L
CO2 SERPL-SCNC: 28 MMOL/L
CREAT SERPL-MCNC: 2 MG/DL
DIFFERENTIAL METHOD: ABNORMAL
EOSINOPHIL # BLD AUTO: 0.1 K/UL
EOSINOPHIL NFR BLD: 1.4 %
ERYTHROCYTE [DISTWIDTH] IN BLOOD BY AUTOMATED COUNT: 14.7 %
EST. GFR  (AFRICAN AMERICAN): 36.9 ML/MIN/1.73 M^2
EST. GFR  (NON AFRICAN AMERICAN): 31.9 ML/MIN/1.73 M^2
GLUCOSE SERPL-MCNC: 137 MG/DL
HCT VFR BLD AUTO: 39.3 %
HGB BLD-MCNC: 12 G/DL
IMM GRANULOCYTES # BLD AUTO: 0.01 K/UL
IMM GRANULOCYTES NFR BLD AUTO: 0.2 %
LYMPHOCYTES # BLD AUTO: 1.4 K/UL
LYMPHOCYTES NFR BLD: 22.2 %
MCH RBC QN AUTO: 31.1 PG
MCHC RBC AUTO-ENTMCNC: 30.5 G/DL
MCV RBC AUTO: 102 FL
MONOCYTES # BLD AUTO: 0.8 K/UL
MONOCYTES NFR BLD: 12.2 %
NEUTROPHILS # BLD AUTO: 4.1 K/UL
NEUTROPHILS NFR BLD: 63.1 %
NRBC BLD-RTO: 0 /100 WBC
PLATELET # BLD AUTO: 141 K/UL
PMV BLD AUTO: 10.1 FL
POTASSIUM SERPL-SCNC: 4.4 MMOL/L
RBC # BLD AUTO: 3.86 M/UL
SODIUM SERPL-SCNC: 141 MMOL/L
WBC # BLD AUTO: 6.5 K/UL

## 2018-08-09 PROCEDURE — 36415 COLL VENOUS BLD VENIPUNCTURE: CPT | Mod: PO

## 2018-08-09 PROCEDURE — 80048 BASIC METABOLIC PNL TOTAL CA: CPT

## 2018-08-09 PROCEDURE — 85025 COMPLETE CBC W/AUTO DIFF WBC: CPT

## 2018-08-09 NOTE — TELEPHONE ENCOUNTER
----- Message from García Contreras sent at 8/9/2018  8:26 AM CDT -----  Contact: self [phone]  Pt needs an appt lidia/ David on 08/15 for a follow up, doesn't want to see NP.

## 2018-08-09 NOTE — TELEPHONE ENCOUNTER
----- Message from García Contreras sent at 8/9/2018  8:26 AM CDT -----  Contact: self [phone]  Pt needs an appt lidia/ David on 08/15 for a follow up, doesn't want to see NP.

## 2018-08-10 ENCOUNTER — TELEPHONE (OUTPATIENT)
Dept: FAMILY MEDICINE | Facility: CLINIC | Age: 73
End: 2018-08-10

## 2018-08-10 NOTE — TELEPHONE ENCOUNTER
----- Message from Padmini Owen NP sent at 8/9/2018  4:23 PM CDT -----  I see where he was admitted to [facility] for congestive heart failure, please check with Santo and find out what needs to be done in order for a TCC follow-up to be scheduled

## 2018-08-10 NOTE — TELEPHONE ENCOUNTER
Notified Caity. Was advised that TCC follow ups are handled by another dept. Caity stated that she would keep them advised. Pt viewed results through [employer].

## 2018-08-11 ENCOUNTER — PATIENT MESSAGE (OUTPATIENT)
Dept: NEUROSURGERY | Facility: CLINIC | Age: 73
End: 2018-08-11

## 2018-08-14 ENCOUNTER — PATIENT MESSAGE (OUTPATIENT)
Dept: CARDIOLOGY | Facility: CLINIC | Age: 73
End: 2018-08-14

## 2018-08-14 ENCOUNTER — OFFICE VISIT (OUTPATIENT)
Dept: CARDIOLOGY | Facility: CLINIC | Age: 73
End: 2018-08-14
Payer: MEDICARE

## 2018-08-14 VITALS
SYSTOLIC BLOOD PRESSURE: 194 MMHG | WEIGHT: 224 LBS | HEIGHT: 67 IN | DIASTOLIC BLOOD PRESSURE: 102 MMHG | HEART RATE: 80 BPM | BODY MASS INDEX: 35.16 KG/M2

## 2018-08-14 DIAGNOSIS — I50.30 CHF WITH LEFT VENTRICULAR DIASTOLIC DYSFUNCTION, NYHA CLASS 2: Primary | ICD-10-CM

## 2018-08-14 DIAGNOSIS — Z95.1 S/P CABG (CORONARY ARTERY BYPASS GRAFT): ICD-10-CM

## 2018-08-14 DIAGNOSIS — Z95.810 PRESENCE OF BIVENTRICULAR AICD: ICD-10-CM

## 2018-08-14 DIAGNOSIS — I15.2 HYPERTENSION ASSOCIATED WITH DIABETES: ICD-10-CM

## 2018-08-14 DIAGNOSIS — I34.0 MITRAL VALVE INSUFFICIENCY, UNSPECIFIED ETIOLOGY: ICD-10-CM

## 2018-08-14 DIAGNOSIS — I35.0 AORTIC STENOSIS, MODERATE: ICD-10-CM

## 2018-08-14 DIAGNOSIS — I48.0 PAROXYSMAL ATRIAL FIBRILLATION: ICD-10-CM

## 2018-08-14 DIAGNOSIS — E11.59 HYPERTENSION ASSOCIATED WITH DIABETES: ICD-10-CM

## 2018-08-14 DIAGNOSIS — I49.5 SINUS NODE DYSFUNCTION: ICD-10-CM

## 2018-08-14 PROCEDURE — 3080F DIAST BP >= 90 MM HG: CPT | Mod: CPTII,S$GLB,, | Performed by: INTERNAL MEDICINE

## 2018-08-14 PROCEDURE — 3077F SYST BP >= 140 MM HG: CPT | Mod: CPTII,S$GLB,, | Performed by: INTERNAL MEDICINE

## 2018-08-14 PROCEDURE — 99999 PR PBB SHADOW E&M-EST. PATIENT-LVL IV: CPT | Mod: PBBFAC,,, | Performed by: INTERNAL MEDICINE

## 2018-08-14 PROCEDURE — 99214 OFFICE O/P EST MOD 30 MIN: CPT | Mod: S$GLB,,, | Performed by: INTERNAL MEDICINE

## 2018-08-14 PROCEDURE — 3046F HEMOGLOBIN A1C LEVEL >9.0%: CPT | Mod: CPTII,S$GLB,, | Performed by: INTERNAL MEDICINE

## 2018-08-15 ENCOUNTER — TELEPHONE (OUTPATIENT)
Dept: NEUROSURGERY | Facility: CLINIC | Age: 73
End: 2018-08-15

## 2018-08-15 NOTE — TELEPHONE ENCOUNTER
Pt is reschedule for right CEA on 8/27. I was reviewing cardiology consults he has had over the past month and he is still having cardiac issues and he has been to the ED twice in the past few weeks due to his CHF and other issues. Please advise on rescheduling his CEA procedure if and when.

## 2018-08-15 NOTE — PROGRESS NOTES
Subjective:    Patient ID:  Abdullahi Salazar is a 72 y.o. male who presents for follow-up of CHF    HPI  Recently admitted to [facility] due to worsening SOB, leg swelling, treated medically with IV diuresis and medication adjustments  He's in the process of having CEA and is asking me for a second opinion about this  He comes for follow up with some shortness of breath, though he states he did not take any of his medicines today.  FC II    Review of Systems   Constitution: Negative for decreased appetite, weakness, malaise/fatigue, weight gain and weight loss.   Cardiovascular: Positive for leg swelling. Negative for chest pain, dyspnea on exertion, palpitations and syncope.   Respiratory: Positive for shortness of breath. Negative for cough.    Gastrointestinal: Negative.    All other systems reviewed and are negative.       Objective:      Physical Exam   Constitutional: He is oriented to person, place, and time. He appears well-developed and well-nourished.   HENT:   Head: Normocephalic.   Eyes: Pupils are equal, round, and reactive to light.   Neck: Normal range of motion. Neck supple. No JVD present. Carotid bruit is not present. No thyromegaly present.   Cardiovascular: Normal rate, intact distal pulses and normal pulses. An irregularly irregular rhythm present. PMI is not displaced. Exam reveals no gallop.   Murmur heard.   Harsh midsystolic murmur is present with a grade of 2/6 at the upper right sternal border radiating to the neck.  High-pitched blowing holosystolic murmur of grade 2/6 is also present at the apex.  Pulmonary/Chest: Effort normal and breath sounds normal.   Abdominal: Soft. Normal appearance. He exhibits no mass. There is no hepatosplenomegaly. There is no tenderness.   Musculoskeletal: Normal range of motion. He exhibits no edema.   Neurological: He is alert and oriented to person, place, and time. He has normal strength and normal reflexes. No sensory deficit.   Skin: Skin is warm and intact.    Psychiatric: He has a normal mood and affect.   Nursing note and vitals reviewed.        Assessment:       1. CHF with left ventricular diastolic dysfunction, NYHA class 2    2. Sinus node dysfunction    3. Hypertension associated with diabetes    4. Mitral valve insufficiency, unspecified etiology    5. Presence of biventricular AICD    6. S/P CABG (coronary artery bypass graft)    7. Aortic stenosis, moderate    8. Paroxysmal atrial fibrillation         Plan:   Extra demadex qod  Continue all other cardiac medications  Will set him up to see vascular surgeon for second opinion  Weight loss  2 week f/u

## 2018-08-21 ENCOUNTER — OFFICE VISIT (OUTPATIENT)
Dept: VASCULAR SURGERY | Facility: CLINIC | Age: 73
End: 2018-08-21
Payer: MEDICARE

## 2018-08-21 VITALS
DIASTOLIC BLOOD PRESSURE: 75 MMHG | WEIGHT: 219 LBS | HEIGHT: 67 IN | SYSTOLIC BLOOD PRESSURE: 156 MMHG | BODY MASS INDEX: 34.37 KG/M2 | HEART RATE: 80 BPM

## 2018-08-21 DIAGNOSIS — I65.23 BILATERAL CAROTID ARTERY STENOSIS: Primary | ICD-10-CM

## 2018-08-21 PROCEDURE — 99499 UNLISTED E&M SERVICE: CPT | Mod: HCNC,S$GLB,, | Performed by: THORACIC SURGERY (CARDIOTHORACIC VASCULAR SURGERY)

## 2018-08-21 PROCEDURE — 99215 OFFICE O/P EST HI 40 MIN: CPT | Mod: S$GLB,,, | Performed by: THORACIC SURGERY (CARDIOTHORACIC VASCULAR SURGERY)

## 2018-08-21 PROCEDURE — 99999 PR PBB SHADOW E&M-EST. PATIENT-LVL III: CPT | Mod: PBBFAC,,, | Performed by: THORACIC SURGERY (CARDIOTHORACIC VASCULAR SURGERY)

## 2018-08-21 PROCEDURE — 3077F SYST BP >= 140 MM HG: CPT | Mod: CPTII,S$GLB,, | Performed by: THORACIC SURGERY (CARDIOTHORACIC VASCULAR SURGERY)

## 2018-08-21 PROCEDURE — 3078F DIAST BP <80 MM HG: CPT | Mod: CPTII,S$GLB,, | Performed by: THORACIC SURGERY (CARDIOTHORACIC VASCULAR SURGERY)

## 2018-08-22 ENCOUNTER — TELEPHONE (OUTPATIENT)
Dept: NEUROSURGERY | Facility: CLINIC | Age: 73
End: 2018-08-22

## 2018-08-22 ENCOUNTER — DOCUMENTATION ONLY (OUTPATIENT)
Dept: CARDIOLOGY | Facility: CLINIC | Age: 73
End: 2018-08-22

## 2018-08-22 ENCOUNTER — INITIAL CONSULT (OUTPATIENT)
Dept: CARDIOLOGY | Facility: CLINIC | Age: 73
End: 2018-08-22
Payer: MEDICARE

## 2018-08-22 VITALS
DIASTOLIC BLOOD PRESSURE: 78 MMHG | HEIGHT: 67 IN | HEART RATE: 80 BPM | WEIGHT: 217.38 LBS | SYSTOLIC BLOOD PRESSURE: 159 MMHG | OXYGEN SATURATION: 97 % | BODY MASS INDEX: 34.12 KG/M2

## 2018-08-22 DIAGNOSIS — E11.59 HYPERTENSION ASSOCIATED WITH DIABETES: ICD-10-CM

## 2018-08-22 DIAGNOSIS — G45.8 OTHER SPECIFIED TRANSIENT CEREBRAL ISCHEMIAS: Primary | ICD-10-CM

## 2018-08-22 DIAGNOSIS — I65.21 STENOSIS OF RIGHT CAROTID ARTERY: ICD-10-CM

## 2018-08-22 DIAGNOSIS — I35.0 AORTIC STENOSIS, MODERATE: ICD-10-CM

## 2018-08-22 DIAGNOSIS — I15.2 HYPERTENSION ASSOCIATED WITH DIABETES: ICD-10-CM

## 2018-08-22 PROCEDURE — 99215 OFFICE O/P EST HI 40 MIN: CPT | Mod: S$GLB,,, | Performed by: INTERNAL MEDICINE

## 2018-08-22 PROCEDURE — 3077F SYST BP >= 140 MM HG: CPT | Mod: CPTII,S$GLB,, | Performed by: INTERNAL MEDICINE

## 2018-08-22 PROCEDURE — 99999 PR PBB SHADOW E&M-EST. PATIENT-LVL III: CPT | Mod: PBBFAC,,, | Performed by: INTERNAL MEDICINE

## 2018-08-22 PROCEDURE — 3078F DIAST BP <80 MM HG: CPT | Mod: CPTII,S$GLB,, | Performed by: INTERNAL MEDICINE

## 2018-08-22 PROCEDURE — 3046F HEMOGLOBIN A1C LEVEL >9.0%: CPT | Mod: CPTII,S$GLB,, | Performed by: INTERNAL MEDICINE

## 2018-08-22 RX ORDER — DIPHENHYDRAMINE HCL 25 MG
50 CAPSULE ORAL ONCE
Status: CANCELLED | OUTPATIENT
Start: 2018-08-22 | End: 2018-08-22

## 2018-08-22 RX ORDER — SODIUM CHLORIDE 9 MG/ML
3 INJECTION, SOLUTION INTRAVENOUS CONTINUOUS
Status: CANCELLED | OUTPATIENT
Start: 2018-08-22 | End: 2018-08-22

## 2018-08-22 NOTE — TELEPHONE ENCOUNTER
Abdullahi Salazar will be having surgery on 8/27/18 with Dr. Oconnor, Neurosurgeon, at [facility]. We are reaching out to obtain a surgical clearance from Dr. Francis and Dr. Rodriguez to ensure the safety of our patient. The surgery is a right carotid edarterectomy and will be under general anesthesia. This procedure typically lasts approximately 2 hours. We request that the patient hold all anticoagulant/antiinflammatory medications for 5-7 days prior to surgery. Please let us know if our office can be of further assistance.

## 2018-08-22 NOTE — PROGRESS NOTES
CHIEF COMPLAINT:   Chief Complaint   Patient presents with    Carotid Artery Disease     Shira-2nd opinion       REFERRING PROVIDER: patient    Reason for consult: evaluation of right carotid stenosis    Subjective:     Patient is a 72 y.o. male with recent findings of progressive moderate to severe right internal carotid artery stenosis. Patient and family report that he may have had a transient ischemic attack although they are unsure of the complete nature of his symptoms. This is partially secondary to neurologic symptoms related to chronic subdural hematomas and hydrocephalus for which she has undergone  shunt placement Currently patient has no lateralizing neurologic complaints. He does have some mild memory deficit intermittently. He is scheduled for right carotid endarterectomy next week with neurosurgery. He has had several hospital admissions over the last couple of months related to acute congestive heart failure. This is related to history of coronary artery disease and a cardiomyopathy. Currently he has no edema, and he is not short of breath. He has had several presentatons to the emergency room over the last 2 months withexacerbation of congestive heart failure.    Patient Active Problem List    Diagnosis Date Noted    Acute on chronic diastolic CHF (congestive heart failure) 07/29/2018    Hx of subdural hematoma 07/29/2018    Elevated troponin level     Preop cardiovascular exam 07/19/2018    Bilateral carotid artery stenosis 06/25/2018    S/P  shunt     TIA (transient ischemic attack) 06/19/2018    Chronic subdural hematoma 06/19/2018    Paroxysmal atrial fibrillation 03/01/2018    Chronic diastolic heart failure 03/01/2018    Edema of both legs     Ischemic cardiomyopathy 02/25/2018    Accelerated hypertension 02/21/2018    Non-traumatic rhabdomyolysis 01/02/2018    Influenza A 01/02/2018    Aortic stenosis, moderate 08/17/2017    S/P CABG (coronary artery bypass graft)      S/P coronary artery stent placement     Normal pressure hydrocephalus 08/08/2017    Dementia associated with other underlying disease without behavioral disturbance 08/08/2017    Combined hyperlipidemia associated with type 2 diabetes mellitus 04/12/2017    Class 2 severe obesity due to excess calories with serious comorbidity and body mass index (BMI) of 36.0 to 36.9 in adult 04/12/2017    Type 2 diabetes mellitus with renal complication 03/10/2017    Type 2 diabetes mellitus with stage 3 chronic kidney disease 03/10/2017    Urinary frequency 03/01/2017    Memory loss 03/01/2017    Benign non-nodular prostatic hyperplasia with lower urinary tract symptoms 03/01/2017    Proteinuria due to type 2 diabetes mellitus 03/01/2017    CKD (chronic kidney disease) stage 4, GFR 15-29 ml/min 09/22/2016    Chronic fatigue 09/19/2016    Atherosclerosis of native artery of both lower extremities with intermittent claudication 08/24/2016    Sleep apnea in adult 02/19/2016    Coronary artery disease involving coronary bypass graft of native heart without angina pectoris 02/17/2016    Seizure disorder 02/16/2016    Hyponatremia 02/16/2016    Generalized weakness 02/15/2016    Cough 02/15/2016    Vertebral artery stenosis 04/20/2015    Carotid artery stenosis 03/31/2015    Presence of biventricular AICD 03/31/2015    Mitral valve insufficiency     Abnormal CT scan 03/23/2015    Colon polyps 03/17/2015    Diverticulosis of colon 03/17/2015    Arrhythmia     Long term current use of insulin 01/21/2015    CHF with left ventricular diastolic dysfunction, NYHA class 2 01/09/2015    Hypertensive heart disease with heart failure 01/05/2015    Hypertension associated with diabetes 01/05/2015    History of colon polyps 01/05/2015    Muscle weakness of lower extremity 04/11/2014    Diabetes mellitus type II, uncontrolled     GERD (gastroesophageal reflux disease)     Sinus node dysfunction 12/09/2011     Gait difficulty 03/01/2011     Past Medical History:   Diagnosis Date    Actinic keratoses     Altered mental status 3/22/2015    Anticoagulant long-term use     Atrial fibrillation     CAD (coronary artery disease)     stents after bypass    Carotid artery stenosis 3/31/2015    CHF (congestive heart failure) 03/2018    Colon polyps 2011    repeat colonoscopy 2014    Combined hyperlipidemia associated with type 2 diabetes mellitus     Diabetes mellitus type II, uncontrolled     dx 2004    GERD (gastroesophageal reflux disease)     Grand mal seizure     last 2/2017    Hard of hearing     History of cardiac pacemaker 3/31/2015    HTN (hypertension)     Hydrocephalus 09/08/2017    Influenza A 1/2/2018    He got influenza a recently and was in the hospital in [place].  He had rhabdomyolyisis and acute kidney injury.  He also had an increased troponin.  He had a flu shot in early December.    Mitral valve insufficiency     Presence of automatic (implantable) cardiac defibrillator     Sleep apnea with use of continuous positive airway pressure (CPAP)     patient states he does not use CPAP anymore    Syncope 3/30/2015    Wears dentures     upper and lower    Wears hearing aid     sometimes      Past Surgical History:   Procedure Laterality Date    CARDIAC PACEMAKER PLACEMENT      CATARACT EXTRACTION W/ INTRAOCULAR LENS  IMPLANT, BILATERAL      CORONARY ARTERY BYPASS GRAFT  1993    three vessels    CORONARY STENT PLACEMENT      CSF SHUNT      HERNIA REPAIR      as infant      Medication List with Changes/Refills   Current Medications    ASPIRIN (ECOTRIN) 81 MG EC TABLET    Take 1 tablet (81 mg total) by mouth once daily.    ATORVASTATIN (LIPITOR) 40 MG TABLET    Take 1 tablet (40 mg total) by mouth once daily.    BENAZEPRIL (LOTENSIN) 40 MG TABLET    Take 1 tablet (40 mg total) by mouth once daily.    BLOOD SUGAR DIAGNOSTIC (ACCU-CHEK CLEOPATRA PLUS TEST STRP) STRP    TEST BLOOD SUGARS 4 TIMES  "DAILY    CARVEDILOL (COREG) 25 MG TABLET    Take 1 tablet (25 mg total) by mouth 2 (two) times daily with meals.    DABIGATRAN ETEXILATE (PRADAXA) 75 MG CAP    Take 1 capsule (75 mg total) by mouth 2 (two) times daily. "Do NOT break, chew, or open capsules."    EZETIMIBE (ZETIA) 10 MG TABLET    TAKE 1 TABLET ONE TIME DAILY    HYDRALAZINE (APRESOLINE) 100 MG TABLET    Take 1 tablet (100 mg total) by mouth 2 (two) times daily.    INSULIN ASPART U-100 (NOVOLOG) 100 UNIT/ML INPN PEN    Humalog Sliding Scale tid before meals: 150-200 give 4 units 201-250 give 6 units 251-300 give 8 units 301-350 give 10 units > 350 give 12 units    INSULIN GLARGINE, TOUJEO, (TOUJEO SOLOSTAR U-300 INSULIN) 300 UNIT/ML (1.5 ML) INPN PEN    Inject 48 Units into the skin once daily.    ISOSORBIDE MONONITRATE (IMDUR) 120 MG 24 HR TABLET    Take 1 tablet (120 mg total) by mouth once daily.    LANCETS (ACCU-CHEK SOFTCLIX LANCETS) MISC    1 each by Misc.(Non-Drug; Combo Route) route 2 (two) times daily.    LEVETIRACETAM (KEPPRA) 500 MG TAB    Take 1.5 tablets (750 mg total) by mouth 2 (two) times daily.    MULTIVITAMIN CAPSULE    Take 1 capsule by mouth once daily.    NITROGLYCERIN (NITROSTAT) 0.4 MG SL TABLET    Place 1 tablet (0.4 mg total) under the tongue every 5 (five) minutes as needed for Chest pain.    PEN NEEDLE, DIABETIC (BD ULTRA-FINE SHORT PEN NEEDLE) 31 GAUGE X 5/16" NDLE    USE FOUR TIMES DAILY    POLYETHYLENE GLYCOL (GLYCOLAX) 17 GRAM PWPK    Take 17 g by mouth once daily.    POTASSIUM CHLORIDE (KLOR-CON) 10 MEQ TBSR    Take 1 tablet (10 mEq total) by mouth once daily.    TAMSULOSIN (FLOMAX) 0.4 MG CP24    Take 1 capsule (0.4 mg total) by mouth once daily.    TORSEMIDE (DEMADEX) 20 MG TAB    Take 1 tablet (20 mg total) by mouth every morning.     Review of patient's allergies indicates:  No Known Allergies   Social History     Tobacco Use    Smoking status: Former Smoker     Packs/day: 2.00     Years: 25.00     Pack years: 50.00 " "    Types: Cigarettes     Last attempt to quit: 1983     Years since quittin.6    Smokeless tobacco: Never Used    Tobacco comment: quit     Substance Use Topics    Alcohol use: Yes     Comment: occasional      Family History   Problem Relation Age of Onset    Stomach cancer Mother     Lung cancer Father     Diabetes Sister     Hypertension Sister     Heart disease Neg Hx     Stroke Neg Hx       Review of Systems  General: no fevers, chills, night sweats. He has mild fatigue.  HEENT: No new visual field changes or hoarseness. No significant headaches.  Neck: No complaints.  Respiratory: Mild shortness of breath on exerth or hemoptysis.  Cardiac:ositive edema although this has improved currently. Occasional palpitations. No angina.  GI: No complaints.  : No complaints.  Musculoskeletal: No current edema. No significant arthralgias.  Neurologic: Positive balance difficulty. Positive mild memory deficit. No lateralizing complaints.    Objective:     Vitals:    18 1317   BP: (!) 156/75   Pulse: 80   Weight: 99.3 kg (219 lb)   Height: 5' 7" (1.702 m)   PainSc: 0-No pain     Gen.: Well-nourished, well-developed man in no obvious distress.  HEENT: There is a shunt in the right side of his head. Pupils are reactive. Extraocular movements are intact.  Neck: Trachea is midline. There are bilateral carotid bruits more obvious on the right.  Heart: Regular rate an and rhythm. No rub or murmur.  Chest: Pacer generator is in place. There is a sternotomy scar.  Lungs: Clear bilaterally.  Abdomen: Normal bowel sounds.  Extremities: Surgical scars are present on the legs from saphenous vein graft harvest.there is no significant edema.  Neurologic: Alert and oriented ×4.There are no focal mor deficits alexi.    Data Review:   Lab Results   Component Value Date    WBC 6.50 2018    HGB 12.0 (L) 2018    HCT 39.3 (L) 2018     (H) 2018     (L) 2018   ,   BMP   Lab " Results   Component Value Date     08/09/2018    K 4.4 08/09/2018     08/09/2018    CO2 28 08/09/2018    BUN 35 (H) 08/09/2018    CREATININE 2.0 (H) 08/09/2018    CALCIUM 9.3 08/09/2018    ANIONGAP 11 08/09/2018    ESTGFRAFRICA 36.9 (A) 08/09/2018    EGFRNONAA 31.9 (A) 08/09/2018   ,   CMP  Sodium   Date Value Ref Range Status   08/09/2018 141 136 - 145 mmol/L Final     Potassium   Date Value Ref Range Status   08/09/2018 4.4 3.5 - 5.1 mmol/L Final     Chloride   Date Value Ref Range Status   08/09/2018 102 95 - 110 mmol/L Final     CO2   Date Value Ref Range Status   08/09/2018 28 23 - 29 mmol/L Final     Glucose   Date Value Ref Range Status   08/09/2018 137 (H) 70 - 110 mg/dL Final     BUN, Bld   Date Value Ref Range Status   08/09/2018 35 (H) 8 - 23 mg/dL Final     Creatinine   Date Value Ref Range Status   08/09/2018 2.0 (H) 0.5 - 1.4 mg/dL Final     Calcium   Date Value Ref Range Status   08/09/2018 9.3 8.7 - 10.5 mg/dL Final     Total Protein   Date Value Ref Range Status   08/08/2018 7.9 6.0 - 8.4 g/dL Final     Albumin   Date Value Ref Range Status   08/08/2018 4.6 3.5 - 5.2 g/dL Final     Total Bilirubin   Date Value Ref Range Status   08/08/2018 2.5 (H) 0.2 - 1.3 mg/dL Final     Alkaline Phosphatase   Date Value Ref Range Status   08/08/2018 116 38 - 145 U/L Final     AST ([place])   Date Value Ref Range Status   02/15/2016 47 17 - 59 U/L Final     AST   Date Value Ref Range Status   08/08/2018 49 17 - 59 U/L Final     ALT   Date Value Ref Range Status   08/08/2018 53 (H) 10 - 44 U/L Final     Anion Gap   Date Value Ref Range Status   08/09/2018 11 8 - 16 mmol/L Final     eGFR if    Date Value Ref Range Status   08/09/2018 36.9 (A) >60 mL/min/1.73 m^2 Final     eGFR if non    Date Value Ref Range Status   08/09/2018 31.9 (A) >60 mL/min/1.73 m^2 Final     Comment:     Calculation used to obtain the estimated glomerular filtration  rate (eGFR) is the  CKD-EPI equation.      ,   Lab Results   Component Value Date    INR 1.2 07/27/2018    INR 1.8 07/17/2018    INR 1.3 06/19/2018    INR 1.3 06/19/2018       ECG:No results found for this visit on 08/21/18.    Chest X-Ray: No results found for this visit on 08/21/18.No results found for this visit on 08/21/18.    CT Scan:   CTA Head and Neck (xpd)   Order: [id number]   Status:  Final result   Visible to patient:  Yes (Patient Portal) Next appt:  Today at 03:00 PM in Cardiology (Mariano Francis MD)   Details     Reading Physician Reading Date Result Priority   Kamaljit Ponce MD 6/19/2018       Narrative     EXAMINATION:  CTA HEAD AND NECK (XPD)    CLINICAL HISTORY:  Cerebral ischemia.    TECHNIQUE:  Axial CTA images of the head and neck were obtained with intravenous contrast.  Coronal and sagittal reformations were obtained.  3D reconstructions were obtained.  3D reconstructions were obtained.  Automated exposure control utilized to reduce radiation dose.  Total exam DLP is 792 mGy cm.    COMPARISON:  04/15/2015 CTA neck    FINDINGS:  There is atherosclerotic plaque aortic arch and branch vessels.  There is no flow-limiting stenosis at the origins of the common carotid arteries or subclavian arteries.  Calcified and noncalcified plaque demonstrated within the carotid bulbs and internal carotid arteries.  There is approximately 70% stenosis at the proximal right ICA with intermittent stenoses identified along the course of the cervical ICA with stenoses approximating between 50 and 70% within the segmental stenosis.  Atherosclerotic plaque demonstrated within the intracranial right ICA without flow-limiting stenosis identified.  The right MCA demonstrates no flow-limiting stenosis.  The right SMITH A1 segment is either hypoplastic, occluded, or significantly stenosed.  The A2 segment fills via the anterior communicating artery however.  The left proximal ICA demonstrates approximately 40-50% stenosis.  Intracranial  ICA demonstrates no flow limiting stenosis.  The left MCA and SMITH are patent without flow-limiting stenosis.    The left vertebral artery is dominant.  There is approximately 50% stenosis left vertebral artery at the C5-C6 level.  There is no flow-limiting stenosis at the left vertebral artery origin.  No flow-limiting stenosis of the left intracranial vertebral artery.  The right vertebral artery is diminutive throughout its length appearing last robust than on the previous exam.  Calcified plaque within the intracranial right vertebral artery causes greater than 70% stenosis appearing closer to 90% as the artery approaches the basilar artery.  There is approximately 50% stenosis within the proximal basilar artery.  The posterior cerebral arteries demonstrate no flow-limiting stenosis.    No aneurysms are appreciated within the intracervical or intracranial arteries.    No cervical lymphadenopathy is appreciated.  Included portions of the lung apices demonstrate no acute process.      Impression       1. Atherosclerotic disease with approximately 70% stenosis proximal right ICA with intermittent stenoses along the course of the cervical ICA between 50 and 70%.  2. 40-50% stenosis of the proximal left ICA.  3. Approximately 50% stenosis proximal basilar artery  4. Approximately 50% stenosis left vertebral artery C5-C6 level.  5. Greater than 70% stenosis of the intracervical right ICA becoming approximately 90% distally near juncture with the basilar artery.  6. Hypoplastic A1 segment of the right SMITH versus occlusion/severe stenosis.  The A2 segment fills via the anterior communicating artery.  7. Additional findings as above.      Electronically signed by: Kamaljit Ponce MD  Date: 06/19/2018  Time: 18:55             Last Resulted: 06/19/18 18:55 Order Details View Encounter Lab and Collection Details Routing Result History            External Result Report     External Result Report   Narrative      EXAMINATION:  CTA HEAD AND NECK (XPD)    CLINICAL HISTORY:  Cerebral ischemia.    TECHNIQUE:  Axial CTA images of the head and neck were obtained with intravenous contrast.  Coronal and sagittal reformations were obtained.  3D reconstructions were obtained.  3D reconstructions were obtained.  Automated exposure control utilized to reduce radiation dose.  Total exam DLP is 792 mGy cm.    COMPARISON:  04/15/2015 CTA neck    FINDINGS:  There is atherosclerotic plaque aortic arch and branch vessels.  There is no flow-limiting stenosis at the origins of the common carotid arteries or subclavian arteries.  Calcified and noncalcified plaque demonstrated within the carotid bulbs and internal carotid arteries.  There is approximately 70% stenosis at the proximal right ICA with intermittent stenoses identified along the course of the cervical ICA with stenoses approximating between 50 and 70% within the segmental stenosis.  Atherosclerotic plaque demonstrated within the intracranial right ICA without flow-limiting stenosis identified.  The right MCA demonstrates no flow-limiting stenosis.  The right SMIHT A1 segment is either hypoplastic, occluded, or significantly stenosed.  The A2 segment fills via the anterior communicating artery however.  The left proximal ICA demonstrates approximately 40-50% stenosis.  Intracranial ICA demonstrates no flow limiting stenosis.  The left MCA and SMITH are patent without flow-limiting stenosis.    The left vertebral artery is dominant.  There is approximately 50% stenosis left vertebral artery at the C5-C6 level.  There is no flow-limiting stenosis at the left vertebral artery origin.  No flow-limiting stenosis of the left intracranial vertebral artery.  The right vertebral artery is diminutive throughout its length appearing last robust than on the previous exam.  Calcified plaque within the intracranial right vertebral artery causes greater than 70% stenosis appearing closer to 90% as  the artery approaches the basilar artery.  There is approximately 50% stenosis within the proximal basilar artery.  The posterior cerebral arteries demonstrate no flow-limiting stenosis.    No aneurysms are appreciated within the intracervical or intracranial arteries.    No cervical lymphadenopathy is appreciated.  Included portions of the lung apices demonstrate no acute process.   Impression       1. Atherosclerotic disease with approximately 70% stenosis proximal right ICA with intermittent stenoses along the course of the cervical ICA between 50 and 70%.  2. 40-50% stenosis of the proximal left ICA.  3. Approximately 50% stenosis proximal basilar artery  4. Approximately 50% stenosis left vertebral artery C5-C6 level.  5. Greater than 70% stenosis of the intracervical right ICA becoming approximately 90% distally near juncture with the basilar artery.  6. Hypoplastic A1 segment of the right SMITH versus occlusion/severe stenosis.  The A2 segment fills via the anterior communicating artery.  7. Additional findings as above.      Electronically signed by: Kamaljit Ponce MD  Date: 06/19/2018  Time: 18:55              Assessment:     The patient has significant right internal carotid artery stenosis. I have reviewed his CT angiography personally. The stenosis occurs at the carotid bulb and continues for a long course through the internal carotid artery approaching its entry to the skull. There is also diseasentracraniallyin the same vessel.Patient and family are not certain of the exact nature of his transient ischemic attack symptoms, but he appears to be neurologically int currently.    Plan:     I would agree that intervention is reasonable for this patient given the significant disease of his right carotid artery. I am concerned as to which of the procedural options would be the more reasonable.  Given that the patient has had multiple congestive heart failure episodes over the last month, I would be concerned for  him having a prolonged surgical intervention that would be required to get as high up in the carotid vessel as necessary. A prolonged surgical procedure would involve high fluid volumes and high risk for worsening of his cardiomyopathy/congestive heart failure. While surgical intervention would be reasonable, I believe it'll be more reasonable to have patient evaluated for potential carotid stent placement.  Neither procedure would address his intracranial disease.

## 2018-08-22 NOTE — PROGRESS NOTES
OUTPATIENT CATHETERIZATION INSTRUCTIONS    You have been scheduled for a procedure in the catheterization lab on Monday, August 27, 2018.     Please report to the Cardiology Waiting Area on the Third floor of the hospital and check in at 6 AM.   You will then be taken to the SSCU (Short Stay Cardiac Unit) and prepared for your procedure. Please be aware that this is not the time of your procedure but the time you are to arrive. The procedures are scheduled on an hourly basis; however, emergency cases take precedence over all other cases.       You may not have anything to eat or drink after midnight the night before your test. You may take your regular morning medications with water. If there are any medications that you should not take you will be instructed to hold them that morning. If you are diabetic and on Metformin (Glucophage) do not take it the day before, the day of, and for 2 days after your procedure.      The procedure will take 1-2 hours to perform. After the procedure, you will return to SSCU on the third floor of the hospital. You will need to lie still (or keep your arm still) for the next 4 to 6 hours to minimize bleeding from the puncture site. Your family may remain in the room with you during this time.       You may be able to be discharged home that same afternoon if there is someone to drive you home and there were no complications. If you have one of the balloon, stent, or device procedures you may spend the night in the hospital. Your doctor will determine, based on your progress, the date and time of your discharge. The results of your procedure will be discussed with you before you are discharged. Any further testing or procedures will be scheduled for you either before you leave or you will be called with these appointments.       If you should have any questions, concerns, or need to change the date of your procedure, please call GILBERTO Bustos @ [phone]    Special  Instructions:    Stop Pradaxa on Saturday August 25, 2018.      THE ABOVE INSTRUCTIONS WERE GIVEN TO THE PATIENT VERBALLY AND THEY VERBALIZED UNDERSTANDING.  THEY DO NOT REQUIRE ANY SPECIAL NEEDS AND DO NOT HAVE ANY LEARNING BARRIERS.          Directions for Reporting to Cardiology Waiting Area in the Hospital  If you park in the Parking Garage:  Take elevators to the1st floor of the parking garage.  Continue past the gift shop, coffee shop, and piano.  Take a right and go to the gold elevators. (Elevator B)  Take the elevator to the 3rd floor.  Follow the arrow on the sign on the wall that says Cath Lab Registration/EP Lab Registration.  Follow the long hallway all the way around until you come to a big open area.  This is the registration area.  Check in at Reception Desk.    OR    If family is dropping you off:  Have them drop you off at the front of the Hospital under the green overhang.  Enter through the doors and take a right.  Take the E elevators to the 3rd floor Cardiology Waiting Area.  Check in at the Reception Desk in the waiting room.

## 2018-08-22 NOTE — ASSESSMENT & PLAN NOTE
-Carotid angiogram via R CFA   -IVF at 3 ml/hour  -Patient is a DOREEN candidate  -The risks, benefits & alternatives of the procedure were explained to the patient.    -The risks of coronary angiography include but are not limited to:  Bleeding, infection, heart rhythm abnormalities, allergic reactions, kidney injury, stroke and death.    -The risks of moderate sedation include hypotension, respiratory depression, arrhythmias, bronchospasm, & death.    -Informed consent was obtained & the patient is agreeable to proceed with the procedure.  -This patient was discussed with the attending interventional cardiologist who agrees with the above assessment & plan.

## 2018-08-22 NOTE — H&P (VIEW-ONLY)
Subjective:    Patient ID:  Abdullahi Salazar is a 72 y.o. male who presents for evaluation of No chief complaint on file.      HPI    Mr Salazar is a 71 yo man with medical history significant for HTN, DLPD, CAD s/p LAD PCI, NPH s/p  shunt, TIA in April 2018 with symptoms consistent of left facial paralysis and drooling with motor weakness who is referred by Dr Rodriguez for evaluation of symptomatic severe R ICA stenosis.   Patient had a TIA event 4 months ago and was evaluated at [place]. This prompted CTA of the neck which showed extensive atherosclerosis of the carotid arch including > 70% stenosis of the R ICA.   Patient currently reports no symptoms and reports to be doing well. Denies any current neurological events.     Review of Systems   Constitution: Negative.   HENT: Negative.    Eyes: Negative.    Cardiovascular: Negative for chest pain, claudication, cyanosis, dyspnea on exertion, irregular heartbeat, leg swelling, near-syncope, orthopnea, palpitations, paroxysmal nocturnal dyspnea and syncope.   Respiratory: Negative.    Endocrine: Negative.    Gastrointestinal: Negative.    Neurological: Positive for brief paralysis, focal weakness and numbness. Negative for aphonia, difficulty with concentration and disturbances in coordination.        Objective:    Physical Exam   Constitutional: He is oriented to person, place, and time. He appears well-developed and well-nourished.   HENT:   Head: Normocephalic and atraumatic.   Eyes: Conjunctivae are normal.   Neck: Neck supple. No JVD present. No thyromegaly present.   Cardiovascular: Normal rate, regular rhythm, normal heart sounds and intact distal pulses. Exam reveals no gallop and no friction rub.   No murmur heard.  Pulses:       Carotid pulses are 2+ on the right side with bruit, and 2+ on the left side.       Radial pulses are 2+ on the right side, and 2+ on the left side.        Femoral pulses are 2+ on the right side, and 2+ on the left side.        Popliteal pulses are 2+ on the right side, and 2+ on the left side.        Dorsalis pedis pulses are 2+ on the right side, and 2+ on the left side.        Posterior tibial pulses are 2+ on the right side, and 2+ on the left side.   Pulmonary/Chest: Effort normal and breath sounds normal. No respiratory distress. He has no wheezes. He has no rales.   Abdominal: Soft. Bowel sounds are normal. He exhibits no distension. There is no tenderness. There is no rebound.   Neurological: He is oriented to person, place, and time.   Skin: Skin is warm.   Nursing note and vitals reviewed.        Assessment:       1. Other specified transient cerebral ischemias    2. Aortic stenosis, moderate    3. Stenosis of right carotid artery    4. Hypertension associated with diabetes         Plan:       Carotid artery stenosis  -Carotid angiogram via R CFA   -IVF at 3 ml/hour  -Patient is a DOREEN candidate  -The risks, benefits & alternatives of the procedure were explained to the patient.    -The risks of coronary angiography include but are not limited to:  Bleeding, infection, heart rhythm abnormalities, allergic reactions, kidney injury, stroke and death.    -The risks of moderate sedation include hypotension, respiratory depression, arrhythmias, bronchospasm, & death.    -Informed consent was obtained & the patient is agreeable to proceed with the procedure.  -This patient was discussed with the attending interventional cardiologist who agrees with the above assessment & plan.            Hermilo Garcia MD  Interventional Cardiovascular Fellow, PGY VII  Pager: [phone]  8/22/2018 6:48 PM        I have personally taken the history and examined this patient. I have discussed and agree with the resident's findings and plan as documented in the resident's note.  Mariano Francis

## 2018-08-22 NOTE — LETTER
August 24, 2018      Torito Rodriguez MD  [address] [facility] [address] [address]           [facility]  [address]  [facility] [address]  Phone: [phone]          Patient: Abdullahi Salazar   MR Number: [id number]   YOB: 1945   Date of Visit: 8/22/2018       Dear Dr. Torito Rodriguez:    Thank you for referring Abdullahi Salazar to me for evaluation. Attached you will find relevant portions of my assessment and plan of care.    If you have questions, please do not hesitate to call me. I look forward to following Abdullahi Salazar along with you.    Sincerely,    Mariano Francis MD    Enclosure  CC:  No Recipients    If you would like to receive this communication electronically, please contact [email or web address] or [phone] to request more information on [employer] access.    For providers and/or their staff who would like to refer a patient to [facility], please contact us through our one-stop-shop provider referral line, [facility] , at [phone].    If you feel you have received this communication in error or would no longer like to receive these types of communications, please e-mail [email or web address]

## 2018-08-22 NOTE — PROGRESS NOTES
Subjective:    Patient ID:  Abdullahi Salazar is a 72 y.o. male who presents for evaluation of No chief complaint on file.      HPI    Mr Salazar is a 73 yo man with medical history significant for HTN, DLPD, CAD s/p LAD PCI, NPH s/p  shunt, TIA in April 2018 with symptoms consistent of left facial paralysis and drooling with motor weakness who is referred by Dr Rodriguez for evaluation of symptomatic severe R ICA stenosis.   Patient had a TIA event 4 months ago and was evaluated at [place]. This prompted CTA of the neck which showed extensive atherosclerosis of the carotid arch including > 70% stenosis of the R ICA.   Patient currently reports no symptoms and reports to be doing well. Denies any current neurological events.     Review of Systems   Constitution: Negative.   HENT: Negative.    Eyes: Negative.    Cardiovascular: Negative for chest pain, claudication, cyanosis, dyspnea on exertion, irregular heartbeat, leg swelling, near-syncope, orthopnea, palpitations, paroxysmal nocturnal dyspnea and syncope.   Respiratory: Negative.    Endocrine: Negative.    Gastrointestinal: Negative.    Neurological: Positive for brief paralysis, focal weakness and numbness. Negative for aphonia, difficulty with concentration and disturbances in coordination.        Objective:    Physical Exam   Constitutional: He is oriented to person, place, and time. He appears well-developed and well-nourished.   HENT:   Head: Normocephalic and atraumatic.   Eyes: Conjunctivae are normal.   Neck: Neck supple. No JVD present. No thyromegaly present.   Cardiovascular: Normal rate, regular rhythm, normal heart sounds and intact distal pulses. Exam reveals no gallop and no friction rub.   No murmur heard.  Pulses:       Carotid pulses are 2+ on the right side with bruit, and 2+ on the left side.       Radial pulses are 2+ on the right side, and 2+ on the left side.        Femoral pulses are 2+ on the right side, and 2+ on the left side.        Popliteal pulses are 2+ on the right side, and 2+ on the left side.        Dorsalis pedis pulses are 2+ on the right side, and 2+ on the left side.        Posterior tibial pulses are 2+ on the right side, and 2+ on the left side.   Pulmonary/Chest: Effort normal and breath sounds normal. No respiratory distress. He has no wheezes. He has no rales.   Abdominal: Soft. Bowel sounds are normal. He exhibits no distension. There is no tenderness. There is no rebound.   Neurological: He is oriented to person, place, and time.   Skin: Skin is warm.   Nursing note and vitals reviewed.        Assessment:       1. Other specified transient cerebral ischemias    2. Aortic stenosis, moderate    3. Stenosis of right carotid artery    4. Hypertension associated with diabetes         Plan:       Carotid artery stenosis  -Carotid angiogram via R CFA   -IVF at 3 ml/hour  -Patient is a DOREEN candidate  -The risks, benefits & alternatives of the procedure were explained to the patient.    -The risks of coronary angiography include but are not limited to:  Bleeding, infection, heart rhythm abnormalities, allergic reactions, kidney injury, stroke and death.    -The risks of moderate sedation include hypotension, respiratory depression, arrhythmias, bronchospasm, & death.    -Informed consent was obtained & the patient is agreeable to proceed with the procedure.  -This patient was discussed with the attending interventional cardiologist who agrees with the above assessment & plan.            Hermilo Garcia MD  Interventional Cardiovascular Fellow, PGY VII  Pager: [phone]  8/22/2018 6:48 PM        I have personally taken the history and examined this patient. I have discussed and agree with the resident's findings and plan as documented in the resident's note.  Mariano Francis

## 2018-08-23 ENCOUNTER — TELEPHONE (OUTPATIENT)
Dept: VASCULAR SURGERY | Facility: CLINIC | Age: 73
End: 2018-08-23

## 2018-08-23 NOTE — TELEPHONE ENCOUNTER
----- Message from Liz Granados LPN sent at 8/23/2018 12:08 PM CDT -----  Pt has an established appt with Dr. Brown made, however, we were wondering if there was any possibility if he could be seen sooner than this date? Please advise. Thanks in advance.

## 2018-08-24 DIAGNOSIS — I62.00 SUBDURAL HEMORRHAGE: ICD-10-CM

## 2018-08-27 ENCOUNTER — PATIENT MESSAGE (OUTPATIENT)
Dept: NEUROSURGERY | Facility: CLINIC | Age: 73
End: 2018-08-27

## 2018-08-27 ENCOUNTER — HOSPITAL ENCOUNTER (OUTPATIENT)
Facility: HOSPITAL | Age: 73
LOS: 1 days | Discharge: HOME | End: 2018-08-27
Attending: INTERNAL MEDICINE | Admitting: INTERNAL MEDICINE
Payer: MEDICARE

## 2018-08-27 VITALS
BODY MASS INDEX: 33.74 KG/M2 | DIASTOLIC BLOOD PRESSURE: 64 MMHG | RESPIRATION RATE: 17 BRPM | SYSTOLIC BLOOD PRESSURE: 154 MMHG | HEART RATE: 80 BPM | WEIGHT: 215 LBS | TEMPERATURE: 96 F | OXYGEN SATURATION: 98 % | HEIGHT: 67 IN

## 2018-08-27 DIAGNOSIS — G45.8 OTHER SPECIFIED TRANSIENT CEREBRAL ISCHEMIAS: ICD-10-CM

## 2018-08-27 DIAGNOSIS — G45.9 TRANSIENT CEREBRAL ISCHEMIA, UNSPECIFIED TYPE: ICD-10-CM

## 2018-08-27 DIAGNOSIS — I49.5 SICK SINUS SYNDROME: ICD-10-CM

## 2018-08-27 DIAGNOSIS — I65.22 STENOSIS OF LEFT CAROTID ARTERY: Primary | ICD-10-CM

## 2018-08-27 DIAGNOSIS — I65.21 STENOSIS OF RIGHT CAROTID ARTERY: ICD-10-CM

## 2018-08-27 LAB
ABO + RH BLD: NORMAL
ANION GAP SERPL CALC-SCNC: 10 MMOL/L
BASOPHILS # BLD AUTO: 0.03 K/UL
BASOPHILS NFR BLD: 0.4 %
BLD GP AB SCN CELLS X3 SERPL QL: NORMAL
BUN SERPL-MCNC: 34 MG/DL
CALCIUM SERPL-MCNC: 9.2 MG/DL
CHLORIDE SERPL-SCNC: 100 MMOL/L
CO2 SERPL-SCNC: 28 MMOL/L
CREAT SERPL-MCNC: 2.1 MG/DL
DIFFERENTIAL METHOD: ABNORMAL
EOSINOPHIL # BLD AUTO: 0.1 K/UL
EOSINOPHIL NFR BLD: 1 %
ERYTHROCYTE [DISTWIDTH] IN BLOOD BY AUTOMATED COUNT: 14.1 %
EST. GFR  (AFRICAN AMERICAN): 34.8 ML/MIN/1.73 M^2
EST. GFR  (NON AFRICAN AMERICAN): 30.1 ML/MIN/1.73 M^2
GLUCOSE SERPL-MCNC: 265 MG/DL
HCT VFR BLD AUTO: 37.4 %
HGB BLD-MCNC: 12.3 G/DL
IMM GRANULOCYTES # BLD AUTO: 0.02 K/UL
IMM GRANULOCYTES NFR BLD AUTO: 0.3 %
LYMPHOCYTES # BLD AUTO: 0.9 K/UL
LYMPHOCYTES NFR BLD: 12.5 %
MCH RBC QN AUTO: 32.5 PG
MCHC RBC AUTO-ENTMCNC: 32.9 G/DL
MCV RBC AUTO: 99 FL
MONOCYTES # BLD AUTO: 0.8 K/UL
MONOCYTES NFR BLD: 10.5 %
NEUTROPHILS # BLD AUTO: 5.4 K/UL
NEUTROPHILS NFR BLD: 75.3 %
NRBC BLD-RTO: 0 /100 WBC
PLATELET # BLD AUTO: 130 K/UL
PMV BLD AUTO: 11 FL
POCT GLUCOSE: 221 MG/DL (ref 70–110)
POTASSIUM SERPL-SCNC: 4.2 MMOL/L
RBC # BLD AUTO: 3.79 M/UL
SODIUM SERPL-SCNC: 138 MMOL/L
WBC # BLD AUTO: 7.22 K/UL

## 2018-08-27 PROCEDURE — 93010 ELECTROCARDIOGRAM REPORT: CPT | Mod: ,,, | Performed by: INTERNAL MEDICINE

## 2018-08-27 PROCEDURE — 63600175 PHARM REV CODE 636 W HCPCS

## 2018-08-27 PROCEDURE — 93005 ELECTROCARDIOGRAM TRACING: CPT

## 2018-08-27 PROCEDURE — 80048 BASIC METABOLIC PNL TOTAL CA: CPT

## 2018-08-27 PROCEDURE — 85025 COMPLETE CBC W/AUTO DIFF WBC: CPT

## 2018-08-27 PROCEDURE — 25000003 PHARM REV CODE 250

## 2018-08-27 PROCEDURE — 36226 PLACE CATH VERTEBRAL ART: CPT | Mod: 50,,, | Performed by: INTERNAL MEDICINE

## 2018-08-27 PROCEDURE — 25000003 PHARM REV CODE 250: Performed by: STUDENT IN AN ORGANIZED HEALTH CARE EDUCATION/TRAINING PROGRAM

## 2018-08-27 PROCEDURE — 36223 PLACE CATH CAROTID/INOM ART: CPT | Mod: 50,51,, | Performed by: INTERNAL MEDICINE

## 2018-08-27 PROCEDURE — 82962 GLUCOSE BLOOD TEST: CPT

## 2018-08-27 PROCEDURE — 86901 BLOOD TYPING SEROLOGIC RH(D): CPT

## 2018-08-27 PROCEDURE — C1769 GUIDE WIRE: HCPCS

## 2018-08-27 RX ORDER — SODIUM CHLORIDE 9 MG/ML
3 INJECTION, SOLUTION INTRAVENOUS CONTINUOUS
Status: ACTIVE | OUTPATIENT
Start: 2018-08-27 | End: 2018-08-27

## 2018-08-27 RX ORDER — DIPHENHYDRAMINE HCL 50 MG
50 CAPSULE ORAL ONCE
Status: COMPLETED | OUTPATIENT
Start: 2018-08-27 | End: 2018-08-27

## 2018-08-27 RX ADMIN — DIPHENHYDRAMINE HYDROCHLORIDE 50 MG: 50 CAPSULE ORAL at 06:08

## 2018-08-27 RX ADMIN — SODIUM CHLORIDE 3 ML/KG/HR: 0.9 INJECTION, SOLUTION INTRAVENOUS at 06:08

## 2018-08-27 NOTE — HOSPITAL COURSE
Patient tolerated carotid angiogram well and was found to have moderate focal disease of his bilateral carotids and severe disease of his bilateral external carotids however no intervention was indicated.  Patient recovered in post procedure holding without event and was discharged home in stable condition.  He is to follow up with neurology for further evaluation of symptoms.

## 2018-08-27 NOTE — NURSING
Ambulated without difficulty.  Right groin remains clean dry and intact.  Pt and wife verbalized understanding of d/c instructions.  VSS.

## 2018-08-27 NOTE — NURSING
Report received from Melania BAEZ RN.  Assuming care.  Awake and alert.  Denies pain or SOB.  Resp even and unlabored.  Right groin soft.  No bleeding or hematoma noted.  Wife at bedside.  Will continue to monitor.

## 2018-08-27 NOTE — SIGNIFICANT EVENT
Patient tolerated carotid angiography well and there were no complications.  MARGI with mild to moderate diffuse disease, RVA occluded, LICA 50% discrete stenosis, LVA patent.  Please see procedure report 8/27/18 for further details.      -Liban Bradley MD

## 2018-08-27 NOTE — PLAN OF CARE
Problem: Patient Care Overview  Goal: Plan of Care Review  Outcome: Ongoing (interventions implemented as appropriate)  Report received from GILBERTO Dacosta. Patient s/p carotid angiogram. R groin cdi, soft. + pulse. Post procedure protocol discussed with patient. IV fluids infusing. Call light in reach. Will monitor.

## 2018-08-27 NOTE — DISCHARGE SUMMARY
[facility]  Interventional Cardiology  Discharge Summary      Patient Name: Abdullahi Salazar  MRN: [id number]  Admission Date: 8/27/2018  Hospital Length of Stay: 1 days  Discharge Date and Time:  08/27/2018 6:18 PM  Attending Physician: No att. providers found  Discharging Provider: Liban Bradley III, MD  Primary Care Physician: Maximo Hernandez MD    HPI:  Mr Salazar is a 71 yo man with medical history significant for HTN, DLPD, CAD s/p LAD PCI, NPH s/p  shunt, TIA in April 2018 with symptoms consistent of left facial paralysis and drooling with motor weakness who is referred by Dr Rodriguez for evaluation of symptomatic severe R ICA stenosis.   Patient had a TIA event 4 months ago and was evaluated at [place]. This prompted CTA of the neck which showed extensive atherosclerosis of the carotid arch including > 70% stenosis of the R ICA.   Patient currently reports no symptoms and reports to be doing well. Denies any current neurological events.         Procedure(s) (LRB):  ANGIOPLASTY-CAROTID (N/A)     Indwelling Lines/Drains at time of discharge:  Lines/Drains/Airways          None          Hospital Course:  Patient tolerated carotid angiogram well and was found to have moderate focal disease of his bilateral carotids and severe disease of his bilateral external carotids however no intervention was indicated.  Patient recovered in post procedure holding without event and was discharged home in stable condition.  He is to follow up with neurology for further evaluation of symptoms.           Significant Diagnostic Studies: Labs:   BMP:   Recent Labs   Lab  08/27/18   0632   GLU  265*   NA  138   K  4.2   CL  100   CO2  28   BUN  34*   CREATININE  2.1*   CALCIUM  9.2   , CMP   Recent Labs   Lab  08/27/18   0632   NA  138   K  4.2   CL  100   CO2  28   GLU  265*   BUN  34*   CREATININE  2.1*   CALCIUM  9.2   ANIONGAP  10   ESTGFRAFRICA  34.8*   EGFRNONAA  30.1*   , CBC   Recent Labs   Lab   "08/27/18   0632   WBC  7.22   HGB  12.3*   HCT  37.4*   PLT  130*   , INR   Lab Results   Component Value Date    INR 1.2 07/27/2018    INR 1.8 07/17/2018    INR 1.3 06/19/2018    INR 1.3 06/19/2018   , Lipid Panel   Lab Results   Component Value Date    CHOL 113 (L) 06/19/2018    HDL 27 (L) 06/19/2018    LDLCALC 67.0 06/19/2018    TRIG 95 06/19/2018    CHOLHDL 23.9 06/19/2018   , Troponin No results for input(s): TROPONINI in the last 168 hours. and A1C:   Recent Labs   Lab  06/20/18   0429  07/17/18   1013  07/29/18   1748   HGBA1C  13.1*  12.4*  11.6*       Pending Diagnostic Studies:     None        No new Assessment & Plan notes have been filed under this hospital service since the last note was generated.  Service: Interventional Cardiology      Discharged Condition: good    Follow Up:    Patient Instructions:   No discharge procedures on file.  Medications:  Reconciled Home Medications:      Medication List      CONTINUE taking these medications    aspirin 81 MG EC tablet  Commonly known as:  ECOTRIN  Take 1 tablet (81 mg total) by mouth once daily.     atorvastatin 40 MG tablet  Commonly known as:  LIPITOR  Take 1 tablet (40 mg total) by mouth once daily.     benazepril 40 MG tablet  Commonly known as:  LOTENSIN  Take 1 tablet (40 mg total) by mouth once daily.     blood sugar diagnostic Strp  Commonly known as:  ACCU-CHEK CLEOPATRA PLUS TEST STRP  TEST BLOOD SUGARS 4 TIMES DAILY     carvedilol 25 MG tablet  Commonly known as:  COREG  Take 1 tablet (25 mg total) by mouth 2 (two) times daily with meals.     dabigatran etexilate 75 mg Cap  Commonly known as:  PRADAXA  Take 1 capsule (75 mg total) by mouth 2 (two) times daily. "Do NOT break, chew, or open capsules."     ezetimibe 10 mg tablet  Commonly known as:  ZETIA  TAKE 1 TABLET ONE TIME DAILY     hydrALAZINE 100 MG tablet  Commonly known as:  APRESOLINE  Take 1 tablet (100 mg total) by mouth 2 (two) times daily.     insulin aspart U-100 100 unit/mL Inpn " "pen  Commonly known as:  NovoLOG  Humalog Sliding Scale tid before meals: 150-200 give 4 units 201-250 give 6 units 251-300 give 8 units 301-350 give 10 units > 350 give 12 units     insulin glargine (TOUJEO) 300 unit/mL (1.5 mL) Inpn pen  Commonly known as:  TOUJEO SOLOSTAR U-300 INSULIN  Inject 48 Units into the skin once daily.     isosorbide mononitrate 120 MG 24 hr tablet  Commonly known as:  IMDUR  Take 1 tablet (120 mg total) by mouth once daily.     lancets Misc  Commonly known as:  ACCU-CHEK SOFTCLIX LANCETS  1 each by Misc.(Non-Drug; Combo Route) route 2 (two) times daily.     levETIRAcetam 500 MG Tab  Commonly known as:  KEPPRA  Take 1.5 tablets (750 mg total) by mouth 2 (two) times daily.     multivitamin capsule  Take 1 capsule by mouth once daily.     nitroGLYCERIN 0.4 MG SL tablet  Commonly known as:  NITROSTAT  Place 1 tablet (0.4 mg total) under the tongue every 5 (five) minutes as needed for Chest pain.     pen needle, diabetic 31 gauge x 5/16" Ndle  Commonly known as:  BD ULTRA-FINE SHORT PEN NEEDLE  USE FOUR TIMES DAILY     polyethylene glycol 17 gram Pwpk  Commonly known as:  GLYCOLAX  Take 17 g by mouth once daily.     potassium chloride 10 MEQ Tbsr  Commonly known as:  KLOR-CON  Take 1 tablet (10 mEq total) by mouth once daily.     tamsulosin 0.4 mg Cap  Commonly known as:  FLOMAX  Take 1 capsule (0.4 mg total) by mouth once daily.     torsemide 20 MG Tab  Commonly known as:  DEMADEX  Take 1 tablet (20 mg total) by mouth every morning.            Time spent on the discharge of patient: 20 minutes    Liban Bradley III, MD  Interventional Cardiology  [facility]  "

## 2018-08-27 NOTE — NURSING
Sleeping.  Right groin dressing clean dry and intact.  No c/o pain.  No bleeding or hematoma noted to right groin.  Wife at pt side.  Will continue to monitor.

## 2018-08-28 LAB
BATTERY VOLTAGE (V): 2.93 V
CHARGE TIME (SEC): 9.6 SEC
HV IMPEDANCE (OHM): 84 OHM
IMPEDANCE RA LEAD (DONOR): 363 OHMS
IMPEDANCE RA LEAD (NATIVE): 695 OHMS
IMPEDANCE RA LEAD: 609 OHMS
OHS CV DC PP MS1: 0.4 MS
OHS CV DC PP MS2: 0.4 MS
OHS CV DC PP MS3: 0.4 MS
OHS CV DC PP V1: 2.5 V
OHS CV DC PP V2: NORMAL V
OHS CV DC PP V3: NORMAL V
P/R-WAVE RA LEAD (DONOR): 20 MV
P/R-WAVE RA LEAD (NATIVE): 20 MV
P/R-WAVE RA LEAD: 1.9 MV
POC ACTIVATED CLOTTING TIME K: 164 SEC (ref 74–137)
SAMPLE: ABNORMAL

## 2018-08-29 LAB — PERIPHERAL STENOSIS: ABNORMAL

## 2018-09-01 ENCOUNTER — PATIENT MESSAGE (OUTPATIENT)
Dept: FAMILY MEDICINE | Facility: CLINIC | Age: 73
End: 2018-09-01

## 2018-09-06 ENCOUNTER — OFFICE VISIT (OUTPATIENT)
Dept: CARDIOLOGY | Facility: CLINIC | Age: 73
End: 2018-09-06
Payer: MEDICARE

## 2018-09-06 VITALS
BODY MASS INDEX: 33.8 KG/M2 | SYSTOLIC BLOOD PRESSURE: 159 MMHG | HEIGHT: 67 IN | HEART RATE: 79 BPM | DIASTOLIC BLOOD PRESSURE: 76 MMHG | WEIGHT: 215.38 LBS

## 2018-09-06 DIAGNOSIS — I65.22 STENOSIS OF LEFT CAROTID ARTERY: ICD-10-CM

## 2018-09-06 DIAGNOSIS — I50.30 CHF WITH LEFT VENTRICULAR DIASTOLIC DYSFUNCTION, NYHA CLASS 2: Primary | ICD-10-CM

## 2018-09-06 DIAGNOSIS — Z95.810 PRESENCE OF BIVENTRICULAR AICD: ICD-10-CM

## 2018-09-06 DIAGNOSIS — I25.810 CORONARY ARTERY DISEASE INVOLVING CORONARY BYPASS GRAFT OF NATIVE HEART WITHOUT ANGINA PECTORIS: ICD-10-CM

## 2018-09-06 PROCEDURE — 99214 OFFICE O/P EST MOD 30 MIN: CPT | Mod: S$PBB,,, | Performed by: INTERNAL MEDICINE

## 2018-09-06 PROCEDURE — 99999 PR PBB SHADOW E&M-EST. PATIENT-LVL III: CPT | Mod: PBBFAC,,, | Performed by: INTERNAL MEDICINE

## 2018-09-06 PROCEDURE — 1101F PT FALLS ASSESS-DOCD LE1/YR: CPT | Mod: CPTII,,, | Performed by: INTERNAL MEDICINE

## 2018-09-06 PROCEDURE — 99213 OFFICE O/P EST LOW 20 MIN: CPT | Mod: PBBFAC,PO | Performed by: INTERNAL MEDICINE

## 2018-09-06 PROCEDURE — 3078F DIAST BP <80 MM HG: CPT | Mod: CPTII,,, | Performed by: INTERNAL MEDICINE

## 2018-09-06 PROCEDURE — 3077F SYST BP >= 140 MM HG: CPT | Mod: CPTII,,, | Performed by: INTERNAL MEDICINE

## 2018-09-06 NOTE — PROGRESS NOTES
Subjective:    Patient ID:  Abdullahi Salazar is a 72 y.o. male who presents for follow-up of cad, cvd    HPI  He comes for follow up with no major problems, no chest pain, no shortness of breath.  Had 4 vessel cerebral angiogram showing moderate ICA stenosis, not requiring surgical correction/intervention  Again, he missed his pills today    Review of Systems   Constitution: Negative for decreased appetite, weakness, malaise/fatigue, weight gain and weight loss.   Cardiovascular: Negative for chest pain, dyspnea on exertion, leg swelling, palpitations and syncope.   Respiratory: Negative for cough and shortness of breath.    Gastrointestinal: Negative.    All other systems reviewed and are negative.       Objective:      Physical Exam   Constitutional: He is oriented to person, place, and time. He appears well-developed and well-nourished.   HENT:   Head: Normocephalic.   Eyes: Pupils are equal, round, and reactive to light.   Neck: Normal range of motion. Neck supple. No JVD present. Carotid bruit is not present. No thyromegaly present.   Cardiovascular: Normal rate, regular rhythm, normal heart sounds, intact distal pulses and normal pulses. PMI is not displaced. Exam reveals no gallop.   No murmur heard.  Pulmonary/Chest: Effort normal and breath sounds normal.   Abdominal: Soft. Normal appearance. He exhibits no mass. There is no hepatosplenomegaly. There is no tenderness.   Musculoskeletal: Normal range of motion. He exhibits no edema.   Neurological: He is alert and oriented to person, place, and time. He has normal strength and normal reflexes. No sensory deficit.   Skin: Skin is warm and intact.   Psychiatric: He has a normal mood and affect.   Nursing note and vitals reviewed.        Assessment:       1. CHF with left ventricular diastolic dysfunction, NYHA class 2    2. Stenosis of left carotid artery    3. Presence of biventricular AICD    4. Coronary artery disease involving coronary bypass graft of native  heart without angina pectoris         Plan:     Continue all cardiac medications  Regular exercise program  Weight loss  3 m f/u

## 2018-09-26 ENCOUNTER — OFFICE VISIT (OUTPATIENT)
Dept: NEUROSURGERY | Facility: CLINIC | Age: 73
End: 2018-09-26
Payer: MEDICARE

## 2018-09-26 ENCOUNTER — HOSPITAL ENCOUNTER (OUTPATIENT)
Dept: RADIOLOGY | Facility: HOSPITAL | Age: 73
Discharge: HOME | End: 2018-09-26
Attending: PHYSICIAN ASSISTANT
Payer: MEDICARE

## 2018-09-26 VITALS
BODY MASS INDEX: 33.15 KG/M2 | WEIGHT: 211.19 LBS | SYSTOLIC BLOOD PRESSURE: 184 MMHG | HEART RATE: 81 BPM | HEIGHT: 67 IN | DIASTOLIC BLOOD PRESSURE: 93 MMHG | RESPIRATION RATE: 17 BRPM

## 2018-09-26 DIAGNOSIS — G91.2 NORMAL PRESSURE HYDROCEPHALUS SYNDROME: ICD-10-CM

## 2018-09-26 DIAGNOSIS — I62.00 SUBDURAL HEMORRHAGE: ICD-10-CM

## 2018-09-26 DIAGNOSIS — R26.9 GAIT DIFFICULTY: Primary | ICD-10-CM

## 2018-09-26 PROCEDURE — 99999 PR PBB SHADOW E&M-EST. PATIENT-LVL IV: CPT | Mod: PBBFAC,,, | Performed by: PHYSICIAN ASSISTANT

## 2018-09-26 PROCEDURE — 3080F DIAST BP >= 90 MM HG: CPT | Mod: CPTII,,, | Performed by: PHYSICIAN ASSISTANT

## 2018-09-26 PROCEDURE — 70450 CT HEAD/BRAIN W/O DYE: CPT | Mod: 26,,, | Performed by: RADIOLOGY

## 2018-09-26 PROCEDURE — 1101F PT FALLS ASSESS-DOCD LE1/YR: CPT | Mod: CPTII,,, | Performed by: PHYSICIAN ASSISTANT

## 2018-09-26 PROCEDURE — 99213 OFFICE O/P EST LOW 20 MIN: CPT | Mod: S$PBB,,, | Performed by: PHYSICIAN ASSISTANT

## 2018-09-26 PROCEDURE — 70450 CT HEAD/BRAIN W/O DYE: CPT | Mod: TC,PO

## 2018-09-26 PROCEDURE — 62252 CSF SHUNT REPROGRAM: CPT | Mod: 26,S$PBB,, | Performed by: PHYSICIAN ASSISTANT

## 2018-09-26 PROCEDURE — 62252 CSF SHUNT REPROGRAM: CPT | Mod: PBBFAC,PN | Performed by: PHYSICIAN ASSISTANT

## 2018-09-26 PROCEDURE — 3077F SYST BP >= 140 MM HG: CPT | Mod: CPTII,,, | Performed by: PHYSICIAN ASSISTANT

## 2018-09-26 PROCEDURE — 99214 OFFICE O/P EST MOD 30 MIN: CPT | Mod: PBBFAC,25,PN | Performed by: PHYSICIAN ASSISTANT

## 2018-09-26 RX ORDER — ISOSORBIDE MONONITRATE 60 MG/1
60 TABLET, EXTENDED RELEASE ORAL DAILY
Status: ON HOLD | COMMUNITY
Start: 2018-08-27 | End: 2018-12-07 | Stop reason: HOSPADM

## 2018-09-26 RX ORDER — CLOPIDOGREL BISULFATE 75 MG/1
75 TABLET ORAL DAILY
COMMUNITY
Start: 2018-08-27 | End: 2019-01-15 | Stop reason: ALTCHOICE

## 2018-10-17 ENCOUNTER — TELEPHONE (OUTPATIENT)
Dept: NEUROSURGERY | Facility: CLINIC | Age: 73
End: 2018-10-17

## 2018-10-18 ENCOUNTER — IMMUNIZATION (OUTPATIENT)
Dept: FAMILY MEDICINE | Facility: CLINIC | Age: 73
End: 2018-10-18
Payer: MEDICARE

## 2018-10-18 PROCEDURE — 90686 IIV4 VACC NO PRSV 0.5 ML IM: CPT | Mod: PBBFAC,PO

## 2018-10-19 ENCOUNTER — OUTPATIENT CASE MANAGEMENT (OUTPATIENT)
Dept: ADMINISTRATIVE | Facility: OTHER | Age: 73
End: 2018-10-19

## 2018-10-19 DIAGNOSIS — E11.59 HYPERTENSION ASSOCIATED WITH DIABETES: ICD-10-CM

## 2018-10-19 DIAGNOSIS — I15.2 HYPERTENSION ASSOCIATED WITH DIABETES: ICD-10-CM

## 2018-10-19 NOTE — PATIENT INSTRUCTIONS
"  Step-by-Step  Checking Your Blood Sugar    Date Last Reviewed: 5/1/2016  © 0165-8171 [employer]. [address] [place] [address], [place] [address]. All rights reserved. This information is not intended as a substitute for professional medical care. Always follow your healthcare professional's instructions.        Diabetes with High Blood Sugar  You have been treated for high blood sugar (hyperglycemia). This may be because of an infection or other illness, eating too many sweets or starches, or not taking enough insulin.  Home care  Monitor and write down your blood sugar level at least twice a day. Do this before breakfast and before dinner. If you take insulin, record your routine insulin dose as well. Also record any additional doses required based on your sliding scale. Do this for the next 3 to 5 days.  High blood sugar may cause symptoms that you can learn to recognize, such as:  · Frequent urination  · Thirst  · Dizziness  · Headache  · Shortness of breath  · Breath that smells fruity  · Nausea or vomiting  · Abdominal pain  · Drowsiness or loss of consciousness  If you have high-blood-sugar symptoms, use a blood or urine test to find out what your blood sugar level is. If it is above your usual range, use the "sliding scale" regular insulin dose prescribed by your healthcare provider. If you were not given a range for your insulin dose, contact your healthcare provider for more advice.  Follow-up care  Follow up with your healthcare provider, or as advised. You may need to meet with your healthcare provider in the next week. You will likely review your blood sugar records together. You may also talk to your provider about adjusting your dose of insulin or other medicine for blood sugar.  When to seek medical advice  Call your healthcare provider right away if these occur:  · High blood sugar symptoms (Symptoms are described above.)  · Blood sugar over 300 mg/dl If you cant reach your healthcare " provider, go to a hospital emergency room or urgent care center  Date Last Reviewed: 6/1/2016  © 7740-2319 [employer]. [address] [place] [address], [place] [address]. All rights reserved. This information is not intended as a substitute for professional medical care. Always follow your healthcare professional's instructions.        Long-Term Complications of Diabetes    Diabetes can cause health problems over time. These are called complications. They are more likely to happen if your blood sugar is often too high. Over time, high blood sugar can damage blood vessels in your body. It is important to keep your blood sugar in your target range. This can help prevent or delay complications from diabetes.  Possible complications  Complications of diabetes include:  · Eye problems, including damage to the blood vessels in the eyes (retinopathy), pressure in the eye (glaucoma), and clouding of the eyes lens (a cataract). Eye problems can eventually lead to irreversible blindness.   · Tooth and gum problems (periodontal disease), causing loss of teeth and bone  · Blood vessel (vascular) disease leading to circulation problems, heart attack or stroke, or a need for amputation of a limb   · Problems with sexual function leading to erectile dysfunction in men and sexual discomfort in women   · Kidney disease (nephropathy) can eventually lead to kidney failure, which may require dialysis or kidney transplant   · Nerve problems (neuropathy), causing pain or loss of feeling in your feet and other parts of your body, potentially leading to an amputation of a limb   · High blood pressure (hypertension), putting strain on your heart and blood vessels  · Serious infections, possibly leading to loss of toes, feet, or limbs  How to avoid complications  The serious consequences of these complications may be avoidable for most people with diabetes by managing your blood glucose, blood pressure, and cholesterol levels. This  can help you feel better and stay healthy. You can manage diabetes by tracking your blood sugar. You can also eat healthy and exercise to avoid gaining weight. And you should take medicine if directed by your healthcare provider.  Date Last Reviewed: 5/1/2016 © 2000-2017 [employer]. [address] [place] [id number]. All rights reserved. This information is not intended as a substitute for professional medical care. Always follow your healthcare professional's instructions.        Managing Diabetes: The A1C Test       Healthy red blood cells have some glucose stuck to them. A high A1C means that unhealthy amounts of glucose are stuck to the cells.   What is the A1C test?  Using your meter helps you track your blood sugar every day. But your glucose meter tells you the value at the time of testing only. You also need to know if your treatment plan is keeping you healthy over time. The hemoglobin A1C (or glycated hemoglobin) test can help. This test measures your average blood sugar level over a few months. A higher A1C result means that you have a higher risk of developing complications.  The A1C test  The A1C is a blood test done by your healthcare provider. You will likely have an A1C test every 3 to 6 months.  Your blood glucose goal  A1C has been shown as a percentage. But it can also be shown as a number representing the estimated Average Glucose (eAG). Unlike the A1C percentage, eAG is a number similar to the numbers listed on your daily glucose monitor. Both A1C and eAG measure the amount of glucose stuck to a protein called hemoglobin in red blood cells. Your healthcare provider will help you figure out what your ideal A1C or eAG should be. Your target number will depend on your age, general health, and other factors. If your current number is too high, your treatment plan may need changes, such as different medicines.  Sample results  Most people aim for an A1c lower than 7%. Thats  an eAG less than 154 mg/dL. Or, your healthcare provider may want you to aim for an A1C of 6%. Thats an eAG of 126 mg/dL.     Glucose calculator  Visit http://professional.diabetes.org/diapro/glucose_calc for a chart that helps convert your A1C percentages into eAG numbers.   Date Last Reviewed: 6/1/2016  © 0209-4134 [employer]. [address] [place] [address], [place] [address]. All rights reserved. This information is not intended as a substitute for professional medical care. Always follow your healthcare professional's instructions.        Diabetes: Exams and Tests    For your diabetes care, you may see your primary care provider or a specialist 2 to 4 times a year. This page lists some of the regular exams and tests recommended for people with diabetes. To learn more, contact the American Diabetes Association (865-503-9541, www.diabetes.org).  Tests and immunizations  These should be done at least as often as stated below:  · Blood pressure check: every healthcare provider visit  · A1C: at first, every 3 months; if controlled, then every 3 to 6 months   · Cholesterol and blood lipid tests: at least every 12 months.  · Urine tests for kidney function: every 12 months  · Flu shots: once a year  · Pneumonia shots: talk with your healthcare provider about which pneumonia vaccines are right for you  · Hepatitis B shots: as soon as possible if youre under 60, or as advised by your healthcare provider if youre older than 60  · Shingles vaccine after age 60, even if you have already had shingles   · Other tests or vaccines: as advised by your healthcare provider  · Individualized medical nutrition therapy: at least once, then as needed  · Stop smoking counseling, if you still smoke, at each visit   Regular exams  The following exams help keep you healthy:  · Foot exams. Nerve and blood vessel problems can affect your feet sooner than other parts of your body. Make sure that your healthcare provider checks your  feet at every office visit.  · Eye exams. You can have problems with your eyes even if you dont have trouble seeing. An ophthalmologist (eye healthcare provider) or specially trained optometrist will give you a dilated eye exam at least once a year. If you see dark spots, see poorly in dim light, have eye pain or pressure, or notice any other problems, tell your healthcare provider right away.  · Dental exams. Gum disease (also called periodontal disease) and other mouth problems are common in people with diabetes. To help prevent these problems, see your dentist two or more times a year.  Ask your healthcare provider what other exams youll need on a regular basis.  Date Last Reviewed: 6/1/2016  © 5856-0142 [employer]. [address] [place] [address], [place] [address]. All rights reserved. This information is not intended as a substitute for professional medical care. Always follow your healthcare professional's instructions.        Diabetes: Inspecting Your Feet    Diabetes increases your chances of developing foot problems. So inspect your feet every day. This helps you find small skin irritations before they become serious ulcers or infections. If you have trouble seeing the bottoms of your feet, use a mirror or ask a family member or friend to help.  How to check your feet  Below are tips to help you look for foot problems. Try to check your feet at the same time each day, such as when you get out of bed in the morning:  · Check the top of each foot. The tops of toes, back of the heel, and outer edge of the foot can get a lot of rubbing from poor-fitting shoes.  · Check the bottom of each foot. Daily wear and tear often leads to problems at pressure spots.  · Check the toes and nails. Fungal infections often occur between toes. Toenail problems can also be a sign of fungal infections or lead to breaks in the skin.  · Check your shoes, too. Loose objects inside a shoe can injure the foot. Use your hand  to feel inside your shoes for things like mary alice, loose stitching, or rough areas that could irritate your skin.  Warning signs  Look for any color changes in the foot. Redness with streaks can signal a severe infection, which needs immediate medical attention. Tell your healthcare provider right away if you have any of these problems:  · Swelling, sometimes with color changes, may be a sign of poor blood flow or infection. Symptoms include tenderness and an increase in the size of your foot.  · Warm or hot areas on your feet may be signs of infection. A foot that is cold may not be getting enough blood.  · Sensations such as burning, tingling, or pins and needles can be signs of a problem. Also check for areas that may be numb.  · Hot spots are caused by friction or pressure. Look for hot spots in areas that get a lot of rubbing. Hot spots can turn into blisters, calluses, or sores.  · Cracks and sores are caused by dry or irritated skin. They are a sign that the skin is breaking down, which can lead to infection.  · Toenail problems to watch for include nails growing into the skin (ingrown toenail) and causing redness or pain. Thick, yellow, or discolored nails can signal a fungal infection.  · Drainage and odor can develop from untreated sores and ulcers. Call your healthcare provider right away if you notice white or yellow drainage, bleeding, or unpleasant odor.   Date Last Reviewed: 6/1/2016  © 4134-1783 [employer]. [address] [place] [address]. All rights reserved. This information is not intended as a substitute for professional medical care. Always follow your healthcare professional's instructions.        Diabetes: Sick-Day Plan  Infections, the flu, and even a cold, can cause your blood sugar to rise. And, eating less, nausea, and vomiting may cause your blood glucose to fall (hypoglycemia). Ask your healthcare provider to help you develop a sick-day plan. The following  information can help.    Donts  Don'ts include the following:  · Diabetes medicines. Dont stop taking your diabetes medicine.  · Other medicines. Dont take other medicines, such as those for colds or the flu, without checking with your healthcare provider.  Dos  Do's include the following:  · Eating. Stick to your meal plan. If you cant eat, try fruit juice, regular gelatin, or frozen juice bars as directed by your healthcare provider.  · Drinking. Drink at least 1 glass of liquid every hour. If youre eating, these liquids should be sugar-free.  · Blood glucose. Check your blood sugar as often as directed by your healthcare provider. You may need to check it more often than usual.  · Ketones. Check your blood or urine for ketones. Ketones are the waste from burning fat instead of glucose for energy. Ketones are a warning sign of ketoacidosis. Ketoacidosis is a medical emergency. Ketoacidosis can happen to anyone with diabetes, but it's very rare in type 2 diabetes. It is usually only an issue if you have type 1 diabetes.  · Diabetes medicines.  ¨ Adjust your insulin according to your sick-day plan. Don't skip insulin. You need insulin even if you can't eat your normal meals.  ¨ If you take pills for diabetes (oral medicines), take your normal dose unless your healthcare provider tells you something different.  · Sugar-free medicines. Look for sugar-free cough drops and other medicines. Ask your healthcare provider if its OK for you to take these.  · Getting help. If you're alone, ask someone to check on you several times a day.  Try to get all these supplies together before you need them.  Call your healthcare provider  Call your healthcare provider if you have any of the following:  · You vomit or have diarrhea for more than 6 hours.  · Your blood glucose level is higher than usual or more than 250 mg/dL after you have taken extra insulin (if recommended in your sick-day plan).  · You take oral medicine for  diabetes, and your blood sugar is higher than usual or over 250 mg/dL, before a meal and stays that high for more than 24 hours.  · Your blood glucose is lower than usual or less than 70 mg/dL  · You have moderate to large amounts of ketones in your blood or urine.  · You arent better after 2 days.  Date Last Reviewed: 6/1/2016 © 2000-2017 [employer]. [address] [place] [address], [place] [address]. All rights reserved. This information is not intended as a substitute for professional medical care. Always follow your healthcare professional's instructions.        What is Heart Failure?  The heart is a muscle that pumps oxygen-rich blood to all parts of the body. When you have heart failure, the heart is not able to pump as well as it should. Blood and fluid may back up into the lungs, and some parts of the body dont get enough oxygen-rich blood to work normally. These problems lead to the symptoms you feel.  When you have heart failure  With heart failure, not enough oxygen-rich blood leaves the heart with each beat. There are 2 types of heart failure. Both affect the ventricles ability to pump blood. You may have 1 or both types.       Systolic heart failure. The heart muscle becomes weak and enlarged. It cant pump enough oxygen-rich blood forward to the rest of the body when the ventricles contract. The measurement of how much blood your heart pushes out when it beats is called ejection fraction. In systolic heart failure, the ejection fraction is lower than normal. This can cause blood to back up into the lungs and cause shortness of breath and eventually ankle swelling (edema).  This is also called heart failure with reduced ejection fraction, or  HFrEF.    Diastolic heart failure. The heart muscle becomes stiff. It doesnt relax normally between contractions, which keeps the ventricles from filling with blood. Ejection fraction is often in the normal range. This can still lead to the backup of  blood into the body and affect the organs such as the liver. This is also called heart failure with preserved ejection fraction or HFpEF.     Recognizing heart failure symptoms  When you have heart failure, you need to pay close attention to your body and how you feel, every single day. That way, if a problem occurs, you can get help before it becomes too severe. You'll need to watch for changes in your symptoms. As long as symptoms stay about the same from one day to the next, your heart failure is stable. But if symptoms start to get worse, it's time to take action.  Signs and symptoms of worsening heart failure include:  · Rapid weight gain  · Shortness of breath  · Swelling (edema)  · Fatigue  How heart failure affects your body  When the heart doesn't pump enough blood, hormones (body chemicals) are sent to increase the amount of work the heart does. Some hormones make the heart grow larger. Others tell the heart to pump faster. As a result, the heart may pump more blood at first, but it can't keep up with the ongoing demands. So, the heart muscle becomes even more weak. Over time, even less blood is pumped through the heart. This leads to problems throughout the body as organs began to feel the effects of a long-term lack of oxygen. Eventually, if untreated, this can cause problems with your lungs, liver, kidneys, and loss of elasticity in the skin, and skin changes in the lower legs. A weak heart itself can eventually cause a severe decline in health and possible death if left untreated.  What is ejection fraction?  Ejection fraction (EF) measures how much blood the heart pumps out (ejects). This is measured to help diagnose heart failure. A healthy heart pumps at least half of the blood from the ventricles with each beat. This means a normal ejection fraction is around 50% to 70%. Your doctor will calculate ejection fraction from an echocardiogram.     My ejection fraction     Date:  ______________________  Ejection fraction: _____________  Test used: __________________  Date Last Reviewed: 3/15/2016  © 3592-3649 [employer]. [address] [place] [address]. All rights reserved. This information is not intended as a substitute for professional medical care. Always follow your healthcare professional's instructions.        Heart Disease Education    The heart beats 60 to 100 times per minute, 24 hours a day. This equals almost 1000,000 times a day. It pumps blood with oxygen and nutrients to the tissues and organs of the body. But the heart is a muscle and needs its own supply of blood. Blood flow to the heart is supplied by the coronary arteries. Coronary artery disease (atherosclerosis) is a result of cholesterol, saturated fat, and calcium deposits (plaques) that build up inside the walls. This causes inflammation within the coronary arteries. These plaques narrow the artery and reduce blood flow to the heart muscle. The reduction in blood flow to the heart muscle decreases oxygen supply to the heart. If the narrowing is significant enough, the oxygen supply to one or more regions of the heart can be temporarily or permanently shut down. This can cause chest pain, and possibly death of heart tissue (heart attack).  Types of chest pain  Angina is the name for pain in the heart muscle. Angina is a warning sign of serious heart disease. When untreated it can lead to a heart attack, also known as acute myocardial infarction, or AMI. Angina occurs when there is not enough blood and oxygen flowing to the heart for the amount of work it is doing. This most often happens during physical exertion, when the heart is working hardest. It is usually relieved by rest or nitroglycerin. Angina may also occur after a large meal when extra blood is sent to the digestive organs and less goes to the heart. In the case of advanced or unstable heart disease, angina can occur at rest or  awaken you from sleep. Angina usually lasts from a few minutes up to 20 minutes or more. When treated early, the effects of angina can be reversed without permanent damage to the heart. Angina is a serious condition and needs to be evaluated by a medical professional immediately.  There are two types of angina -- stable and unstable:  · Stable angina usually occurs with a predictable level of activity. Being stable, its character, severity, and occurrence do not change much over time. It usually starts with activity, and resolves with rest or taking your medicine as instructed by your doctor. The symptoms usually do not last long.  · Unstable angina changes or gets worse over time. It is different from whatever you are used to. It may feel different or worse, begin without cause, occur with exercise or exertion, wake you up from sleep, and last longer. It may not respond in the same way as it does when you take your usual medicines for an attack. This type of angina can be a warning sign of an impending heart attack.     A heart attack is usually the result of a blood clot that suddenly forms in a coronary artery that has been narrowed with plaque. When this occurs, blood flow may be cut off to a part of the heart muscle, causing the cells to die. This weakens the pumping action of the heart, which affects the delivery of blood to all the other organs in the body including the brain. This damage is not reversible. However, early treatment can limit the amount of damage.  The pain you feel with angina and a heart attack may have a similar quality. However, it is usually different in intensity and duration. Here are some typical descriptions of a heart attack:  · It is most often experienced as a squeezing, crushing, pressure-like sensation in the center of the chest.  · It is sometimes described as something heavy sitting on my chest.  · It may feel more like a bad case of indigestion.  · The pain may spread from  "the chest to the arm, shoulder, throat or jaw.  · Sometimes the pain is not felt in the chest at all, but only in the arm, shoulder, throat or jaw.  · There may also be nausea, vomiting, dizziness or light-headedness, sweating and trouble breathing.  · Palpitations, or your heart beating rapidly  · A new, irregular heart beat  · Unexplained weakness  You may not be able to tell the difference between "bad" angina and a heart attack at home. Seek help if your symptoms are different than usual. Do not be in denial or just try to "tough it out."  Call 911  This is the fastest and safest way to get to the emergency department. The paramedics can also start treatment on the way to the hospital, saving valuable time for your heart.  · If the angina gets worse, if it continues, or if it stops and returns, call 911 immediately. Do not delay. You may be having a heart attack.  · After you call 911, take a second tablet or spray unless instructed otherwise. When repeating doses, sit down if possible, because it can make you feel lightheaded or dizzy. Wait another 5 minutes. If the angina still does not go away, take a third tablet or spray. Do not take more than 3 tablets or sprays within 15 minutes. Stay on the phone with 911 for further instruction.  · Your healthcare provider may give you slightly different instructions than those above. If so, follow them carefully.  Do not wait until symptoms become severe to call 911.  Other reasons to call 911 include:  · Trouble breathing  · Feeling lightheaded, faint, or dizzy  · Rapid heart beat  · Slower than usual heart rate compared to your normal  · Angina with weakness, dizziness, fainting, heavy sweating, nausea, or vomiting  · Extreme drowsiness, confusion  · Weakness of an arm or leg or one side of the face  · Difficulty with speech or vision  When to seek medical care  Remember, the signs and symptoms of a heart attack are not always like they are on TV. Sometimes they are " "not so obvious. You may only feel weak, or just not right. If it is not clear or if you have any doubt, call for advice.  · Seek help if there is a change in the type of pain, if it feels different, or if your symptoms are mild.  · Do not drive yourself. Have someone else drive you. If no one can drive, call 911.  · Do not delay. Fast diagnosis and treatment can prevent or limit the amount of heart damage during a heart attack.  · Do not go to your doctor's office or a clinic as they may not be able to provide all the testing and treatment required for this condition.  · If your doctor has given you medicine to take when symptoms occur, take them but don't delay getting help trying to locate medicines.  What happens in the emergency department  The emergency department is connected to your local emergency medical system (EMS) through 911. That's why during a cardiac emergency, calling 911 is the fastest way to get help. The goal of the emergency department is to rapidly screen, evaluate, and treat people.  Once you are there, an electrocardiogram (ECG or heart tracing) will be done. Blood samples may be taken to look for the presence of heart enzymes that leak from damaged heart cells and show if a heart attack is occurring. You will often be evaluated by a heart specialist (cardiologist) who decides the best course of action. In the case of severe angina or early heart attack, and depending on the circumstances, powerful "clot busting" medicines can be used to dissolve blood clots in the coronary artery. In other cases, you may be taken to a cardiac catheterization lab. Here, a tiny balloon-tipped catheter is advanced through blood vessels to the heart. There the balloon is inflated pushing open the blood vessel restoring blood flow.  Risk factors for heart disease  Risk factors for heart disease are a combination of genetic and lifestyle. Many risk factors work by either directly or indirectly damaging the blood " vessels of the heart, or by increasing the risk of forming blood or cholesterol clots, which then clog up and block the arteries.     Examples of physical lifestyle risk factors:  · Cigarette smoking  · High blood pressure  · High blood cholesterol  · Use of stimulant drugs such as cocaine, crack, and amphetamines  · Eating a high-fat, high-cholesterol meal  · Diabetes   · Obesity which increases risk for diabetes and high blood pressure  · Lack of regular physical activity     Examples of emotional lifestyle factors:  · Chronic high stress levels release stress hormones. These raise blood pressure and cholesterol level and makes blood clot more easily.  · Held-in anger, hostile or cynical attitude  · Social and emotional isolation, lack of intimacy  · Loss of relationship  · Depression  Other factors that increase the risk of heart attack that you cannot control :  · Age. The older you get beyond 40, the greater is your risk of significant coronary artery disease.  · Gender. More men than women get heart disease; but once past menopause, women who are not taking estrogen replacement have the same risk as men for a heart attack.  · Family history. If your mother, father, brother or sister has coronary artery disease, your risk of having it is higher than a person your age without this family history.  What can you do to decrease your risk  To reduce your risk of heart disease:  · Get regular checkups with your doctor.  · Take your medicines for blood pressure, cholesterol or diabetes as directed.  · Watch your diet. Eat a heart healthy diet choosing fresh foods, less salt, cholesterol, and fat  · Stop smoking. Get help if needed.  · Get regular exercise.  · Manage stress.  · Carry a list of medicines and doses in your wallet.  Date Last Reviewed: 12/30/2015 © 2000-2017 [employer]. [address] [place] [address], [place] [address]. All rights reserved. This information is not intended as a substitute for  professional medical care. Always follow your healthcare professional's instructions.        Heart Failure: Making Changes to Your Diet  You have a condition called heart failure. When you have heart failure, excess fluid is more likely to build up in your body because your heart isn't working well. This makes the heart work harder to pump blood. Fluid buildup causes symptoms such as shortness of breath and swelling (edema). This is often referred to as congestive heart failure or CHF. Controlling the amount of salt (sodium) you eat may help stop fluid from building up. Your doctor may also tell you to reduce the amount of fluid you drink.  Reading food labels    Your healthcare provider will tell you how much sodium you can eat each day. Read food labels to keep track. Keep in mind that certain foods are high in salt. These include canned, frozen, and processed foods. Check the amount of sodium in each serving. Watch out for high-sodium ingredients. These include MSG (monosodium glutamate), baking soda, and sodium phosphate.   Eating less salt  Give yourself time to get used to eating less salt. It may take a little while. Here are some tips to help:  · Take the saltshaker off the table. Replace it with salt-free herb mixes and spices.  · Eat fresh or plain frozen vegetables. These have much less salt than canned vegetables.  · Choose low-sodium snacks like sodium-free pretzels, crackers, or air-popped popcorn.  · Dont add salt to your food when youre cooking. Instead, season your foods with pepper, lemon, garlic, or onion.  · When you eat out, ask that your food be cooked without added salt.  · Avoid eating fried foods as these often have a great deal of salt.  If youre told to limit fluids  You may need to limit how much fluid you have to help prevent swelling. This includes anything that is liquid at room temperature, such as ice cream and soup. If your doctor tells you to limit fluid, try these  tips:  · Measure drinks in a measuring cup before you drink them. This will help you meet daily goals.  · Chill drinks to make them more refreshing.  · Suck on frozen lemon wedges to quench thirst.  · Only drink when youre thirsty.  · Chew sugarless gum or suck on hard candy to keep your mouth moist.  · Weigh yourself daily to know if your body's fluid content is rising.  My sodium goal  Your healthcare provider may give you a sodium goal to meet each day. This includes sodium found in food as well as salt that you add. My goal is to eat no more than ___________ mg of sodium per day.     When to call your doctor  Call your doctor right away if you have any symptoms of worsening heart failure. These can include:  · Sudden weight gain  · Increased swelling of your legs or ankles  · Trouble breathing when youre resting or at night  · Increase in the number of pillows you have to sleep on  · Chest pain, pressure, discomfort, or pain in the jaw, neck, or back   Date Last Reviewed: 3/21/2016  © 5240-1926 [employer]. [address] [place] [address]. All rights reserved. This information is not intended as a substitute for professional medical care. Always follow your healthcare professional's instructions.        Heart Failure: Warning Signs of a Flare-Up  You have a condition called heart failure. Once you have heart failure, flare-ups can happen. Below are signs that can mean your heart failure is getting worse. If you notice any of these warning signs, call your healthcare provider.  Swelling    · Your feet, ankles, or lower legs get puffier.  · You notice skin changes on your lower legs.  · Your shoes feel too tight.  · Your clothes are tighter in the waist.  · You have trouble getting rings on or off your fingers.  Shortness of breath  · You have to breathe harder even when youre doing your normal activities or when youre resting.  · You are short of breath walking up stairs or even short  distances.  · You wake up at night short of breath or coughing.  · You need to use more pillows or sit up to sleep.  · You wake up tired or restless.  Other warning signs  · You feel weaker, dizzy, or more tired.  · You have chest pain or changes in your heartbeat.  · You have a cough that wont go away.  · You cant remember things or dont feel like eating.  Tracking your weight  Gaining weight is often the first warning sign that heart failure is getting worse. Gaining even a few pounds can be a sign that your body is retaining excess water and salt. Weighing yourself each day in the morning after you urinate and before you eat, is the best way to know if you're retaining water. Get a scale that is easy to read and make sure you wear the same clothes and use the same scale every time you weigh. Your healthcare provider will show you how to track your weight. Call your doctor if you gain more than 2 pounds in 1 day, 5 pounds in 1 week, or whatever weight gain you were told to report by your doctor. This is often a sign of worsening heart failure and needs to be evaluated and treated before it compromises your breathing. Your doctor will tell you what to do next.    Date Last Reviewed: 3/15/2016  © 7350-6670 [employer]. [address] [place] [address]. All rights reserved. This information is not intended as a substitute for professional medical care. Always follow your healthcare professional's instructions.            Date Last Reviewed: 9/30/2015  © 1294-0807 [employer]. [address] [place] [address]. All rights reserved. This information is not intended as a substitute for professional medical care. Always follow your healthcare professional's instructions.        What is High Blood Pressure?  High blood pressure (also called hypertension) is known as the silent killer. This is because most of the time it doesnt cause symptoms. In fact, many people dont  know they have it until other problems develop. In most cases, high blood pressure cant be cured. Its a disease that often requires lifelong treatment. The good news is that it can be managed.  Understanding blood pressure  The circulatory system is made up of the heart and blood vessels that carry blood through the body. Your heart is the pump for this system. With each heartbeat (contraction), the heart sends blood out through large blood vessels called arteries. Blood pressure is a measure of how hard the moving blood pushes against the walls of the arteries.  High blood pressure can harm your health  In a healthy blood vessel, the blood moves smoothly through the vessel and puts normal pressure on the vessel walls.       High blood pressure occurs when blood pushes too hard against artery walls. This causes damage to the artery walls and then the formation of scar tissue as it heals. This makes the arteries stiff and weak. Plaque sticks to the scarred tissue narrowing and hardening the arteries. High blood pressure also causes your heart to work harder to get blood out to the body. High blood pressure raises your risk of heart attack, also known as acute myocardial infarction, or AMI, and stroke. It can also lead to kidney disease, and blindness.  Measuring blood pressure  An example of a blood pressure measurement is 120/70 (120 over 70). The top number is the pressure of blood against the artery walls during a heartbeat (systolic). The bottom number is the pressure of blood against artery walls between heartbeats (diastolic). Talk with your healthcare provider to find out what your blood pressure goals should be.   Controlling blood pressure  If your blood pressure is too high, work with your doctor on a plan for lowering it. Below are steps you can take that will help lower your blood pressure.  · Choose heart-healthy foods. Eating healthier meals helps you control your blood pressure. Ask your doctor about  "the DASH eating plan. This plan helps reduce blood pressure by limiting the amount of sodium (salt) you have in your diet. DASH also encourages eating plenty of fruits and vegetables, low-fat or non-fat dairy, whole-grains, and foods high in fiber, and low in fat.  · Reduce sodium. Reducing sodium in your diet reduces fluid retention. Fluid retention caused by too much salt increases blood volume and blood pressure. The American Heart Associations (AHA) "ideal" sodium intake recommendation is 1,500 milligrams per day.  However, since American's eat so much salt, the AHA says a positive change can occur by cutting back to even 2,400 milligrams of sodium a day.  · Maintain a healthy weight. Being overweight makes you more likely to have high blood pressure. Losing excess weight helps lower blood pressure.  · Exercise regularly. Daily exercise helps your heart and blood vessels work better and stay healthier. It can help lower your blood pressure.  · Stop smoking. Smoking increases blood pressure and damages blood vessels.  · Limit alcohol. Drinking too much alcohol can raise blood pressure. Men should have no more than 2 drinks a day. Women should have no more than 1. (A drink is equal to 1 beer, or a small glass of wine, or a shot of liquor.)  · Control stress. Stress makes your heart work harder and beat faster. Controlling stress helps you control your blood pressure.  Facts about high blood pressure  · Feeling OK does not mean that blood pressure is under control. Likewise, feeling bad doesnt mean its out of control. The only way to know for sure is to check your pressure regularly.  · Medicine is only one part of controlling high blood pressure. You also need to manage your weight, get regular exercise, and adjust your eating habits.  · High blood pressure is usually a lifelong problem. But it can be controlled with healthy lifestyle changes and medicine.  · Hypertension is not the same as stress. Although " stress may be a factor in high blood pressure, its only one part of the story.  · Blood pressure medicines need to be taken every day. Stopping suddenly may cause a dangerous increase in pressure.   Date Last Reviewed: 4/27/2016 © 2000-2017 [employer]. [address] [place] [address], [place] [address]. All rights reserved. This information is not intended as a substitute for professional medical care. Always follow your healthcare professional's instructions.        Established High Blood Pressure    High blood pressure (hypertension) is a chronic disease. Often, healthcare providers dont know what causes it. But it can be caused by certain health conditions and medicines.  If you have high blood pressure, you may not have any symptoms. If you do have symptoms, they may include headache, dizziness, changes in your vision, chest pain, and shortness of breath. But even without symptoms, high blood pressure thats not treated raises your risk for heart attack and stroke. High blood pressure is a serious health risk and shouldnt be ignored.  A blood pressure reading is made up of two numbers: a higher number over a lower number. The top number is the systolic pressure. The bottom number is the diastolic pressure. A normal blood pressure is a systolic pressure of  less than 120 over a diastolic pressure of less than 80. You will see your blood pressure readings written together. For example, a person with a systolic pressure of 188 and a diastolic pressure of 78 will have 118/78 written in the medical record.  High blood pressure is when either the top number is 140 or higher, or the bottom number is 90 or higher. This must be the result when taking your blood pressure a number of times. The blood pressures between normal and high are called prehypertension.  Home care  If you have high blood pressure, you should do what is listed below to lower your blood pressure. If you are taking medicines for high blood  pressure, these methods may reduce or end your need for medicines in the future.  · Begin a weight-loss program if you are overweight.  · Cut back on how much salt you get in your diet. Heres how to do this:  ¨ Dont eat foods that have a lot of salt. These include olives, pickles, smoked meats, and salted potato chips.  ¨ Dont add salt to your food at the table.  ¨ Use only small amounts of salt when cooking.  · Start an exercise program. Talk with your healthcare provider about the type of exercise program that would be best for you. It doesn't have to be hard. Even brisk walking for 20 minutes 3 times a week is a good form of exercise.  · Dont take medicines that stimulate the heart. This includes many over-the-counter cold and sinus decongestant pills and sprays, as well as diet pills. Check the warnings about hypertension on the label. Before buying any over-the-counter medicines or supplements, always ask the pharmacist about the product's potential interaction with your high blood pressure and your high blood pressure medicines.  · Stimulants such as amphetamine or cocaine could be deadly for someone with high blood pressure. Never take these.  · Limit how much caffeine you get in your diet. Switch to caffeine-free products.  · Stop smoking. If you are a long-time smoker, this can be hard. Talk to your healthcare provider about medicines and nicotine replacement options to help you. Also, enroll in a stop-smoking program to make it more likely that you will quit for good.  · Learn how to handle stress. This is an important part of any program to lower blood pressure. Learn about relaxation methods like meditation, yoga, or biofeedback.  · If your provider prescribed medicines, take them exactly as directed. Missing doses may cause your blood pressure get out of control.  · If you miss a dose or doses, check with your healthcare provider or pharmacist about what to do.  · Consider buying an automatic blood  pressure machine. Ask your provider for a recommendation. You can get one of these at most pharmacies.     The American Heart Association recommends the following guidelines for home blood pressure monitoring:  · Don't smoke or drink coffee for 30 minutes before taking your blood pressure.  · Go to the bathroom before the test.  · Relax for 5 minutes before taking the measurement.  · Sit with your back supported (don't sit on a couch or soft chair); keep your feet on the floor uncrossed. Place your arm on a solid flat surface (like a table) with the upper part of the arm at heart level. Place the middle of the cuff directly above the eye of the elbow. Check the monitor's instruction manual for an illustration.  · Take multiple readings. When you measure, take 2 to 3 readings one minute apart and record all of the results.  · Take your blood pressure at the same time every day, or as your healthcare provider recommends.  · Record the date, time, and blood pressure reading.  · Take the record with you to your next medical appointment. If your blood pressure monitor has a built-in memory, simply take the monitor with you to your next appointment.  · Call your provider if you have several high readings. Don't be frightened by a single high blood pressure reading, but if you get several high readings, check in with your healthcare provider.  · Note: When blood pressure reaches a systolic (top number) of 180 or higher OR diastolic (bottom number) of 110 or higher, seek emergency medical treatment.  Follow-up care  You will need to see your healthcare provider regularly. This is to check your blood pressure and to make changes to your medicines. Make a follow-up appointment as directed. Bring the record of your home blood pressure readings to the appointment.  When to seek medical advice  Call your healthcare provider right away if any of these occur:  · Blood pressure reaches a systolic (upper number) of 180 or higher OR  a diastolic (bottom number) of 110 or higher  · Chest pain or shortness of breath  · Severe headache  · Throbbing or rushing sound in the ears  · Nosebleed  · Sudden severe pain in your belly (abdomen)  · Extreme drowsiness, confusion, or fainting  · Dizziness or spinning sensation (vertigo)  · Weakness of an arm or leg or one side of the face  · You have problems speaking or seeing   Date Last Reviewed: 12/1/2016 © 2000-2017 [employer]. [address] [place] [address]. All rights reserved. This information is not intended as a substitute for professional medical care. Always follow your healthcare professional's instructions.        Living with High Blood Pressure and Kidney Disease  By lowering high blood pressure, you can reduce the amount of damage to your kidneys, and help slow any progression of kidney disease. Visit your healthcare provider as scheduled and follow the tips below.    Eat right  To control blood pressure and kidney disease:  · Limit salt (sodium) intake. Your sodium limit is 1,500 mg a day. Sodium makes up 40% of table salt, which is also called sodium chloride. So 1,500 mg of sodium equals 3,800 mg of salt. It's very important to read and understand this difference when reading food labels. The following guide will make it easy to understand how much sodium is in different amounts of salt:  ¨ 1/4 teaspoon salt = 600 mg sodium  ¨ 1/2 teaspoon salt = 1,200 mg sodium  ¨ 3/4 teaspoon salt = 1,800 mg sodium  ¨ 1 teaspoon salt = 2,400 mg sodium  · Cook with spices and herbs instead of salt.  · Eat fresh foods instead of canned or processed ones.  · Eat less fat. Avoid fats that come from animal sources.  · Choose low-fat dairy foods.  You may also need to  · Eat less protein.  · Drink less fluid.  · Eat foods that are low in phosphorus and potassium.  Stay active  Regular activity helps reduce high blood pressure. For best results:  · Talk with your healthcare provider  before starting a fitness program. Your provider may be able to suggest activities that will help you feel your best.  · Ask your healthcare provider how often you should exercise and for how long.  · Try to exercise at the same time each day.  Date Last Reviewed: 2/1/2017 © 2000-2017 [employer]. [address] [place] [address], [place] [address]. All rights reserved. This information is not intended as a substitute for professional medical care. Always follow your healthcare professional's instructions.        High Blood Pressure and Kidney Disease  If you have high blood pressure and it is not controlled, it can damage the walls of the blood vessels in your body including those in the kidneys. If that happens, the tiny filtration units, or nephrons, become damaged and less able to filter your blood and waste products in the blood. Lowering high blood pressure can reduce the amount of damage to your kidneys and help slow any progression of kidney disease. High blood pressure is one of the top two causes of kidney failure in [place] countries.     Follow the instructions that come with your kit.     Normal blood pressure  The systolic pressure is when your heart is beating and pumping blood. The diastolic pressure is when your heart is relaxing and refilling with blood. A normal blood pressure is less than 120/80. In chronic kidney disease, or CKD, the blood pressure goal is less than 130/80.   Check your blood pressure often  Checking your blood pressure is a simple test that you can do at home. Most pharmacies have in-store monitors and home blood pressure monitors. For best results, keep the hints below in mind.  · Always take your blood pressure at the same time of the day. Morning may be best.  · Sit so that you feel relaxed, and do not talk.  · Use the cuff on your bare arm.  · Place the cuff so it fits snugly on your upper arm. Some monitors are placed on the wrist.  · Follow all the instructions that  come with your kit.  · Keep a record of all your blood pressure readings.  · Take your record and kit with you to healthcare provider visits. Ask your healthcare provider to check your blood pressure using your kit, and compare your readings with your providers.  Take medicine as directed  Blood pressure medicines often play a large role in treatment. Your medicine will work best if its taken as directed. Be sure to do these things:  · Take your medicine at the same time each day.  · Find out if it should be taken with food.  · Call your healthcare provider if you think the medicine is making you dizzy or sick to your stomach.  · Do not skip doses.  · Do not stop taking your medicine unless your healthcare provider tells you to. Doing so may be harmful.  · Get regular urine and blood tests at least annually to watch for kidney disease or monitor existing kidney disease.  Addressing other risk factors for kidney disease  Many other factors can also contribute to kidney disease. Smoking, diabetes, dietary habits, lack of exercise, obesity, and other factors can contribute.  Date Last Reviewed: 2/1/2017  © 8229-0074 [employer]. [address] [place] [address]. All rights reserved. This information is not intended as a substitute for professional medical care. Always follow your healthcare professional's instructions.        Chronic Kidney Disease (CKD)     The role of the kidneys is to remove waste products and extra water from the blood.  When the kidneys do not work as they should, waste products begin to build up in the blood. This is called chronic kidney disease (CKD). CKD means that you have kidney damage or a decrease in kidney function lasting at least 3 months. CKD allows extra water, waste, and toxins to build up in the body. This can eventually become life-threatening. You might need dialysis or a kidney transplant to stay alive. This most severe form is called end stage renal  disease.  Diabetes is the leading causes of chronic renal failure. Other causes include high blood pressure, hardening of the arteries (atherosclerosis), lupus, inflammation of the blood vessels (vasculitis), and past viral or bacterial infections. Certain over-the-counter pain medicines can cause renal failure when taken often over a long period of time. These include aspirin, ibuprofen, and related anti-inflammatory medicines called NSAIDs (nonsteroidal anti-inflammatory drugs).  Home care  The following guidelines will help you care for yourself at home:  · If you have diabetes, talk with your healthcare provider about keeping your blood sugar under control. Ask if you need to make and changes to your diet, lifestyle, or medicines.  · If you have high blood pressure:  ¨ Take prescribed medicine to lower your blood pressure to the recommended goal of less than 130/80.  ¨ Start a regular exercise program that you enjoy. Check with your healthcare provider to be sure your planned exercise program is right for you.  ¨ Eat less salt (sodium). Your healthcare provider can tell you how much salt per day is safe for you.  · If you are overweight, talk with your healthcare provider about a weight loss plan.  · If you smoke, you must quit. Smoking makes kidney disease worse. Talk with your healthcare provider about ways to help you quit.  For more information, visit the following links:  ¨ www.smokefree.gov/sites/default/files/pdf/clearing-the-air-accessible.pdf  ¨ www.smokefree.gov  ¨ www.cancer.org/healthy/stayawayfromtobacco/guidetoquittingsmoking/  · Most people with CKD need to follow a special diet.  Be sure you understand yours. In general, you will need to limit protein, salt, potassium, and phosphorus. You also need to limit how much fluid you drink.   · CKD is a risk factor for heart disease. Talk with your healthcare provider about any other risk factors you might have and what you can do to lessen  them.  · Talk with your healthcare provider about any medicines you are taking to find out if they need to be reduced or stopped.  · Don't use the following over-the-counter medicines, or consult your healthcare provider before using:  ¨ Aspirin and NSAIDs such as ibuprofen or naproxen. Using acetaminophen for fever or pain is OK.  ¨ Laxatives and antacids containing magnesium or aluminum  ¨ Fleet or phospho soda enemas containing phosphorus  ¨ Certain stomach acid-blocking medicine such as cimetidine or ranitidine   ¨ Decongestants containing pseudoephedrine   ¨ Herbal supplements  Follow-up care  Follow up with your healthcare provider, or as advised. Contact one of the following for more information:  · American Association of Kidney Patients 223-714-4295 www.aakp.org  · National Kidney Foundation 495-875-3441 www.kidney.org  · American Kidney Fund 366-749-7191 www.kidneyfund.org  · National Kidney Disease Education Program 866-4KIDNEY www.nkdep.nih.gov  If an X-ray, ECG (cardiogram), or other diagnostic test was taken, you will be told of any new findings that may affect your care.  Call 911  Call 911 if you have any of the following:  · Severe weakness, dizziness, fainting, drowsiness, or confusion  · Chest pain or shortness of breath  · Heart beating fast, slow, or irregularly  When to seek medical advice  Call your healthcare provider right away if any of these occur:  · Nausea or vomiting  · Fever of 100.4°F (38°C) or higher, or as directed by your healthcare provider  · Unexpected weight gain or swelling in the legs, ankles, or around the eyes  · Decrease or absent urine output  Date Last Reviewed: 9/1/2016  © 9751-6702 [employer]. [address] [place] [address], [place] [address]. All rights reserved. This information is not intended as a substitute for professional medical care. Always follow your healthcare professional's instructions.        Kidney Disease: Eating Less Sodium  Sodium is a mineral  that the body needs in small amounts. Sodium is found in table salt. Most people eat far more salt than they need. There are 2main reasons for this: Salt is present in high amounts in most processed foods (pre-prepared foods like breakfast cereals, cookies, and pickles) and in all restaurant foods. In other words, if you are not cooking from fresh ingredients at home, you are very likely eating more salt than you need. When sodium intake is too high, it can increase thirst and cause the body to retain fluid. To avoid these side effects, people with chronic kidney disease are often told to eat less sodium. The tips on this sheet can show you how.  People with chronic kidney disease should restrict their salt intake to less than 1,500 milligrams (mg) of sodium daily. Food labels generally report the sodium content. Table salt is sodium chloride. One level teaspoon of table salt contains 2.300 mg of sodium.    When you shop for food  Unlike canned and processed foods, fresh foods have no added salt and are better for you. When youre food shopping:  · Choose fresh foods when you can.  · Read food labels before buying packaged foods. Check the labels nutrition facts for sodium amounts and servings per package.  · Try to pick packaged foods with a sodium content of 140 mg (milligrams) or less per serving.  · Do not choose foods with over 400 mg of sodium per serving.  Season instead of salt  Try the seasonings and foods listed below to season without sodium.  · Basil: tomatoes, squash, eggplant, soups, fish  · Tenorio: soups, rice, lentils, chicken  · Dill: beets, cucumbers, green beans  · Garlic: sauces, vegetables, meats, fish  · Bhumi: carrots, chicken, cooked fruit, white sauces  · Lemon: asparagus, artichokes, broccoli, spinach, fish  · Mint: cold soups, salads, fruit dishes  · Oregano: eggplant, chicken, salads, sauces  · Thyme: chicken, fish, lean meats, soups, stews  Food that has salt added during cooking tastes  less salty than if salt is sprinkled on it at the table. Therefore, use half the amount of salt you want to have in your food during cooking, and sprinkle the other half on the food at the table.  Do not use seasoned salt or salt substitutes. They may contain sodium or potassium (another mineral people with kidney disease are often told to limit).  Date Last Reviewed: 2/1/2017  © 4602-0371 [employer]. [address] [place] [address]. All rights reserved. This information is not intended as a substitute for professional medical care. Always follow your healthcare professional's instructions.        Step-by-Step  Checking Your Blood Pressure    Date Last Reviewed: 4/27/2016  © 9265-5978 [employer]. [address] [place] [address]. All rights reserved. This information is not intended as a substitute for professional medical care. Always follow your healthcare professional's instructions.        Heart Failure: Tracking Your Weight  You have a condition called heart failure. When you have heart failure, a sudden weight gain or a steady rise in weight is a warning sign that your body is retaining too much water and salt. This could mean your heart failure is getting worse. If left untreated, it can cause problems for your lungs and result in shortness of breath. Weighing yourself each day is the best way to know if youre retaining water. If your weight goes up quickly, call your doctor. You will be given instructions on how to get rid of the excess water. You will likely need medicines and to avoid salt. This will help your heart work better.  Call your doctor if you gain more than 2 pounds in 1 day, more than 5 pounds in 1 week, or whatever weight gain you were told to report by your doctor. This is often a sign of worsening heart failure and needs to be evaluated and treated. Your doctor will tell you what to do next.   Tips for weighing yourself    · Weigh yourself at  the same time each morning, wearing the same clothes. Weigh yourself after urinating and before eating.  · Use the same scale each day. Make sure the numbers are easy to read. Put the scale on a flat, hard surface -- not on a rug or carpet.  · Do not stop weighing yourself. If you forget one day, weigh again the next morning.  How to use your weight chart  · Keep your weight chart near the scale. Write your weight on the chart as soon as you get off the scale.  · Fill in the month and the start date on the chart. Then write down your weight each day. Your chart will look like this:    · If you miss a day, leave the space blank. Weigh yourself the next day and write your weight in the next space.  · Take your weight chart with you when you go to see your doctor.  Date Last Reviewed: 3/20/2016  © 0734-6175 [employer]. [address] [place] [address], [place] [address]. All rights reserved. This information is not intended as a substitute for professional medical care. Always follow your healthcare professional's instructions.

## 2018-10-19 NOTE — PROGRESS NOTES
Thank you for the referral. The following patient has been assigned to Alessandra Shepard RN with Outpatient Complex Care Management for high risk screening.    Reason for referral: Hypertension associated with diabetes    Please contact [facility] at [phone] with any questions.    Thank you,    Fariba Brink    Outpatient Case Management
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
## 2018-10-22 ENCOUNTER — TELEPHONE (OUTPATIENT)
Dept: FAMILY MEDICINE | Facility: CLINIC | Age: 73
End: 2018-10-22

## 2018-10-22 ENCOUNTER — CLINICAL SUPPORT (OUTPATIENT)
Dept: CARDIOLOGY | Facility: CLINIC | Age: 73
End: 2018-10-22
Attending: INTERNAL MEDICINE
Payer: MEDICARE

## 2018-10-22 DIAGNOSIS — I50.30 CHF WITH LEFT VENTRICULAR DIASTOLIC DYSFUNCTION, NYHA CLASS 2: ICD-10-CM

## 2018-10-22 DIAGNOSIS — E11.65 UNCONTROLLED TYPE 2 DIABETES MELLITUS WITH HYPERGLYCEMIA: Primary | ICD-10-CM

## 2018-10-22 DIAGNOSIS — Z95.810 CARDIAC DEFIBRILLATOR IN SITU: ICD-10-CM

## 2018-10-22 DIAGNOSIS — Z95.810 CARDIAC DEFIBRILLATOR IN SITU: Primary | ICD-10-CM

## 2018-10-22 PROCEDURE — 93296 REM INTERROG EVL PM/IDS: CPT | Mod: PBBFAC,PO | Performed by: INTERNAL MEDICINE

## 2018-10-22 PROCEDURE — 93295 DEV INTERROG REMOTE 1/2/MLT: CPT | Mod: ,,, | Performed by: INTERNAL MEDICINE

## 2018-10-22 NOTE — TELEPHONE ENCOUNTER
"----- Message from Cornelio Sandoval RN sent at 10/22/2018 10:38 AM CDT -----  Contact: Alessandra Victoria RN-YASSINE  Good afternoon. This is Alessandra Victoria. I am a Registered Nurse with [facility]'s Outpatient Case Management Team. I received a referral on the above patient. I spoke with the patient today on the telephone and he reports that he is not checking his blood glucose "as often as he should be". He also stated he is not sure how often he is "supposed to be checking it". Patient's last HgbA1c on 7/29/18 was 11.6% and he is taking Toujeo and Novolog. Please advise recommended frequency of CBG testing for this patient. I will be working with this patient to provide resources and education regarding diabetes and his other co-morbidities over the next 60-90 days. Patient would also benefit from Diabetic Education so am also requesting a referral to Diabetic Educator. Once the referral is placed I will facilitate setting up the appointment for the patient.     Kindest Regards,  Alessandra Victoria RN-YASSINE  [phone] or [phone]  "

## 2018-10-22 NOTE — TELEPHONE ENCOUNTER
He should be checking his sugars before every meal and before bed.     I have signed for the following orders AND/OR meds.  Please call the patient and ask the patient to schedule the testing AND/OR inform about any medications that were sent.      Orders Placed This Encounter   Procedures    Ambulatory Referral to Diabetes Education     Referral Priority:   Routine     Referral Type:   Consultation     Referral Reason:   Specialty Services Required     Requested Specialty:   Endocrinology     Number of Visits Requested:   1

## 2018-10-23 ENCOUNTER — TELEPHONE (OUTPATIENT)
Dept: DIABETES | Facility: CLINIC | Age: 73
End: 2018-10-23

## 2018-10-25 ENCOUNTER — OFFICE VISIT (OUTPATIENT)
Dept: NEUROSURGERY | Facility: CLINIC | Age: 73
End: 2018-10-25
Payer: MEDICARE

## 2018-10-25 VITALS
WEIGHT: 201.63 LBS | SYSTOLIC BLOOD PRESSURE: 168 MMHG | TEMPERATURE: 97 F | DIASTOLIC BLOOD PRESSURE: 88 MMHG | HEART RATE: 80 BPM | HEIGHT: 67 IN | BODY MASS INDEX: 31.65 KG/M2

## 2018-10-25 DIAGNOSIS — I65.21 CAROTID STENOSIS, RIGHT: ICD-10-CM

## 2018-10-25 DIAGNOSIS — Z79.4 TYPE 2 DIABETES MELLITUS WITH OTHER SPECIFIED COMPLICATION, WITH LONG-TERM CURRENT USE OF INSULIN: ICD-10-CM

## 2018-10-25 DIAGNOSIS — G91.2 NORMAL PRESSURE HYDROCEPHALUS SYNDROME: Primary | ICD-10-CM

## 2018-10-25 DIAGNOSIS — E11.69 TYPE 2 DIABETES MELLITUS WITH OTHER SPECIFIED COMPLICATION, WITH LONG-TERM CURRENT USE OF INSULIN: ICD-10-CM

## 2018-10-25 DIAGNOSIS — Z98.2 STATUS POST VENTRICULO-PERITONEAL SHUNT PLACEMENT: ICD-10-CM

## 2018-10-25 LAB
AV DELAY - LONGEST: 140 MSEC
AV DELAY - SHORTEST: 120 MSEC
CHARGE TIME (SEC): 9.6 SEC
HV IMPEDANCE (OHM): 75 OHM
IMPEDANCE RA LEAD (DONOR): 319 OHMS
IMPEDANCE RA LEAD (NATIVE): 594 OHMS
IMPEDANCE RA LEAD: 594 OHMS
P/R-WAVE RA LEAD: NORMAL MV
PV DELAY - FIXED: -30 MSEC

## 2018-10-25 PROCEDURE — 3077F SYST BP >= 140 MM HG: CPT | Mod: CPTII,,, | Performed by: PHYSICIAN ASSISTANT

## 2018-10-25 PROCEDURE — 3046F HEMOGLOBIN A1C LEVEL >9.0%: CPT | Mod: CPTII,,, | Performed by: PHYSICIAN ASSISTANT

## 2018-10-25 PROCEDURE — 99212 OFFICE O/P EST SF 10 MIN: CPT | Mod: S$PBB,,, | Performed by: PHYSICIAN ASSISTANT

## 2018-10-25 PROCEDURE — 99215 OFFICE O/P EST HI 40 MIN: CPT | Mod: PBBFAC,PN | Performed by: PHYSICIAN ASSISTANT

## 2018-10-25 PROCEDURE — 3079F DIAST BP 80-89 MM HG: CPT | Mod: CPTII,,, | Performed by: PHYSICIAN ASSISTANT

## 2018-10-25 PROCEDURE — 1101F PT FALLS ASSESS-DOCD LE1/YR: CPT | Mod: CPTII,,, | Performed by: PHYSICIAN ASSISTANT

## 2018-10-25 PROCEDURE — 99999 PR PBB SHADOW E&M-EST. PATIENT-LVL V: CPT | Mod: PBBFAC,,, | Performed by: PHYSICIAN ASSISTANT

## 2018-10-25 NOTE — PROGRESS NOTES
Neurosurgery History & Physical    Patient ID: Abdullahi Salazar is a 72 y.o. male.    Chief Complaint   Patient presents with    Follow-up     1 month follow up. Denies pain at this time       Review of Systems   Constitutional: Negative for activity change, chills, fatigue and unexpected weight change.   HENT: Negative for hearing loss, tinnitus, trouble swallowing and voice change.    Eyes: Negative for visual disturbance.   Respiratory: Negative for apnea, chest tightness and shortness of breath.    Cardiovascular: Negative for chest pain and palpitations.   Gastrointestinal: Negative for abdominal pain, constipation, diarrhea, nausea and vomiting.   Genitourinary: Negative for difficulty urinating, dysuria and frequency.   Musculoskeletal: Negative for back pain, gait problem, neck pain and neck stiffness.   Skin: Negative for wound.   Neurological: Negative for dizziness, tremors, seizures, facial asymmetry, speech difficulty, weakness, light-headedness, numbness and headaches.   Psychiatric/Behavioral: Negative for confusion and decreased concentration.       Past Medical History:   Diagnosis Date    Actinic keratoses     Altered mental status 3/22/2015    Anticoagulant long-term use     Atrial fibrillation     CAD (coronary artery disease)     stents after bypass    Carotid artery stenosis 3/31/2015    CHF (congestive heart failure) 03/2018    Colon polyps 2011    repeat colonoscopy 2014    Combined hyperlipidemia associated with type 2 diabetes mellitus     Diabetes mellitus type II, uncontrolled     dx 2004    GERD (gastroesophageal reflux disease)     Grand mal seizure     last 2/2017    Hard of hearing     History of cardiac pacemaker 3/31/2015    HTN (hypertension)     Hydrocephalus 09/08/2017    Influenza A 1/2/2018    He got influenza a recently and was in the hospital in [place].  He had rhabdomyolyisis and acute kidney injury.  He also had an increased troponin.  He had a flu shot in  early December.    Mitral valve insufficiency     Presence of automatic (implantable) cardiac defibrillator     Sleep apnea with use of continuous positive airway pressure (CPAP)     patient states he does not use CPAP anymore    Syncope 3/30/2015    Wears dentures     upper and lower    Wears hearing aid     sometimes     Social History     Socioeconomic History    Marital status:      Spouse name: Not on file    Number of children: Not on file    Years of education: Not on file    Highest education level: Not on file   Social Needs    Financial resource strain: Not on file    Food insecurity - worry: Not on file    Food insecurity - inability: Not on file    Transportation needs - medical: Not on file    Transportation needs - non-medical: Not on file   Occupational History    Not on file   Tobacco Use    Smoking status: Former Smoker     Packs/day: 2.00     Years: 25.00     Pack years: 50.00     Types: Cigarettes     Last attempt to quit: 1983     Years since quittin.8    Smokeless tobacco: Never Used    Tobacco comment: quit     Substance and Sexual Activity    Alcohol use: Yes     Alcohol/week: 0.6 oz     Types: 1 Cans of beer per week     Comment: rarely    Drug use: No    Sexual activity: Not on file   Other Topics Concern    Not on file   Social History Narrative    Not on file     Family History   Problem Relation Age of Onset    Stomach cancer Mother     Lung cancer Father     Diabetes Sister     Hypertension Sister     Heart disease Neg Hx     Stroke Neg Hx      Review of patient's allergies indicates:  No Known Allergies    Current Outpatient Medications:     aspirin (ECOTRIN) 81 MG EC tablet, Take 1 tablet (81 mg total) by mouth once daily., Disp: , Rfl: 0    atorvastatin (LIPITOR) 40 MG tablet, Take 1 tablet (40 mg total) by mouth once daily., Disp: 90 tablet, Rfl: 3    benazepril (LOTENSIN) 40 MG tablet, Take 1 tablet (40 mg total) by mouth once  "daily., Disp: 90 tablet, Rfl: 3    blood sugar diagnostic (ACCU-CHEK CLEOPATRA PLUS TEST STRP) Strp, TEST BLOOD SUGARS 4 TIMES DAILY, Disp: 150 strip, Rfl: PRN    carvedilol (COREG) 25 MG tablet, Take 1 tablet (25 mg total) by mouth 2 (two) times daily with meals., Disp: 180 tablet, Rfl: 3    clopidogrel (PLAVIX) 75 mg tablet, , Disp: , Rfl:     dabigatran etexilate (PRADAXA) 75 mg Cap, Take 1 capsule (75 mg total) by mouth 2 (two) times daily. "Do NOT break, chew, or open capsules.", Disp: 180 capsule, Rfl: 3    ezetimibe (ZETIA) 10 mg tablet, TAKE 1 TABLET ONE TIME DAILY, Disp: 90 tablet, Rfl: 3    insulin aspart U-100 (NOVOLOG) 100 unit/mL InPn pen, Humalog Sliding Scale tid before meals: 150-200 give 4 units 201-250 give 6 units 251-300 give 8 units 301-350 give 10 units > 350 give 12 units, Disp: 15 mL, Rfl: 0    insulin glargine, TOUJEO, (TOUJEO SOLOSTAR U-300 INSULIN) 300 unit/mL (1.5 mL) InPn pen, Inject 48 Units into the skin once daily., Disp: 6 Syringe, Rfl: 0    isosorbide mononitrate (IMDUR) 120 MG 24 hr tablet, Take 1 tablet (120 mg total) by mouth once daily., Disp: 30 tablet, Rfl: 1    isosorbide mononitrate (IMDUR) 60 MG 24 hr tablet, , Disp: , Rfl:     lancets (ACCU-CHEK SOFTCLIX LANCETS) Misc, 1 each by Misc.(Non-Drug; Combo Route) route 2 (two) times daily., Disp: 100 each, Rfl: 11    levETIRAcetam (KEPPRA) 500 MG Tab, Take 1.5 tablets (750 mg total) by mouth 2 (two) times daily., Disp: 270 tablet, Rfl: 0    multivitamin capsule, Take 1 capsule by mouth once daily., Disp: , Rfl:     nitroGLYCERIN (NITROSTAT) 0.4 MG SL tablet, Place 1 tablet (0.4 mg total) under the tongue every 5 (five) minutes as needed for Chest pain., Disp: 100 tablet, Rfl: 11    pen needle, diabetic (BD ULTRA-FINE SHORT PEN NEEDLE) 31 gauge x 5/16" Ndle, USE FOUR TIMES DAILY, Disp: 360 each, Rfl: 3    polyethylene glycol (GLYCOLAX) 17 gram PwPk, Take 17 g by mouth once daily., Disp: 30 packet, Rfl: 0    potassium " "chloride (KLOR-CON) 10 MEQ TbSR, Take 1 tablet (10 mEq total) by mouth once daily., Disp: 30 tablet, Rfl: 11    tamsulosin (FLOMAX) 0.4 mg Cp24, Take 1 capsule (0.4 mg total) by mouth once daily., Disp: 90 capsule, Rfl: 3    torsemide (DEMADEX) 20 MG Tab, Take 1 tablet (20 mg total) by mouth every morning., Disp: 90 tablet, Rfl: 3    hydrALAZINE (APRESOLINE) 100 MG tablet, TAKE 1 TABLET TWICE DAILY, Disp: 180 tablet, Rfl: 11    Vitals:    10/25/18 1523   BP: (!) 168/88   Pulse: 80   Temp: 97.1 °F (36.2 °C)   Weight: 91.4 kg (201 lb 9.8 oz)   Height: 5' 7" (1.702 m)       Physical Exam   Constitutional: He is oriented to person, place, and time. He appears well-developed and well-nourished.   HENT:   Head: Normocephalic and atraumatic.   Eyes: Pupils are equal, round, and reactive to light.   Neck: Normal range of motion. Neck supple.   Cardiovascular: Normal rate.   Pulmonary/Chest: Effort normal.   Abdominal: He exhibits no distension.   Musculoskeletal: Normal range of motion. He exhibits no edema.   Neurological: He is alert and oriented to person, place, and time. He has an abnormal Tandem Gait Test. He has a normal Finger-Nose-Finger Test, a normal Heel to Villeda Test and a normal Romberg Test. Gait normal.   Reflex Scores:       Tricep reflexes are 2+ on the right side and 2+ on the left side.       Bicep reflexes are 2+ on the right side and 2+ on the left side.       Brachioradialis reflexes are 2+ on the right side and 2+ on the left side.       Patellar reflexes are 2+ on the right side and 2+ on the left side.       Achilles reflexes are 2+ on the right side and 2+ on the left side.  Skin: Skin is warm and dry.   Psychiatric: He has a normal mood and affect. His speech is normal and behavior is normal. Judgment and thought content normal.   Nursing note and vitals reviewed.      Neurologic Exam     Mental Status   Oriented to person, place, and time.   Oriented to person.   Oriented to place.   Oriented " to time.   Follows 3 step commands.   Attention: normal. Concentration: normal.   Speech: speech is normal   Level of consciousness: alert  Knowledge: consistent with education.   Able to name object. Able to read. Able to repeat. Able to write. Normal comprehension.     Cranial Nerves     CN II   Visual acuity: normal  Right visual field deficit: none  Left visual field deficit: none     CN III, IV, VI   Pupils are equal, round, and reactive to light.  Right pupil: Size: 3 mm. Shape: regular. Reactivity: brisk. Consensual response: intact.   Left pupil: Size: 3 mm. Shape: regular. Reactivity: brisk. Consensual response: intact.   CN III: no CN III palsy  CN VI: no CN VI palsy  Nystagmus: none   Diplopia: none  Ophthalmoparesis: none  Conjugate gaze: present    CN V   Right facial sensation deficit: none  Left facial sensation deficit: none    CN VII   Right facial weakness: none  Left facial weakness: none    CN VIII   Hearing: intact    CN IX, X   CN IX normal.   CN X normal.     CN XI   Right sternocleidomastoid strength: normal  Left sternocleidomastoid strength: normal  Right trapezius strength: normal  Left trapezius strength: normal    CN XII   Fasciculations: absent  Tongue deviation: none    Motor Exam   Muscle bulk: normal  Overall muscle tone: normal  Right arm pronator drift: absent  Left arm pronator drift: absent    Strength   Right neck flexion: 5/5  Left neck flexion: 5/5  Right neck extension: 5/5  Left neck extension: 5/5  Right deltoid: 5/5  Left deltoid: 5/5  Right biceps: 5/5  Left biceps: 5/5  Right triceps: 5/5  Left triceps: 5/5  Right wrist flexion: 5/5  Left wrist flexion: 5/5  Right wrist extension: 5/5  Left wrist extension: 5/5  Right interossei: 5/5  Left interossei: 5/5  Right abdominals: 5/5  Left abdominals: 5/5  Right iliopsoas: 5/5  Left iliopsoas: 5/5  Right quadriceps: 5/5  Left quadriceps: 5/5  Right hamstrin/5  Left hamstrin/5  Right glutei: 5/5  Left glutei: 5/5  Right  anterior tibial: 5/5  Left anterior tibial: 5/5  Right posterior tibial: 5/5  Left posterior tibial: 5/5  Right peroneal: 5/5  Left peroneal: 5/5  Right gastroc: 5/5  Left gastroc: 5/5    Sensory Exam   Right arm light touch: normal  Left arm light touch: normal  Right leg light touch: normal  Left leg light touch: normal  Right arm vibration: normal  Left arm vibration: normal  Right arm pinprick: normal  Left arm pinprick: normal    Gait, Coordination, and Reflexes     Gait  Gait: normal    Coordination   Romberg: negative  Finger to nose coordination: normal  Heel to shin coordination: normal  Tandem walking coordination: abnormal    Tremor   Resting tremor: absent  Intention tremor: absent  Action tremor: absent    Reflexes   Right brachioradialis: 2+  Left brachioradialis: 2+  Right biceps: 2+  Left biceps: 2+  Right triceps: 2+  Left triceps: 2+  Right patellar: 2+  Left patellar: 2+  Right achilles: 2+  Left achilles: 2+  Right Chavez: absent  Left Chavez: absent  Right ankle clonus: absent  Left ankle clonus: absent      Provider dictation:    Mr. Salazar presents is a known patient in our clinic.  He is 10 months status post  shunt for NPH.  He is following up due to chronic subdural hematomas present bilaterally last office visit.  He had continued aspirin and Plavix due to chronicity of the fluid collections.  He reports today with his wife with no new complications.  Denies headache denies any worsening of his cognition memory gait and urinary incontinence.  He is here today only for follow-up at my recommendation.    On physical examination, he has an abnormal tandem walk and a magnetic gate. He had an episode of incontinence while in the clinic.  He has a positive Romberg.      CT done on 6/7/2018 demonstrates resolution of right subdural hematoma, however left subdural hematoma has changed in chronicity to be a more acute 5 mm collection in the left frontal.    I discussed with Mr. Salazar and his  wife my concerns with new bleed in the left frontal.  I discussed the case with Dr. Oconnor.  We have recommended him to stop aspirin and Plavix for the next 2 weeks.  I've increased shunt to 2.5.  We will repeat CT of the head in 2 weeks.  If left subdural hematoma is stable without any acute hemorrhage, he will be able to start aspirin 325.  We will follow him closely to ensure resolution of left frontal hematoma.  Patient is currently asymptomatic however we discussed new image findings and ER precautions if he has any worsening symptoms.  He is currently on levetiracetam and will continue that for seizure prophylaxis.  I'll follow up with him in 2 weeks.    Visit Diagnosis:  Normal pressure hydrocephalus syndrome    Type 2 diabetes mellitus with other specified complication, with long-term current use of insulin    Status post ventriculo-peritoneal shunt placement    Carotid stenosis, right                Neurosurgery History & Physical    Patient ID: Abdullahi Salazar is a 72 y.o. male.    Chief Complaint   Patient presents with    Follow-up     1 month follow up. Denies pain at this time       Review of Systems   Constitutional: Negative for activity change, chills, fatigue and unexpected weight change.   HENT: Negative for hearing loss, tinnitus, trouble swallowing and voice change.    Eyes: Negative for visual disturbance.   Respiratory: Negative for apnea, chest tightness and shortness of breath.    Cardiovascular: Negative for chest pain and palpitations.   Gastrointestinal: Negative for abdominal pain, constipation, diarrhea, nausea and vomiting.   Genitourinary: Negative for difficulty urinating, dysuria and frequency.   Musculoskeletal: Negative for back pain, gait problem, neck pain and neck stiffness.   Skin: Negative for wound.   Neurological: Negative for dizziness, tremors, seizures, facial asymmetry, speech difficulty, weakness, light-headedness, numbness and headaches.   Psychiatric/Behavioral:  Negative for confusion and decreased concentration.       Past Medical History:   Diagnosis Date    Actinic keratoses     Altered mental status 3/22/2015    Anticoagulant long-term use     Atrial fibrillation     CAD (coronary artery disease)     stents after bypass    Carotid artery stenosis 3/31/2015    CHF (congestive heart failure) 2018    Colon polyps     repeat colonoscopy     Combined hyperlipidemia associated with type 2 diabetes mellitus     Diabetes mellitus type II, uncontrolled     dx 2004    GERD (gastroesophageal reflux disease)     Grand mal seizure     last 2017    Hard of hearing     History of cardiac pacemaker 3/31/2015    HTN (hypertension)     Hydrocephalus 2017    Influenza A 2018    He got influenza a recently and was in the hospital in [place].  He had rhabdomyolyisis and acute kidney injury.  He also had an increased troponin.  He had a flu shot in early December.    Mitral valve insufficiency     Presence of automatic (implantable) cardiac defibrillator     Sleep apnea with use of continuous positive airway pressure (CPAP)     patient states he does not use CPAP anymore    Syncope 3/30/2015    Wears dentures     upper and lower    Wears hearing aid     sometimes     Social History     Socioeconomic History    Marital status:      Spouse name: Not on file    Number of children: Not on file    Years of education: Not on file    Highest education level: Not on file   Social Needs    Financial resource strain: Not on file    Food insecurity - worry: Not on file    Food insecurity - inability: Not on file    Transportation needs - medical: Not on file    Transportation needs - non-medical: Not on file   Occupational History    Not on file   Tobacco Use    Smoking status: Former Smoker     Packs/day: 2.00     Years: 25.00     Pack years: 50.00     Types: Cigarettes     Last attempt to quit: 1983     Years since quittin.8     "Smokeless tobacco: Never Used    Tobacco comment: quit 1983    Substance and Sexual Activity    Alcohol use: Yes     Alcohol/week: 0.6 oz     Types: 1 Cans of beer per week     Comment: rarely    Drug use: No    Sexual activity: Not on file   Other Topics Concern    Not on file   Social History Narrative    Not on file     Family History   Problem Relation Age of Onset    Stomach cancer Mother     Lung cancer Father     Diabetes Sister     Hypertension Sister     Heart disease Neg Hx     Stroke Neg Hx      Review of patient's allergies indicates:  No Known Allergies    Current Outpatient Medications:     aspirin (ECOTRIN) 81 MG EC tablet, Take 1 tablet (81 mg total) by mouth once daily., Disp: , Rfl: 0    atorvastatin (LIPITOR) 40 MG tablet, Take 1 tablet (40 mg total) by mouth once daily., Disp: 90 tablet, Rfl: 3    benazepril (LOTENSIN) 40 MG tablet, Take 1 tablet (40 mg total) by mouth once daily., Disp: 90 tablet, Rfl: 3    blood sugar diagnostic (ACCU-CHEK CLEOPATRA PLUS TEST STRP) Strp, TEST BLOOD SUGARS 4 TIMES DAILY, Disp: 150 strip, Rfl: PRN    carvedilol (COREG) 25 MG tablet, Take 1 tablet (25 mg total) by mouth 2 (two) times daily with meals., Disp: 180 tablet, Rfl: 3    clopidogrel (PLAVIX) 75 mg tablet, , Disp: , Rfl:     dabigatran etexilate (PRADAXA) 75 mg Cap, Take 1 capsule (75 mg total) by mouth 2 (two) times daily. "Do NOT break, chew, or open capsules.", Disp: 180 capsule, Rfl: 3    ezetimibe (ZETIA) 10 mg tablet, TAKE 1 TABLET ONE TIME DAILY, Disp: 90 tablet, Rfl: 3    insulin aspart U-100 (NOVOLOG) 100 unit/mL InPn pen, Humalog Sliding Scale tid before meals: 150-200 give 4 units 201-250 give 6 units 251-300 give 8 units 301-350 give 10 units > 350 give 12 units, Disp: 15 mL, Rfl: 0    insulin glargine, TOUJEO, (TOUJEO SOLOSTAR U-300 INSULIN) 300 unit/mL (1.5 mL) InPn pen, Inject 48 Units into the skin once daily., Disp: 6 Syringe, Rfl: 0    isosorbide mononitrate (IMDUR) 120 " "MG 24 hr tablet, Take 1 tablet (120 mg total) by mouth once daily., Disp: 30 tablet, Rfl: 1    isosorbide mononitrate (IMDUR) 60 MG 24 hr tablet, , Disp: , Rfl:     lancets (ACCU-CHEK SOFTCLIX LANCETS) Misc, 1 each by Misc.(Non-Drug; Combo Route) route 2 (two) times daily., Disp: 100 each, Rfl: 11    levETIRAcetam (KEPPRA) 500 MG Tab, Take 1.5 tablets (750 mg total) by mouth 2 (two) times daily., Disp: 270 tablet, Rfl: 0    multivitamin capsule, Take 1 capsule by mouth once daily., Disp: , Rfl:     nitroGLYCERIN (NITROSTAT) 0.4 MG SL tablet, Place 1 tablet (0.4 mg total) under the tongue every 5 (five) minutes as needed for Chest pain., Disp: 100 tablet, Rfl: 11    pen needle, diabetic (BD ULTRA-FINE SHORT PEN NEEDLE) 31 gauge x 5/16" Ndle, USE FOUR TIMES DAILY, Disp: 360 each, Rfl: 3    polyethylene glycol (GLYCOLAX) 17 gram PwPk, Take 17 g by mouth once daily., Disp: 30 packet, Rfl: 0    potassium chloride (KLOR-CON) 10 MEQ TbSR, Take 1 tablet (10 mEq total) by mouth once daily., Disp: 30 tablet, Rfl: 11    tamsulosin (FLOMAX) 0.4 mg Cp24, Take 1 capsule (0.4 mg total) by mouth once daily., Disp: 90 capsule, Rfl: 3    torsemide (DEMADEX) 20 MG Tab, Take 1 tablet (20 mg total) by mouth every morning., Disp: 90 tablet, Rfl: 3    hydrALAZINE (APRESOLINE) 100 MG tablet, TAKE 1 TABLET TWICE DAILY, Disp: 180 tablet, Rfl: 11    Vitals:    10/25/18 1523   BP: (!) 168/88   Pulse: 80   Temp: 97.1 °F (36.2 °C)   Weight: 91.4 kg (201 lb 9.8 oz)   Height: 5' 7" (1.702 m)       Physical Exam   Constitutional: He is oriented to person, place, and time. He appears well-developed and well-nourished.   HENT:   Head: Normocephalic and atraumatic.   Eyes: Pupils are equal, round, and reactive to light.   Neck: Normal range of motion. Neck supple.   Cardiovascular: Normal rate.    Pulmonary/Chest: Effort normal.   Abdominal: He exhibits no distension.   Musculoskeletal: Normal range of motion. He exhibits no edema. "   Neurological: He is alert and oriented to person, place, and time. He has an abnormal Tandem Gait Test. He has a normal Finger-Nose-Finger Test, a normal Heel to Villeda Test and a normal Romberg Test. Gait normal.   Reflex Scores:       Tricep reflexes are 2+ on the right side and 2+ on the left side.       Bicep reflexes are 2+ on the right side and 2+ on the left side.       Brachioradialis reflexes are 2+ on the right side and 2+ on the left side.       Patellar reflexes are 2+ on the right side and 2+ on the left side.       Achilles reflexes are 2+ on the right side and 2+ on the left side.  Skin: Skin is warm and dry.   Psychiatric: He has a normal mood and affect. His speech is normal and behavior is normal. Judgment and thought content normal.   Nursing note and vitals reviewed.      Neurologic Exam     Mental Status   Oriented to person, place, and time.   Oriented to person.   Oriented to place.   Oriented to time.   Follows 3 step commands.   Attention: normal. Concentration: normal.   Speech: speech is normal   Level of consciousness: alert  Knowledge: consistent with education.   Able to name object. Able to read. Able to repeat. Able to write. Normal comprehension.     Cranial Nerves     CN II   Visual acuity: normal  Right visual field deficit: none  Left visual field deficit: none     CN III, IV, VI   Pupils are equal, round, and reactive to light.  Right pupil: Size: 3 mm. Shape: regular. Reactivity: brisk. Consensual response: intact.   Left pupil: Size: 3 mm. Shape: regular. Reactivity: brisk. Consensual response: intact.   CN III: no CN III palsy  CN VI: no CN VI palsy  Nystagmus: none   Diplopia: none  Ophthalmoparesis: none  Conjugate gaze: present    CN V   Right facial sensation deficit: none  Left facial sensation deficit: none    CN VII   Right facial weakness: none  Left facial weakness: none    CN VIII   Hearing: intact    CN IX, X   CN IX normal.   CN X normal.     CN XI   Right  sternocleidomastoid strength: normal  Left sternocleidomastoid strength: normal  Right trapezius strength: normal  Left trapezius strength: normal    CN XII   Fasciculations: absent  Tongue deviation: none    Motor Exam   Muscle bulk: normal  Overall muscle tone: normal  Right arm pronator drift: absent  Left arm pronator drift: absent    Strength   Right neck flexion: 5/5  Left neck flexion: 5/5  Right neck extension: 5/5  Left neck extension: 5/5  Right deltoid: 5/5  Left deltoid: 5/5  Right biceps: 5/5  Left biceps: 5/5  Right triceps: 5/5  Left triceps: 5/5  Right wrist flexion: 5/5  Left wrist flexion: 5/5  Right wrist extension: 5/5  Left wrist extension: 5/5  Right interossei: 5/5  Left interossei: 5/5  Right abdominals: 5/5  Left abdominals: 5/5  Right iliopsoas: 5/5  Left iliopsoas: 5/5  Right quadriceps: 5/5  Left quadriceps: 5/5  Right hamstrin/5  Left hamstrin/5  Right glutei: 5/5  Left glutei: 5/5  Right anterior tibial: 5/5  Left anterior tibial: 5/5  Right posterior tibial: 5/5  Left posterior tibial: 5/5  Right peroneal: 5/5  Left peroneal: 5/5  Right gastroc: 5/5  Left gastroc: 5/5    Sensory Exam   Right arm light touch: normal  Left arm light touch: normal  Right leg light touch: normal  Left leg light touch: normal  Right arm vibration: normal  Left arm vibration: normal  Right arm pinprick: normal  Left arm pinprick: normal    Gait, Coordination, and Reflexes     Gait  Gait: normal    Coordination   Romberg: negative  Finger to nose coordination: normal  Heel to shin coordination: normal  Tandem walking coordination: abnormal    Tremor   Resting tremor: absent  Intention tremor: absent  Action tremor: absent    Reflexes   Right brachioradialis: 2+  Left brachioradialis: 2+  Right biceps: 2+  Left biceps: 2+  Right triceps: 2+  Left triceps: 2+  Right patellar: 2+  Left patellar: 2+  Right achilles: 2+  Left achilles: 2+  Right Chavez: absent  Left Chavez: absent  Right ankle clonus:  absent  Left ankle clonus: absent      Provider dictation:    Patient presents today for follow-up evaluation after shunt adjustment from 2.5-1.5.  Both he and his wife reports some improvement since shunt was adjusted previously.  He is still having both cognitive deficits and urinary incontinence but his gait has improved significantly.  He denies nausea vomiting fever chills.  He denies headache.  He denies dizziness or vision changes.  He has no complaints today.  He feels he is doing well.    On physical examination, he continues to have a slow magnetic gait.  He is pleasant and cooperative.  Shunt confirmed to be set at 1.5 P/L    At this time the patient is doing well from an NPH standpoint.  He is 1 year status post shunt placement.  At this time I will let him continue at 1.5 but he and his wife will call me if there are any changes.  This was discussed extensively with them for shunt adjustment down to 1.0.  Patient will follow-up as needed.        Visit Diagnosis:  Normal pressure hydrocephalus syndrome    Type 2 diabetes mellitus with other specified complication, with long-term current use of insulin    Status post ventriculo-peritoneal shunt placement    Carotid stenosis, right

## 2018-10-27 RX ORDER — HYDRALAZINE HYDROCHLORIDE 100 MG/1
TABLET, FILM COATED ORAL
Qty: 180 TABLET | Refills: 11 | Status: SHIPPED | OUTPATIENT
Start: 2018-10-27 | End: 2019-07-01 | Stop reason: SDUPTHER

## 2018-10-29 ENCOUNTER — TELEPHONE (OUTPATIENT)
Dept: DIABETES | Facility: CLINIC | Age: 73
End: 2018-10-29

## 2018-10-30 ENCOUNTER — OUTPATIENT CASE MANAGEMENT (OUTPATIENT)
Dept: ADMINISTRATIVE | Facility: OTHER | Age: 73
End: 2018-10-30

## 2018-10-30 NOTE — PROGRESS NOTES
10/30/2018  Summary:  (1st Attempt)  RN-CM attempted to contact patient to follow up for OPCM. Called [phone] and the mail box was full and unable to accept messages at this time.  Will attempt to contact patient at a later date. -  RN-OPCM    Interventions:  - Scheduled patient for 2nd attempt to follow up later this week.     Plan:  - Assess for receipt of blood pressure, weight and blood glucose logs.  - Assess for receipt and completion of Advance Directive/POA paperwork.  - Follow up with PCP regarding recommended frequency of blood glucose monitoring and referral for Diabetic Education. - 10/22/18 He should be checking his sugars before every meal and before bed.  - Hypertension - Review literature with patient. Assess for and address knowledge deficits.  - CHF/CHF/Flare - Review literature with patient. Assess for and address knowledge deficits.  - Diabetes - Review literature with patient. Assess for and address knowledge deficits.  - Chronic Kidney Disease - Review literature with patient. Assess for and address knowledge deficits.  - Biventricular Pacemaker/AICD - Review literature with patient. Assess for and address knowledge deficits.

## 2018-11-01 ENCOUNTER — PATIENT MESSAGE (OUTPATIENT)
Dept: FAMILY MEDICINE | Facility: CLINIC | Age: 73
End: 2018-11-01

## 2018-11-06 ENCOUNTER — OUTPATIENT CASE MANAGEMENT (OUTPATIENT)
Dept: ADMINISTRATIVE | Facility: OTHER | Age: 73
End: 2018-11-06

## 2018-11-06 NOTE — LETTER
November 7, 2018    Adbullahi Salazar  [address]  [place] [address]             [facility] [address] [place] [address] Dear Mr. Salazar,    This is Alessandra Victoria, RN and I work with [facility] Department. I have been unsuccessful at reaching you to follow-up to see how you have been doing since enrolling you in our program on 10/19/18. Please call me back at your earliest convenience to discuss how we can best assist you with your health care needs.      I can be reached at [phone]  from 8:00 AM to 4:30 PM on Monday thru Friday. [facility] is a program offered through [facility] where a nurse is available 24/7 to answer questions or provide medical advice and their number is [phone].       Kind Regards,      Alessandra Victoria, RN  [facility]  [phone]

## 2018-11-06 NOTE — PROGRESS NOTES
11/06/2018  Summary:  (2nd Attempt)  RN-ZAHIDA attempted to contact patient to follow up for OPCM. Called patient at [phone] and left message requesting a return call. Will mail attempt letter to patient and will schedule patient for next follow up attempt in one week if no response from patient prior to that time. - SASHA Victoria RN-OPCM    Interventions:  - Left message requesting a return call.  - Mailed attempt letter to patient's home.  - Scheduled patient for next follow up attempt in one week.      Plan:  - Assess for receipt of blood pressure, weight and blood glucose logs.  - Assess for receipt and completion of Advance Directive/POA paperwork.  - Follow up with PCP regarding recommended frequency of blood glucose monitoring and referral for Diabetic Education. - 10/22/18 He should be checking his sugars before every meal and before bed.  - Hypertension - Review literature with patient. Assess for and address knowledge deficits.  - CHF/CHF/Flare - Review literature with patient. Assess for and address knowledge deficits.  - Diabetes - Review literature with patient. Assess for and address knowledge deficits.  - Chronic Kidney Disease - Review literature with patient. Assess for and address knowledge deficits.  - Biventricular Pacemaker/AICD - Review literature with patient. Assess for and address knowledge deficits.

## 2018-11-14 ENCOUNTER — OFFICE VISIT (OUTPATIENT)
Dept: FAMILY MEDICINE | Facility: CLINIC | Age: 73
End: 2018-11-14
Payer: MEDICARE

## 2018-11-14 ENCOUNTER — LAB VISIT (OUTPATIENT)
Dept: LAB | Facility: HOSPITAL | Age: 73
End: 2018-11-14
Attending: FAMILY MEDICINE
Payer: MEDICARE

## 2018-11-14 VITALS
HEART RATE: 83 BPM | WEIGHT: 201.38 LBS | DIASTOLIC BLOOD PRESSURE: 88 MMHG | HEIGHT: 67 IN | BODY MASS INDEX: 31.61 KG/M2 | RESPIRATION RATE: 14 BRPM | SYSTOLIC BLOOD PRESSURE: 138 MMHG | TEMPERATURE: 98 F

## 2018-11-14 DIAGNOSIS — E11.65 UNCONTROLLED TYPE 2 DIABETES MELLITUS WITH HYPERGLYCEMIA: ICD-10-CM

## 2018-11-14 DIAGNOSIS — R41.3 MEMORY LOSS: ICD-10-CM

## 2018-11-14 DIAGNOSIS — E11.65 UNCONTROLLED TYPE 2 DIABETES MELLITUS WITH HYPERGLYCEMIA: Primary | ICD-10-CM

## 2018-11-14 LAB
ESTIMATED AVG GLUCOSE: ABNORMAL MG/DL
HBA1C MFR BLD HPLC: >14 %

## 2018-11-14 PROCEDURE — 3075F SYST BP GE 130 - 139MM HG: CPT | Mod: CPTII,HCWC,S$GLB, | Performed by: FAMILY MEDICINE

## 2018-11-14 PROCEDURE — 83036 HEMOGLOBIN GLYCOSYLATED A1C: CPT | Mod: HCWC

## 2018-11-14 PROCEDURE — 1101F PT FALLS ASSESS-DOCD LE1/YR: CPT | Mod: CPTII,HCWC,S$GLB, | Performed by: FAMILY MEDICINE

## 2018-11-14 PROCEDURE — 3079F DIAST BP 80-89 MM HG: CPT | Mod: CPTII,HCWC,S$GLB, | Performed by: FAMILY MEDICINE

## 2018-11-14 PROCEDURE — 3046F HEMOGLOBIN A1C LEVEL >9.0%: CPT | Mod: CPTII,HCWC,S$GLB, | Performed by: FAMILY MEDICINE

## 2018-11-14 PROCEDURE — 99214 OFFICE O/P EST MOD 30 MIN: CPT | Mod: HCWC,S$GLB,, | Performed by: FAMILY MEDICINE

## 2018-11-14 PROCEDURE — 99999 PR PBB SHADOW E&M-EST. PATIENT-LVL IV: CPT | Mod: PBBFAC,HCWC,, | Performed by: FAMILY MEDICINE

## 2018-11-14 PROCEDURE — 36415 COLL VENOUS BLD VENIPUNCTURE: CPT | Mod: HCWC,PO

## 2018-11-14 NOTE — PROGRESS NOTES
Subjective:      Patient ID: Abdullahi Salazar is a 73 y.o. male.    Chief Complaint: Other    HPIhis family presented today concerned over his care at home.  He has had around 12 admissions in the last year for various issues.   He reportedly is not taking his medicine and he has not been following up on his diabetes.  His a1c is very high.    Lab Results   Component Value Date    HGBA1C 11.6 (H) 07/29/2018     He has had PT after being in the hospital for a short time.  He has had multiple episodes of CHF and has not been having CP or swelling.  He is fatigued.      Health Maintenance Due   Topic Date Due    TETANUS VACCINE  11[birth year]12/1963    Eye Exam  11/06/2018       Past Medical History:  Past Medical History:   Diagnosis Date    Actinic keratoses     Altered mental status 3/22/2015    Anticoagulant long-term use     Atrial fibrillation     CAD (coronary artery disease)     stents after bypass    Carotid artery stenosis 3/31/2015    CHF (congestive heart failure) 03/2018    Colon polyps 2011    repeat colonoscopy 2014    Combined hyperlipidemia associated with type 2 diabetes mellitus     Diabetes mellitus type II, uncontrolled     dx 2004    GERD (gastroesophageal reflux disease)     Grand mal seizure     last 2/2017    Hard of hearing     History of cardiac pacemaker 3/31/2015    HTN (hypertension)     Hydrocephalus 09/08/2017    Influenza A 1/2/2018    He got influenza a recently and was in the hospital in [place].  He had rhabdomyolyisis and acute kidney injury.  He also had an increased troponin.  He had a flu shot in early December.    Mitral valve insufficiency     Presence of automatic (implantable) cardiac defibrillator     Sleep apnea with use of continuous positive airway pressure (CPAP)     patient states he does not use CPAP anymore    Syncope 3/30/2015    Wears dentures     upper and lower    Wears hearing aid     sometimes     Past Surgical History:   Procedure Laterality Date  "   AORTOGRAM WITH RUNOFF Bilateral 8/24/2016    [facility] CATH    biotronic icd placed Left 3/11/2016    Performed by Gordy Rodney MD at [facility] CATH    CARDIAC PACEMAKER PLACEMENT      CARDIOVERSION N/A 2/12/2015    Performed by [facility] at [facility] CATH LAB    CATARACT EXTRACTION W/ INTRAOCULAR LENS  IMPLANT, BILATERAL      COLONOSCOPY N/A 3/17/2015    [facility] ENDO    CORONARY ARTERY BYPASS GRAFT  1993    three vessels    CORONARY STENT PLACEMENT      CSF SHUNT      HEART CATH-LEFT N/A 2/18/2016    Performed by Toño Haro [facility] at [facility] CATH    HERNIA REPAIR      as infant    LUMBAR PUNCTURE N/A 5/22/2017    Performed by Britton Pizarro [facility] at [facility] CATH    SHUNT-  RIGHT FRONTAL  SHUNT WITH BIOPSY OF MIDDLE FRONTAL N/A 8/8/2017    Performed by Seun Oconnor MD at [facility] OR     Review of patient's allergies indicates:  No Known Allergies  Current Outpatient Medications on File Prior to Visit   Medication Sig Dispense Refill    aspirin (ECOTRIN) 81 MG EC tablet Take 1 tablet (81 mg total) by mouth once daily.  0    atorvastatin (LIPITOR) 40 MG tablet Take 1 tablet (40 mg total) by mouth once daily. 90 tablet 3    benazepril (LOTENSIN) 40 MG tablet Take 1 tablet (40 mg total) by mouth once daily. 90 tablet 3    blood sugar diagnostic (ACCU-CHEK CLEOPATRA PLUS TEST STRP) Strp TEST BLOOD SUGARS 4 TIMES DAILY 150 strip PRN    carvedilol (COREG) 25 MG tablet Take 1 tablet (25 mg total) by mouth 2 (two) times daily with meals. 180 tablet 3    clopidogrel (PLAVIX) 75 mg tablet       dabigatran etexilate (PRADAXA) 75 mg Cap Take 1 capsule (75 mg total) by mouth 2 (two) times daily. "Do NOT break, chew, or open capsules." 180 capsule 3    ezetimibe (ZETIA) 10 mg tablet TAKE 1 TABLET ONE TIME DAILY 90 tablet 3    hydrALAZINE (APRESOLINE) 100 MG tablet TAKE 1 TABLET TWICE DAILY 180 tablet 11    insulin aspart U-100 " "(NOVOLOG) 100 unit/mL InPn pen Humalog Sliding Scale tid before meals: 150-200 give 4 units 201-250 give 6 units 251-300 give 8 units 301-350 give 10 units > 350 give 12 units 15 mL 0    insulin glargine, TOUJEO, (TOUJEO SOLOSTAR U-300 INSULIN) 300 unit/mL (1.5 mL) InPn pen Inject 48 Units into the skin once daily. 6 Syringe 0    isosorbide mononitrate (IMDUR) 120 MG 24 hr tablet Take 1 tablet (120 mg total) by mouth once daily. 30 tablet 1    isosorbide mononitrate (IMDUR) 60 MG 24 hr tablet       lancets (ACCU-CHEK SOFTCLIX LANCETS) Misc 1 each by Misc.(Non-Drug; Combo Route) route 2 (two) times daily. 100 each 11    levETIRAcetam (KEPPRA) 500 MG Tab Take 1.5 tablets (750 mg total) by mouth 2 (two) times daily. 270 tablet 0    multivitamin capsule Take 1 capsule by mouth once daily.      nitroGLYCERIN (NITROSTAT) 0.4 MG SL tablet Place 1 tablet (0.4 mg total) under the tongue every 5 (five) minutes as needed for Chest pain. 100 tablet 11    pen needle, diabetic (BD ULTRA-FINE SHORT PEN NEEDLE) 31 gauge x 5/16" Ndle USE FOUR TIMES DAILY 360 each 3    polyethylene glycol (GLYCOLAX) 17 gram PwPk Take 17 g by mouth once daily. 30 packet 0    potassium chloride (KLOR-CON) 10 MEQ TbSR Take 1 tablet (10 mEq total) by mouth once daily. 30 tablet 11    tamsulosin (FLOMAX) 0.4 mg Cp24 Take 1 capsule (0.4 mg total) by mouth once daily. 90 capsule 3    torsemide (DEMADEX) 20 MG Tab Take 1 tablet (20 mg total) by mouth every morning. 90 tablet 3     No current facility-administered medications on file prior to visit.      Social History     Socioeconomic History    Marital status:      Spouse name: Not on file    Number of children: Not on file    Years of education: Not on file    Highest education level: Not on file   Social Needs    Financial resource strain: Not on file    Food insecurity - worry: Not on file    Food insecurity - inability: Not on file    Transportation needs - medical: Not on " "file    Transportation needs - non-medical: Not on file   Occupational History    Not on file   Tobacco Use    Smoking status: Former Smoker     Packs/day: 2.00     Years: 25.00     Pack years: 50.00     Types: Cigarettes     Last attempt to quit: 1983     Years since quittin.8    Smokeless tobacco: Never Used    Tobacco comment: quit     Substance and Sexual Activity    Alcohol use: Yes     Alcohol/week: 0.6 oz     Types: 1 Cans of beer per week     Comment: rarely    Drug use: No    Sexual activity: Not on file   Other Topics Concern    Not on file   Social History Narrative    Not on file     Family History   Problem Relation Age of Onset    Stomach cancer Mother     Lung cancer Father     Diabetes Sister     Hypertension Sister     Heart disease Neg Hx     Stroke Neg Hx              Review of Systems   Constitutional: Positive for fatigue. Negative for chills, fever and unexpected weight change.   HENT: Negative for rhinorrhea.    Respiratory: Negative for cough, shortness of breath and wheezing.    Cardiovascular: Negative for chest pain, palpitations and leg swelling.   Gastrointestinal: Negative for abdominal pain, constipation, diarrhea, nausea and vomiting.   Genitourinary: Negative for dysuria and hematuria.   Neurological: Positive for weakness. Negative for dizziness.   Psychiatric/Behavioral: Positive for confusion and dysphoric mood. Negative for agitation, hallucinations, sleep disturbance and suicidal ideas. The patient is not nervous/anxious.        Objective:   BP (!) 176/92   Pulse 83   Temp 97.6 °F (36.4 °C) (Oral)   Resp 14   Ht 5' 7" (1.702 m)   Wt 91.4 kg (201 lb 6.4 oz)   BMI 31.54 kg/m²     Physical Exam   Constitutional: He is oriented to person, place, and time. He appears well-developed and well-nourished.   HENT:   Head: Normocephalic and atraumatic.   Right Ear: External ear normal.   Left Ear: External ear normal.   Nose: Nose normal.   Mouth/Throat: " Oropharynx is clear and moist. No oropharyngeal exudate.   Eyes: Conjunctivae and EOM are normal. Pupils are equal, round, and reactive to light. Right eye exhibits no discharge. Left eye exhibits no discharge. No scleral icterus.   Neck: Normal range of motion. Neck supple. No JVD present. No thyromegaly present.   Cardiovascular: Normal rate, regular rhythm, normal heart sounds and intact distal pulses. Exam reveals no gallop and no friction rub.   No murmur heard.  Pulmonary/Chest: Effort normal and breath sounds normal. No respiratory distress. He has no wheezes. He has no rales. He exhibits no tenderness.   Abdominal: Soft. Bowel sounds are normal. He exhibits no distension and no mass. There is no tenderness. There is no rebound and no guarding.   Musculoskeletal: Normal range of motion. He exhibits no edema or tenderness.   Lymphadenopathy:     He has no cervical adenopathy.   Neurological: He is alert and oriented to person, place, and time. No cranial nerve deficit. Coordination normal.   Skin: Skin is warm and dry. He is not diaphoretic.   Psychiatric: His behavior is normal. His affect is labile. Thought content is not paranoid and not delusional. Cognition and memory are impaired. He does not express inappropriate judgment. He expresses no homicidal and no suicidal ideation. He exhibits abnormal recent memory. He exhibits normal remote memory.   He scored a 23/30 on the mini mental status exam.               Assessment:     1. Uncontrolled type 2 diabetes mellitus with hyperglycemia    2. Memory loss        Plan:   Abdullahi was seen today for other.    Diagnoses and all orders for this visit:    Uncontrolled type 2 diabetes mellitus with hyperglycemia  -     Ambulatory referral to Home Health  -     Hemoglobin A1c; Future  -     Diabetic Eye Screening Photo; Future    Memory loss  -     Ambulatory referral to Home Health  -     Ambulatory referral to Neurology

## 2018-11-15 ENCOUNTER — TELEPHONE (OUTPATIENT)
Dept: NEUROLOGY | Facility: CLINIC | Age: 73
End: 2018-11-15

## 2018-11-15 NOTE — TELEPHONE ENCOUNTER
----- Message from Sandhya Diallo LPN sent at 11/14/2018  5:11 PM CST -----  Dr. Hernandez is requesting that pt be seen for memory loss issues. Can you call pt to schedule the next available.

## 2018-11-15 NOTE — PROGRESS NOTES
The A1c is >14 which is extremely high.  I just consulted for him to be seen by home health because there is concern that he is not taking his insulin as instructed.  Please arrange for him to see the diabetic educator also with his family present.  Get this done ASAP because some of them live out of town and they are here now.    Send us a glucose log of the sugars taken before every meal and before bed on Monday to make decisions on what to do to get the glucose down.  Confirm that the insulin is being taken as instructed.

## 2018-11-16 ENCOUNTER — TELEPHONE (OUTPATIENT)
Dept: FAMILY MEDICINE | Facility: CLINIC | Age: 73
End: 2018-11-16

## 2018-11-16 ENCOUNTER — TELEPHONE (OUTPATIENT)
Dept: NEUROLOGY | Facility: CLINIC | Age: 73
End: 2018-11-16

## 2018-11-16 DIAGNOSIS — E11.9 TYPE 2 DIABETES MELLITUS WITHOUT COMPLICATION, WITH LONG-TERM CURRENT USE OF INSULIN: Primary | ICD-10-CM

## 2018-11-16 DIAGNOSIS — Z79.4 TYPE 2 DIABETES MELLITUS WITHOUT COMPLICATION, WITH LONG-TERM CURRENT USE OF INSULIN: Primary | ICD-10-CM

## 2018-11-16 NOTE — TELEPHONE ENCOUNTER
----- Message from Antonio WRIGHT Frisard sent at 11/16/2018  2:28 PM CST -----  Contact: same  Patient called in and stated he missed a call from office yesterday 11/15/18.  Patient stated he is not sure why though.  Patient call back number is [phone]

## 2018-11-16 NOTE — TELEPHONE ENCOUNTER
I have signed for the following orders AND/OR meds.  Please call the patient and ask the patient to schedule the testing AND/OR inform about any medications that were sent.      Orders Placed This Encounter   Procedures    Ambulatory consult to Diabetic Education     Referral Priority:   Routine     Referral Type:   Consultation     Referral Reason:   Specialty Services Required     Requested Specialty:   Diabetes     Number of Visits Requested:   1     Expiration Date:   11/16/2019

## 2018-11-16 NOTE — TELEPHONE ENCOUNTER
----- Message from Maximo Hernandez MD sent at 11/15/2018 12:29 PM CST -----  The A1c is >14 which is extremely high.  I just consulted for him to be seen by home health because there is concern that he is not taking his insulin as instructed.  Please arrange for him to see the diabetic educator also with his family present.  Get this done ASAP because some of them live out of town and they are here now.    Send us a glucose log of the sugars taken before every meal and before bed on Monday to make decisions on what to do to get the glucose down.  Confirm that the insulin is being taken as instructed.

## 2018-11-19 ENCOUNTER — PATIENT MESSAGE (OUTPATIENT)
Dept: FAMILY MEDICINE | Facility: CLINIC | Age: 73
End: 2018-11-19

## 2018-11-19 ENCOUNTER — TELEPHONE (OUTPATIENT)
Dept: FAMILY MEDICINE | Facility: CLINIC | Age: 73
End: 2018-11-19

## 2018-11-19 DIAGNOSIS — Z79.4 TYPE 2 DIABETES MELLITUS WITH STAGE 3 CHRONIC KIDNEY DISEASE, WITH LONG-TERM CURRENT USE OF INSULIN: Primary | ICD-10-CM

## 2018-11-19 DIAGNOSIS — R80.9 PROTEINURIA DUE TO TYPE 2 DIABETES MELLITUS: ICD-10-CM

## 2018-11-19 DIAGNOSIS — E11.22 TYPE 2 DIABETES MELLITUS WITH STAGE 3 CHRONIC KIDNEY DISEASE, WITH LONG-TERM CURRENT USE OF INSULIN: Primary | ICD-10-CM

## 2018-11-19 DIAGNOSIS — E11.29 PROTEINURIA DUE TO TYPE 2 DIABETES MELLITUS: ICD-10-CM

## 2018-11-19 DIAGNOSIS — N18.30 TYPE 2 DIABETES MELLITUS WITH STAGE 3 CHRONIC KIDNEY DISEASE, WITH LONG-TERM CURRENT USE OF INSULIN: Primary | ICD-10-CM

## 2018-11-19 RX ORDER — INSULIN GLARGINE 100 [IU]/ML
30 INJECTION, SOLUTION SUBCUTANEOUS DAILY
COMMUNITY
End: 2018-11-20 | Stop reason: SDUPTHER

## 2018-11-19 NOTE — TELEPHONE ENCOUNTER
Spoke w/pt and he states he is not taking the Toujeo. Discussed this with Delfino Abdul NP and she instructed to increase the Lantus 2 units every 3 days until he reaches the dose of 45 units that he is supposed to be on. Pt was given these instructions and verbalized understanding. Pt was also scheduled with diabetic education for 11/26/18 and given the appointment information.

## 2018-11-20 ENCOUNTER — OUTPATIENT CASE MANAGEMENT (OUTPATIENT)
Dept: ADMINISTRATIVE | Facility: OTHER | Age: 73
End: 2018-11-20

## 2018-11-20 RX ORDER — INSULIN GLARGINE 100 [IU]/ML
39 INJECTION, SOLUTION SUBCUTANEOUS DAILY
Qty: 10 ML | Refills: 2 | Status: SHIPPED | OUTPATIENT
Start: 2018-11-20 | End: 2018-11-29 | Stop reason: ALTCHOICE

## 2018-11-20 NOTE — PROGRESS NOTES
"11/20/2018  Summary:  RN-ZAHIDA contacted patient to follow up for OPCM. Spoke with patient via telephone. Patient apologized for not returning my previous calls or responding to the letter mailed to him. He states that he just forgets sometimes. In reviewing patient's chart prior to call, noted that patient's HgbA1c done on 11/14/18 was >14.0%. Patient states he is aware of the result and states that he was not taking his medication correctly. He states that he gets mixed up and was only taking 30 units of Lantus instead of 39 units. Patient states that he is now taking 39 units of Lantus, but states "I think" I'm supposed to be increasing it by 2 units per day until "I get up to 45 units I think". Reviewed notes from Dr. Hernandez's nurse, Moni after she spoke with Delfino Abdul NP. Patient was told to increase his Lantus by 2 units every 3 days until he reaches the prescribed dose of 45 units per day. Patient states that he took 39 units yesterday evening, so I explained to him that he should take 41 units of Lantus today, Wednesday and Thursday, 43 units of Lantus on Friday, Saturday and Sunday, then 45 units every evening starting on Monday, 11/26/18. This is the same day that patient has an appointment with the Diabetic Educator at the [facility] at 8:00AM. We went over the instructions from Dr. Hernandez that patient needs to be checking his blood glucose 4 times per day, before each meal and before bed. Patient also confirms that he is taking the Novolog per sliding scale after checking his blood glucose, but was unable to tell me his sliding scale parameters. Patient states that he was "out" in [place] and not at home where his papers were. We reviewed the sliding scale as ordered: Humalog Sliding Scale tid before meals: 150-200 give 4 units, 201-250 give 6 units, 251-300 give 8 units, 301-350 give 10 units, >350 give 12 units. Patient verbalized understanding. Confirmed with patient that he had received " all the educational resource materials and logs that I mailed to him. Encouraged patient to utilize the blood glucose logs that were sent to him to log all the blood glucose readings, as well as the units of Novolog he administered. We reviewed the recommended HgbA1c for diabetics of at or below 7.0%, reflecting an average blood glucose of 154 over the last 2-3 months. Explained that when the HgbA1c is >14.0%, it puts patient at much higher risk of developing worsening kidney function, which he is currently experiencing, as well as further complications:  · Eye problems, including damage to the blood vessels in the eyes (retinopathy), pressure in the eye (glaucoma), and clouding of the eyes lens (a cataract). Eye problems can eventually lead to irreversible blindness.   · Tooth and gum problems (periodontal disease), causing loss of teeth and bone  · Blood vessel (vascular) disease leading to circulation problems, heart attack or stroke, or a need for amputation of a limb   · Problems with sexual function leading to erectile dysfunction in men and sexual discomfort in women   · Kidney disease (nephropathy) can eventually lead to kidney failure, which may require dialysis or kidney transplant   · Nerve problems (neuropathy), causing pain or loss of feeling in your feet and other parts of your body, potentially leading to an amputation of a limb   · High blood pressure (hypertension), putting strain on your heart and blood vessels  · Serious infections, possibly leading to loss of toes, feet, or limbs    We reviewed the ways to prevent further complications: managing your blood glucose, blood pressure, and cholesterol levels. This can help you feel better and stay healthy. You can manage diabetes by measuring and logging your blood sugar. You can also eat healthy to avoid gaining weight and exercising to try to lose excess weight. You should take medicine as directed by your healthcare provider. We reviewed the signs  and symptoms of Hyperglycemia with patient: Extreme thirst, Frequent urination, Feeling tired, Dizziness, Blurry vision. Patient verbalized understanding but seemed to be overwhelmed with the amount of information covered. Will have to work with patient and Diabetic Educator to reinforce Diabetic Teaching as it is difficult for patient to retain the information. Will continue to review. Reviewed patient's upcoming scheduled appointments and reminded patient to bring his Blood Glucose Logs, Testing Meter and supplies, and his diabetic medications to his appointment with the Diabetic Educator on Monday, 11/26/18 at 8:00AM. Patient verbalized understanding and stated that his wife, Yaritza, would be attending the appointment with him. Patient is in agreement with a follow up call in one week. We will review the Diabetic information in more detail after patient has seen the Diabetic Educator on 11/26/18. - SASHA Victoria, RN-OPCM    Interventions:  - Confirmed receipt of all educational resources mailed to patient.  - Reviewed patient's current HgbA1c (>14.0%) and HgbA1c goal (at or <7.0%).  - Reviewed complications of Diabetes.  - Reviewed ways to prevent further complications of Diabetes.   - Reviewed patient instructions of blood glucose testing and logging, Lantus dosing and Novolog Sliding Scale.   - Reviewed the signs and symptoms of Hyperglycemia with patient.  - Reviewed with and mailed patient a copy of upcoming scheduled appointments.   - Reminded patient to take his blood glucose logs, meter, testing supplies and diabetic medications to Diabetic Educator on Monday, 11/26/18.    Plan:  - Assess for receipt of blood pressure, weight and blood glucose logs. - 11/20/18 Confirmed receipt   - Assess for receipt and completion of Advance Directive/POA paperwork. - 11/20/18 Confirmed receipt  - Follow up with PCP regarding recommended frequency of blood glucose monitoring and referral for Diabetic Education. - 10/22/18 He  should be checking his sugars before every meal and before bed; 11/20/18 Reviewed PCP's instructions with patient.   - Hypertension - Review literature with patient. Assess for and address knowledge deficits.  - CHF/CHF/Flare - Review literature with patient. Assess for and address knowledge deficits.  - Diabetes - Review literature with patient. Assess for and address knowledge deficits. - Done 11/20/18 Continue to educate  - Chronic Kidney Disease - Review literature with patient. Assess for and address knowledge deficits.  - Biventricular Pacemaker/AICD - Review literature with patient. Assess for and address knowledge deficits.  - Assess for any additional needs.     Todays OPCM Self-Management Care Plan was developed with the patients input and was based on identified barriers from todays assessment.  Goals were reviewed today with the patient and the patient has agreed to work towards these goals to improve his overall well-being. Patient verbalized understanding of the care plan, goals, and all of today's instructions. Encouraged patient to communicate with his physician and health care team about health conditions and the treatment plan.  Provided my contact information today and encouraged patient to call me with any questions as needed.

## 2018-11-21 ENCOUNTER — TELEPHONE (OUTPATIENT)
Dept: FAMILY MEDICINE | Facility: CLINIC | Age: 73
End: 2018-11-21

## 2018-11-21 NOTE — TELEPHONE ENCOUNTER
----- Message from Elizabeth Way sent at 11/21/2018  4:34 PM CST -----  Contact: Leydi/daughter  Leyla would like a call back at [phone], Regards to an eye scan appointment.    Thanks  Td

## 2018-11-21 NOTE — TELEPHONE ENCOUNTER
Returned call to pt's daughter. She states patient will have eye exam done as his regular eye doctor's office. Informed to have report faxed to our office when completed to update patient chart.

## 2018-11-26 ENCOUNTER — CLINICAL SUPPORT (OUTPATIENT)
Dept: DIABETES | Facility: CLINIC | Age: 73
End: 2018-11-26
Payer: MEDICARE

## 2018-11-26 ENCOUNTER — OUTPATIENT CASE MANAGEMENT (OUTPATIENT)
Dept: ADMINISTRATIVE | Facility: OTHER | Age: 73
End: 2018-11-26

## 2018-11-26 ENCOUNTER — PATIENT MESSAGE (OUTPATIENT)
Dept: FAMILY MEDICINE | Facility: CLINIC | Age: 73
End: 2018-11-26

## 2018-11-26 ENCOUNTER — PATIENT MESSAGE (OUTPATIENT)
Dept: ENDOCRINOLOGY | Facility: CLINIC | Age: 73
End: 2018-11-26

## 2018-11-26 VITALS — WEIGHT: 209 LBS | BODY MASS INDEX: 32.8 KG/M2 | HEIGHT: 67 IN

## 2018-11-26 DIAGNOSIS — Z79.4 TYPE 2 DIABETES MELLITUS WITHOUT COMPLICATION, WITH LONG-TERM CURRENT USE OF INSULIN: ICD-10-CM

## 2018-11-26 DIAGNOSIS — E11.9 TYPE 2 DIABETES MELLITUS WITHOUT COMPLICATION, WITH LONG-TERM CURRENT USE OF INSULIN: ICD-10-CM

## 2018-11-26 PROCEDURE — 99999 PR PBB SHADOW E&M-EST. PATIENT-LVL I: CPT | Mod: PBBFAC,HCWC,,

## 2018-11-26 PROCEDURE — G0108 DIAB MANAGE TRN  PER INDIV: HCPCS | Mod: HCWC,S$GLB,, | Performed by: DIETITIAN, REGISTERED

## 2018-11-26 NOTE — PROGRESS NOTES
"Diabetes Education  Author: Jennifer Whitlock RD, CDE  Date: 11/26/2018    Diabetes Care Management Summary  Diabetes Education Record Assessment/Progress: Initial(Patient has been seen several times over the past several years for dm ed.  His last visit was in 7/2018 but was also seen in 2016 and 2014.  In today with his wife and daughter.  His daughter does not live in town.  His wife had trouble hearing today and his daughter will be going back home soon)  Current Diabetes Risk Level: High     Last A1c:   Lab Results   Component Value Date    HGBA1C >14.0 (H) 11/14/2018     Last Visit with Diabetes Educator: : 11/26/2018  Last OPCM Referral: : 10/19/2018   Enrolled in OPCM: Yes      Diabetes Type  Diabetes Type : Type II    Diabetes History  Diabetes Diagnosis: >10 years    Current Treatment: Insulin(Patient admits to forgetting insulin "a lot of the time".  He is supposed to be taking 45 units Lantus daily but not getting it on a regular basis.  Takes it in am when he does get it.  At first said he takes it everyday but upon further questioning he says he is missing it often.  Unclear what he is doing with his Novolog - states he is taking 30 units plus a scale but does not know which scale he is using.  Would like to see him write down exactly what he is taking on his logs in addition to his blood sugars.  Will need to begin to test more often in order to be able to use sliding scale appropriately.      Reviewed Problem List with Patient: Yes    Health Maintenance was reviewed today with patient. Discussed with patient importance of routine eye exams, foot exams/foot care, blood work (i.e.: A1c, microalbumin, and lipid), dental visits, yearly flu vaccine, and pneumonia vaccine as indicated by PCP. Patient verbalized understanding.     Health Maintenance Topics with due status: Not Due       Topic Last Completion Date    Colonoscopy 03/17/2015    Foot Exam 04/05/2018    Lipid Panel 06/19/2018    Hemoglobin A1c " 11/14/2018     Health Maintenance Due   Topic Date Due    TETANUS VACCINE  11/12/1963    Eye Exam  11/06/2018       Nutrition  Meal Planning: 3 meals per day, snacks between meal(Denies skipping meals.  Not following any specific type of meal plan)    Monitoring   Monitoring: (Patient has several meters and he is unsure which one he is using. Asking that he brings in meter at next visit and to check to see if his strips are in date.)  Self Monitoring : (He is not testing regularly.  Gaurav reports him checking once in the past week she has been here)  Blood Glucose Logs: No  Do you use a personal continuous glucose monitor?: No  In the last month, how often have you had a low blood sugar reaction?: never  Can you tell when your blood sugar is too high?: no    Exercise   Exercise Type: none(No programmed activity)    Social History  Preferred Learning Method: Face to Face  Primary Support: Self, Spouse    Barriers to Change  Barriers to Change: Cognitive  Learning Challenges : Other(Having trouble with memory - feel that this is contributing to his compliance with insulin injections and monitoring)    Readiness to Learn   Readiness to Learn : Acceptance    Cultural Influences  Cultural Influences: No    Diabetes Education Assessment/Progress  Nutrition (Incorporating nutritional management into one's lifestyle): Discussion(Briefly reviewed food intake today. Not really watching his carb intake.  Reviewed carb sources and appropriate amounts per meal and snack)  Medications (states correct name, dose, onset, peak, duration, side effects & timing of meds): Discussion, Needs Review(Stressed importance of taking insulin as prescribed.  I suspect he is skipping multiple doses weekly of both types of insulin.  Discussed timing and action of both types of insulin and discussed that he should be taking Novolog right before eating.  Discussed that he should be taking his insulin with him when he goes out to eat.  Discussed  ways to try to improve compliance with insulin and he may think of setting alarms throughout the day   Monitoring (monitoring blood glucose/other parameters & using results): Discussion(Stressed importance of checking sugars more regularly.  Instructed him to test before each meal and at bedtime.  Discussed keeping logs to bring to clinic visits for review)  Acute Complications (preventing, detecting, and treating acute complications): Discussion(No hypoglycemia noted. Reviewed symptoms, prevention and treatment of hypoglycemia)  Cognitive (knowledge of self-management skills, functional health literacy): Discussion(Patient appears to be forgetting some of his insulin injections and unsure of when he is taking insulin and not monitoring sugars.  Scheduled him with Endo NP to restablish care to see if there is anyway to simplify his regimen to improve compliance)    Diabetes Care Plan/Intervention  Education Plan/Intervention: (Scheduled to see Pamela Owen 11/29.  He will bring in logs of his sugars and what insulin he takes in the next 4 days.  Insulin needs to be adjusted but question his compliance with taking his current doses.  Written materials provided and encouraged patient or wife to call with questions/concerns.      Today's Self-Management Care Plan was developed with the patient's input and is based on barriers identified during today's assessment.    The long and short-term goals in the care plan were written with the patient/caregiver's input. The patient has agreed to work toward these goals to improve his overall diabetes control.      The patient received a copy of today's self-management plan and verbalized understanding of the care plan, goals, and all of today's instructions.      The patient was encouraged to communicate with his physician and care team regarding his condition(s) and treatment.  I provided the patient with my contact information today and encouraged him to contact me via phone  or patient portal as needed.     Education Units of Time   Time Spent: 60 min

## 2018-11-29 ENCOUNTER — OFFICE VISIT (OUTPATIENT)
Dept: ENDOCRINOLOGY | Facility: CLINIC | Age: 73
End: 2018-11-29
Payer: MEDICARE

## 2018-11-29 VITALS
HEIGHT: 67 IN | SYSTOLIC BLOOD PRESSURE: 150 MMHG | WEIGHT: 213 LBS | DIASTOLIC BLOOD PRESSURE: 88 MMHG | HEART RATE: 81 BPM | BODY MASS INDEX: 33.43 KG/M2

## 2018-11-29 DIAGNOSIS — N18.30 TYPE 2 DIABETES MELLITUS WITH STAGE 3 CHRONIC KIDNEY DISEASE, WITH LONG-TERM CURRENT USE OF INSULIN: ICD-10-CM

## 2018-11-29 DIAGNOSIS — I62.03 CHRONIC SUBDURAL HEMATOMA: ICD-10-CM

## 2018-11-29 DIAGNOSIS — R41.3 MEMORY LOSS: ICD-10-CM

## 2018-11-29 DIAGNOSIS — E11.8 TYPE 2 DIABETES MELLITUS WITH COMPLICATION, WITH LONG-TERM CURRENT USE OF INSULIN: Primary | ICD-10-CM

## 2018-11-29 DIAGNOSIS — Z79.4 TYPE 2 DIABETES MELLITUS WITH STAGE 3 CHRONIC KIDNEY DISEASE, WITH LONG-TERM CURRENT USE OF INSULIN: ICD-10-CM

## 2018-11-29 DIAGNOSIS — I25.10 CORONARY ARTERY DISEASE, ANGINA PRESENCE UNSPECIFIED, UNSPECIFIED VESSEL OR LESION TYPE, UNSPECIFIED WHETHER NATIVE OR TRANSPLANTED HEART: ICD-10-CM

## 2018-11-29 DIAGNOSIS — Z79.4 TYPE 2 DIABETES MELLITUS WITH COMPLICATION, WITH LONG-TERM CURRENT USE OF INSULIN: Primary | ICD-10-CM

## 2018-11-29 DIAGNOSIS — E11.22 TYPE 2 DIABETES MELLITUS WITH STAGE 3 CHRONIC KIDNEY DISEASE, WITH LONG-TERM CURRENT USE OF INSULIN: ICD-10-CM

## 2018-11-29 PROCEDURE — 99214 OFFICE O/P EST MOD 30 MIN: CPT | Mod: HCWC,S$GLB,, | Performed by: NURSE PRACTITIONER

## 2018-11-29 PROCEDURE — 99999 PR PBB SHADOW E&M-EST. PATIENT-LVL IV: CPT | Mod: PBBFAC,HCWC,, | Performed by: NURSE PRACTITIONER

## 2018-11-29 RX ORDER — SYRINGE-NEEDLE,INSULIN,0.5 ML 30 GX5/16"
SYRINGE, EMPTY DISPOSABLE MISCELLANEOUS
Qty: 100 EACH | Status: SHIPPED | OUTPATIENT
Start: 2018-11-29 | End: 2018-11-29 | Stop reason: ALTCHOICE

## 2018-11-29 RX ORDER — SYRINGE-NEEDLE,INSULIN,0.5 ML 30 GX5/16"
SYRINGE, EMPTY DISPOSABLE MISCELLANEOUS
Qty: 100 EACH | Refills: 12 | Status: SHIPPED | OUTPATIENT
Start: 2018-11-29 | End: 2019-07-05 | Stop reason: SDUPTHER

## 2018-11-29 NOTE — PROGRESS NOTES
Subjective:      Patient ID: Abdullahi Salazar is a 73 y.o. male.    Chief Complaint:   Routine f/u Type 2 Dm     History of Present Illness  CHIEF COMPLAINT: DM 2     Pt is a 73 y.o. wm with a diagnosis of Type 2 diabetes mellitus diagnosed approximately 2004, as well as chronic conditions pending review including HTN, HLP, CAD.     Interim Events: Pt not seen in > 2 years.  REcent A1C was >14%.  States he just stopped taking his insulin. Doesn't know why.  He feels fine.  Saw Diabetes Ed yesterday and there was some concerns with memory.  He states he usually only occasionally misses insulin injections.  Brings 2 days of glucose readings.  Also reviewed neuro notes--pt with recurrent chronic SDH and now s/p shunt for NPH.      Eats breakfast from 8-10 am,   SOmetimes lunch, or snacks  Supper around 6        Diabetes Flow Sheet:   Diabetes Medications: Lantus 45 , Humalog 30-30-30  plus ss with meals  (but may scale dose back to 10 if glucose low or eating lgihgt) based on what he is eating.   Diabetes Complications:   Aspirin: yes   Statin: Zetia &  Livalo r/t myalgias. --states cannot tolerate statins r/t myalgias and whelchol causes too much abdominal discomfort.   ACE/ARB: yes   Last Urine Microalbumin: 12/13   Last Eye exam: Approx   Last Diabetic Education: 8/13.       PAST MEDICAL HISTORY/PAST SURGICAL HISTORY: Reviewed in [facility]   SOCIAL HISTORY: No T/A. Retired    FAMILY HISTORY: + DM 2. No known thyroid disease. Lung Cancer.   MEDICATIONS/ALLERGIES: The patient's MedCard has been updated and reviewed.       Review of Systems   Constitutional: Negative for activity change, appetite change, fatigue and unexpected weight change.   HENT: Negative for hearing loss and trouble swallowing.    Eyes: Negative for photophobia and visual disturbance.         Approx 2/15   Respiratory: Negative for cough and shortness of breath.    Cardiovascular: Negative for chest pain, palpitations and leg swelling.    Gastrointestinal: Negative for constipation and diarrhea.   Genitourinary: Negative for difficulty urinating and frequency.   Musculoskeletal: Negative for arthralgias, joint swelling and myalgias ( l).        Myalgias abated when pt stopped zetia and livalo   Skin: Negative for rash and wound.   Neurological: Negative for weakness and numbness.   Psychiatric/Behavioral: Negative for agitation and sleep disturbance. The patient is not nervous/anxious.        Objective:   Physical Exam   Constitutional: He is oriented to person, place, and time. He appears well-developed and well-nourished.   Appears age, appropriately groomed   HENT:   Head: Normocephalic and atraumatic.   Nose: Nose normal.   Eyes: Conjunctivae and EOM are normal. Pupils are equal, round, and reactive to light.   Neck: Normal range of motion. Neck supple. No tracheal deviation present. No thyromegaly present.   Cardiovascular: Normal rate, normal heart sounds and intact distal pulses.   Extra beat occasionally  Cards appt next month   Pulmonary/Chest: Effort normal and breath sounds normal.   Musculoskeletal: Normal range of motion.   Feet:    Neurological: He is alert and oriented to person, place, and time. He has normal reflexes.   Skin: Skin is warm and dry. No rash noted. No erythema.   Psychiatric: He has a normal mood and affect. His behavior is normal. Judgment and thought content normal.       Lab Review:     Chemistry        Component Value Date/Time     08/27/2018 0632    K 4.2 08/27/2018 0632     08/27/2018 0632    CO2 28 08/27/2018 0632    BUN 34 (H) 08/27/2018 0632    CREATININE 2.1 (H) 08/27/2018 0632     (H) 08/27/2018 0632        Component Value Date/Time    CALCIUM 9.2 08/27/2018 0632    ALKPHOS 116 08/08/2018 0851    AST 49 08/08/2018 0851    AST 47 02/15/2016 0453    ALT 53 (H) 08/08/2018 0851    BILITOT 2.5 (H) 08/08/2018 0851        Hemoglobin A1C   Date Value Ref Range Status   11/14/2018 >14.0 (H) 4.0 -  5.6 % Final     Comment:     ADA Screening Guidelines:  5.7-6.4%  Consistent with prediabetes  >or=6.5%  Consistent with diabetes  High levels of fetal hemoglobin interfere with the HbA1C  assay. Heterozygous hemoglobin variants (HbS, HgC, etc)do  not significantly interfere with this assay.   However, presence of multiple variants may affect accuracy.     07/29/2018 11.6 (H) 0.0 - 5.6 % Final     Comment:     Reference Interval:  5.0 - 5.6 Normal   5.7 - 6.4 High Risk   > 6.5 Diabetic    Hgb A1c results are standardized based on the (NGSP) National   Glycohemoglobin Standardization Program.    Hemoglobin A1C levels are related to mean serum/plasma glucose   during the preceding 2-3 months.        07/17/2018 12.4 (H) 0.0 - 5.6 % Final     Comment:     Reference Interval:  5.0 - 5.6 Normal   5.7 - 6.4 High Risk   > 6.5 Diabetic    Hgb A1c results are standardized based on the (NGSP) National   Glycohemoglobin Standardization Program.    Hemoglobin A1C levels are related to mean serum/plasma glucose   during the preceding 2-3 months.          Lab Results   Component Value Date    LDLCALC 67.0 06/19/2018     Lab Results   Component Value Date    TSH 0.816 06/20/2018         Assessment:     1. Type 2 diabetes mellitus with complication, with long-term current use of insulin  insulin NPH-insulin regular, 70/30, (NOVOLIN 70/30) 100 unit/mL (70-30) injection    insulin syringe-needle U-100 1 mL 30 gauge x 5/16 Syrg      DISCONTINUED: insulin syringe-needle U-100 1 mL 30 gauge x 5/16 Syrg  Chronic-uncontrolled-see plan    2. Type 2 diabetes mellitus with stage 3 chronic kidney disease, with long-term current use of insulin     3. Coronary artery disease, angina presence unspecified, unspecified vessel or lesion type, unspecified whether native or transplanted heart  Chronic-stable-avoid hypoglycemia    4. Memory loss  Chronic-impeding DM mgmt    5. Chronic subdural hematoma  -crhonic-likely contributing to memory loss           Plan:   -Suspect he is missing most insulin injections.   _will switch to conventional therapy--pt does not need tight control.  ~8% reasonable     D/c Novolog and lantus    Start Novolin 70/30   60 and 40 bid.       ORDERS 11/29/2018    2 week glucose log review.

## 2018-11-29 NOTE — PATIENT INSTRUCTIONS
Novolin/Humulin/Relion brand of     70/30 insulin----60 units 30 min before breakfast---preferably around 8 am                                          --Make sure you are eating lunch or snack arount 1-2 pm at latest                            40 units  --30 min before supper.          Check glucoses BEFORE breakfast,   Lunch, supper and BT

## 2018-12-05 ENCOUNTER — OUTPATIENT CASE MANAGEMENT (OUTPATIENT)
Dept: ADMINISTRATIVE | Facility: OTHER | Age: 73
End: 2018-12-05

## 2018-12-05 PROBLEM — I10 BENIGN ESSENTIAL HTN: Status: ACTIVE | Noted: 2018-12-05

## 2018-12-05 PROBLEM — I50.33 ACUTE ON CHRONIC DIASTOLIC CONGESTIVE HEART FAILURE: Status: ACTIVE | Noted: 2018-12-05

## 2018-12-06 ENCOUNTER — TELEPHONE (OUTPATIENT)
Dept: CARDIOLOGY | Facility: CLINIC | Age: 73
End: 2018-12-06

## 2018-12-06 NOTE — TELEPHONE ENCOUNTER
----- Message from Ailin Salamanca sent at 12/6/2018 11:52 AM CST -----  Contact: daughter Leydi  Patient daughter calling to let DR. Rios know that has been admitted to [facility] for congested heart failure per daughter   Admitted 12/05    Please call daughter at [phone]

## 2018-12-06 NOTE — TELEPHONE ENCOUNTER
Spoke to pt. Daughter, informed her she will have to speak to the attending physician at the hospital. Pt verbalized understanding.

## 2018-12-07 ENCOUNTER — TELEPHONE (OUTPATIENT)
Dept: ADMINISTRATIVE | Facility: CLINIC | Age: 73
End: 2018-12-07

## 2018-12-10 NOTE — PROGRESS NOTES
11/26/2018  Summary:  (1st Attempt)  RNCUAUHTEMOC attempted to meet with patient in clinic after scheduled visit with Diabetic Educator, Jennifer Whitlock this morning. Missed patient, but spoke with Jennifer who reports that she met with patient, his spouse (who is hard of hearing) and patient's daughter (who lives out of town).  Jennifer stated that after talking with patient, she also noted significant memory issues that are more than likely impacting his ability to control his diabetes. We had previously discussed these issues when I requested the appointment for diabetic educations. Jennifer states that he will be seeing Pamela Owen NP on 11/28/18 and will be updating the NP regarding her observations. Jennifer also noted that patient admitted to forgetting his insulin often. She will be discussing with the NP on possibly simplifying his insulin regimen to make it easier for patient to comply. Will attempt to contact patient after his visit with Pamela Owen NP and will work with Diabetic Educator to reinforce any medication regimen changes with patient. - SASHA Victoria RN-OPCM    Interventions:  - Reviewed patient with Diabetic Educator, Jennifer Whitlock.  - Scheduled patient for next call next week.    Plan:  - Assess for receipt of blood pressure, weight and blood glucose logs. - 11/20/18 Confirmed receipt   - Assess for receipt and completion of Advance Directive/POA paperwork. - 11/20/18 Confirmed receipt  - Follow up with PCP regarding recommended frequency of blood glucose monitoring and referral for Diabetic Education. - 10/22/18 He should be checking his sugars before every meal and before bed; 11/20/18 Reviewed PCP's instructions with patient.   - Hypertension - Review literature with patient. Assess for and address knowledge deficits.  - CHF/CHF/Flare - Review literature with patient. Assess for and address knowledge deficits.  - Diabetes - Review literature with patient. Assess for and address knowledge deficits. -  Done 11/20/18 Continue to educate  - Chronic Kidney Disease - Review literature with patient. Assess for and address knowledge deficits.  - Biventricular Pacemaker/AICD - Review literature with patient. Assess for and address knowledge deficits.  - Assess for any additional needs.

## 2018-12-10 NOTE — PROGRESS NOTES
12/05/2018  Summary: RN-ZAHIDA checked chart prior to contacting patient to follow up for OPCM. Patient admitted to [facility] thru the ED chest tightness and shortness of breath for the past 2 days. Discharge plans have yet to be determined. Will check chart early next week to determine discharge disposition. - SASHA Victoria RN-OPCM    Interventions:  - Reviewed chart; patient inpatient at this time.  - Scheduled patient for next follow up attempt early next week.     Plan:  - Assess for any diabetic medication regimen changes and review with patient at next call.   - Assess for receipt of blood pressure, weight and blood glucose logs. - 11/20/18 Confirmed receipt   - Assess for receipt and completion of Advance Directive/POA paperwork. - 11/20/18 Confirmed receipt  - Follow up with PCP regarding recommended frequency of blood glucose monitoring and referral for Diabetic Education. - 10/22/18 He should be checking his sugars before every meal and before bed; 11/20/18 Reviewed PCP's instructions with patient.   - Hypertension - Review literature with patient. Assess for and address knowledge deficits.  - CHF/CHF/Flare - Review literature with patient. Assess for and address knowledge deficits.  - Diabetes - Review literature with patient. Assess for and address knowledge deficits. - Done 11/20/18 Continue to educate  - Chronic Kidney Disease - Review literature with patient. Assess for and address knowledge deficits.  - Biventricular Pacemaker/AICD - Review literature with patient. Assess for and address knowledge deficits.  - Assess for any additional needs.

## 2018-12-11 ENCOUNTER — TELEPHONE (OUTPATIENT)
Dept: CARDIOLOGY | Facility: CLINIC | Age: 73
End: 2018-12-11

## 2018-12-11 NOTE — TELEPHONE ENCOUNTER
----- Message from Carmen Schmitt sent at 12/11/2018  3:58 PM CST -----    Type:  Sooner Apoointment Request    Caller is requesting a sooner appointment.  Caller declined first available appointment listed below.  Caller will not accept being placed on the waitlist and is requesting a message be sent to doctor.    Name of Caller: belen   [facility]   calling  When is the first available appointment?   12/28  Symptoms:   Pt was  In  Er   Friday and  Pt  Was    In  Er  For  Two  Days  Increase  Shortness of  Breath  And  weakness  Best Call Back Number: [phone]  Additional Information:    Pt  Needs to  Be  Fitted  Sooner  Due  To shortness  Of  Breath  With  Activity  And  Some  weakness

## 2018-12-12 ENCOUNTER — TELEPHONE (OUTPATIENT)
Dept: CARDIOLOGY | Facility: CLINIC | Age: 73
End: 2018-12-12

## 2018-12-12 NOTE — TELEPHONE ENCOUNTER
Spoke with the patient he stated he is not having any symptoms at this time. He was advised if he starts having any chest pain or SOB to proceed to the ER. He was offer a sooner appointment he declined. He verbalized understanding.

## 2018-12-12 NOTE — TELEPHONE ENCOUNTER
----- Message from Eufemia Washington sent at 12/12/2018 12:37 PM CST -----  Contact: Consuelo  Type: Needs Medical Advice    Who Called:  Danielle-[facility]  Best Call Back Number: [phone]  Additional Information: 2.8 lb weight gain would like to speak to a nurse please would like a call back.

## 2018-12-13 ENCOUNTER — TELEPHONE (OUTPATIENT)
Dept: CARDIOLOGY | Facility: CLINIC | Age: 73
End: 2018-12-13

## 2018-12-13 ENCOUNTER — OUTPATIENT CASE MANAGEMENT (OUTPATIENT)
Dept: ADMINISTRATIVE | Facility: OTHER | Age: 73
End: 2018-12-13

## 2018-12-13 NOTE — TELEPHONE ENCOUNTER
----- Message from Eden Perla sent at 12/13/2018  2:28 PM CST -----  Type: Needs Medical Advice    Who Called:  Danielle with [facility]  Symptoms (please be specific):  Called reported2.8 weight lb yesterday , and 5.8 gain since yesterday  Best Call Back Number:[phone]  Additional Information: contact to advise.

## 2018-12-14 NOTE — TELEPHONE ENCOUNTER
----- Message from Tammy Turcios sent at 12/14/2018  1:40 PM CST -----  Contact: Damaris murillo/[facility] [phone]  Please call her regarding assessment findings today.  Thank you!

## 2018-12-18 ENCOUNTER — TELEPHONE (OUTPATIENT)
Dept: CARDIOLOGY | Facility: CLINIC | Age: 73
End: 2018-12-18

## 2018-12-18 ENCOUNTER — OFFICE VISIT (OUTPATIENT)
Dept: ENDOCRINOLOGY | Facility: CLINIC | Age: 73
End: 2018-12-18
Payer: MEDICARE

## 2018-12-18 VITALS
HEIGHT: 67 IN | DIASTOLIC BLOOD PRESSURE: 88 MMHG | BODY MASS INDEX: 33.91 KG/M2 | HEART RATE: 81 BPM | WEIGHT: 216.06 LBS | SYSTOLIC BLOOD PRESSURE: 168 MMHG

## 2018-12-18 DIAGNOSIS — E11.22 TYPE 2 DIABETES MELLITUS WITH STAGE 3 CHRONIC KIDNEY DISEASE, WITH LONG-TERM CURRENT USE OF INSULIN: Primary | ICD-10-CM

## 2018-12-18 DIAGNOSIS — Z51.81 MEDICATION MONITORING ENCOUNTER: ICD-10-CM

## 2018-12-18 DIAGNOSIS — N18.30 TYPE 2 DIABETES MELLITUS WITH STAGE 3 CHRONIC KIDNEY DISEASE, WITH LONG-TERM CURRENT USE OF INSULIN: Primary | ICD-10-CM

## 2018-12-18 DIAGNOSIS — R41.3 MEMORY LOSS: ICD-10-CM

## 2018-12-18 DIAGNOSIS — Z79.4 TYPE 2 DIABETES MELLITUS WITH STAGE 3 CHRONIC KIDNEY DISEASE, WITH LONG-TERM CURRENT USE OF INSULIN: Primary | ICD-10-CM

## 2018-12-18 PROCEDURE — 99213 OFFICE O/P EST LOW 20 MIN: CPT | Mod: S$GLB,,, | Performed by: NURSE PRACTITIONER

## 2018-12-18 PROCEDURE — 99999 PR PBB SHADOW E&M-EST. PATIENT-LVL V: CPT | Mod: PBBFAC,,, | Performed by: NURSE PRACTITIONER

## 2018-12-18 NOTE — TELEPHONE ENCOUNTER
Spoke with Jeanette with home health she has been informed that pt should increase the Demadex for the next 3 days. She verbalized understanding

## 2018-12-18 NOTE — TELEPHONE ENCOUNTER
----- Message from Kamaljit Alcaraz sent at 12/18/2018  1:42 PM CST -----  Type: Needs Medical Advice    Who Called:  Danielle/[facility]    Best Call Back Number: [phone]  Additional Information: Caller states that the patient has gained 7.8 lbs in the past week and he hasn't taken his medication.

## 2018-12-19 ENCOUNTER — OUTPATIENT CASE MANAGEMENT (OUTPATIENT)
Dept: ADMINISTRATIVE | Facility: OTHER | Age: 73
End: 2018-12-19

## 2018-12-20 ENCOUNTER — TELEPHONE (OUTPATIENT)
Dept: CARDIOLOGY | Facility: CLINIC | Age: 73
End: 2018-12-20

## 2018-12-20 NOTE — TELEPHONE ENCOUNTER
----- Message from Miri Saenz sent at 12/20/2018  9:14 AM CST -----  Contact: Jeanette with [facility]   Jeanette with [facility] states patients  weight has increased and he is forgetting to take medication    Jeanette with [facility] states they will increase visit to twice a week for the next 3 weeks and if Dr has ay questions to please call     Please call [phone]

## 2018-12-25 ENCOUNTER — NURSE TRIAGE (OUTPATIENT)
Dept: ADMINISTRATIVE | Facility: CLINIC | Age: 73
End: 2018-12-25

## 2018-12-26 ENCOUNTER — TELEPHONE (OUTPATIENT)
Dept: FAMILY MEDICINE | Facility: CLINIC | Age: 73
End: 2018-12-26

## 2018-12-26 NOTE — TELEPHONE ENCOUNTER
----- Message from Mary Lutz sent at 12/26/2018  3:41 PM CST -----  Contact: self   requesting same day access for a severe headache and vomiting.Please call back at [phone].      Thanks,  Mary Lutz

## 2018-12-26 NOTE — TELEPHONE ENCOUNTER
Patient informed no available appts in clinic tomorrow, advised urgent care or walk in, reports he will call back if needed

## 2018-12-26 NOTE — TELEPHONE ENCOUNTER
----- Message from Madeleine Pena sent at 12/26/2018  4:07 PM CST -----  Contact: self [phone]  States that he is returning call. Please call back at [phone]//thank you acc

## 2018-12-28 ENCOUNTER — TELEPHONE (OUTPATIENT)
Dept: GASTROENTEROLOGY | Facility: CLINIC | Age: 73
End: 2018-12-28

## 2018-12-28 ENCOUNTER — OUTPATIENT CASE MANAGEMENT (OUTPATIENT)
Dept: ADMINISTRATIVE | Facility: OTHER | Age: 73
End: 2018-12-28

## 2018-12-28 ENCOUNTER — TELEPHONE (OUTPATIENT)
Dept: CARDIOLOGY | Facility: CLINIC | Age: 73
End: 2018-12-28

## 2018-12-28 ENCOUNTER — OFFICE VISIT (OUTPATIENT)
Dept: CARDIOLOGY | Facility: CLINIC | Age: 73
End: 2018-12-28
Payer: MEDICARE

## 2018-12-28 ENCOUNTER — TELEPHONE (OUTPATIENT)
Dept: FAMILY MEDICINE | Facility: CLINIC | Age: 73
End: 2018-12-28

## 2018-12-28 VITALS
DIASTOLIC BLOOD PRESSURE: 89 MMHG | SYSTOLIC BLOOD PRESSURE: 181 MMHG | HEIGHT: 67 IN | HEART RATE: 80 BPM | WEIGHT: 214.5 LBS | BODY MASS INDEX: 33.67 KG/M2

## 2018-12-28 DIAGNOSIS — Z95.810 PRESENCE OF BIVENTRICULAR AICD: ICD-10-CM

## 2018-12-28 DIAGNOSIS — I25.5 ISCHEMIC CARDIOMYOPATHY: ICD-10-CM

## 2018-12-28 DIAGNOSIS — R11.10 VOMITING, INTRACTABILITY OF VOMITING NOT SPECIFIED, PRESENCE OF NAUSEA NOT SPECIFIED, UNSPECIFIED VOMITING TYPE: Primary | ICD-10-CM

## 2018-12-28 DIAGNOSIS — I25.810 CORONARY ARTERY DISEASE INVOLVING CORONARY BYPASS GRAFT OF NATIVE HEART WITHOUT ANGINA PECTORIS: Primary | ICD-10-CM

## 2018-12-28 DIAGNOSIS — I49.5 SINUS NODE DYSFUNCTION: ICD-10-CM

## 2018-12-28 DIAGNOSIS — I15.2 HYPERTENSION ASSOCIATED WITH DIABETES: ICD-10-CM

## 2018-12-28 DIAGNOSIS — E11.59 HYPERTENSION ASSOCIATED WITH DIABETES: ICD-10-CM

## 2018-12-28 DIAGNOSIS — I35.0 AORTIC STENOSIS, MODERATE: ICD-10-CM

## 2018-12-28 PROBLEM — K92.2 UGIB (UPPER GASTROINTESTINAL BLEED): Status: ACTIVE | Noted: 2018-12-28

## 2018-12-28 PROBLEM — R51.9 HEADACHE: Status: ACTIVE | Noted: 2018-12-28

## 2018-12-28 PROCEDURE — 3079F DIAST BP 80-89 MM HG: CPT | Mod: CPTII,S$GLB,, | Performed by: INTERNAL MEDICINE

## 2018-12-28 PROCEDURE — 1101F PT FALLS ASSESS-DOCD LE1/YR: CPT | Mod: CPTII,S$GLB,, | Performed by: INTERNAL MEDICINE

## 2018-12-28 PROCEDURE — 3077F SYST BP >= 140 MM HG: CPT | Mod: CPTII,S$GLB,, | Performed by: INTERNAL MEDICINE

## 2018-12-28 PROCEDURE — 99214 OFFICE O/P EST MOD 30 MIN: CPT | Mod: S$GLB,,, | Performed by: INTERNAL MEDICINE

## 2018-12-28 PROCEDURE — 3046F HEMOGLOBIN A1C LEVEL >9.0%: CPT | Mod: CPTII,S$GLB,, | Performed by: INTERNAL MEDICINE

## 2018-12-28 PROCEDURE — 99999 PR PBB SHADOW E&M-EST. PATIENT-LVL III: CPT | Mod: PBBFAC,,, | Performed by: INTERNAL MEDICINE

## 2018-12-28 NOTE — TELEPHONE ENCOUNTER
Spoke with April Hinojosa Nurse, informed her as per Dr. Escamilla pt. Needs an upper GI w/luis scheduled to see Dr. Escamilla notes. April Rosales verbalized understanding.

## 2018-12-28 NOTE — TELEPHONE ENCOUNTER
----- Message from Cookie Barclay sent at 12/28/2018  4:23 PM CST -----  Contact: Maritza/Daughter  ..Type:  Apoointment Request    Caller is requesting a sooner appointment.  Caller declined first available appointment listed below.  Caller will not accept being placed on the waitlist and is requesting a message be sent to doctor.    Name of Caller: Maritza/Daughter  Symptoms: Vomiting brown substance  Best Call Back Number:   Additional Information: Maritza stated the nurse in Dr Rodney's office told her that someone from Dr Hinojosa's office would call her to schedule an appt for pt to get an upper GI.  No referral in system  Please advise  Thanks

## 2018-12-28 NOTE — TELEPHONE ENCOUNTER
Pt was seen at his cardiologist office today and told him that he had been throwing up a brown substance for the past couple of days. The cardiologist instructed them to call Dr Hinojosa's office to be seen. The family is asking for a referral to be placed in chart just in case he needs this to be seen. They are calling to schedule appointment.

## 2018-12-28 NOTE — PROGRESS NOTES
Subjective:    Patient ID:  Abdullahi Salazar is a 73 y.o. male who presents for follow-up of ICM    HPI  He comes for follow up with no major problems, no chest pain, no shortness of breath.  Main complaint is nausea/vomiting x 2 days, coffee/ground    Review of Systems   Constitution: Negative for decreased appetite, weakness, malaise/fatigue, weight gain and weight loss.   Cardiovascular: Negative for chest pain, dyspnea on exertion, leg swelling, palpitations and syncope.   Respiratory: Negative for cough and shortness of breath.    Gastrointestinal: Negative.    All other systems reviewed and are negative.       Objective:      Physical Exam   Constitutional: He is oriented to person, place, and time. He appears well-developed and well-nourished.   HENT:   Head: Normocephalic.   Eyes: Pupils are equal, round, and reactive to light.   Neck: Normal range of motion. Neck supple. No JVD present. Carotid bruit is not present. No thyromegaly present.   Cardiovascular: Normal rate, regular rhythm, normal heart sounds, intact distal pulses and normal pulses. PMI is not displaced. Exam reveals no gallop.   No murmur heard.  Pulmonary/Chest: Effort normal and breath sounds normal.   Abdominal: Soft. Normal appearance. He exhibits no mass. There is no hepatosplenomegaly. There is no tenderness.   Musculoskeletal: Normal range of motion. He exhibits no edema.   Neurological: He is alert and oriented to person, place, and time. He has normal strength and normal reflexes. No sensory deficit.   Skin: Skin is warm and intact.   Psychiatric: He has a normal mood and affect.   Nursing note and vitals reviewed.        Assessment:       1. Coronary artery disease involving coronary bypass graft of native heart without angina pectoris    2. Aortic stenosis, moderate    3. Ischemic cardiomyopathy    4. Hypertension associated with diabetes    5. Sinus node dysfunction    6. Presence of biventricular AICD         Plan:   stop  ASA  Continue all cardiac medications  Regular exercise program  Weight loss  EGD soon  Dr Hernandez to evaluate

## 2018-12-28 NOTE — TELEPHONE ENCOUNTER
----- Message from Estefani Marley sent at 12/28/2018 10:45 AM CST -----  Contact: pt daughter Leydi  Calling in regards to have questions and health concerns and about referral to Gastro and please advise [phone]

## 2018-12-31 ENCOUNTER — PATIENT MESSAGE (OUTPATIENT)
Dept: FAMILY MEDICINE | Facility: CLINIC | Age: 73
End: 2018-12-31

## 2018-12-31 ENCOUNTER — TELEPHONE (OUTPATIENT)
Dept: NEUROSURGERY | Facility: CLINIC | Age: 73
End: 2018-12-31

## 2018-12-31 DIAGNOSIS — G91.2 NORMAL PRESSURE HYDROCEPHALUS: Primary | ICD-10-CM

## 2018-12-31 NOTE — TELEPHONE ENCOUNTER
Left voicemail on pt phone scheduling 1 wk f/u visit per Dr. FLORES request; appt letters mailed.

## 2018-12-31 NOTE — TELEPHONE ENCOUNTER
Hi,    Pt has been scheduled for an EGD with Dr. Hinojosa on 1/10. Is it ok for pt to stop plavix 5 days and pradaxa 3 days prior.     Thanks,  April

## 2019-01-02 ENCOUNTER — OUTPATIENT CASE MANAGEMENT (OUTPATIENT)
Dept: ADMINISTRATIVE | Facility: OTHER | Age: 74
End: 2019-01-02

## 2019-01-02 ENCOUNTER — PATIENT MESSAGE (OUTPATIENT)
Dept: ADMINISTRATIVE | Facility: OTHER | Age: 74
End: 2019-01-02

## 2019-01-02 NOTE — PROGRESS NOTES
01/02/2019  Summary: (3rd Attempt) RN-ZAHIDA attempted to contact patient to follow up for OPCM. No answer at [phone]; left message requesting return call. Patient has been in the ED and hospital several times over the last few weeks. I will mail attempt letter to patient's home today with OPCM and OOC contact information, encouraging patient to return call. If no response to previous voice mail messages or mailed letter by 1/11/19, I will close case. Dominique Huffman RN-OPCM    Interventions:  - Left message requesting a return call.  - Sent attempt letter through patient portal requesting a return call (and mailed copy of letter to patient's home).    Plan:  - Assess for receipt of blood pressure, weight and blood glucose logs. - 11/20/18 Confirmed receipt   - Assess for receipt and completion of Advance Directive/POA paperwork. - 11/20/18 Confirmed receipt  - Follow up with PCP regarding recommended frequency of blood glucose monitoring and referral for Diabetic Education. - 10/22/18 He should be checking his sugars before every meal and before bed; 11/20/18 Reviewed PCP's instructions with patient.   - Hypertension - Review literature with patient. Assess for and address knowledge deficits.  - CHF/CHF/Flare - Review literature with patient. Assess for and address knowledge deficits.  - Diabetes - Review literature with patient. Assess for and address knowledge deficits. - Done 11/20/18 Continue to educate  - Chronic Kidney Disease - Review literature with patient. Assess for and address knowledge deficits.  - Biventricular Pacemaker/AICD - Review literature with patient. Assess for and address knowledge deficits.  - If no response from patient/caregiver by 1/11/19, will close case.

## 2019-01-02 NOTE — PROGRESS NOTES
12/28/2018  Summary:  RN-CM reviewed chart prior to contacting patient to follow up for OPCM. Patient has multiple phone calls to MD's and an appointment with Cardiologist, Dr. Rodney, this morning. Patient is reporting several days of vomiting up a brown substance and Cardiology has advised GI follow up. Patient later advised to go to ED. Will attempt to reach patient next week. If patient is admitted to hospital, will plan to close case. - SASHA Victoria RN-OPCM    Interventions:  - Scheduled patient for follow up next week.    Plan:  - Close case if patient was admitted to hospital as I have been unable to make contact with patient.

## 2019-01-02 NOTE — PROGRESS NOTES
12/19/2018  Summary: (2nd Attempt)  RN-CM attempted to contact patient to follow up for OPCM. No answer at [phone]; left message requesting a return call. Will attempt to contact patient at a later date. - , RN-OPCM    Interventions:  - Left message requesting a return call.  - Scheduled patient for follow up call next week.

## 2019-01-02 NOTE — LETTER
January 2, 2019    Abdullahi Salazar  [address] [address]  [place] [address]             [facility] [address] [place] [address] Dear Mr. Salazar,    I work with [facility]'s Outpatient Case Management Department. I have been unsuccessful at reaching you to follow-up to see how you have been doing. I know that you have been in the hospital several times over the last few weeks.  Please call me back at your earliest convenience to discuss your health care needs and determine how we can best assist you.      I can be reached at [phone] from 8:00 AM to 4:30 PM on Monday thru Friday. [facility] is a program offered through [facility] where a nurse is available 24/7 to answer questions or provide medical advice. Their number is [phone].    Kind Regards,      Alessandra Victoria, RN-OPCM  Outpatient Care Management  [phone]

## 2019-01-02 NOTE — PROGRESS NOTES
12/13/2018  Summary:  RN-ZAHIDA reviewed chart prior to contacting patient to follow up for OPCM. Noted in chart review that patient had been admitted on 12/5/18 and was discharged home on 12/7/18. Patient is now back in the ED with shortness of breath and 7 pound weight gain over the last week. Will attempt to contact patient at a later date and determine discharge status. - SASHA Victoria RN-OPCM    Interventions:  - Scheduled patient for follow up next week.     Plan:  - Assess for any diabetic medication regimen changes and review with patient at next call.   - Assess for receipt of blood pressure, weight and blood glucose logs. - 11/20/18 Confirmed receipt   - Assess for receipt and completion of Advance Directive/POA paperwork. - 11/20/18 Confirmed receipt  - Follow up with PCP regarding recommended frequency of blood glucose monitoring and referral for Diabetic Education. - 10/22/18 He should be checking his sugars before every meal and before bed; 11/20/18 Reviewed PCP's instructions with patient.   - Hypertension - Review literature with patient. Assess for and address knowledge deficits.  - CHF/CHF/Flare - Review literature with patient. Assess for and address knowledge deficits.  - Diabetes - Review literature with patient. Assess for and address knowledge deficits. - Done 11/20/18 Continue to educate  - Chronic Kidney Disease - Review literature with patient. Assess for and address knowledge deficits.  - Biventricular Pacemaker/AICD - Review literature with patient. Assess for and address knowledge deficits.  - Assess for any additional needs.

## 2019-01-05 ENCOUNTER — PATIENT MESSAGE (OUTPATIENT)
Dept: CARDIOLOGY | Facility: CLINIC | Age: 74
End: 2019-01-05

## 2019-01-07 ENCOUNTER — TELEPHONE (OUTPATIENT)
Dept: FAMILY MEDICINE | Facility: CLINIC | Age: 74
End: 2019-01-07

## 2019-01-07 ENCOUNTER — OFFICE VISIT (OUTPATIENT)
Dept: FAMILY MEDICINE | Facility: CLINIC | Age: 74
End: 2019-01-07
Payer: MEDICARE

## 2019-01-07 ENCOUNTER — PATIENT MESSAGE (OUTPATIENT)
Dept: GASTROENTEROLOGY | Facility: CLINIC | Age: 74
End: 2019-01-07

## 2019-01-07 ENCOUNTER — LAB VISIT (OUTPATIENT)
Dept: LAB | Facility: HOSPITAL | Age: 74
End: 2019-01-07
Attending: FAMILY MEDICINE
Payer: MEDICARE

## 2019-01-07 ENCOUNTER — PATIENT MESSAGE (OUTPATIENT)
Dept: CARDIOLOGY | Facility: CLINIC | Age: 74
End: 2019-01-07

## 2019-01-07 VITALS
TEMPERATURE: 98 F | SYSTOLIC BLOOD PRESSURE: 194 MMHG | HEIGHT: 67 IN | BODY MASS INDEX: 33.59 KG/M2 | HEART RATE: 81 BPM | WEIGHT: 214 LBS | DIASTOLIC BLOOD PRESSURE: 93 MMHG

## 2019-01-07 DIAGNOSIS — E11.69 COMBINED HYPERLIPIDEMIA ASSOCIATED WITH TYPE 2 DIABETES MELLITUS: ICD-10-CM

## 2019-01-07 DIAGNOSIS — I70.213 ATHEROSCLEROSIS OF NATIVE ARTERY OF BOTH LOWER EXTREMITIES WITH INTERMITTENT CLAUDICATION: ICD-10-CM

## 2019-01-07 DIAGNOSIS — E66.01 CLASS 2 SEVERE OBESITY DUE TO EXCESS CALORIES WITH SERIOUS COMORBIDITY AND BODY MASS INDEX (BMI) OF 36.0 TO 36.9 IN ADULT: ICD-10-CM

## 2019-01-07 DIAGNOSIS — I50.32 CHRONIC DIASTOLIC HEART FAILURE: ICD-10-CM

## 2019-01-07 DIAGNOSIS — N18.30 TYPE 2 DIABETES MELLITUS WITH STAGE 3 CHRONIC KIDNEY DISEASE, WITH LONG-TERM CURRENT USE OF INSULIN: ICD-10-CM

## 2019-01-07 DIAGNOSIS — I48.20 CHRONIC ATRIAL FIBRILLATION: ICD-10-CM

## 2019-01-07 DIAGNOSIS — G40.909 SEIZURE DISORDER: ICD-10-CM

## 2019-01-07 DIAGNOSIS — D50.0 IRON DEFICIENCY ANEMIA DUE TO CHRONIC BLOOD LOSS: ICD-10-CM

## 2019-01-07 DIAGNOSIS — I25.810 CORONARY ARTERY DISEASE INVOLVING CORONARY BYPASS GRAFT OF NATIVE HEART WITHOUT ANGINA PECTORIS: ICD-10-CM

## 2019-01-07 DIAGNOSIS — Z79.4 TYPE 2 DIABETES MELLITUS WITH COMPLICATION, WITH LONG-TERM CURRENT USE OF INSULIN: ICD-10-CM

## 2019-01-07 DIAGNOSIS — E11.8 TYPE 2 DIABETES MELLITUS WITH COMPLICATION, WITH LONG-TERM CURRENT USE OF INSULIN: ICD-10-CM

## 2019-01-07 DIAGNOSIS — Z79.4 LONG TERM CURRENT USE OF INSULIN: ICD-10-CM

## 2019-01-07 DIAGNOSIS — I11.0 HYPERTENSIVE HEART DISEASE WITH HEART FAILURE: ICD-10-CM

## 2019-01-07 DIAGNOSIS — E11.65 UNCONTROLLED TYPE 2 DIABETES MELLITUS WITH HYPERGLYCEMIA: ICD-10-CM

## 2019-01-07 DIAGNOSIS — E11.59 HYPERTENSION ASSOCIATED WITH DIABETES: ICD-10-CM

## 2019-01-07 DIAGNOSIS — E78.2 COMBINED HYPERLIPIDEMIA ASSOCIATED WITH TYPE 2 DIABETES MELLITUS: ICD-10-CM

## 2019-01-07 DIAGNOSIS — E11.22 TYPE 2 DIABETES MELLITUS WITH STAGE 3 CHRONIC KIDNEY DISEASE, WITH LONG-TERM CURRENT USE OF INSULIN: ICD-10-CM

## 2019-01-07 DIAGNOSIS — I15.2 HYPERTENSION ASSOCIATED WITH DIABETES: ICD-10-CM

## 2019-01-07 DIAGNOSIS — D50.0 IRON DEFICIENCY ANEMIA DUE TO CHRONIC BLOOD LOSS: Primary | ICD-10-CM

## 2019-01-07 DIAGNOSIS — Z79.4 TYPE 2 DIABETES MELLITUS WITH STAGE 3 CHRONIC KIDNEY DISEASE, WITH LONG-TERM CURRENT USE OF INSULIN: ICD-10-CM

## 2019-01-07 DIAGNOSIS — I62.03 CHRONIC SUBDURAL HEMATOMA: ICD-10-CM

## 2019-01-07 DIAGNOSIS — N18.4 CKD (CHRONIC KIDNEY DISEASE) STAGE 4, GFR 15-29 ML/MIN: ICD-10-CM

## 2019-01-07 DIAGNOSIS — E11.21 TYPE 2 DIABETES MELLITUS WITH DIABETIC NEPHROPATHY, WITHOUT LONG-TERM CURRENT USE OF INSULIN: ICD-10-CM

## 2019-01-07 LAB
BASOPHILS # BLD AUTO: 0.03 K/UL
BASOPHILS NFR BLD: 0.6 %
DIFFERENTIAL METHOD: ABNORMAL
EOSINOPHIL # BLD AUTO: 0.1 K/UL
EOSINOPHIL NFR BLD: 1.6 %
ERYTHROCYTE [DISTWIDTH] IN BLOOD BY AUTOMATED COUNT: 14.9 %
HCT VFR BLD AUTO: 36.6 %
HGB BLD-MCNC: 11.4 G/DL
IRON SERPL-MCNC: 59 UG/DL
LYMPHOCYTES # BLD AUTO: 1.2 K/UL
LYMPHOCYTES NFR BLD: 22.9 %
MCH RBC QN AUTO: 31 PG
MCHC RBC AUTO-ENTMCNC: 31.1 G/DL
MCV RBC AUTO: 100 FL
MONOCYTES # BLD AUTO: 0.6 K/UL
MONOCYTES NFR BLD: 12 %
NEUTROPHILS # BLD AUTO: 3.2 K/UL
NEUTROPHILS NFR BLD: 62.9 %
PLATELET # BLD AUTO: 135 K/UL
PMV BLD AUTO: 9 FL
RBC # BLD AUTO: 3.68 M/UL
SATURATED IRON: 16 %
TOTAL IRON BINDING CAPACITY: 379 UG/DL
TRANSFERRIN SERPL-MCNC: 256 MG/DL
WBC # BLD AUTO: 5.02 K/UL

## 2019-01-07 PROCEDURE — 99499 RISK ADDL DX/OHS AUDIT: ICD-10-PCS | Mod: HCNC,S$GLB,, | Performed by: FAMILY MEDICINE

## 2019-01-07 PROCEDURE — 99499 UNLISTED E&M SERVICE: CPT | Mod: HCNC,S$GLB,, | Performed by: FAMILY MEDICINE

## 2019-01-07 PROCEDURE — 85025 COMPLETE CBC W/AUTO DIFF WBC: CPT | Mod: PO

## 2019-01-07 PROCEDURE — 36415 COLL VENOUS BLD VENIPUNCTURE: CPT | Mod: PO

## 2019-01-07 PROCEDURE — 99999 PR PBB SHADOW E&M-EST. PATIENT-LVL III: ICD-10-PCS | Mod: PBBFAC,,, | Performed by: FAMILY MEDICINE

## 2019-01-07 PROCEDURE — 99499 UNLISTED E&M SERVICE: CPT | Mod: HCNC,,, | Performed by: FAMILY MEDICINE

## 2019-01-07 PROCEDURE — 83540 ASSAY OF IRON: CPT

## 2019-01-07 PROCEDURE — 99999 PR PBB SHADOW E&M-EST. PATIENT-LVL III: CPT | Mod: PBBFAC,,, | Performed by: FAMILY MEDICINE

## 2019-01-07 PROCEDURE — 99214 PR OFFICE/OUTPT VISIT, EST, LEVL IV, 30-39 MIN: ICD-10-PCS | Mod: S$GLB,,, | Performed by: FAMILY MEDICINE

## 2019-01-07 PROCEDURE — 99214 OFFICE O/P EST MOD 30 MIN: CPT | Mod: S$GLB,,, | Performed by: FAMILY MEDICINE

## 2019-01-07 PROCEDURE — 99499 RISK ADDL DX/OHS AUDIT: ICD-10-PCS | Mod: HCNC,,, | Performed by: FAMILY MEDICINE

## 2019-01-07 NOTE — PROGRESS NOTES
"  Subjective:      Patient ID: Abdullahi Salazar is a 73 y.o. male.    Chief Complaint: Follow-up      The patient was recently hospitalized at [facility] for GIB.  The discharge summary from the hospitalization is copied below for reference and completeness.      Transitional Care Note    Family and/or Caretaker present at visit?  Yes.  Diagnostic tests reviewed/disposition: No diagnosic tests pending after this hospitalization.  Disease/illness education: he understands what he had to a point but thought that the main reason for his admit was his cough.  It was really that he had a GI bleed. He has no cough now.  He denies any melena.  Home health/community services discussion/referrals: Patient has home health established at [facility]. .   Establishment or re-establishment of referral orders for community resources: No other necessary community resources.   Discussion with other health care providers: No discussion with other health care providers necessary.     Problem List Items Addressed This Visit     None        Jose Hawthorne MD   Physician   [facility] Medicine   Discharge Summary   Signed                             []Hide copied text    []Mattie for details      [facility] Medicine  Discharge Summary        Patient Name: Abdullahi Salazar  MRN: [id number]  Admission Date: 12/28/2018  Hospital Length of Stay: 0 days  Discharge Date and Time:  12/30/2018 2:26 PM  Attending Physician: Jose Hawthorne MD   Discharging Provider: Jose Hawthorne MD  Primary Care Provider: Maximo Hernandez MD        HPI:   Per my initial H&P: "This is a 73-year-old gentleman with a history of coronary disease, ischemic cardiomyopathy, atrial fibrillation,  hydrocephalus status post shunt placement.  He is maintained on aspirin, Plavix, and Pradaxa.  He has a history of subdural hematoma in August of 2018.  He developed nausea with emesis of a dark material on [place].  He has " "had poor p.o. intake since.  He states that the dark discoloration of the emesis has become to light and recently.  The patient saw his cardiologist earlier today also referred to the emergency department for further evaluation and treatment after he could not get in to see a gastroenterologist.  There was concern for an upper GI bleed.  IV PPI was ordered.  The department of hospital medicine is now consulted."     * No surgery found *       Hospital Course:   The patient was admitted for further evaluation and treatment. Serial Hb remained overall stable. Nausea resolved. No signs of bleeding noted. No indication for transfusion. IV PPI was transitioned to PO. No need for intervention per GI.   GI and cardiology have approved restarting plavix and pradaxa. Asa will remain on hold. Regarding the patient's headache, CT head showed only hygroma formation without evidence for acute bleed. Neurosurgery was consulted. Dr. Alcantara believes this may be related to over-ambitious drainage via  shunt. Adjustment was made. The patient was cleared for discharge by neurosurgery, GI, and cardiology. He feels sufficiently improved to return home. The patient will be discharged to home in good condition. He was told to return to the ER for any nausea, bleeding, chest pain, shortness of breath, fever, or any other concern. He will have close followup with the [facility] as well as neurosurgery, GI, and cardiology. The patient, his wife, and all consultants are in agreement with the discharge plan.         Consults:           Consults (From admission, onward)         Status Ordering Provider       Inpatient consult to Cardiology  Once     Provider:  Gordy Rodney MD    Completed IVA STAHL       Inpatient consult to Diabetes educator  Once     Provider:  (Not yet assigned)    Acknowledged IVA STAHL       Inpatient consult to Diabetes educator  Once     Provider:  (Not yet assigned)    Acknowledged " IVA STAHL       Inpatient consult to Diabetes educator  Once     Provider:  (Not yet assigned)    Acknowledged IVA STAHL       Inpatient consult to Gastroenterology  Once     Provider:  Hoda Carlson MD    Completed IVA STAHL       Inpatient consult to Hospitalist  Once     Provider:  (Not yet assigned)    Acknowledged NIKKI HASTINGS       Inpatient consult to Neurosurgery  Once     Provider:  Fernie Ventura MD    Acknowledged IVA STAHL             No new Assessment & Plan notes have been filed under this hospital service since the last note was generated.  Service: Hospital Medicine           Final Active Diagnoses:     Diagnosis Date Noted POA    PRINCIPAL PROBLEM:  UGIB (upper gastrointestinal bleed) [K92.2] 12/28/2018 Yes    Headache [R51] 12/28/2018 Yes    Coffee ground emesis [K92.0]   Unknown    Chronic anticoagulation [Z79.01]   Not Applicable    Normal pressure hydrocephalus [G91.2] 08/08/2017 Yes    CKD (chronic kidney disease) stage 4, GFR 15-29 ml/min [N18.4] 09/22/2016 Yes    Coronary artery disease involving coronary bypass graft of native heart without angina pectoris [I25.810] 02/17/2016 Yes    Hypertension associated with diabetes [E11.59, I10] 01/05/2015 Yes    Diabetes mellitus type II, uncontrolled [E11.65]   Yes    GERD (gastroesophageal reflux disease) [K21.9]   Yes       Problems Resolved During this Admission:         Discharged Condition: good     Disposition: Home or Self Care     Follow Up:      Follow-up Information      [facility].    Contact information:  [address]  [place] [address]  [phone]                 Seun Oconnor MD.    Specialty:  Neurosurgery  Contact information:  [address]  [address]  [place] [address]  [phone]                 Hoda Carlson MD.    Specialties:  Gastroenterology, Internal Medicine  Contact information:  [address]  [facility]  [address]  [facility] [address]  [phone]                 Gordy Rodney MD.    Specialty:  Cardiology  Contact information:  [address] [facility] [id number] [phone]                      Patient Instructions:           Ambulatory Referral to Discharge Clinic   Referral Priority: Routine Referral Type: Consultation   Referral Reason: Specialty Services Required   Number of Visits Requested: 1          Diet diabetic   Order Comments: bland          Diet renal   Order Comments: bland          Notify your health care provider if you experience any of the following:   Order Comments: Inform MD or return to the ER for any worsened nausea, hematemesis, chest pain, shortness of breath, fever, worsened pain, or any other concern.      Activity as tolerated         Significant Diagnostic Studies: Labs:   BMP:         Recent Labs   Lab 12/28/18 1932 12/29/18 0605 12/30/18 0435   * 194* 284*    140 137   K 4.6 4.0 4.1   CL 99 100 99   CO2 28 28 29   BUN 29* 28* 27*   CREATININE 1.63* 1.73* 1.50*   CALCIUM 8.8 8.8 8.8   , CMP         Recent Labs   Lab 12/28/18 1932 12/29/18 0605 12/30/18  0435    140 137   K 4.6 4.0 4.1   CL 99 100 99   CO2 28 28 29   * 194* 284*   BUN 29* 28* 27*   CREATININE 1.63* 1.73* 1.50*   CALCIUM 8.8 8.8 8.8   PROT 7.8  --   --    ALBUMIN 4.4  --   --    BILITOT 1.8*  --   --    ALKPHOS 94  --   --    AST 45  --   --    ALT 22  --   --    ANIONGAP 12 12 9   ESTGFRAFRICA 47* 44* 52*   EGFRNONAA 41* 38* 45*    and CBC           Recent Labs   Lab 12/28/18 1932 12/29/18 0605 12/29/18 2015 12/30/18  0842   WBC 5.84  --  5.07  --   --    HGB 12.3*   < > 11.8* 11.2* 11.1*   HCT 38.2*  --  36.2*  --   --      --  154  --   --     < > = values in this interval not displayed.             Pending Diagnostic Studies:      None          Medications:  Reconciled Home Medications:       Medication List       START taking these medications   "  pantoprazole 40 MG tablet  Commonly known as:  PROTONIX  Take 1 tablet (40 mg total) by mouth 2 (two) times daily.          CONTINUE taking these medications    atorvastatin 40 MG tablet  Commonly known as:  LIPITOR  Take 1 tablet (40 mg total) by mouth once daily.      benazepril 40 MG tablet  Commonly known as:  LOTENSIN  Take 1 tablet (40 mg total) by mouth once daily.      blood sugar diagnostic Strp  Commonly known as:  ACCU-CHEK CLEOPATRA PLUS TEST STRP  TEST BLOOD SUGARS 4 TIMES DAILY      carvedilol 25 MG tablet  Commonly known as:  COREG  Take 1 tablet (25 mg total) by mouth 2 (two) times daily with meals.      clopidogrel 75 mg tablet  Commonly known as:  PLAVIX  Take 75 mg by mouth once daily.      dabigatran etexilate 75 mg Cap  Commonly known as:  PRADAXA  Take 1 capsule (75 mg total) by mouth 2 (two) times daily. "Do NOT break, chew, or open capsules."      ezetimibe 10 mg tablet  Commonly known as:  ZETIA  TAKE 1 TABLET ONE TIME DAILY      hydrALAZINE 100 MG tablet  Commonly known as:  APRESOLINE  TAKE 1 TABLET TWICE DAILY      insulin NPH-insulin regular (70/30) 100 unit/mL (70-30) injection  Commonly known as:  NOVOLIN 70/30  May substitute for Humulin/Novolin or [employer]   60 units 30 min before breakfast,  40  Units 30 min before supper.      insulin syringe-needle U-100 1 mL 30 gauge x 5/16 Syrg  Use with insulin twice a day      isosorbide mononitrate 120 MG 24 hr tablet  Commonly known as:  IMDUR  Take 1 tablet (120 mg total) by mouth once daily.      lancets Misc  Commonly known as:  ACCU-CHEK SOFTCLIX LANCETS  1 each by Misc.(Non-Drug; Combo Route) route 2 (two) times daily.      levETIRAcetam 500 MG Tab  Commonly known as:  KEPPRA  Take 1.5 tablets (750 mg total) by mouth 2 (two) times daily.      multivitamin capsule  Take 1 capsule by mouth once daily.      nitroGLYCERIN 0.4 MG SL tablet  Commonly known as:  NITROSTAT  Place 1 tablet (0.4 mg total) under the tongue every 5 (five) " "minutes as needed for Chest pain.      pen needle, diabetic 31 gauge x 5/16" Ndle  Commonly known as:  BD ULTRA-FINE SHORT PEN NEEDLE  USE FOUR TIMES DAILY      polyethylene glycol 17 gram Pwpk  Commonly known as:  GLYCOLAX  Take 17 g by mouth once daily.      potassium chloride 10 MEQ Tbsr  Commonly known as:  KLOR-CON  Take 1 tablet (10 mEq total) by mouth once daily.      tamsulosin 0.4 mg Cap  Commonly known as:  FLOMAX  Take 1 capsule (0.4 mg total) by mouth once daily.      torsemide 10 MG Tab  Commonly known as:  DEMADEX  Take 3 tablets (30 mg total) by mouth once daily.          STOP taking these medications    aspirin 81 MG EC tablet  Commonly known as:  ECOTRIN                Indwelling Lines/Drains at time of discharge:       Lines/Drains/Airways            None             Time spent on the discharge of patient: 35 minutes  Patient was seen and examined on the date of discharge and determined to be suitable for discharge.           Jose Hawthorne MD  Department of Hospital Medicine  [facility]          Electronically signed by Jose Hawthorne MD at 12/30/2018  2:26 PM          Atherosclerosis of native artery of both lower extremities with intermittent claudication    Overview     He has atherosclerosis of his lower extremities.  He cannot walk that far.  He has had treatment of this.  He is on plavix and pradaxa at the direction of his cardiologist.           Current Assessment & Plan     The current medical regimen is effective;  continue present plan and medications.           Atrial fibrillation-he has had afib and he has been stable on his rate with this.    CHF with left ventricular diastolic dysfunction, NYHA class 2    Overview     He has CHF that is compensated.  He is on a diretic for this.           Current Assessment & Plan     The current medical regimen is effective;  continue present plan and medications.           Combined hyperlipidemia associated with type 2 " diabetes mellitus    Overview     The patient presents with hyperlipidemia.  The patient reports tolerating the medication well and is in excellent compliance.  There have been no medication side effects.  The patient denies chest pain, neuropathy, and myalgias.  The patient has reduced fat intake and has been exercising.  Current treatment has included the medications listed in the med card.       Lab Results   Component Value Date    CHOL 108 (L) 12/06/2018    CHOL 113 (L) 06/19/2018    CHOL 141 10/12/2017     Lab Results   Component Value Date    HDL 32 (L) 12/06/2018    HDL 27 (L) 06/19/2018    HDL 37 (L) 10/12/2017     Lab Results   Component Value Date    LDLCALC 62.0 (L) 12/06/2018    LDLCALC 67.0 06/19/2018    LDLCALC 80.4 10/12/2017     Lab Results   Component Value Date    TRIG 70 12/06/2018    TRIG 95 06/19/2018    TRIG 118 10/12/2017     Lab Results   Component Value Date    CHOLHDL 29.6 12/06/2018    CHOLHDL 23.9 06/19/2018    CHOLHDL 26.2 10/12/2017            Current Assessment & Plan     The current medical regimen is effective;  continue present plan and medications.           Coronary artery disease involving coronary bypass graft of native heart without angina pectoris    Overview     The patient presents with coronary artery disease.  Denies chest pain, shortness of breath, left arm or neck pain, diaphoresis, nausea, vomiting, palpitations, paroxysmal nocturnal dyspnea, orthopnea, claudication or decreased exercise tolerance.  The patient reports excellent compliance.  Current treatment has included medications that are listed in medication list.  The cannot identify any exacerbating factors.    He has imdur and has nitroglycerin.           Current Assessment & Plan     The current medical regimen is effective;  continue present plan and medications.           Diabetes mellitus type II, uncontrolled    Relevant Orders    Comprehensive metabolic panel    Hemoglobin A1c    Elevated troponin     "Overview     Unlikely true cardiac event         Hypertension associated with diabetes    Overview     The patient presents with essential hypertension.  The patient is tolerating the medication well and is in excellent compliance.  The patient is experiencing no side effects.  Counseling was offered regarding low salt diets.  The patient has a reduced salt intake.  The patient denies chest pain, palpitations, shortness of breath, dyspnea on exertion, left or murmur neck pain, nausea, vomiting, diaphoresis, paroxysmal nocturnal dyspnea, and orthopnea.   /76   Pulse 74   Temp 97.8 °F (36.6 °C) (Oral)   Ht 5' 7" (1.702 m)   Wt 105.6 kg (232 lb 12.9 oz)   BMI 36.46 kg/m²            Current Assessment & Plan     The current medical regimen is effective;  continue present plan and medications.           Hypertensive heart disease with heart failure    Influenza A    Overview     He got influenza a recently and was in the hospital in [place].  He had rhabdomyolyisis and acute kidney injury.  He also had an increased troponin.  He had a flu shot in early December.         Relevant Orders    X-Ray Chest PA And Lateral (Completed)    Non-traumatic rhabdomyolysis    Current Assessment & Plan     Check labs.         Relevant Orders    CK    Proteinuria due to type 2 diabetes mellitus    Overview     He has chronic proteinuria due to the diabetes and he has followed with a nephrologist.  He also has CKD.          Seizure disorder    Overview     Last seizure 2/2017 occurred while on Keppra, Keppra increased to 750 BID at that time. CT brain with and without contrast negative for evidence of L temporal lobe mass. Persistent slowing on EEG 3/9/17 L temporal.            Current Assessment & Plan     The current medical regimen is effective;  continue present plan and medications.           Type 2 diabetes mellitus with renal complication    Relevant Orders    Comprehensive metabolic panel    Hemoglobin A1c    " Comprehensive metabolic panel    Hemoglobin A1c    Type 2 diabetes mellitus with stage 3 chronic kidney disease    Relevant Orders    Comprehensive metabolic panel    Hemoglobin A1c      Other Visit Diagnoses     Pneumonia of right lower lobe due to infectious organism    -  Primary    Relevant Medications    cefTRIAXone injection 1 g (Completed)    lidocaine HCL 10 mg/ml (1%) injection 2.1 mL (Completed)    cefaclor (CECLOR) 500 mg Cap    azithromycin (Z-MICHELLE) 250 MG tablet    Other Relevant Orders    CBC auto differential    X-Ray Chest PA And Lateral        He needs to get labs done.         The patient presents with diabetes.  The patient denies polyuria, polydipsia, polyphagia, hypoglycemia and paresthesias.  The patient's glucose control has been good.  Home glucose averages are routinely checked.  The patient is without retinopathy currently.  The patient has no history of neuropathy.  The patient currently complains of no podiatric problems.  The patient has excellent compliance.  Diabetes Management Status    Statin: Taking  ACE/ARB: Not taking    Screening or Prevention Patient's value Goal Complete/Controlled?   HgA1C Testing and Control   Lab Results   Component Value Date    HGBA1C Lab Results   Component Value Date    HGBA1C 13.0 (H) 12/05/2018     )       Annually/Less than 8% No         Home health has been consulted to help with the diabetes and he has been taking his meds, he states.      The patient presents with coronary artery disease.  Denies chest pain, shortness of breath, left arm or neck pain, diaphoresis, nausea, vomiting, palpitations, paroxysmal nocturnal dyspnea, orthopnea, claudication or decreased exercise tolerance.  The patient reports excellent compliance.  Current treatment has included medications that are listed in medication list.  The cannot identify any exacerbating factors.    He has afib and he has been followed by Dr. Jacobs.  He is on plavix and ASA.  He did have an  "increased troponin with this illness.  He has blockages in his legs and he can't walk very far due to his recent illness but can usually walk 100 feet.    The patient presents with essential hypertension.  The patient is tolerating the medication well and is in excellent compliance.  The patient is experiencing no side effects.  Counseling was offered regarding low salt diets.  The patient has a reduced salt intake.  The patient denies chest pain, palpitations, shortness of breath, dyspnea on exertion, left or murmur neck pain, nausea, vomiting, diaphoresis, paroxysmal nocturnal dyspnea, and orthopnea.   BP (!) 194/93 Comment: Pt states did not take BP med this morning  Pulse 81   Temp 97.6 °F (36.4 °C)   Ht 5' 7" (1.702 m)   Wt 97.1 kg (214 lb)   BMI 33.52 kg/m²   He did not have his bp med today.      The patient presents with hyperlipidemia.  The patient reports tolerating the medication well and is in excellent compliance.  There have been no medication side effects.  The patient denies chest pain, neuropathy, and myalgias.  The patient has reduced fat intake and has been exercising.  Current treatment has included the medications listed in the med card.    Lab Results   Component Value Date    CHOL 108 (L) 12/06/2018    CHOL 113 (L) 06/19/2018    CHOL 141 10/12/2017     Lab Results   Component Value Date    HDL 32 (L) 12/06/2018    HDL 27 (L) 06/19/2018    HDL 37 (L) 10/12/2017     Lab Results   Component Value Date    LDLCALC 62.0 (L) 12/06/2018    LDLCALC 67.0 06/19/2018    LDLCALC 80.4 10/12/2017     Lab Results   Component Value Date    TRIG 70 12/06/2018    TRIG 95 06/19/2018    TRIG 118 10/12/2017     Lab Results   Component Value Date    CHOLHDL 29.6 12/06/2018    CHOLHDL 23.9 06/19/2018    CHOLHDL 26.2 10/12/2017         He was kept on a statin in his discharge from the hospital and he was put on 40 mg atorvastatin.      He had a history of vertebral artery stenosis and is on blood thinners but he " has had a seizure in the past and is on keppra. He has not had a seizure in a while.  He has been taken off of asa due to the gi bleed.    He has a shunt in his head due to his long term hematoma and the increased pressure.  He has had some headaches.  He has been following with Dr. Oconnor.    The patient presents with obesity.  Denies bulimia, amenorrhea, cold intolerance, edema, hip pain, hirsutism, knee pain, polydipsia, polyuria, thirst and weakness.  The patient does not perform regular exercise.  Previous treatments for obesity :self-directed dieting without success.  The patient and I discussed the importance of exercise.    Wt Readings from Last 4 Encounters:   01/07/19 97.1 kg (214 lb)   12/28/18 98.5 kg (217 lb 2.5 oz)   12/28/18 97.3 kg (214 lb 8.1 oz)   12/18/18 98 kg (216 lb 0.8 oz)               Past Medical History:  Past Medical History:   Diagnosis Date    Actinic keratoses     Altered mental status 3/22/2015    Anticoagulant long-term use     Atrial fibrillation     CAD (coronary artery disease)     stents after bypass    Carotid artery stenosis 3/31/2015    CHF (congestive heart failure) 03/2018    Colon polyps 2011    repeat colonoscopy 2014    Combined hyperlipidemia associated with type 2 diabetes mellitus     Diabetes mellitus type II, uncontrolled     dx 2004    GERD (gastroesophageal reflux disease)     Grand mal seizure     last 2/2017    Hard of hearing     History of cardiac pacemaker 3/31/2015    HTN (hypertension)     Hydrocephalus 09/08/2017    Influenza A 1/2/2018    He got influenza a recently and was in the hospital in [place].  He had rhabdomyolyisis and acute kidney injury.  He also had an increased troponin.  He had a flu shot in early December.    Mitral valve insufficiency     Presence of automatic (implantable) cardiac defibrillator     Respiratory distress     Sleep apnea with use of continuous positive airway pressure (CPAP)     patient states he does not  "use CPAP anymore    Syncope 3/30/2015    Wears dentures     upper and lower    Wears hearing aid     sometimes     Past Surgical History:   Procedure Laterality Date    AORTOGRAM WITH RUNOFF Bilateral 8/24/2016    [facility] CATH    biotronic icd placed Left 3/11/2016    Performed by Gordy Rodney MD at [facility] CATH    BRAIN SURGERY      CARDIAC PACEMAKER PLACEMENT      CARDIOVERSION N/A 2/12/2015    [facility] LAB    CATARACT EXTRACTION W/ INTRAOCULAR LENS  IMPLANT, BILATERAL      COLONOSCOPY N/A 3/17/2015    [facility] ENDO    CORONARY ARTERY BYPASS GRAFT  1993    three vessels    CORONARY STENT PLACEMENT      CSF SHUNT      EYE SURGERY      HEART CATH-LEFT N/A 2/18/2016    Performed by Toño Haro MD at [facility] CATH    HERNIA REPAIR      as infant    LUMBAR PUNCTURE N/A 5/22/2017    Performed by Britton Pizarro MD at [facility] CATH    SHUNT-  RIGHT FRONTAL  SHUNT WITH BIOPSY OF MIDDLE FRONTAL N/A 8/8/2017    Performed by Seun Oconnor MD at [facility] OR     Review of patient's allergies indicates:  No Known Allergies  Current Outpatient Medications on File Prior to Visit   Medication Sig Dispense Refill    atorvastatin (LIPITOR) 40 MG tablet Take 1 tablet (40 mg total) by mouth once daily. 90 tablet 3    benazepril (LOTENSIN) 40 MG tablet Take 1 tablet (40 mg total) by mouth once daily. 90 tablet 3    blood sugar diagnostic (ACCU-CHEK CLEOPATRA PLUS TEST STRP) Strp TEST BLOOD SUGARS 4 TIMES DAILY 150 strip PRN    carvedilol (COREG) 25 MG tablet Take 1 tablet (25 mg total) by mouth 2 (two) times daily with meals. 180 tablet 3    clopidogrel (PLAVIX) 75 mg tablet Take 75 mg by mouth once daily.       dabigatran etexilate (PRADAXA) 75 mg Cap Take 1 capsule (75 mg total) by mouth 2 (two) times daily. "Do NOT break, chew, or open capsules." 180 capsule 3    ezetimibe (ZETIA) 10 mg tablet " "TAKE 1 TABLET ONE TIME DAILY 90 tablet 3    hydrALAZINE (APRESOLINE) 100 MG tablet TAKE 1 TABLET TWICE DAILY 180 tablet 11    insulin NPH-insulin regular, 70/30, (NOVOLIN 70/30) 100 unit/mL (70-30) injection May substitute for Humulin/Novolin or [employer]   60 units 30 min before breakfast,  40  Units 30 min before supper. 3 vial 6    insulin syringe-needle U-100 1 mL 30 gauge x 5/16 Syrg Use with insulin twice a day 100 each 12    isosorbide mononitrate (IMDUR) 120 MG 24 hr tablet Take 1 tablet (120 mg total) by mouth once daily. 30 tablet 1    lancets (ACCU-CHEK SOFTCLIX LANCETS) Misc 1 each by Misc.(Non-Drug; Combo Route) route 2 (two) times daily. 100 each 11    levETIRAcetam (KEPPRA) 500 MG Tab Take 1.5 tablets (750 mg total) by mouth 2 (two) times daily. 270 tablet 0    multivitamin capsule Take 1 capsule by mouth once daily.      nitroGLYCERIN (NITROSTAT) 0.4 MG SL tablet Place 1 tablet (0.4 mg total) under the tongue every 5 (five) minutes as needed for Chest pain. 100 tablet 11    pantoprazole (PROTONIX) 40 MG tablet Take 1 tablet (40 mg total) by mouth 2 (two) times daily. 60 tablet 1    pen needle, diabetic (BD ULTRA-FINE SHORT PEN NEEDLE) 31 gauge x 5/16" Ndle USE FOUR TIMES DAILY 360 each 3    polyethylene glycol (GLYCOLAX) 17 gram PwPk Take 17 g by mouth once daily. 30 packet 0    potassium chloride (KLOR-CON) 10 MEQ TbSR Take 1 tablet (10 mEq total) by mouth once daily. 30 tablet 11    tamsulosin (FLOMAX) 0.4 mg Cp24 Take 1 capsule (0.4 mg total) by mouth once daily. 90 capsule 3    torsemide (DEMADEX) 10 MG Tab Take 3 tablets (30 mg total) by mouth once daily. 90 tablet 0     No current facility-administered medications on file prior to visit.      Social History     Socioeconomic History    Marital status:      Spouse name: Not on file    Number of children: Not on file    Years of education: Not on file    Highest education level: Not on file   Social Needs    " "Financial resource strain: Not on file    Food insecurity - worry: Not on file    Food insecurity - inability: Not on file    Transportation needs - medical: Not on file    Transportation needs - non-medical: Not on file   Occupational History    Not on file   Tobacco Use    Smoking status: Former Smoker     Packs/day: 2.00     Years: 25.00     Pack years: 50.00     Types: Cigarettes     Last attempt to quit: 1983     Years since quittin.0    Smokeless tobacco: Never Used    Tobacco comment: quit     Substance and Sexual Activity    Alcohol use: Yes     Alcohol/week: 0.6 oz     Types: 1 Cans of beer per week     Comment: rarely    Drug use: No    Sexual activity: Yes     Partners: Female   Other Topics Concern    Not on file   Social History Narrative    Not on file     Family History   Problem Relation Age of Onset    Stomach cancer Mother     Lung cancer Father     Diabetes Sister     Hypertension Sister     Heart disease Neg Hx     Stroke Neg Hx        Review of Systems   Constitutional: Positive for fever. Negative for chills and diaphoresis.   HENT: Negative for congestion, hearing loss, mouth sores, postnasal drip and sore throat.    Eyes: Negative for pain and visual disturbance.   Respiratory: Negative for cough, chest tightness, shortness of breath and wheezing.    Cardiovascular: Negative for chest pain.   Gastrointestinal: Negative for abdominal pain, anal bleeding, blood in stool, constipation, diarrhea, nausea and vomiting.   Genitourinary: Negative for dysuria and hematuria.   Musculoskeletal: Negative for back pain, neck pain and neck stiffness.   Skin: Negative for rash.   Neurological: Negative for dizziness and weakness.       Objective:     BP (!) 194/93 Comment: Pt states did not take BP med this morning  Pulse 81   Temp 97.6 °F (36.4 °C)   Ht 5' 7" (1.702 m)   Wt 97.1 kg (214 lb)   BMI 33.52 kg/m²     Physical Exam   Constitutional: He is oriented to person, " place, and time. He appears well-developed and well-nourished.   HENT:   Head: Normocephalic and atraumatic.   Right Ear: External ear normal.   Left Ear: External ear normal.   Nose: Nose normal.   Mouth/Throat: Oropharynx is clear and moist. No oropharyngeal exudate.   Eyes: Conjunctivae and EOM are normal. Pupils are equal, round, and reactive to light. Right eye exhibits no discharge. Left eye exhibits no discharge. No scleral icterus.   Neck: Normal range of motion. Neck supple. No JVD present. No thyromegaly present.   Cardiovascular: Normal rate, regular rhythm, normal heart sounds and intact distal pulses. Exam reveals no gallop and no friction rub.   No murmur heard.  Pulmonary/Chest: Effort normal and breath sounds normal. No respiratory distress. He has no wheezes. He has no rales. He exhibits no tenderness.   Abdominal: Soft. Bowel sounds are normal. He exhibits no distension and no mass. There is no tenderness. There is no rebound and no guarding.   Musculoskeletal: Normal range of motion. He exhibits edema. He exhibits no tenderness.   Lymphadenopathy:     He has no cervical adenopathy.   Neurological: He is alert and oriented to person, place, and time. No cranial nerve deficit. Coordination normal.   Skin: Skin is warm and dry. He is not diaphoretic.   Psychiatric: He has a normal mood and affect.     Assessment:     1. Iron deficiency anemia due to chronic blood loss    2. Atherosclerosis of native artery of both lower extremities with intermittent claudication    3. Chronic diastolic heart failure    4. Chronic atrial fibrillation    5. Combined hyperlipidemia associated with type 2 diabetes mellitus    6. Coronary artery disease involving coronary bypass graft of native heart without angina pectoris    7. CKD (chronic kidney disease) stage 4, GFR 15-29 ml/min    8. Uncontrolled type 2 diabetes mellitus with hyperglycemia    9. Hypertensive heart disease with heart failure    10. Hypertension  associated with diabetes    11. Seizure disorder    12. Long term current use of insulin    13. Chronic subdural hematoma    14. Type 2 diabetes mellitus with diabetic nephropathy, without long-term current use of insulin    15. Type 2 diabetes mellitus with stage 3 chronic kidney disease, with long-term current use of insulin    16. Class 2 severe obesity due to excess calories with serious comorbidity and body mass index (BMI) of 36.0 to 36.9 in adult        Plan:     Problem List Items Addressed This Visit     Arrhythmia    Atherosclerosis of native artery of both lower extremities with intermittent claudication    Chronic diastolic heart failure    Chronic subdural hematoma    CKD (chronic kidney disease) stage 4, GFR 15-29 ml/min    Class 2 severe obesity due to excess calories with serious comorbidity and body mass index (BMI) of 36.0 to 36.9 in adult    Combined hyperlipidemia associated with type 2 diabetes mellitus    Coronary artery disease involving coronary bypass graft of native heart without angina pectoris    Diabetes mellitus type II, uncontrolled    Hypertension associated with diabetes    Hypertensive heart disease with heart failure    Iron deficiency anemia due to chronic blood loss - Primary    Relevant Orders    CBC auto differential    Iron and TIBC    Long term current use of insulin    Seizure disorder    Type 2 diabetes mellitus with renal complication    Type 2 diabetes mellitus with stage 3 chronic kidney disease      he is to continue all meds for all of the problems above and RTC in 2 months for the a1c check. He is to take his bp meds and return to ancillary this week to recheck the blood pressures on his medicine as he did not take them today.    Cont f/u with specialists.  Cont Home Health.    We discussed his weight and that he should work on lowering the calorie intake.

## 2019-01-08 ENCOUNTER — TELEPHONE (OUTPATIENT)
Dept: GASTROENTEROLOGY | Facility: CLINIC | Age: 74
End: 2019-01-08

## 2019-01-08 DIAGNOSIS — D64.9 ANEMIA, UNSPECIFIED TYPE: Primary | ICD-10-CM

## 2019-01-08 RX ORDER — FERROUS SULFATE 325(65) MG
325 TABLET ORAL
Refills: 0 | COMMUNITY
Start: 2019-01-08 | End: 2019-01-14 | Stop reason: SDUPTHER

## 2019-01-08 NOTE — TELEPHONE ENCOUNTER
----- Message from Maximo Hernandez MD sent at 1/7/2019  9:02 PM CST -----  Anemia persists.  The iron is low.  Please add iron sulfate 325 mg po tid.  Call his home health for them to help with the medicine administration.    Recheck the cbc and iron in 2 months.

## 2019-01-08 NOTE — TELEPHONE ENCOUNTER
Hi,    Patient is scheduled for an urgent EGD that is needed at [facility] on Thursday. He is also scheduled to f/u with Dr. Oconnor same day. Pt daughter is asking if there is anyway ya'll can work him in next week or another day soon. Please call pt's daughter she ask I send a message for her. The number is listed in the below message.     Thank you so much,  April

## 2019-01-08 NOTE — PROGRESS NOTES
Anemia persists.  The iron is low.  Please add iron sulfate 325 mg po tid.  Call his home health for them to help with the medicine administration.    Recheck the cbc and iron in 2 months.

## 2019-01-08 NOTE — TELEPHONE ENCOUNTER
I have signed for the following orders AND/OR meds.  Please call the patient and ask the patient to schedule the testing AND/OR inform about any medications that were sent.      No orders of the defined types were placed in this encounter.      Medications Ordered This Encounter   Medications    ferrous sulfate (FEOSOL) 325 mg (65 mg iron) Tab tablet     Sig: Take 1 tablet (325 mg total) by mouth daily with breakfast.     Refill:  0

## 2019-01-08 NOTE — TELEPHONE ENCOUNTER
----- Message from Silke Wild sent at 1/8/2019  4:01 PM CST -----  Contact: Pt's daughter Leydi Holley is calling in regards to pt's procedure on 01/10. Leydi has a few questions and concerns she would like to discuss.    She can be reached at [phone].    Thank you

## 2019-01-09 ENCOUNTER — TELEPHONE (OUTPATIENT)
Dept: NEUROSURGERY | Facility: CLINIC | Age: 74
End: 2019-01-09

## 2019-01-09 ENCOUNTER — TELEPHONE (OUTPATIENT)
Dept: GASTROENTEROLOGY | Facility: CLINIC | Age: 74
End: 2019-01-09

## 2019-01-09 NOTE — TELEPHONE ENCOUNTER
Patients daughter called to reschedule appointments. CT and clinic visit both rescheduled at this time. Times, dates, and locations confirmed.

## 2019-01-09 NOTE — TELEPHONE ENCOUNTER
----- Message from Ailin Salamanca sent at 1/9/2019  4:01 PM CST -----  Contact: daughter Leydi Salazar   Patient daughter calling to get time on procedure for patient that is schedule tomorrow    Daughter was inform someone will be calling    Please call to advice [phone] pt daughter

## 2019-01-10 NOTE — TELEPHONE ENCOUNTER
----- Message from García Contreras sent at 4/13/2018  1:35 PM CDT -----  Contact: self [phone]  Pt needs a new [facility] rx. Pt says that he lost his rx while waiting for his voucher to come in/[facility] in [place]/pls call/thanks.   n/a

## 2019-01-11 ENCOUNTER — PATIENT MESSAGE (OUTPATIENT)
Dept: GASTROENTEROLOGY | Facility: CLINIC | Age: 74
End: 2019-01-11

## 2019-01-11 ENCOUNTER — TELEPHONE (OUTPATIENT)
Dept: GASTROENTEROLOGY | Facility: CLINIC | Age: 74
End: 2019-01-11

## 2019-01-11 NOTE — TELEPHONE ENCOUNTER
Procedure was scheduled through the pt's daughter. I have mailed instructions and also sent to pt's my [facility] so that pt and daughter are able to review them.

## 2019-01-11 NOTE — TELEPHONE ENCOUNTER
----- Message from Karlos Cardozo sent at 1/11/2019  4:21 PM CST -----  Contact: pt  Type:  Patient Returning Call    Who Called:  pt  Who Left Message for Patient:  April  Does the patient know what this is regarding?:  BRAYAN  Best Call Back Number:  [phone]  Additional Information:

## 2019-01-14 ENCOUNTER — HOSPITAL ENCOUNTER (OUTPATIENT)
Dept: RADIOLOGY | Facility: HOSPITAL | Age: 74
Discharge: HOME | End: 2019-01-14
Attending: NEUROLOGICAL SURGERY
Payer: MEDICARE

## 2019-01-14 ENCOUNTER — OFFICE VISIT (OUTPATIENT)
Dept: NEUROSURGERY | Facility: CLINIC | Age: 74
End: 2019-01-14
Payer: MEDICARE

## 2019-01-14 ENCOUNTER — TELEPHONE (OUTPATIENT)
Dept: FAMILY MEDICINE | Facility: CLINIC | Age: 74
End: 2019-01-14

## 2019-01-14 VITALS
SYSTOLIC BLOOD PRESSURE: 195 MMHG | HEART RATE: 82 BPM | WEIGHT: 210.13 LBS | DIASTOLIC BLOOD PRESSURE: 89 MMHG | BODY MASS INDEX: 32.98 KG/M2 | HEIGHT: 67 IN | TEMPERATURE: 98 F

## 2019-01-14 DIAGNOSIS — D64.9 ANEMIA, UNSPECIFIED TYPE: ICD-10-CM

## 2019-01-14 DIAGNOSIS — G40.909 SEIZURE DISORDER: ICD-10-CM

## 2019-01-14 DIAGNOSIS — G91.2 NORMAL PRESSURE HYDROCEPHALUS: ICD-10-CM

## 2019-01-14 DIAGNOSIS — R41.3 MEMORY LOSS: ICD-10-CM

## 2019-01-14 DIAGNOSIS — Z98.2 S/P VP SHUNT: ICD-10-CM

## 2019-01-14 DIAGNOSIS — G45.9 TIA (TRANSIENT ISCHEMIC ATTACK): ICD-10-CM

## 2019-01-14 DIAGNOSIS — F02.80 DEMENTIA ASSOCIATED WITH OTHER UNDERLYING DISEASE WITHOUT BEHAVIORAL DISTURBANCE: Primary | Chronic | ICD-10-CM

## 2019-01-14 DIAGNOSIS — Z86.79 HX OF SUBDURAL HEMATOMA: ICD-10-CM

## 2019-01-14 PROCEDURE — 3077F PR MOST RECENT SYSTOLIC BLOOD PRESSURE >= 140 MM HG: ICD-10-PCS | Mod: CPTII,S$GLB,, | Performed by: NEUROLOGICAL SURGERY

## 2019-01-14 PROCEDURE — 70450 CT HEAD/BRAIN W/O DYE: CPT | Mod: 26,,, | Performed by: RADIOLOGY

## 2019-01-14 PROCEDURE — 3079F DIAST BP 80-89 MM HG: CPT | Mod: CPTII,S$GLB,, | Performed by: NEUROLOGICAL SURGERY

## 2019-01-14 PROCEDURE — 70450 CT HEAD/BRAIN W/O DYE: CPT | Mod: TC,PO

## 2019-01-14 PROCEDURE — 3079F PR MOST RECENT DIASTOLIC BLOOD PRESSURE 80-89 MM HG: ICD-10-PCS | Mod: CPTII,S$GLB,, | Performed by: NEUROLOGICAL SURGERY

## 2019-01-14 PROCEDURE — 70450 CT HEAD WITHOUT CONTRAST: ICD-10-PCS | Mod: 26,,, | Performed by: RADIOLOGY

## 2019-01-14 PROCEDURE — 1101F PR PT FALLS ASSESS DOC 0-1 FALLS W/OUT INJ PAST YR: ICD-10-PCS | Mod: CPTII,S$GLB,, | Performed by: NEUROLOGICAL SURGERY

## 2019-01-14 PROCEDURE — 99215 OFFICE O/P EST HI 40 MIN: CPT | Mod: S$GLB,,, | Performed by: NEUROLOGICAL SURGERY

## 2019-01-14 PROCEDURE — 99215 PR OFFICE/OUTPT VISIT, EST, LEVL V, 40-54 MIN: ICD-10-PCS | Mod: S$GLB,,, | Performed by: NEUROLOGICAL SURGERY

## 2019-01-14 PROCEDURE — 3077F SYST BP >= 140 MM HG: CPT | Mod: CPTII,S$GLB,, | Performed by: NEUROLOGICAL SURGERY

## 2019-01-14 PROCEDURE — 1101F PT FALLS ASSESS-DOCD LE1/YR: CPT | Mod: CPTII,S$GLB,, | Performed by: NEUROLOGICAL SURGERY

## 2019-01-14 PROCEDURE — 99999 PR PBB SHADOW E&M-EST. PATIENT-LVL IV: ICD-10-PCS | Mod: PBBFAC,,, | Performed by: NEUROLOGICAL SURGERY

## 2019-01-14 PROCEDURE — 99999 PR PBB SHADOW E&M-EST. PATIENT-LVL IV: CPT | Mod: PBBFAC,,, | Performed by: NEUROLOGICAL SURGERY

## 2019-01-14 RX ORDER — FERROUS SULFATE 325(65) MG
325 TABLET ORAL 3 TIMES DAILY
Qty: 300 TABLET | Refills: 0 | Status: SHIPPED | OUTPATIENT
Start: 2019-01-14 | End: 2019-02-03 | Stop reason: SDUPTHER

## 2019-01-14 NOTE — TELEPHONE ENCOUNTER
Spoke w/pt's daughter. She states patient is being seen by Home Health. They will be tracking his blood pressure in home. No need to reschedule appointment for B/P check at this time. She will have her mom contact the office where he had his eye exam done and have the report faxed.

## 2019-01-14 NOTE — TELEPHONE ENCOUNTER
Spoke w/pt's daughter. She asks if this can be sent to [facility] to make sure that he gets the right dosage and instructions.

## 2019-01-14 NOTE — PROGRESS NOTES
Neurosurgery History & Physical    Patient ID: Abdullahi Salazar is a 73 y.o. male.    No chief complaint on file.      Review of Systems   Constitutional: Negative for activity change, chills, fatigue and unexpected weight change.   HENT: Negative for hearing loss, tinnitus, trouble swallowing and voice change.    Eyes: Negative for visual disturbance.   Respiratory: Negative for apnea, chest tightness and shortness of breath.    Cardiovascular: Negative for chest pain and palpitations.   Gastrointestinal: Negative for abdominal pain, constipation, diarrhea, nausea and vomiting.   Genitourinary: Negative for difficulty urinating, dysuria and frequency.   Musculoskeletal: Negative for back pain, gait problem, neck pain and neck stiffness.   Skin: Negative for wound.   Neurological: Negative for dizziness, tremors, seizures, facial asymmetry, speech difficulty, weakness, light-headedness, numbness and headaches.   Psychiatric/Behavioral: Negative for confusion and decreased concentration.       Past Medical History:   Diagnosis Date    Actinic keratoses     Altered mental status 3/22/2015    Anticoagulant long-term use     Atrial fibrillation     CAD (coronary artery disease)     stents after bypass    Carotid artery stenosis 3/31/2015    CHF (congestive heart failure) 03/2018    Colon polyps 2011    repeat colonoscopy 2014    Combined hyperlipidemia associated with type 2 diabetes mellitus     Diabetes mellitus type II, uncontrolled     dx 2004    GERD (gastroesophageal reflux disease)     Grand mal seizure     last 2/2017    Hard of hearing     History of cardiac pacemaker 3/31/2015    HTN (hypertension)     Hydrocephalus 09/08/2017    Influenza A 1/2/2018    He got influenza a recently and was in the hospital in [place].  He had rhabdomyolyisis and acute kidney injury.  He also had an increased troponin.  He had a flu shot in early December.    Iron deficiency anemia due to chronic blood loss  2019    Mitral valve insufficiency     Presence of automatic (implantable) cardiac defibrillator     Respiratory distress     Sleep apnea with use of continuous positive airway pressure (CPAP)     patient states he does not use CPAP anymore    Syncope 3/30/2015    Wears dentures     upper and lower    Wears hearing aid     sometimes     Social History     Socioeconomic History    Marital status:      Spouse name: Not on file    Number of children: Not on file    Years of education: Not on file    Highest education level: Not on file   Social Needs    Financial resource strain: Not on file    Food insecurity - worry: Not on file    Food insecurity - inability: Not on file    Transportation needs - medical: Not on file    Transportation needs - non-medical: Not on file   Occupational History    Not on file   Tobacco Use    Smoking status: Former Smoker     Packs/day: 2.00     Years: 25.00     Pack years: 50.00     Types: Cigarettes     Last attempt to quit: 1983     Years since quittin.0    Smokeless tobacco: Never Used    Tobacco comment: quit     Substance and Sexual Activity    Alcohol use: Yes     Alcohol/week: 0.6 oz     Types: 1 Cans of beer per week     Comment: rarely    Drug use: No    Sexual activity: Yes     Partners: Female   Other Topics Concern    Not on file   Social History Narrative    Not on file     Family History   Problem Relation Age of Onset    Stomach cancer Mother     Lung cancer Father     Diabetes Sister     Hypertension Sister     Heart disease Neg Hx     Stroke Neg Hx      Review of patient's allergies indicates:  No Known Allergies    Current Outpatient Medications:     atorvastatin (LIPITOR) 40 MG tablet, Take 1 tablet (40 mg total) by mouth once daily., Disp: 90 tablet, Rfl: 3    benazepril (LOTENSIN) 40 MG tablet, Take 1 tablet (40 mg total) by mouth once daily., Disp: 90 tablet, Rfl: 3    blood sugar diagnostic (ACCU-CHEK CLEOPATRA  "PLUS TEST STRP) Strp, TEST BLOOD SUGARS 4 TIMES DAILY, Disp: 150 strip, Rfl: PRN    carvedilol (COREG) 25 MG tablet, Take 1 tablet (25 mg total) by mouth 2 (two) times daily with meals., Disp: 180 tablet, Rfl: 3    clopidogrel (PLAVIX) 75 mg tablet, Take 75 mg by mouth once daily. , Disp: , Rfl:     dabigatran etexilate (PRADAXA) 75 mg Cap, Take 1 capsule (75 mg total) by mouth 2 (two) times daily. "Do NOT break, chew, or open capsules.", Disp: 180 capsule, Rfl: 3    ezetimibe (ZETIA) 10 mg tablet, TAKE 1 TABLET ONE TIME DAILY, Disp: 90 tablet, Rfl: 3    ferrous sulfate (FEOSOL) 325 mg (65 mg iron) Tab tablet, Take 1 tablet (325 mg total) by mouth daily with breakfast., Disp: , Rfl: 0    hydrALAZINE (APRESOLINE) 100 MG tablet, TAKE 1 TABLET TWICE DAILY, Disp: 180 tablet, Rfl: 11    insulin NPH-insulin regular, 70/30, (NOVOLIN 70/30) 100 unit/mL (70-30) injection, May substitute for Humulin/Novolin or [employer]   60 units 30 min before breakfast,  40  Units 30 min before supper., Disp: 3 vial, Rfl: 6    insulin syringe-needle U-100 1 mL 30 gauge x 5/16 Syrg, Use with insulin twice a day, Disp: 100 each, Rfl: 12    isosorbide mononitrate (IMDUR) 120 MG 24 hr tablet, Take 1 tablet (120 mg total) by mouth once daily., Disp: 30 tablet, Rfl: 1    lancets (ACCU-CHEK SOFTCLIX LANCETS) Misc, 1 each by Misc.(Non-Drug; Combo Route) route 2 (two) times daily., Disp: 100 each, Rfl: 11    levETIRAcetam (KEPPRA) 500 MG Tab, Take 1.5 tablets (750 mg total) by mouth 2 (two) times daily., Disp: 270 tablet, Rfl: 0    multivitamin capsule, Take 1 capsule by mouth once daily., Disp: , Rfl:     nitroGLYCERIN (NITROSTAT) 0.4 MG SL tablet, Place 1 tablet (0.4 mg total) under the tongue every 5 (five) minutes as needed for Chest pain., Disp: 100 tablet, Rfl: 11    pantoprazole (PROTONIX) 40 MG tablet, Take 1 tablet (40 mg total) by mouth 2 (two) times daily., Disp: 60 tablet, Rfl: 1    pen needle, diabetic (BD ULTRA-FINE " "SHORT PEN NEEDLE) 31 gauge x 5/16" Ndle, USE FOUR TIMES DAILY, Disp: 360 each, Rfl: 3    polyethylene glycol (GLYCOLAX) 17 gram PwPk, Take 17 g by mouth once daily., Disp: 30 packet, Rfl: 0    potassium chloride (KLOR-CON) 10 MEQ TbSR, Take 1 tablet (10 mEq total) by mouth once daily., Disp: 30 tablet, Rfl: 11    tamsulosin (FLOMAX) 0.4 mg Cp24, Take 1 capsule (0.4 mg total) by mouth once daily., Disp: 90 capsule, Rfl: 3    torsemide (DEMADEX) 10 MG Tab, Take 3 tablets (30 mg total) by mouth once daily., Disp: 90 tablet, Rfl: 0    There were no vitals filed for this visit.    Physical Exam   Constitutional: He is oriented to person, place, and time. He appears well-developed and well-nourished.   HENT:   Head: Normocephalic and atraumatic.   Eyes: Pupils are equal, round, and reactive to light.   Neck: Normal range of motion. Neck supple.   Cardiovascular: Normal rate.   Pulmonary/Chest: Effort normal.   Abdominal: He exhibits no distension.   Musculoskeletal: Normal range of motion. He exhibits no edema.   Neurological: He is alert and oriented to person, place, and time. He has an abnormal Tandem Gait Test. He has a normal Finger-Nose-Finger Test, a normal Heel to Villeda Test and a normal Romberg Test. Gait normal.   Reflex Scores:       Tricep reflexes are 2+ on the right side and 2+ on the left side.       Bicep reflexes are 2+ on the right side and 2+ on the left side.       Brachioradialis reflexes are 2+ on the right side and 2+ on the left side.       Patellar reflexes are 2+ on the right side and 2+ on the left side.       Achilles reflexes are 2+ on the right side and 2+ on the left side.  Skin: Skin is warm and dry.   Psychiatric: He has a normal mood and affect. His speech is normal and behavior is normal. Judgment and thought content normal.   Nursing note and vitals reviewed.      Neurologic Exam     Mental Status   Oriented to person, place, and time.   Oriented to person.   Oriented to place. "   Oriented to time.   Follows 3 step commands.   Attention: normal. Concentration: normal.   Speech: speech is normal   Level of consciousness: alert  Knowledge: consistent with education.   Able to name object. Able to read. Able to repeat. Able to write. Normal comprehension.     Cranial Nerves     CN II   Visual acuity: normal  Right visual field deficit: none  Left visual field deficit: none     CN III, IV, VI   Pupils are equal, round, and reactive to light.  Right pupil: Size: 3 mm. Shape: regular. Reactivity: brisk. Consensual response: intact.   Left pupil: Size: 3 mm. Shape: regular. Reactivity: brisk. Consensual response: intact.   CN III: no CN III palsy  CN VI: no CN VI palsy  Nystagmus: none   Diplopia: none  Ophthalmoparesis: none  Conjugate gaze: present    CN V   Right facial sensation deficit: none  Left facial sensation deficit: none    CN VII   Right facial weakness: none  Left facial weakness: none    CN VIII   Hearing: intact    CN IX, X   CN IX normal.   CN X normal.     CN XI   Right sternocleidomastoid strength: normal  Left sternocleidomastoid strength: normal  Right trapezius strength: normal  Left trapezius strength: normal    CN XII   Fasciculations: absent  Tongue deviation: none    Motor Exam   Muscle bulk: normal  Overall muscle tone: normal  Right arm pronator drift: absent  Left arm pronator drift: absent    Strength   Right neck flexion: 5/5  Left neck flexion: 5/5  Right neck extension: 5/5  Left neck extension: 5/5  Right deltoid: 5/5  Left deltoid: 5/5  Right biceps: 5/5  Left biceps: 5/5  Right triceps: 5/5  Left triceps: 5/5  Right wrist flexion: 5/5  Left wrist flexion: 5/5  Right wrist extension: 5/5  Left wrist extension: 5/5  Right interossei: 5/5  Left interossei: 5/5  Right abdominals: 5/5  Left abdominals: 5/5  Right iliopsoas: 5/5  Left iliopsoas: 5/5  Right quadriceps: 5/5  Left quadriceps: 5/5  Right hamstrin/5  Left hamstrin/5  Right glutei: 5/5  Left glutei:  5/5  Right anterior tibial: 5/5  Left anterior tibial: 5/5  Right posterior tibial: 5/5  Left posterior tibial: 5/5  Right peroneal: 5/5  Left peroneal: 5/5  Right gastroc: 5/5  Left gastroc: 5/5    Sensory Exam   Right arm light touch: normal  Left arm light touch: normal  Right leg light touch: normal  Left leg light touch: normal  Right arm vibration: normal  Left arm vibration: normal  Right arm pinprick: normal  Left arm pinprick: normal    Gait, Coordination, and Reflexes     Gait  Gait: normal    Coordination   Romberg: negative  Finger to nose coordination: normal  Heel to shin coordination: normal  Tandem walking coordination: abnormal    Tremor   Resting tremor: absent  Intention tremor: absent  Action tremor: absent    Reflexes   Right brachioradialis: 2+  Left brachioradialis: 2+  Right biceps: 2+  Left biceps: 2+  Right triceps: 2+  Left triceps: 2+  Right patellar: 2+  Left patellar: 2+  Right achilles: 2+  Left achilles: 2+  Right Chavez: absent  Left Chavez: absent  Right ankle clonus: absent  Left ankle clonus: absent      Provider dictation:    Mr. Salazar presents is a known patient in our clinic.  He is 10 months status post  shunt for NPH.  He is following up due to chronic subdural hematomas present bilaterally last office visit.  He had continued aspirin and Plavix due to chronicity of the fluid collections.  He reports today with his wife with no new complications.  Denies headache denies any worsening of his cognition memory gait and urinary incontinence.  He is here today only for follow-up at my recommendation.    On physical examination, he has an abnormal tandem walk and a magnetic gate. He had an episode of incontinence while in the clinic.  He has a positive Romberg.      CT done on 6/7/2018 demonstrates resolution of right subdural hematoma, however left subdural hematoma has changed in chronicity to be a more acute 5 mm collection in the left frontal.    I discussed with   Marie and his wife my concerns with new bleed in the left frontal.  I discussed the case with Dr. Oconnor.  We have recommended him to stop aspirin and Plavix for the next 2 weeks.  I've increased shunt to 2.5.  We will repeat CT of the head in 2 weeks.  If left subdural hematoma is stable without any acute hemorrhage, he will be able to start aspirin 325.  We will follow him closely to ensure resolution of left frontal hematoma.  Patient is currently asymptomatic however we discussed new image findings and ER precautions if he has any worsening symptoms.  He is currently on levetiracetam and will continue that for seizure prophylaxis.  I'll follow up with him in 2 weeks.    Visit Diagnosis:  Dementia associated with other underlying disease without behavioral disturbance    Seizure disorder    Memory loss    Normal pressure hydrocephalus    TIA (transient ischemic attack)    S/P  shunt    Hx of subdural hematoma                Neurosurgery History & Physical    Patient ID: Abdullahi Salazar is a 73 y.o. male.    No chief complaint on file.      Review of Systems   Constitutional: Negative for activity change, chills, fatigue and unexpected weight change.   HENT: Negative for hearing loss, tinnitus, trouble swallowing and voice change.    Eyes: Negative for visual disturbance.   Respiratory: Negative for apnea, chest tightness and shortness of breath.    Cardiovascular: Negative for chest pain and palpitations.   Gastrointestinal: Negative for abdominal pain, constipation, diarrhea, nausea and vomiting.   Genitourinary: Negative for difficulty urinating, dysuria and frequency.   Musculoskeletal: Negative for back pain, gait problem, neck pain and neck stiffness.   Skin: Negative for wound.   Neurological: Negative for dizziness, tremors, seizures, facial asymmetry, speech difficulty, weakness, light-headedness, numbness and headaches.   Psychiatric/Behavioral: Negative for confusion and decreased concentration.        Past Medical History:   Diagnosis Date    Actinic keratoses     Altered mental status 3/22/2015    Anticoagulant long-term use     Atrial fibrillation     CAD (coronary artery disease)     stents after bypass    Carotid artery stenosis 3/31/2015    CHF (congestive heart failure) 03/2018    Colon polyps 2011    repeat colonoscopy 2014    Combined hyperlipidemia associated with type 2 diabetes mellitus     Diabetes mellitus type II, uncontrolled     dx 2004    GERD (gastroesophageal reflux disease)     Grand mal seizure     last 2/2017    Hard of hearing     History of cardiac pacemaker 3/31/2015    HTN (hypertension)     Hydrocephalus 09/08/2017    Influenza A 1/2/2018    He got influenza a recently and was in the hospital in [place].  He had rhabdomyolyisis and acute kidney injury.  He also had an increased troponin.  He had a flu shot in early December.    Iron deficiency anemia due to chronic blood loss 1/7/2019    Mitral valve insufficiency     Presence of automatic (implantable) cardiac defibrillator     Respiratory distress     Sleep apnea with use of continuous positive airway pressure (CPAP)     patient states he does not use CPAP anymore    Syncope 3/30/2015    Wears dentures     upper and lower    Wears hearing aid     sometimes     Social History     Socioeconomic History    Marital status:      Spouse name: Not on file    Number of children: Not on file    Years of education: Not on file    Highest education level: Not on file   Social Needs    Financial resource strain: Not on file    Food insecurity - worry: Not on file    Food insecurity - inability: Not on file    Transportation needs - medical: Not on file    Transportation needs - non-medical: Not on file   Occupational History    Not on file   Tobacco Use    Smoking status: Former Smoker     Packs/day: 2.00     Years: 25.00     Pack years: 50.00     Types: Cigarettes     Last attempt to quit:  "1983     Years since quittin.0    Smokeless tobacco: Never Used    Tobacco comment: quit     Substance and Sexual Activity    Alcohol use: Yes     Alcohol/week: 0.6 oz     Types: 1 Cans of beer per week     Comment: rarely    Drug use: No    Sexual activity: Yes     Partners: Female   Other Topics Concern    Not on file   Social History Narrative    Not on file     Family History   Problem Relation Age of Onset    Stomach cancer Mother     Lung cancer Father     Diabetes Sister     Hypertension Sister     Heart disease Neg Hx     Stroke Neg Hx      Review of patient's allergies indicates:  No Known Allergies    Current Outpatient Medications:     atorvastatin (LIPITOR) 40 MG tablet, Take 1 tablet (40 mg total) by mouth once daily., Disp: 90 tablet, Rfl: 3    benazepril (LOTENSIN) 40 MG tablet, Take 1 tablet (40 mg total) by mouth once daily., Disp: 90 tablet, Rfl: 3    blood sugar diagnostic (ACCU-CHEK CLEOPATRA PLUS TEST STRP) Strp, TEST BLOOD SUGARS 4 TIMES DAILY, Disp: 150 strip, Rfl: PRN    carvedilol (COREG) 25 MG tablet, Take 1 tablet (25 mg total) by mouth 2 (two) times daily with meals., Disp: 180 tablet, Rfl: 3    clopidogrel (PLAVIX) 75 mg tablet, Take 75 mg by mouth once daily. , Disp: , Rfl:     dabigatran etexilate (PRADAXA) 75 mg Cap, Take 1 capsule (75 mg total) by mouth 2 (two) times daily. "Do NOT break, chew, or open capsules.", Disp: 180 capsule, Rfl: 3    ezetimibe (ZETIA) 10 mg tablet, TAKE 1 TABLET ONE TIME DAILY, Disp: 90 tablet, Rfl: 3    ferrous sulfate (FEOSOL) 325 mg (65 mg iron) Tab tablet, Take 1 tablet (325 mg total) by mouth daily with breakfast., Disp: , Rfl: 0    hydrALAZINE (APRESOLINE) 100 MG tablet, TAKE 1 TABLET TWICE DAILY, Disp: 180 tablet, Rfl: 11    insulin NPH-insulin regular, 70/30, (NOVOLIN 70/30) 100 unit/mL (70-30) injection, May substitute for Humulin/Novolin or [employer]   60 units 30 min before breakfast,  40  Units 30 min before " "supper., Disp: 3 vial, Rfl: 6    insulin syringe-needle U-100 1 mL 30 gauge x 5/16 Syrg, Use with insulin twice a day, Disp: 100 each, Rfl: 12    isosorbide mononitrate (IMDUR) 120 MG 24 hr tablet, Take 1 tablet (120 mg total) by mouth once daily., Disp: 30 tablet, Rfl: 1    lancets (ACCU-CHEK SOFTCLIX LANCETS) Misc, 1 each by Misc.(Non-Drug; Combo Route) route 2 (two) times daily., Disp: 100 each, Rfl: 11    levETIRAcetam (KEPPRA) 500 MG Tab, Take 1.5 tablets (750 mg total) by mouth 2 (two) times daily., Disp: 270 tablet, Rfl: 0    multivitamin capsule, Take 1 capsule by mouth once daily., Disp: , Rfl:     nitroGLYCERIN (NITROSTAT) 0.4 MG SL tablet, Place 1 tablet (0.4 mg total) under the tongue every 5 (five) minutes as needed for Chest pain., Disp: 100 tablet, Rfl: 11    pantoprazole (PROTONIX) 40 MG tablet, Take 1 tablet (40 mg total) by mouth 2 (two) times daily., Disp: 60 tablet, Rfl: 1    pen needle, diabetic (BD ULTRA-FINE SHORT PEN NEEDLE) 31 gauge x 5/16" Ndle, USE FOUR TIMES DAILY, Disp: 360 each, Rfl: 3    polyethylene glycol (GLYCOLAX) 17 gram PwPk, Take 17 g by mouth once daily., Disp: 30 packet, Rfl: 0    potassium chloride (KLOR-CON) 10 MEQ TbSR, Take 1 tablet (10 mEq total) by mouth once daily., Disp: 30 tablet, Rfl: 11    tamsulosin (FLOMAX) 0.4 mg Cp24, Take 1 capsule (0.4 mg total) by mouth once daily., Disp: 90 capsule, Rfl: 3    torsemide (DEMADEX) 10 MG Tab, Take 3 tablets (30 mg total) by mouth once daily., Disp: 90 tablet, Rfl: 0    There were no vitals filed for this visit.    Physical Exam   Constitutional: He is oriented to person, place, and time. He appears well-developed and well-nourished.   HENT:   Head: Normocephalic and atraumatic.   Eyes: Pupils are equal, round, and reactive to light.   Neck: Normal range of motion. Neck supple.   Cardiovascular: Normal rate.    Pulmonary/Chest: Effort normal.   Abdominal: He exhibits no distension.   Musculoskeletal: Normal range of " motion. He exhibits no edema.   Neurological: He is alert and oriented to person, place, and time. He has an abnormal Tandem Gait Test. He has a normal Finger-Nose-Finger Test, a normal Heel to Villeda Test and a normal Romberg Test. Gait normal.   Reflex Scores:       Tricep reflexes are 2+ on the right side and 2+ on the left side.       Bicep reflexes are 2+ on the right side and 2+ on the left side.       Brachioradialis reflexes are 2+ on the right side and 2+ on the left side.       Patellar reflexes are 2+ on the right side and 2+ on the left side.       Achilles reflexes are 2+ on the right side and 2+ on the left side.  Skin: Skin is warm and dry.   Psychiatric: He has a normal mood and affect. His speech is normal and behavior is normal. Judgment and thought content normal.   Nursing note and vitals reviewed.      Neurologic Exam     Mental Status   Oriented to person, place, and time.   Oriented to person.   Oriented to place.   Oriented to time.   Follows 3 step commands.   Attention: normal. Concentration: normal.   Speech: speech is normal   Level of consciousness: alert  Knowledge: consistent with education.   Able to name object. Able to read. Able to repeat. Able to write. Normal comprehension.     Cranial Nerves     CN II   Visual acuity: normal  Right visual field deficit: none  Left visual field deficit: none     CN III, IV, VI   Pupils are equal, round, and reactive to light.  Right pupil: Size: 3 mm. Shape: regular. Reactivity: brisk. Consensual response: intact.   Left pupil: Size: 3 mm. Shape: regular. Reactivity: brisk. Consensual response: intact.   CN III: no CN III palsy  CN VI: no CN VI palsy  Nystagmus: none   Diplopia: none  Ophthalmoparesis: none  Conjugate gaze: present    CN V   Right facial sensation deficit: none  Left facial sensation deficit: none    CN VII   Right facial weakness: none  Left facial weakness: none    CN VIII   Hearing: intact    CN IX, X   CN IX normal.   CN X  normal.     CN XI   Right sternocleidomastoid strength: normal  Left sternocleidomastoid strength: normal  Right trapezius strength: normal  Left trapezius strength: normal    CN XII   Fasciculations: absent  Tongue deviation: none    Motor Exam   Muscle bulk: normal  Overall muscle tone: normal  Right arm pronator drift: absent  Left arm pronator drift: absent    Strength   Right neck flexion: 5/5  Left neck flexion: 5/5  Right neck extension: 5/5  Left neck extension: 5/5  Right deltoid: 5/5  Left deltoid: 5/5  Right biceps: 5/5  Left biceps: 5/5  Right triceps: 5/5  Left triceps: 5/5  Right wrist flexion: 5/5  Left wrist flexion: 5/5  Right wrist extension: 5/5  Left wrist extension: 5/5  Right interossei: 5/5  Left interossei: 5/5  Right abdominals: 5/5  Left abdominals: 5/5  Right iliopsoas: 5/5  Left iliopsoas: 5/5  Right quadriceps: 5/5  Left quadriceps: 5/5  Right hamstrin/5  Left hamstrin/5  Right glutei: 5/5  Left glutei: 5/5  Right anterior tibial: 5/5  Left anterior tibial: 5/5  Right posterior tibial: 5/5  Left posterior tibial: 5/5  Right peroneal: 5/5  Left peroneal: 5/5  Right gastroc: 5/5  Left gastroc: 5/5    Sensory Exam   Right arm light touch: normal  Left arm light touch: normal  Right leg light touch: normal  Left leg light touch: normal  Right arm vibration: normal  Left arm vibration: normal  Right arm pinprick: normal  Left arm pinprick: normal    Gait, Coordination, and Reflexes     Gait  Gait: normal    Coordination   Romberg: negative  Finger to nose coordination: normal  Heel to shin coordination: normal  Tandem walking coordination: abnormal    Tremor   Resting tremor: absent  Intention tremor: absent  Action tremor: absent    Reflexes   Right brachioradialis: 2+  Left brachioradialis: 2+  Right biceps: 2+  Left biceps: 2+  Right triceps: 2+  Left triceps: 2+  Right patellar: 2+  Left patellar: 2+  Right achilles: 2+  Left achilles: 2+  Right Chavez: absent  Left Chavez:  absent  Right ankle clonus: absent  Left ankle clonus: absent      Provider dictation:    The patient 72 y/o male with NPH s/p  shunt here today for follow-up evaluation after shunt adjustment.  Both he and his wife reports some improvement since shunt was adjusted previously.  He is still having both cognitive deficits and urinary incontinence but his gait has improved significantly.  He denies nausea vomiting fever chills.  He denies headache.  He denies dizziness or vision changes.  He has no complaints today.  He feels he is doing well.    On physical examination, he continues to have a slow magnetic gait.  He is pleasant and cooperative.  Shunt confirmed to be set at 2.0 P/L    At this time the patient is doing well from an NPH standpoint.  He is 1.5 year status post shunt placement.  At this time I will let him continue at 2.0 but he and his wife will call me if there are any changes.  Patient will follow-up as needed.We have discussed his Pradaxa and Plavix regimen with his Cardiologist, Dr. Rodney, and we are in agreement to switch him to ASA 81 mg and discontinue his plavix given his high risk of intracranial hemorrhage.         Visit Diagnosis:  Dementia associated with other underlying disease without behavioral disturbance    Seizure disorder    Memory loss    Normal pressure hydrocephalus    TIA (transient ischemic attack)    S/P  shunt    Hx of subdural hematoma

## 2019-01-14 NOTE — TELEPHONE ENCOUNTER
I have signed for the following orders AND/OR meds.  Please call the patient and ask the patient to schedule the testing AND/OR inform about any medications that were sent.      Orders Placed This Encounter   Procedures    CBC auto differential     Standing Status:   Future     Standing Expiration Date:   3/14/2020    Iron and TIBC     Standing Status:   Future     Standing Expiration Date:   3/14/2020       Medications Ordered This Encounter   Medications    ferrous sulfate (FEOSOL) 325 mg (65 mg iron) Tab tablet     Sig: Take 1 tablet (325 mg total) by mouth 3 (three) times daily.     Dispense:  300 tablet     Refill:  0

## 2019-01-14 NOTE — TELEPHONE ENCOUNTER
----- Message from Siria Streeter sent at 1/14/2019 12:00 PM CST -----  Contact: pt daughter - Leydi bedoya   States she's calling to resched pt's nurse visit and also wanted to inform that pt had his eye exam appt last week and can be reached at [phone]//thanks/dbw

## 2019-01-15 RX ORDER — ASPIRIN 81 MG/1
81 TABLET ORAL DAILY
Refills: 0 | Status: ON HOLD | COMMUNITY
Start: 2019-01-15 | End: 2019-04-29 | Stop reason: HOSPADM

## 2019-01-15 RX ORDER — ASPIRIN 325 MG
325 TABLET, DELAYED RELEASE (ENTERIC COATED) ORAL DAILY
Refills: 0 | COMMUNITY
Start: 2019-01-15 | End: 2019-01-15 | Stop reason: ALTCHOICE

## 2019-01-17 ENCOUNTER — OFFICE VISIT (OUTPATIENT)
Dept: CARDIOLOGY | Facility: CLINIC | Age: 74
End: 2019-01-17
Payer: MEDICARE

## 2019-01-17 VITALS
SYSTOLIC BLOOD PRESSURE: 128 MMHG | DIASTOLIC BLOOD PRESSURE: 64 MMHG | HEIGHT: 67 IN | WEIGHT: 207.69 LBS | HEART RATE: 80 BPM | BODY MASS INDEX: 32.6 KG/M2

## 2019-01-17 DIAGNOSIS — Z95.1 S/P CABG (CORONARY ARTERY BYPASS GRAFT): ICD-10-CM

## 2019-01-17 DIAGNOSIS — E11.59 HYPERTENSION ASSOCIATED WITH DIABETES: ICD-10-CM

## 2019-01-17 DIAGNOSIS — I15.2 HYPERTENSION ASSOCIATED WITH DIABETES: ICD-10-CM

## 2019-01-17 DIAGNOSIS — Z95.810 PRESENCE OF BIVENTRICULAR AICD: ICD-10-CM

## 2019-01-17 DIAGNOSIS — I25.5 ISCHEMIC CARDIOMYOPATHY: ICD-10-CM

## 2019-01-17 DIAGNOSIS — I25.810 CORONARY ARTERY DISEASE INVOLVING CORONARY BYPASS GRAFT OF NATIVE HEART WITHOUT ANGINA PECTORIS: Primary | ICD-10-CM

## 2019-01-17 DIAGNOSIS — I48.0 PAROXYSMAL ATRIAL FIBRILLATION: ICD-10-CM

## 2019-01-17 PROCEDURE — 3078F DIAST BP <80 MM HG: CPT | Mod: CPTII,S$GLB,, | Performed by: INTERNAL MEDICINE

## 2019-01-17 PROCEDURE — 3078F PR MOST RECENT DIASTOLIC BLOOD PRESSURE < 80 MM HG: ICD-10-PCS | Mod: CPTII,S$GLB,, | Performed by: INTERNAL MEDICINE

## 2019-01-17 PROCEDURE — 3046F HEMOGLOBIN A1C LEVEL >9.0%: CPT | Mod: CPTII,S$GLB,, | Performed by: INTERNAL MEDICINE

## 2019-01-17 PROCEDURE — 99999 PR PBB SHADOW E&M-EST. PATIENT-LVL III: CPT | Mod: PBBFAC,,, | Performed by: INTERNAL MEDICINE

## 2019-01-17 PROCEDURE — 3046F PR MOST RECENT HEMOGLOBIN A1C LEVEL > 9.0%: ICD-10-PCS | Mod: CPTII,S$GLB,, | Performed by: INTERNAL MEDICINE

## 2019-01-17 PROCEDURE — 99214 OFFICE O/P EST MOD 30 MIN: CPT | Mod: S$GLB,,, | Performed by: INTERNAL MEDICINE

## 2019-01-17 PROCEDURE — 99999 PR PBB SHADOW E&M-EST. PATIENT-LVL III: ICD-10-PCS | Mod: PBBFAC,,, | Performed by: INTERNAL MEDICINE

## 2019-01-17 PROCEDURE — 3074F SYST BP LT 130 MM HG: CPT | Mod: CPTII,S$GLB,, | Performed by: INTERNAL MEDICINE

## 2019-01-17 PROCEDURE — 1101F PT FALLS ASSESS-DOCD LE1/YR: CPT | Mod: CPTII,S$GLB,, | Performed by: INTERNAL MEDICINE

## 2019-01-17 PROCEDURE — 3074F PR MOST RECENT SYSTOLIC BLOOD PRESSURE < 130 MM HG: ICD-10-PCS | Mod: CPTII,S$GLB,, | Performed by: INTERNAL MEDICINE

## 2019-01-17 PROCEDURE — 1101F PR PT FALLS ASSESS DOC 0-1 FALLS W/OUT INJ PAST YR: ICD-10-PCS | Mod: CPTII,S$GLB,, | Performed by: INTERNAL MEDICINE

## 2019-01-17 PROCEDURE — 99214 PR OFFICE/OUTPT VISIT, EST, LEVL IV, 30-39 MIN: ICD-10-PCS | Mod: S$GLB,,, | Performed by: INTERNAL MEDICINE

## 2019-01-17 NOTE — PROGRESS NOTES
Subjective:    Patient ID:  Abdullahi Salazar is a 73 y.o. male who presents for follow-up of ICM    HPI  He comes for follow up with no major problems, no chest pain, no shortness of breath.  FC II    Review of Systems   Constitution: Negative for decreased appetite, weakness, malaise/fatigue, weight gain and weight loss.   Cardiovascular: Negative for chest pain, dyspnea on exertion, leg swelling, palpitations and syncope.   Respiratory: Negative for cough and shortness of breath.    Gastrointestinal: Negative.    All other systems reviewed and are negative.       Objective:      Physical Exam   Constitutional: He is oriented to person, place, and time. He appears well-developed and well-nourished.   HENT:   Head: Normocephalic.   Eyes: Pupils are equal, round, and reactive to light.   Neck: Normal range of motion. Neck supple. No JVD present. Carotid bruit is not present. No thyromegaly present.   Cardiovascular: Normal rate, regular rhythm, normal heart sounds, intact distal pulses and normal pulses. PMI is not displaced. Exam reveals no gallop.   No murmur heard.  Pulmonary/Chest: Effort normal and breath sounds normal.   Abdominal: Soft. Normal appearance. He exhibits no mass. There is no hepatosplenomegaly. There is no tenderness.   Musculoskeletal: Normal range of motion. He exhibits no edema.   Neurological: He is alert and oriented to person, place, and time. He has normal strength and normal reflexes. No sensory deficit.   Skin: Skin is warm and intact.   Psychiatric: He has a normal mood and affect.   Nursing note and vitals reviewed.        Assessment:       1. Coronary artery disease involving coronary bypass graft of native heart without angina pectoris    2. S/P CABG (coronary artery bypass graft)    3. Presence of biventricular AICD    4. Hypertension associated with diabetes    5. Ischemic cardiomyopathy    6. Paroxysmal atrial fibrillation         Plan:     Continue all cardiac medications  Regular  exercise program  Weight loss  4 m f/u

## 2019-01-18 ENCOUNTER — OUTPATIENT CASE MANAGEMENT (OUTPATIENT)
Dept: ADMINISTRATIVE | Facility: OTHER | Age: 74
End: 2019-01-18

## 2019-01-18 NOTE — PROGRESS NOTES
01/18/2019  Summary:  (Case Closure)  RN-CM attempted to contact patient multiple times to complete follow up for OPCM. There has been no response to previous messages nor to attempt letter mailed and sent through patient portal on 1/2/19. This RN-CM will close case today. Contact information for OPCM and [facility] 24/7 Care Line has already been provided to patient. - SASHA Lennon, RN-OPCM    Interventions:  - Case closed.

## 2019-01-24 ENCOUNTER — TELEPHONE (OUTPATIENT)
Dept: FAMILY MEDICINE | Facility: CLINIC | Age: 74
End: 2019-01-24

## 2019-01-24 NOTE — TELEPHONE ENCOUNTER
----- Message from Noemy Vo sent at 1/24/2019  2:56 PM CST -----  Contact: Jeanette/[facility]  Jeanette called to report a Blood Sugar of 305; the patient forgot to take his insulin this morning. She had him take it and will re-check it in 30 minutes.  She can be contacted at [phone].    Thanks,  Noemy

## 2019-01-24 NOTE — TELEPHONE ENCOUNTER
Spoke with Jeanette with [facility]. She states pt just took his insulin, so she will recheck it in half and hour and we will go from there. She just wanted to call and let us know it was 305 and out of his range.

## 2019-01-28 ENCOUNTER — CLINICAL SUPPORT (OUTPATIENT)
Dept: CARDIOLOGY | Facility: CLINIC | Age: 74
End: 2019-01-28
Attending: INTERNAL MEDICINE
Payer: MEDICARE

## 2019-01-28 DIAGNOSIS — Z95.810 CARDIAC DEFIBRILLATOR IN SITU: ICD-10-CM

## 2019-01-28 DIAGNOSIS — I50.30 CHF WITH LEFT VENTRICULAR DIASTOLIC DYSFUNCTION, NYHA CLASS 2: ICD-10-CM

## 2019-01-28 PROCEDURE — 93296 REM INTERROG EVL PM/IDS: CPT | Mod: HCNC,S$GLB,, | Performed by: INTERNAL MEDICINE

## 2019-01-28 PROCEDURE — 93296 CARDIAC DEVICE CHECK CHECK - REMOTE: ICD-10-PCS | Mod: HCNC,S$GLB,, | Performed by: INTERNAL MEDICINE

## 2019-01-28 PROCEDURE — 93295 CARDIAC DEVICE CHECK CHECK - REMOTE: ICD-10-PCS | Mod: HCNC,S$GLB,, | Performed by: INTERNAL MEDICINE

## 2019-01-28 PROCEDURE — 93295 DEV INTERROG REMOTE 1/2/MLT: CPT | Mod: HCNC,S$GLB,, | Performed by: INTERNAL MEDICINE

## 2019-02-01 ENCOUNTER — PATIENT MESSAGE (OUTPATIENT)
Dept: FAMILY MEDICINE | Facility: CLINIC | Age: 74
End: 2019-02-01

## 2019-02-01 ENCOUNTER — TELEPHONE (OUTPATIENT)
Dept: FAMILY MEDICINE | Facility: CLINIC | Age: 74
End: 2019-02-01

## 2019-02-01 NOTE — TELEPHONE ENCOUNTER
----- Message from Yenifer Ponce sent at 2/1/2019  9:06 AM CST -----  Contact: [facility]   She's calling in regards to blood sugar 233, pls call back at [phone]

## 2019-02-01 NOTE — TELEPHONE ENCOUNTER
Does the home health have a system available to call this patient daily to get him to take his medicine?      Alternatively, is this something that Caity would consider doing daily?    Also has the patient had a ?      We could also consider having him go through the complex care clinic folks to help manage his complex case.  Call the home health first to see what the options are.

## 2019-02-01 NOTE — TELEPHONE ENCOUNTER
Can we give home health a parameter on Mr. Schmid blood glucose levels? They keep calling every day with a glucoses in the 200s but Mr. Salazar keeps forgetting to take his insulin. Please advise.

## 2019-02-01 NOTE — TELEPHONE ENCOUNTER
Spoke with the  she states she will talk to Jeanette his nurse to see if she can get a care giver to call and remind him about taking his medication.  has put him on a call list this weekend so he can be reminded to take his medication as far as any other options there isn't anything else.  also states that he is going to be getting discharged from home health on March 15th.

## 2019-02-03 DIAGNOSIS — D64.9 ANEMIA, UNSPECIFIED TYPE: ICD-10-CM

## 2019-02-03 RX ORDER — FERROUS SULFATE 325(65) MG
TABLET ORAL
Qty: 270 TABLET | Refills: 0 | Status: SHIPPED | OUTPATIENT
Start: 2019-02-03 | End: 2019-10-11 | Stop reason: SDUPTHER

## 2019-02-04 LAB
AV DELAY - LONGEST: 140 MSEC
AV DELAY - SHORTEST: 110 MSEC
BATTERY VOLTAGE (V): 2.92 V
CHARGE TIME (SEC): 9.6 SEC
HV IMPEDANCE (OHM): 64 OHM
IMPEDANCE RA LEAD (DONOR): 305 OHMS
IMPEDANCE RA LEAD (NATIVE): 536 OHMS
IMPEDANCE RA LEAD: 551 OHMS
P/R-WAVE RA LEAD: 1.9 MV
PV DELAY - LONGEST: 110 MSEC
PV DELAY - SHORTEST: 80 MSEC

## 2019-02-05 ENCOUNTER — TELEPHONE (OUTPATIENT)
Dept: ADMINISTRATIVE | Facility: CLINIC | Age: 74
End: 2019-02-05

## 2019-02-05 ENCOUNTER — TELEPHONE (OUTPATIENT)
Dept: GASTROENTEROLOGY | Facility: CLINIC | Age: 74
End: 2019-02-05

## 2019-02-05 NOTE — TELEPHONE ENCOUNTER
Home Health Recert with [facility]-Dr. Maximo Hernandez. Pt received  services.

## 2019-02-05 NOTE — TELEPHONE ENCOUNTER
----- Message from Kamaljit Alcaraz sent at 2/5/2019  4:08 PM CST -----  Type: Needs Medical Advice    Who Called:  Daughter/Leydi Salazar    Best Call Back Number: [phone]  Additional Information: Caller states that she would like a callback regarding the patient's upcoming procedure

## 2019-02-06 ENCOUNTER — TELEPHONE (OUTPATIENT)
Dept: FAMILY MEDICINE | Facility: CLINIC | Age: 74
End: 2019-02-06

## 2019-02-06 NOTE — TELEPHONE ENCOUNTER
S/w pt daughter answered all questions regarding prep and procedure at [facility] [[email or web address]

## 2019-02-06 NOTE — TELEPHONE ENCOUNTER
----- Message from Alo Lewis sent at 2/6/2019 11:55 AM CST -----  Contact: Jeanette ( [facility])   Returning missed call,pls return call.           [phone]

## 2019-02-06 NOTE — TELEPHONE ENCOUNTER
----- Message from Siria Streeter sent at 2/6/2019 10:30 AM CST -----  Contact: Jeanette from [facility]   States she's rtn nurses call and can be reached at [phone]//thanks/dbw

## 2019-02-06 NOTE — TELEPHONE ENCOUNTER
----- Message from Gypsy Sharp sent at 2/6/2019  1:35 PM CST -----  Contact: [facility] Nurse - Ms Akhtar  Stated she calling about pt he was able to get his medication, she can be reached at  [phone] Thanks

## 2019-02-06 NOTE — TELEPHONE ENCOUNTER
----- Message from Lise Kuo sent at 2/6/2019 10:11 AM CST -----  Contact: home health nurse  Patient blood surgar Is 278 patient is out of medication and nurse would like to know if he can take lantus since patient already has it [phone]   start after surgery

## 2019-02-08 ENCOUNTER — TELEPHONE (OUTPATIENT)
Dept: CARDIOLOGY | Facility: CLINIC | Age: 74
End: 2019-02-08

## 2019-02-08 NOTE — TELEPHONE ENCOUNTER
----- Message from Oralia Ruelas sent at 2/8/2019  9:02 AM CST -----  Type: Needs Medical Advice    Who Called:  [facility] nurse-Jeanette  Symptoms (please be specific):  NA  How long has patient had these symptoms: AMBIKA  Pharmacy name and phone #:    Best Call Back Number: [phone]  Additional Information: Patient gained 5 pounds since last week. Patient doesn't have swelling or shortness of breath.

## 2019-02-12 ENCOUNTER — LAB VISIT (OUTPATIENT)
Dept: LAB | Facility: HOSPITAL | Age: 74
End: 2019-02-12
Attending: NURSE PRACTITIONER
Payer: MEDICARE

## 2019-02-12 DIAGNOSIS — E11.22 TYPE 2 DIABETES MELLITUS WITH STAGE 3 CHRONIC KIDNEY DISEASE, WITH LONG-TERM CURRENT USE OF INSULIN: ICD-10-CM

## 2019-02-12 DIAGNOSIS — N18.30 TYPE 2 DIABETES MELLITUS WITH STAGE 3 CHRONIC KIDNEY DISEASE, WITH LONG-TERM CURRENT USE OF INSULIN: ICD-10-CM

## 2019-02-12 DIAGNOSIS — Z79.4 TYPE 2 DIABETES MELLITUS WITH STAGE 3 CHRONIC KIDNEY DISEASE, WITH LONG-TERM CURRENT USE OF INSULIN: ICD-10-CM

## 2019-02-12 LAB
ESTIMATED AVG GLUCOSE: 220 MG/DL
HBA1C MFR BLD HPLC: 9.3 %

## 2019-02-12 PROCEDURE — 83036 HEMOGLOBIN GLYCOSYLATED A1C: CPT | Mod: HCNC

## 2019-02-12 PROCEDURE — 36415 COLL VENOUS BLD VENIPUNCTURE: CPT | Mod: HCNC,PO

## 2019-02-13 ENCOUNTER — TELEPHONE (OUTPATIENT)
Dept: ENDOCRINOLOGY | Facility: CLINIC | Age: 74
End: 2019-02-13

## 2019-02-13 NOTE — TELEPHONE ENCOUNTER
----- Message from Eufemia Washington sent at 2/13/2019  8:36 AM CST -----  Contact: Danielle-LPN [facility]  Type: Needs Medical Advice    Who Called:  LALO Reid  Best Call Back Number: [phone]  Additional Information: Blood Sugar 258,  Which is outside of his perimeter he is not checking it having a hard time remembering to check it.

## 2019-02-13 NOTE — TELEPHONE ENCOUNTER
Spoke with Home Health nurse, states pt forgets to take insulin and forgets to check blood sugar, at time of visit, pts blood sugar was 258, pt did not know if he had his morning insulin, advised to continue education on importance of taking blood sugar and taking medication as prescribed, also advised to have family involved to assist with care, voiced understanding, pt has an upcoming appt, advised to make sure logs are sent to the appt with pt.

## 2019-02-14 DIAGNOSIS — E78.2 COMBINED HYPERLIPIDEMIA ASSOCIATED WITH TYPE 2 DIABETES MELLITUS: ICD-10-CM

## 2019-02-14 DIAGNOSIS — E11.69 COMBINED HYPERLIPIDEMIA ASSOCIATED WITH TYPE 2 DIABETES MELLITUS: ICD-10-CM

## 2019-02-14 RX ORDER — EZETIMIBE 10 MG/1
TABLET ORAL
Qty: 90 TABLET | Refills: 3 | Status: SHIPPED | OUTPATIENT
Start: 2019-02-14 | End: 2019-10-11 | Stop reason: SDUPTHER

## 2019-02-14 RX ORDER — POTASSIUM CHLORIDE 750 MG/1
TABLET, EXTENDED RELEASE ORAL
Qty: 90 TABLET | Refills: 11 | Status: SHIPPED | OUTPATIENT
Start: 2019-02-14 | End: 2019-10-11 | Stop reason: SDUPTHER

## 2019-02-14 RX ORDER — TAMSULOSIN HYDROCHLORIDE 0.4 MG/1
CAPSULE ORAL
Qty: 90 CAPSULE | Refills: 3 | Status: SHIPPED | OUTPATIENT
Start: 2019-02-14 | End: 2019-10-11 | Stop reason: SDUPTHER

## 2019-02-15 ENCOUNTER — TELEPHONE (OUTPATIENT)
Dept: FAMILY MEDICINE | Facility: CLINIC | Age: 74
End: 2019-02-15

## 2019-02-15 NOTE — TELEPHONE ENCOUNTER
Home health was wondering if there is some oral medication he can be placed on instead of taking the insulin since he isnt remembering to taking the insulin. Please advise.

## 2019-02-15 NOTE — TELEPHONE ENCOUNTER
----- Message from Lauren Marley sent at 2/15/2019 10:27 AM CST -----  Contact: daniele [facility] -[phone]  Would like  to consult with the nurse, patients blood sugar is 326, patients keeps for getting  to  Check   His  sugar and take his insulin ,  The nurse would  like to know if patient can get an oral medication by mouth, so that he can take,  please call back at [phone], thank rolando

## 2019-02-15 NOTE — TELEPHONE ENCOUNTER
Would they consider giving an injection of trulicity weekly to him?  It is my understanding that the patient does not take his pills all of the time either.  Confirm this.    Lab Results   Component Value Date    HGBA1C 9.3 (H) 02/12/2019

## 2019-02-15 NOTE — TELEPHONE ENCOUNTER
Jeanette with [facility] returned call. She states that they will be able to give the Trulicity injection weekly if order is placed. Fax order to [phone].

## 2019-02-19 ENCOUNTER — TELEPHONE (OUTPATIENT)
Dept: CARDIOLOGY | Facility: CLINIC | Age: 74
End: 2019-02-19

## 2019-02-19 ENCOUNTER — TELEPHONE (OUTPATIENT)
Dept: FAMILY MEDICINE | Facility: CLINIC | Age: 74
End: 2019-02-19

## 2019-02-19 NOTE — TELEPHONE ENCOUNTER
Returned call to Urszula at [facility]. Verbal order given to start Trulicity injections weekly. They have received a faxed copy of the prescription for the Trulicity. Family has been instructed to pick medication up from pharmacy.

## 2019-02-19 NOTE — TELEPHONE ENCOUNTER
----- Message from Sander Gutierrez sent at 2/19/2019 11:25 AM CST -----  [facility]  She's calling in regards to a missed call, [phone] (office)

## 2019-02-19 NOTE — TELEPHONE ENCOUNTER
----- Message from Rayna Marley sent at 2/19/2019  8:14 AM CST -----  Nurse is reporting patient has 5.2 weight gain since last week / pt states he feels fine / call [phone] / Jeanette  With [facility]

## 2019-02-19 NOTE — TELEPHONE ENCOUNTER
----- Message from Carmen Schmitt sent at 2/19/2019  1:49 PM CST -----  Aleena   With  [facility]  Calling to speak to   Anh // please call  [phone]

## 2019-02-19 NOTE — TELEPHONE ENCOUNTER
Spoke with Jeanette thorne she has been advised to tell the patient to double up on his demedex BID to decrease fluid

## 2019-02-19 NOTE — TELEPHONE ENCOUNTER
----- Message from Joe Quintana sent at 2/19/2019  8:18 AM CST -----  ..Type:  Patient Returning Call    Who Called: Jeanette ( [facility] )  Who Left Message for Patient: Jeanette ( [facility] )  Does the patient know what this is regarding?:  Would the patient rather a call back or a response via [facility]? Call back  Best Call Back Number: [phone]  Additional Information: Jeanette ( [facility] ) is requesting a call from nurse to f/s on pt blood sugar levels.

## 2019-02-19 NOTE — TELEPHONE ENCOUNTER
Spoke w/Jeanette and informed that Rx for Trulicity has been faxed to the patient's pharmacy as well as [facility].

## 2019-02-21 ENCOUNTER — TELEPHONE (OUTPATIENT)
Dept: FAMILY MEDICINE | Facility: CLINIC | Age: 74
End: 2019-02-21

## 2019-02-21 ENCOUNTER — TELEPHONE (OUTPATIENT)
Dept: ENDOCRINOLOGY | Facility: CLINIC | Age: 74
End: 2019-02-21

## 2019-02-21 ENCOUNTER — OFFICE VISIT (OUTPATIENT)
Dept: ENDOCRINOLOGY | Facility: CLINIC | Age: 74
End: 2019-02-21
Payer: MEDICARE

## 2019-02-21 VITALS
SYSTOLIC BLOOD PRESSURE: 134 MMHG | HEIGHT: 67 IN | HEART RATE: 64 BPM | DIASTOLIC BLOOD PRESSURE: 72 MMHG | WEIGHT: 213.94 LBS | BODY MASS INDEX: 33.58 KG/M2

## 2019-02-21 DIAGNOSIS — I25.810 CORONARY ARTERY DISEASE INVOLVING CORONARY BYPASS GRAFT OF NATIVE HEART WITHOUT ANGINA PECTORIS: ICD-10-CM

## 2019-02-21 DIAGNOSIS — I10 BENIGN ESSENTIAL HTN: ICD-10-CM

## 2019-02-21 DIAGNOSIS — R41.3 MEMORY LOSS: ICD-10-CM

## 2019-02-21 DIAGNOSIS — I25.5 ISCHEMIC CARDIOMYOPATHY: ICD-10-CM

## 2019-02-21 DIAGNOSIS — E11.21 TYPE 2 DIABETES MELLITUS WITH DIABETIC NEPHROPATHY, WITHOUT LONG-TERM CURRENT USE OF INSULIN: Primary | ICD-10-CM

## 2019-02-21 PROCEDURE — 99214 OFFICE O/P EST MOD 30 MIN: CPT | Mod: HCNC,S$GLB,, | Performed by: NURSE PRACTITIONER

## 2019-02-21 PROCEDURE — 99999 PR PBB SHADOW E&M-EST. PATIENT-LVL V: CPT | Mod: PBBFAC,HCNC,, | Performed by: NURSE PRACTITIONER

## 2019-02-21 PROCEDURE — 99214 PR OFFICE/OUTPT VISIT, EST, LEVL IV, 30-39 MIN: ICD-10-PCS | Mod: HCNC,S$GLB,, | Performed by: NURSE PRACTITIONER

## 2019-02-21 PROCEDURE — 99999 PR PBB SHADOW E&M-EST. PATIENT-LVL V: ICD-10-PCS | Mod: PBBFAC,HCNC,, | Performed by: NURSE PRACTITIONER

## 2019-02-21 NOTE — TELEPHONE ENCOUNTER
----- Message from Gypsy Sharp sent at 2/21/2019  2:14 PM CST -----  Contact: [facility] Ms Reid  Stated the pt blood sugar is still elevated and medicare wants to discharge him due to non-compliance pt isn't taken his medication as instructed, she can be reach at [phone] Thanks

## 2019-02-21 NOTE — TELEPHONE ENCOUNTER
Messaged Pamela Owen with previous glucose readings given by Home Health as well as history of noncompliance.

## 2019-02-21 NOTE — TELEPHONE ENCOUNTER
We received a call today from the patient's Home Health nurse that he was seen today in your office and you did not want the patient to start on the Trulicity that was ordered for Home Health to administer, but instead placed patient on insulin injections twice daily. Dr Hernandez has asked that this message be sent to you for your review. The Home Health nurse has been seeing the patient one a week for his diabetes and blood pressure. Each week she has called to report an elevated glucose. On 1/24/19 it was 305, 2/1/19 it was 233, 2/6/19 it was 278, 2/15/19 it was 326 and today it was 495. Each time the patient stated that he had forgotten to take his insulin. He is being discharged from Home Health in 2 weeks due to non compliance with his diabetes medication. Dr Hernandez asked for us to forward this information to you to get your recommendation as he has been noncompliant and has not remembered to take the twice daily dosing of insulin. Thank you for your assistance.

## 2019-02-21 NOTE — TELEPHONE ENCOUNTER
Spoke with Leydi, advised Dr Hernandez sent Trulicity and pt pick it up,  Ps daughter has a lot of questions, unable to answer questions because she states she is confused with his care and does not know what to ask. Unable to help pts family at this time,     Pavithra Pamela was notified by PCP regarding Trulicity,.

## 2019-02-21 NOTE — PROGRESS NOTES
Subjective:      Patient ID: Abdullahi Salazar is a 73 y.o. male.    Chief Complaint:   2 week glucose log review Routine f/u Type 2 Dm     History of Present Illness  CHIEF COMPLAINT:    Pt is a 73 y.o. wm with a diagnosis of Type 2 diabetes mellitus diagnosed approximately 2004, as well as chronic conditions pending review including HTN, HLP, CAD. Also pt t with recurrent chronic SDH and now s/p shunt for NPH.    Interim Events: No acute events.  Has HH at house now, not sure why.  Recent call from  indicated pt glucose was 250--pt may have forgotten insulin.  Pt was supposed to be taking 60 & 40 units bid conventional 70/30, and may have forgotten his dose.  Pt states his insulin was recently stopped and weekly Trulicity started--but he has not started the first dose yet.  Pt suspects he daughter called provider wanting easier regime for him.  Wife is present today. She states he usually doesn't forget insulin (not sure how good historian she is).  Important to note pt a1c dropped from 13 to 9% in 3 mo while on insulin.  No c/o hypoglycemia.  No glucose logs. No c/o hypoglycemia.  No focal complaints.     Since memory is clearly an issue opted to put on Conventional 70/30 BID vs basal bolus.  States his DM has never been so well controlled--He also did not bring glucose logs which was his only reason for coming today.  No c/o hypoglycemia. R    FBS today 282--no insulin this AM, and probably not last night either.     Eats breakfast from 8-10 am,   SOmetimes lunch, or snacks  Supper around 6        Diabetes Flow Sheet:   Diabetes Medications: Novolin 70/30  60 and 40 units bid.   Prior Regimens: Lantus 45 , Humalog 30-30-30  plus ss with meals  (but may scale dose back to 10 if glucose low or eating lgihgt) based on what he is eating.   Diabetes Complications:   Aspirin: yes   Statin: Zetia &  Livalo r/t myalgias. --states cannot tolerate statins r/t myalgias and whelchol causes too much abdominal discomfort.    ACE/ARB: yes   Last Urine Microalbumin: 12/13   Last Eye exam: Approx   Last Diabetic Education: 8/13.       PAST MEDICAL HISTORY/PAST SURGICAL HISTORY: Reviewed in [facility]   SOCIAL HISTORY: No T/A. Retired    FAMILY HISTORY: + DM 2. No known thyroid disease. Lung Cancer.   MEDICATIONS/ALLERGIES: The patient's MedCard has been updated and reviewed.       Review of Systems   Constitutional: Negative for activity change and fatigue.   HENT: Negative for hearing loss and trouble swallowing.    Eyes: Negative for photophobia and visual disturbance.        Last Eye Exam:    Respiratory: Negative for cough and shortness of breath.    Cardiovascular: Negative for chest pain and palpitations.   Gastrointestinal: Negative for constipation and diarrhea.   Genitourinary: Negative for frequency and urgency.   Musculoskeletal: Negative for arthralgias and myalgias.   Skin: Negative for rash and wound.   Neurological: Negative for weakness and numbness.   Psychiatric/Behavioral: Negative for sleep disturbance. The patient is not nervous/anxious.        Objective:   Physical Exam   Constitutional: He is oriented to person, place, and time. He appears well-developed.   HENT:   Head: Normocephalic and atraumatic.   Nose: Nose normal.   Eyes: Conjunctivae and EOM are normal. Pupils are equal, round, and reactive to light.   Neck: Normal range of motion. Neck supple. No tracheal deviation present. No thyromegaly present.   Cardiovascular: Normal rate, regular rhythm and normal heart sounds.   Musculoskeletal: Normal range of motion.   Feet:   Neurological: He is alert and oriented to person, place, and time. He has normal reflexes.   Vibratory sensation to feet:   Skin: Skin is warm and dry. No rash noted. No erythema.   Psychiatric: He has a normal mood and affect. His behavior is normal. Judgment and thought content normal.       Lab Review:     Chemistry        Component Value Date/Time     12/30/2018 0435    K 4.1  12/30/2018 0435    CL 99 12/30/2018 0435    CO2 29 12/30/2018 0435    BUN 27 (H) 12/30/2018 0435    CREATININE 1.50 (H) 12/30/2018 0435     (H) 12/30/2018 0435        Component Value Date/Time    CALCIUM 8.8 12/30/2018 0435    ALKPHOS 94 12/28/2018 1932    AST 45 12/28/2018 1932    AST 47 02/15/2016 0453    ALT 22 12/28/2018 1932    BILITOT 1.8 (H) 12/28/2018 1932        Hemoglobin A1C   Date Value Ref Range Status   02/12/2019 9.3 (H) 4.0 - 5.6 % Final     Comment:     ADA Screening Guidelines:  5.7-6.4%  Consistent with prediabetes  >or=6.5%  Consistent with diabetes  High levels of fetal hemoglobin interfere with the HbA1C  assay. Heterozygous hemoglobin variants (HbS, HgC, etc)do  not significantly interfere with this assay.   However, presence of multiple variants may affect accuracy.     12/05/2018 13.0 (H) 0.0 - 5.6 % Final     Comment:     Reference Interval:  5.0 - 5.6 Normal   5.7 - 6.4 High Risk   > 6.5 Diabetic    Hgb A1c results are standardized based on the (NGSP) National   Glycohemoglobin Standardization Program.    Hemoglobin A1C levels are related to mean serum/plasma glucose   during the preceding 2-3 months.        11/14/2018 >14.0 (H) 4.0 - 5.6 % Final     Comment:     ADA Screening Guidelines:  5.7-6.4%  Consistent with prediabetes  >or=6.5%  Consistent with diabetes  High levels of fetal hemoglobin interfere with the HbA1C  assay. Heterozygous hemoglobin variants (HbS, HgC, etc)do  not significantly interfere with this assay.   However, presence of multiple variants may affect accuracy.       Lab Results   Component Value Date    LDLCALC 62.0 (L) 12/06/2018     Lab Results   Component Value Date    TSH 0.816 06/20/2018         Assessment:     1. Type 2 diabetes mellitus with diabetic nephropathy, without long-term current use of insulin  insulin NPH-insulin regular, 70/30, (NOVOLIN 70/30) 100 unit/mL (70-30) injection  Chronic-uncontrolled-see plan     Hemoglobin A1c   2. Coronary  artery disease involving coronary bypass graft of native heart without angina pectoris  Chronic-stable-avoid hypoglycemia    3. Memory loss  -chronic-impeding DM care    4. Ischemic cardiomyopathy  -chronic-increases insulin resistance    5. Benign essential HTN  -zsleiur-yvtnxh-ar change            Plan:     _will switch to conventional therapy--pt does not need tight control.  ~8% reasonable     Resume:    COntinue Novolin 70/30   60 and 40 bid.     D/c Trulicity--a GLP will not replace 100 units of insulin a day,  Likely little benefit with DM >5 yrs,   And will expedite pt into pharmacy gap.      ORDERS 02/21/2019     2 week glucose log review     3  months with a1c prior in [place].

## 2019-02-21 NOTE — TELEPHONE ENCOUNTER
Home health nurse called to say that patient was seen by Pamela Owen endocrinology today and she does not want patient taking Trulicity. She put him on insulin twice daily. When patient was seen, the wife told that he usually does not forget to take his insulin and that his glucose has never been so well controlled. This has not been true from reports received by the home health nurse. Today his glucose was 495 when he was checked. Pt did not take a glucose log to his appointment. He is being discharged from home health in 2 weeks due to non compliance.

## 2019-02-21 NOTE — TELEPHONE ENCOUNTER
Please forward this to Pamela Owen to comment on this and make a recommendation given the fact that he is noncomplaint and has not historically remembered to do the bid dosing of insulin.

## 2019-02-21 NOTE — TELEPHONE ENCOUNTER
----- Message from Abhay Pierre sent at 2/21/2019  4:17 PM CST -----  Contact: Daughter Leydi   Daughter Leydi need to speak with a nurse has some questions, please call back at [phone] after 315

## 2019-03-06 ENCOUNTER — TELEPHONE (OUTPATIENT)
Dept: FAMILY MEDICINE | Facility: CLINIC | Age: 74
End: 2019-03-06

## 2019-03-06 NOTE — TELEPHONE ENCOUNTER
Ask them if he took his daily dose of medicine yet or had he forgotten?     Att his point, with it being high, I recommend giving 10 units of his insulin Nph/Regular and recheck the glucose in 2 hours.      Notify his diabetic educator about it also.

## 2019-03-06 NOTE — TELEPHONE ENCOUNTER
----- Message from Lise Kuo sent at 3/6/2019 11:20 AM CST -----  Contact: [facility]  Fyi: patient Blood surgar is 470

## 2019-03-07 ENCOUNTER — TELEPHONE (OUTPATIENT)
Dept: FAMILY MEDICINE | Facility: CLINIC | Age: 74
End: 2019-03-07

## 2019-03-07 NOTE — TELEPHONE ENCOUNTER
I agree with that.  Please call and give authorization.   Patient called to follow up on any progress to assist with getting new provider that can prescribe medications.    Requested call back to discuss and o.k to leave detailed message

## 2019-03-07 NOTE — TELEPHONE ENCOUNTER
Spoke with patient and he states that he had not taken his medication as directed yesterday. He states he has just gotten up this morning and is getting ready to take his medication now. Had patient check glucose. States it is 225. Encouraged patient to take medication as directed.

## 2019-03-07 NOTE — TELEPHONE ENCOUNTER
----- Message from Marcy Weston sent at 3/7/2019 10:00 AM CST -----  Contact: Gregory with [facility]   Calling to request a  to evaluate needed assistant for patient in the home to care for his medication, check blood sugar, etc. States his vitals are unstable - patient have some memory issues. Please call Gregory today @ [phone]. Thanks, ana cristina

## 2019-03-08 ENCOUNTER — LAB VISIT (OUTPATIENT)
Dept: LAB | Facility: HOSPITAL | Age: 74
End: 2019-03-08
Attending: FAMILY MEDICINE
Payer: MEDICARE

## 2019-03-08 DIAGNOSIS — D64.9 ANEMIA, UNSPECIFIED TYPE: ICD-10-CM

## 2019-03-08 LAB
BASOPHILS # BLD AUTO: 0.03 K/UL
BASOPHILS NFR BLD: 0.5 %
DIFFERENTIAL METHOD: ABNORMAL
EOSINOPHIL # BLD AUTO: 0.1 K/UL
EOSINOPHIL NFR BLD: 2.5 %
ERYTHROCYTE [DISTWIDTH] IN BLOOD BY AUTOMATED COUNT: 15.8 %
HCT VFR BLD AUTO: 36.6 %
HGB BLD-MCNC: 11.6 G/DL
IMM GRANULOCYTES # BLD AUTO: 0.02 K/UL
IMM GRANULOCYTES NFR BLD AUTO: 0.4 %
IRON SERPL-MCNC: 67 UG/DL
LYMPHOCYTES # BLD AUTO: 1.1 K/UL
LYMPHOCYTES NFR BLD: 19.4 %
MCH RBC QN AUTO: 32.6 PG
MCHC RBC AUTO-ENTMCNC: 31.7 G/DL
MCV RBC AUTO: 103 FL
MONOCYTES # BLD AUTO: 0.7 K/UL
MONOCYTES NFR BLD: 11.8 %
NEUTROPHILS # BLD AUTO: 3.6 K/UL
NEUTROPHILS NFR BLD: 65.4 %
NRBC BLD-RTO: 0 /100 WBC
PLATELET # BLD AUTO: 166 K/UL
PMV BLD AUTO: 10.4 FL
RBC # BLD AUTO: 3.56 M/UL
SATURATED IRON: 17 %
TOTAL IRON BINDING CAPACITY: 400 UG/DL
TRANSFERRIN SERPL-MCNC: 270 MG/DL
WBC # BLD AUTO: 5.51 K/UL

## 2019-03-08 PROCEDURE — 36415 COLL VENOUS BLD VENIPUNCTURE: CPT | Mod: HCNC,PO

## 2019-03-08 PROCEDURE — 83540 ASSAY OF IRON: CPT | Mod: HCNC

## 2019-03-08 PROCEDURE — 85025 COMPLETE CBC W/AUTO DIFF WBC: CPT | Mod: HCNC

## 2019-03-09 NOTE — PROGRESS NOTES
The iron remains low and the blood count remains low but they are stable at this time.  Please continue using iron tablets.  Recheck the levels in 3 months.

## 2019-03-11 ENCOUNTER — TELEPHONE (OUTPATIENT)
Dept: FAMILY MEDICINE | Facility: CLINIC | Age: 74
End: 2019-03-11

## 2019-03-11 NOTE — TELEPHONE ENCOUNTER
Notified Jeanette with [facility] that per Dr Hernandez states that patient's family needs to be involved in addressing the need for patient to take medication as directed. Jeanette states that she has notified the daughter of the issue and she is meeting with a  today to see what can be done.

## 2019-03-11 NOTE — TELEPHONE ENCOUNTER
----- Message from Christi Jones sent at 3/11/2019  1:59 PM CDT -----  Contact: Dannielle, [facility]  Ms Reid states she took patients blood sugar level earlier and  It [id number] blood sugar, patient had no discomfort, rechecked just now and it is 499, patient states he is fine, doesn't feel like he needs to go to ER, he is scheduled to be discharged tomorrow from [facility]. Would like to see what kind of help he can get to manage his blood sugar. Please call her back at  [phone]

## 2019-03-12 ENCOUNTER — TELEPHONE (OUTPATIENT)
Dept: FAMILY MEDICINE | Facility: CLINIC | Age: 74
End: 2019-03-12

## 2019-03-12 DIAGNOSIS — D64.9 ANEMIA, UNSPECIFIED TYPE: Primary | ICD-10-CM

## 2019-03-12 NOTE — TELEPHONE ENCOUNTER
Has his family decided on a plan to make sure that he gets his insulin daily?    Orders Placed This Encounter   Procedures    CBC auto differential     Standing Status:   Future     Standing Expiration Date:   5/10/2020    Iron and TIBC     Standing Status:   Future     Standing Expiration Date:   5/10/2020

## 2019-03-12 NOTE — TELEPHONE ENCOUNTER
----- Message from Maximo Hernandez MD sent at 3/9/2019  8:38 AM CST -----  The iron remains low and the blood count remains low but they are stable at this time.  Please continue using iron tablets.  Recheck the levels in 3 months.

## 2019-03-16 ENCOUNTER — PATIENT MESSAGE (OUTPATIENT)
Dept: CARDIOLOGY | Facility: CLINIC | Age: 74
End: 2019-03-16

## 2019-03-19 ENCOUNTER — LAB VISIT (OUTPATIENT)
Dept: LAB | Facility: HOSPITAL | Age: 74
End: 2019-03-19
Attending: INTERNAL MEDICINE
Payer: MEDICARE

## 2019-03-19 ENCOUNTER — TELEPHONE (OUTPATIENT)
Dept: ENDOCRINOLOGY | Facility: CLINIC | Age: 74
End: 2019-03-19

## 2019-03-19 DIAGNOSIS — N18.30 CKD (CHRONIC KIDNEY DISEASE) STAGE 3, GFR 30-59 ML/MIN: ICD-10-CM

## 2019-03-19 LAB
ALBUMIN SERPL BCP-MCNC: 3.9 G/DL
ANION GAP SERPL CALC-SCNC: 9 MMOL/L
BUN SERPL-MCNC: 19 MG/DL
CALCIUM SERPL-MCNC: 9.6 MG/DL
CHLORIDE SERPL-SCNC: 104 MMOL/L
CO2 SERPL-SCNC: 26 MMOL/L
CREAT SERPL-MCNC: 1.6 MG/DL
CREAT UR-MCNC: 93 MG/DL
EST. GFR  (AFRICAN AMERICAN): 48 ML/MIN/1.73 M^2
EST. GFR  (NON AFRICAN AMERICAN): 41.5 ML/MIN/1.73 M^2
GLUCOSE SERPL-MCNC: 268 MG/DL
PHOSPHATE SERPL-MCNC: 3.1 MG/DL
POTASSIUM SERPL-SCNC: 4.3 MMOL/L
PROT UR-MCNC: 117 MG/DL
PROT/CREAT UR: 1.26 MG/G{CREAT}
PTH-INTACT SERPL-MCNC: 107 PG/ML
SODIUM SERPL-SCNC: 139 MMOL/L

## 2019-03-19 PROCEDURE — 80069 RENAL FUNCTION PANEL: CPT | Mod: HCNC

## 2019-03-19 PROCEDURE — 84156 ASSAY OF PROTEIN URINE: CPT | Mod: HCNC

## 2019-03-19 PROCEDURE — 36415 COLL VENOUS BLD VENIPUNCTURE: CPT | Mod: HCNC,PO

## 2019-03-19 PROCEDURE — 83970 ASSAY OF PARATHORMONE: CPT | Mod: HCNC

## 2019-03-19 NOTE — TELEPHONE ENCOUNTER
"Pt reported no blood sugar logs. Will monitor blood sugars for 1-2 weeks then call us to get in to see ANNA Booth  "ok thank you"  "

## 2019-03-19 NOTE — TELEPHONE ENCOUNTER
----- Message from Nadira Nguyen sent at 3/19/2019 12:32 PM CDT -----  Contact: Patient  Type: Needs Medical Advice    Who Called:  Patient  Best Call Back Number:   Additional Information: Calling to speak with the Nurse. He went to the [place] office today for his appt at 11. He had the Providers mixed up. He still wants to be seen today. Please advise.

## 2019-03-20 ENCOUNTER — TELEPHONE (OUTPATIENT)
Dept: NEUROSURGERY | Facility: CLINIC | Age: 74
End: 2019-03-20

## 2019-03-20 ENCOUNTER — OFFICE VISIT (OUTPATIENT)
Dept: NEUROLOGY | Facility: CLINIC | Age: 74
End: 2019-03-20
Payer: MEDICARE

## 2019-03-20 VITALS
RESPIRATION RATE: 17 BRPM | HEIGHT: 67 IN | HEART RATE: 80 BPM | BODY MASS INDEX: 32.47 KG/M2 | SYSTOLIC BLOOD PRESSURE: 141 MMHG | DIASTOLIC BLOOD PRESSURE: 63 MMHG | WEIGHT: 206.88 LBS

## 2019-03-20 DIAGNOSIS — Z86.79 HX OF SUBDURAL HEMATOMA: ICD-10-CM

## 2019-03-20 DIAGNOSIS — G91.2 NPH (NORMAL PRESSURE HYDROCEPHALUS): Primary | ICD-10-CM

## 2019-03-20 DIAGNOSIS — Z98.2 S/P VP SHUNT: ICD-10-CM

## 2019-03-20 DIAGNOSIS — G40.909 SEIZURE DISORDER: ICD-10-CM

## 2019-03-20 DIAGNOSIS — E11.21 TYPE 2 DIABETES MELLITUS WITH DIABETIC NEPHROPATHY, WITHOUT LONG-TERM CURRENT USE OF INSULIN: ICD-10-CM

## 2019-03-20 DIAGNOSIS — F02.80 DEMENTIA ASSOCIATED WITH OTHER UNDERLYING DISEASE WITHOUT BEHAVIORAL DISTURBANCE: Chronic | ICD-10-CM

## 2019-03-20 PROCEDURE — 99215 OFFICE O/P EST HI 40 MIN: CPT | Mod: HCNC,S$GLB,, | Performed by: PSYCHIATRY & NEUROLOGY

## 2019-03-20 PROCEDURE — 3046F HEMOGLOBIN A1C LEVEL >9.0%: CPT | Mod: HCNC,CPTII,S$GLB, | Performed by: PSYCHIATRY & NEUROLOGY

## 2019-03-20 PROCEDURE — 3078F DIAST BP <80 MM HG: CPT | Mod: HCNC,CPTII,S$GLB, | Performed by: PSYCHIATRY & NEUROLOGY

## 2019-03-20 PROCEDURE — 3046F PR MOST RECENT HEMOGLOBIN A1C LEVEL > 9.0%: ICD-10-PCS | Mod: HCNC,CPTII,S$GLB, | Performed by: PSYCHIATRY & NEUROLOGY

## 2019-03-20 PROCEDURE — 99215 PR OFFICE/OUTPT VISIT, EST, LEVL V, 40-54 MIN: ICD-10-PCS | Mod: HCNC,S$GLB,, | Performed by: PSYCHIATRY & NEUROLOGY

## 2019-03-20 PROCEDURE — 99999 PR PBB SHADOW E&M-EST. PATIENT-LVL IV: CPT | Mod: PBBFAC,HCNC,, | Performed by: PSYCHIATRY & NEUROLOGY

## 2019-03-20 PROCEDURE — 3077F PR MOST RECENT SYSTOLIC BLOOD PRESSURE >= 140 MM HG: ICD-10-PCS | Mod: HCNC,CPTII,S$GLB, | Performed by: PSYCHIATRY & NEUROLOGY

## 2019-03-20 PROCEDURE — 3078F PR MOST RECENT DIASTOLIC BLOOD PRESSURE < 80 MM HG: ICD-10-PCS | Mod: HCNC,CPTII,S$GLB, | Performed by: PSYCHIATRY & NEUROLOGY

## 2019-03-20 PROCEDURE — 1101F PR PT FALLS ASSESS DOC 0-1 FALLS W/OUT INJ PAST YR: ICD-10-PCS | Mod: HCNC,CPTII,S$GLB, | Performed by: PSYCHIATRY & NEUROLOGY

## 2019-03-20 PROCEDURE — 99999 PR PBB SHADOW E&M-EST. PATIENT-LVL IV: ICD-10-PCS | Mod: PBBFAC,HCNC,, | Performed by: PSYCHIATRY & NEUROLOGY

## 2019-03-20 PROCEDURE — 3077F SYST BP >= 140 MM HG: CPT | Mod: HCNC,CPTII,S$GLB, | Performed by: PSYCHIATRY & NEUROLOGY

## 2019-03-20 PROCEDURE — 1101F PT FALLS ASSESS-DOCD LE1/YR: CPT | Mod: HCNC,CPTII,S$GLB, | Performed by: PSYCHIATRY & NEUROLOGY

## 2019-03-20 RX ORDER — AMLODIPINE BESYLATE 10 MG/1
5 TABLET ORAL
COMMUNITY
End: 2019-05-02

## 2019-03-20 RX ORDER — INSULIN GLARGINE 100 [IU]/ML
45 INJECTION, SOLUTION SUBCUTANEOUS
Status: ON HOLD | COMMUNITY
End: 2019-04-29 | Stop reason: HOSPADM

## 2019-03-20 RX ORDER — INSULIN LISPRO 100 [IU]/ML
15 INJECTION, SOLUTION INTRAVENOUS; SUBCUTANEOUS
Status: ON HOLD | COMMUNITY
End: 2019-04-29 | Stop reason: HOSPADM

## 2019-03-20 RX ORDER — CLOPIDOGREL BISULFATE 75 MG/1
TABLET ORAL
Status: ON HOLD | COMMUNITY
Start: 2019-02-13 | End: 2019-04-29 | Stop reason: SDUPTHER

## 2019-03-20 RX ORDER — LEVETIRACETAM 500 MG/1
750 TABLET ORAL 2 TIMES DAILY
Qty: 270 TABLET | Refills: 3 | Status: SHIPPED | OUTPATIENT
Start: 2019-03-20

## 2019-03-20 NOTE — PROGRESS NOTES
"    Date: 3/20/2019    Patient ID: Abdullahi Salazar is a 73 y.o. male.    Referring Provider:  Maximo Hernandez MD    Chief Complaint: Memory Loss      History of Present Illness:  Mr. Salazar is a 73 y.o. male with NPH s/p shunt, seizures, and TIA who presents referred by Maximo Hernandez MD today for evaluation of memory concerns. The patient was accompanied by his wife who has dementia and is unable to give additional history. The patient does not know much of medications he is taking and is a vague historian. Much of the below history was obtained from extensive chart review to determine recent events.     He has not had any seizures to his knowledge. From chart review, his last seizure was in 2017. He should be on keppra 750 mg BID. No side effects that he knows. He states his daughter makes up his pill box and he is not sure what he takes.     His walking is "ok". He is limited in distance but he feels steady on his feet. No falls. He was seen by Dr. Oconnor in the hospital on [place] 15 and had worsening subdural bleed and worsening hydrocephalus on exam. He increased the shunt settings and recommended holding aspirin and plavix with repeat CT head in 2 weeks. This was not done. His current med list is aspirin, plavix, and pradaxa. He is not sure if he is on all of them. No headaches.     He and his wife continue to live alone and their daughter checks on them and puts medications in pill boxes. He states he had home health but was just "discharged" as they told him there was nothing more for them to do.     Review of Systems:   All other systems were reviewed and negative except as mentioned in the HPI    Allergies:  Review of patient's allergies indicates:   Allergen Reactions    Nateglinide      Other reaction(s): fatigue  Other reaction(s): fatigue    Pioglitazone Other (See Comments)     Other reaction(s): CAD    Simvastatin Other (See Comments)     Other reaction(s): Muscle pain       Current " "Medications:  Current Outpatient Medications   Medication Sig Dispense Refill    amLODIPine (NORVASC) 10 MG tablet Take 5 mg by mouth.      aspirin (ECOTRIN) 81 MG EC tablet Take 1 tablet (81 mg total) by mouth once daily.  0    atorvastatin (LIPITOR) 40 MG tablet Take 1 tablet (40 mg total) by mouth once daily. 90 tablet 3    benazepril (LOTENSIN) 40 MG tablet Take 1 tablet (40 mg total) by mouth once daily. 90 tablet 3    blood sugar diagnostic (ACCU-CHEK CLEOPATRA PLUS TEST STRP) Strp TEST BLOOD SUGARS 4 TIMES DAILY 150 strip PRN    carvedilol (COREG) 25 MG tablet Take 1 tablet (25 mg total) by mouth 2 (two) times daily with meals. 180 tablet 3    clopidogrel (PLAVIX) 75 mg tablet       dabigatran etexilate (PRADAXA) 75 mg Cap Take 1 capsule (75 mg total) by mouth 2 (two) times daily. "Do NOT break, chew, or open capsules." 180 capsule 3    ezetimibe (ZETIA) 10 mg tablet TAKE 1 TABLET EVERY DAY 90 tablet 3    ferrous sulfate (FEOSOL) 325 mg (65 mg iron) Tab tablet TAKE 1 TABLET THREE TIMES DAILY 270 tablet 0    hydrALAZINE (APRESOLINE) 100 MG tablet TAKE 1 TABLET TWICE DAILY 180 tablet 11    insulin glargine (LANTUS U-100 INSULIN) 100 unit/mL injection Inject 45 Units into the skin.      insulin lispro (HUMALOG U-100 INSULIN) 100 unit/mL injection Inject 15 Units into the skin.      insulin NPH-insulin regular, 70/30, (NOVOLIN 70/30) 100 unit/mL (70-30) injection 60  Units 30 min before breakfast,   40 units 30 min before supper.  Either Humalin, Novolin or Relion 9 vial 3    insulin syringe-needle U-100 1 mL 30 gauge x 5/16 Syrg Use with insulin twice a day 100 each 12    isosorbide mononitrate (IMDUR) 120 MG 24 hr tablet Take 1 tablet (120 mg total) by mouth once daily. 30 tablet 1    lancets (ACCU-CHEK SOFTCLIX LANCETS) Misc 1 each by Misc.(Non-Drug; Combo Route) route 2 (two) times daily. 100 each 11    levETIRAcetam (KEPPRA) 500 MG Tab Take 1.5 tablets (750 mg total) by mouth 2 (two) times " "daily. 270 tablet 3    multivitamin capsule Take 1 capsule by mouth once daily.      nitroGLYCERIN (NITROSTAT) 0.4 MG SL tablet Place 1 tablet (0.4 mg total) under the tongue every 5 (five) minutes as needed for Chest pain. 100 tablet 11    pantoprazole (PROTONIX) 40 MG tablet Take 1 tablet (40 mg total) by mouth 2 (two) times daily. 60 tablet 1    pen needle, diabetic (BD ULTRA-FINE SHORT PEN NEEDLE) 31 gauge x 5/16" Ndle USE FOUR TIMES DAILY 360 each 3    polyethylene glycol (GLYCOLAX) 17 gram PwPk Take 17 g by mouth once daily. 30 packet 0    potassium chloride SA (K-DUR,KLOR-CON) 10 MEQ tablet TAKE 1 TABLET EVERY DAY 90 tablet 11    ranitidine (ZANTAC) 300 MG tablet Take 1 tablet (300 mg total) by mouth every evening. 30 tablet 2    tamsulosin (FLOMAX) 0.4 mg Cap TAKE 1 CAPSULE EVERY DAY 90 capsule 3    torsemide (DEMADEX) 10 MG Tab Take 3 tablets (30 mg total) by mouth once daily. 90 tablet 0    pantoprazole (PROTONIX) 40 MG tablet Take 1 tablet (40 mg total) by mouth once daily. 90 tablet 0    ranitidine (ZANTAC) 300 MG tablet Take 1 tablet (300 mg total) by mouth every evening. 90 tablet 0     No current facility-administered medications for this visit.        Past Medical History:  Past Medical History:   Diagnosis Date    Actinic keratoses     Altered mental status 3/22/2015    Anticoagulant long-term use     Atrial fibrillation     CAD (coronary artery disease)     stents after bypass    Carotid artery stenosis 3/31/2015    CHF (congestive heart failure) 03/2018    Colon polyps 2011    repeat colonoscopy 2014    Combined hyperlipidemia associated with type 2 diabetes mellitus     Diabetes mellitus type II, uncontrolled     dx 2004    GERD (gastroesophageal reflux disease)     Grand mal seizure     last 2/2017    Hard of hearing     History of cardiac pacemaker 3/31/2015    HTN (hypertension)     Hydrocephalus 09/08/2017    Influenza A 1/2/2018    He got influenza a recently and " was in the hospital in [place].  He had rhabdomyolyisis and acute kidney injury.  He also had an increased troponin.  He had a flu shot in early December.    Iron deficiency anemia due to chronic blood loss 1/7/2019    Mitral valve insufficiency     Presence of automatic (implantable) cardiac defibrillator     Respiratory distress     Sleep apnea with use of continuous positive airway pressure (CPAP)     patient states he does not use CPAP anymore    Syncope 3/30/2015    Wears dentures     upper and lower    Wears hearing aid     sometimes       Past Surgical History:  Past Surgical History:   Procedure Laterality Date    AORTOGRAM WITH RUNOFF Bilateral 8/24/2016    [facility] Brown [facility] CATH    biotronic icd placed Left 3/11/2016    [facility]    BRAIN SURGERY      CARDIAC PACEMAKER PLACEMENT      CARDIOVERSION N/A 2/12/2015    [facility] LAB    CATARACT EXTRACTION W/ INTRAOCULAR LENS  IMPLANT, BILATERAL      COLONOSCOPY N/A 3/17/2015    [facility] ENDO    CORONARY ARTERY BYPASS GRAFT  1993    three vessels    CORONARY STENT PLACEMENT      CSF SHUNT      EGD (ESOPHAGOGASTRODUODENOSCOPY) N/A 2/7/2019    Performed by Abdelrahman Hinojosa MD at [facility] ENDO    EYE SURGERY      HEART CATH-LEFT N/A 2/18/2016    Performed by Toño Haro MD at [facility] CATH    HERNIA REPAIR      as infant    LUMBAR PUNCTURE N/A 5/22/2017    Performed by Britton Pizarro [facility] at [facility] CATH    SHUNT-  RIGHT FRONTAL  SHUNT WITH BIOPSY OF MIDDLE FRONTAL N/A 8/8/2017    Performed by Seun Oconnor MD at [facility] OR       Family History:  family history includes Diabetes in his sister; Hypertension in his sister; Lung cancer in his father; Stomach cancer in his mother.    Social History:   reports that he quit smoking about 36 years ago. His smoking use included cigarettes. He has a 50.00  "pack-year smoking history. he has never used smokeless tobacco. He reports that he drinks about 0.6 oz of alcohol per week. He reports that he does not use drugs.    Physical Exam:  Vitals:    03/20/19 1102   BP: (!) 141/63   Pulse: 80   Resp: 17   Weight: 93.8 kg (206 lb 14.4 oz)   Height: 5' 7" (1.702 m)   PainSc: 0-No pain     Body mass index is 32.41 kg/m².  General: Well developed, well nourished.  No acute distress.  Musculoskeletal: No obvious joint deformities, moves all extremities well.  Peripheral vascular: No edema noted    Neurological Exam:  Mental status: Awake, alert. MOCA 19/30 (see below for details).   Speech/Language: No dysarthria or aphasia on conversation.   Cranial nerves: Face symmetric  Motor: Moves all extremities well  Coordination: No ataxia. No tremor.   Gait: Normal gait            Data:  I have personally reviewed the referring provider's notes, labs, & imaging made available to me today.       Labs:  CBC:   Lab Results   Component Value Date    WBC 5.51 03/08/2019    HGB 11.6 (L) 03/08/2019    HCT 36.6 (L) 03/08/2019     03/08/2019     (H) 03/08/2019    RDW 15.8 (H) 03/08/2019     BMP:   Lab Results   Component Value Date     03/19/2019    K 4.3 03/19/2019     03/19/2019    CO2 26 03/19/2019    BUN 19 03/19/2019    CREATININE 1.6 (H) 03/19/2019     (H) 03/19/2019    CALCIUM 9.6 03/19/2019    MG 2.1 12/07/2018    PHOS 3.1 03/19/2019     LFTS;   Lab Results   Component Value Date    PROT 7.8 12/28/2018    ALBUMIN 3.9 03/19/2019    BILITOT 1.8 (H) 12/28/2018    AST 45 12/28/2018    ALKPHOS 94 12/28/2018    ALT 22 12/28/2018     COAGS:   Lab Results   Component Value Date    INR 1.2 07/27/2018     FLP:   Lab Results   Component Value Date    CHOL 108 (L) 12/06/2018    HDL 32 (L) 12/06/2018    LDLCALC 62.0 (L) 12/06/2018    TRIG 70 12/06/2018    CHOLHDL 29.6 12/06/2018         Imaging:  I have personally reviewed the imaging, CT head from Jan showed " "subdural blood in the posterior fossa and hydrocephalus.     Assessment and Plan:  Mr. Salazar is a 73 y.o. male referred to me by Maximo Hernandez MD for evaluation of memory loss. His MOCA testing is worse compared to 2017 before his shunt (was 22/30 at that time). This could be due to developing neurodegenerative disease but also Dr. Oconnor has had to adjust the shunt due to subdural collections. He did not followup as recommended and I have ordered CT head and followup with Dr. Oconnor. He needs to likely NOT be on pradaxa, aspirin, and plavix but I encouraged him to discuss with his cardiologist. I encouraged him to consider moving to an assisted living facility but he is opposed to this. I would like to talk with his daughter but she is not listed on his "involvement of care form".     I have also made a referral to podiatry per their request to clip his toenails.     NPH (normal pressure hydrocephalus)  -     CT Head Without Contrast; Future; Expected date: 03/20/2019    S/P  shunt  -     CT Head Without Contrast; Future; Expected date: 03/20/2019    Type 2 diabetes mellitus with diabetic nephropathy, without long-term current use of insulin  -     Ambulatory consult to Podiatry    Dementia associated with other underlying disease without behavioral disturbance    Seizure disorder    Hx of subdural hematoma    Other orders  -     levETIRAcetam (KEPPRA) 500 MG Tab; Take 1.5 tablets (750 mg total) by mouth 2 (two) times daily.  Dispense: 270 tablet; Refill: 3      Greater than 50% of the patient's 45 minute clinic visit was spent on counseling and arranging care.    "

## 2019-03-20 NOTE — LETTER
March 20, 2019      Maximo Hernandez MD  [address] [place] [address]           [facility]  [address] [facility] [address] [place] [address]  Phone: [phone]  Fax: [phone]          Patient: Abdullahi Salazar   MR Number: [id number]   YOB: 1945   Date of Visit: 3/20/2019       Dear Dr. Maximo Hernandez:    Thank you for referring Abdullahi Salazar to me for evaluation. Attached you will find relevant portions of my assessment and plan of care.    If you have questions, please do not hesitate to call me. I look forward to following Abdullahi Salazar along with you.    Sincerely,    Madeleine Marley MD    Enclosure  CC:  No Recipients    If you would like to receive this communication electronically, please contact [email or web address] or [phone] to request more information on [employer] Link access.    For providers and/or their staff who would like to refer a patient to [facility], please contact us through our one-stop-shop provider referral line, [facility], at [phone].    If you feel you have received this communication in error or would no longer like to receive these types of communications, please e-mail [email or web address]

## 2019-03-20 NOTE — TELEPHONE ENCOUNTER
----- Message from Meli Sung LPN sent at 3/20/2019 11:54 AM CDT -----  Please call patient to schedule follow up after CT.

## 2019-03-22 ENCOUNTER — OFFICE VISIT (OUTPATIENT)
Dept: NEPHROLOGY | Facility: CLINIC | Age: 74
End: 2019-03-22
Payer: MEDICARE

## 2019-03-22 VITALS
BODY MASS INDEX: 32.33 KG/M2 | DIASTOLIC BLOOD PRESSURE: 96 MMHG | SYSTOLIC BLOOD PRESSURE: 185 MMHG | WEIGHT: 206 LBS | HEIGHT: 67 IN | HEART RATE: 80 BPM

## 2019-03-22 DIAGNOSIS — E87.1 HYPONATREMIA: ICD-10-CM

## 2019-03-22 DIAGNOSIS — E11.22 TYPE 2 DIABETES MELLITUS WITH STAGE 3 CHRONIC KIDNEY DISEASE, WITH LONG-TERM CURRENT USE OF INSULIN: ICD-10-CM

## 2019-03-22 DIAGNOSIS — N18.30 TYPE 2 DIABETES MELLITUS WITH STAGE 3 CHRONIC KIDNEY DISEASE, WITH LONG-TERM CURRENT USE OF INSULIN: ICD-10-CM

## 2019-03-22 DIAGNOSIS — Z79.4 LONG TERM CURRENT USE OF INSULIN: ICD-10-CM

## 2019-03-22 DIAGNOSIS — I25.5 ISCHEMIC CARDIOMYOPATHY: ICD-10-CM

## 2019-03-22 DIAGNOSIS — N18.30 CKD (CHRONIC KIDNEY DISEASE) STAGE 3, GFR 30-59 ML/MIN: Primary | ICD-10-CM

## 2019-03-22 DIAGNOSIS — I10 BENIGN ESSENTIAL HTN: ICD-10-CM

## 2019-03-22 DIAGNOSIS — G91.2 NORMAL PRESSURE HYDROCEPHALUS: ICD-10-CM

## 2019-03-22 DIAGNOSIS — Z79.4 TYPE 2 DIABETES MELLITUS WITH STAGE 3 CHRONIC KIDNEY DISEASE, WITH LONG-TERM CURRENT USE OF INSULIN: ICD-10-CM

## 2019-03-22 DIAGNOSIS — I11.0 HYPERTENSIVE HEART DISEASE WITH HEART FAILURE: ICD-10-CM

## 2019-03-22 DIAGNOSIS — E11.21 TYPE 2 DIABETES MELLITUS WITH DIABETIC NEPHROPATHY, WITHOUT LONG-TERM CURRENT USE OF INSULIN: ICD-10-CM

## 2019-03-22 DIAGNOSIS — R80.9 PROTEINURIA DUE TO TYPE 2 DIABETES MELLITUS: ICD-10-CM

## 2019-03-22 DIAGNOSIS — I25.10 CORONARY ARTERY DISEASE, ANGINA PRESENCE UNSPECIFIED, UNSPECIFIED VESSEL OR LESION TYPE, UNSPECIFIED WHETHER NATIVE OR TRANSPLANTED HEART: ICD-10-CM

## 2019-03-22 DIAGNOSIS — G47.30 SLEEP APNEA IN ADULT: ICD-10-CM

## 2019-03-22 DIAGNOSIS — E11.29 PROTEINURIA DUE TO TYPE 2 DIABETES MELLITUS: ICD-10-CM

## 2019-03-22 DIAGNOSIS — F02.80 DEMENTIA ASSOCIATED WITH OTHER UNDERLYING DISEASE WITHOUT BEHAVIORAL DISTURBANCE: Chronic | ICD-10-CM

## 2019-03-22 DIAGNOSIS — N18.4 CKD (CHRONIC KIDNEY DISEASE) STAGE 4, GFR 15-29 ML/MIN: ICD-10-CM

## 2019-03-22 DIAGNOSIS — Z98.2 S/P VP SHUNT: ICD-10-CM

## 2019-03-22 DIAGNOSIS — E66.01 CLASS 2 SEVERE OBESITY DUE TO EXCESS CALORIES WITH SERIOUS COMORBIDITY AND BODY MASS INDEX (BMI) OF 36.0 TO 36.9 IN ADULT: ICD-10-CM

## 2019-03-22 DIAGNOSIS — I48.0 PAROXYSMAL ATRIAL FIBRILLATION: ICD-10-CM

## 2019-03-22 PROCEDURE — 99499 UNLISTED E&M SERVICE: CPT | Mod: HCNC,S$GLB,, | Performed by: INTERNAL MEDICINE

## 2019-03-22 PROCEDURE — 99214 OFFICE O/P EST MOD 30 MIN: CPT | Mod: HCNC,S$GLB,, | Performed by: INTERNAL MEDICINE

## 2019-03-22 PROCEDURE — 1101F PR PT FALLS ASSESS DOC 0-1 FALLS W/OUT INJ PAST YR: ICD-10-PCS | Mod: HCNC,CPTII,S$GLB, | Performed by: INTERNAL MEDICINE

## 2019-03-22 PROCEDURE — 3046F HEMOGLOBIN A1C LEVEL >9.0%: CPT | Mod: HCNC,CPTII,S$GLB, | Performed by: INTERNAL MEDICINE

## 2019-03-22 PROCEDURE — 99214 PR OFFICE/OUTPT VISIT, EST, LEVL IV, 30-39 MIN: ICD-10-PCS | Mod: HCNC,S$GLB,, | Performed by: INTERNAL MEDICINE

## 2019-03-22 PROCEDURE — 3077F PR MOST RECENT SYSTOLIC BLOOD PRESSURE >= 140 MM HG: ICD-10-PCS | Mod: HCNC,CPTII,S$GLB, | Performed by: INTERNAL MEDICINE

## 2019-03-22 PROCEDURE — 3080F DIAST BP >= 90 MM HG: CPT | Mod: HCNC,CPTII,S$GLB, | Performed by: INTERNAL MEDICINE

## 2019-03-22 PROCEDURE — 3046F PR MOST RECENT HEMOGLOBIN A1C LEVEL > 9.0%: ICD-10-PCS | Mod: HCNC,CPTII,S$GLB, | Performed by: INTERNAL MEDICINE

## 2019-03-22 PROCEDURE — 99999 PR PBB SHADOW E&M-EST. PATIENT-LVL III: CPT | Mod: PBBFAC,HCNC,, | Performed by: INTERNAL MEDICINE

## 2019-03-22 PROCEDURE — 99999 PR PBB SHADOW E&M-EST. PATIENT-LVL III: ICD-10-PCS | Mod: PBBFAC,HCNC,, | Performed by: INTERNAL MEDICINE

## 2019-03-22 PROCEDURE — 3080F PR MOST RECENT DIASTOLIC BLOOD PRESSURE >= 90 MM HG: ICD-10-PCS | Mod: HCNC,CPTII,S$GLB, | Performed by: INTERNAL MEDICINE

## 2019-03-22 PROCEDURE — 99499 RISK ADDL DX/OHS AUDIT: ICD-10-PCS | Mod: HCNC,S$GLB,, | Performed by: INTERNAL MEDICINE

## 2019-03-22 PROCEDURE — 3077F SYST BP >= 140 MM HG: CPT | Mod: HCNC,CPTII,S$GLB, | Performed by: INTERNAL MEDICINE

## 2019-03-22 PROCEDURE — 1101F PT FALLS ASSESS-DOCD LE1/YR: CPT | Mod: HCNC,CPTII,S$GLB, | Performed by: INTERNAL MEDICINE

## 2019-03-22 RX ORDER — VALSARTAN 320 MG/1
TABLET ORAL
Qty: 90 TABLET | Refills: 3 | Status: SHIPPED | OUTPATIENT
Start: 2019-03-22 | End: 2019-06-24 | Stop reason: SDUPTHER

## 2019-03-26 ENCOUNTER — HOSPITAL ENCOUNTER (OUTPATIENT)
Dept: RADIOLOGY | Facility: HOSPITAL | Age: 74
Discharge: HOME | End: 2019-03-26
Attending: PSYCHIATRY & NEUROLOGY
Payer: MEDICARE

## 2019-03-26 DIAGNOSIS — Z98.2 S/P VP SHUNT: ICD-10-CM

## 2019-03-26 DIAGNOSIS — G91.2 NPH (NORMAL PRESSURE HYDROCEPHALUS): ICD-10-CM

## 2019-03-26 PROCEDURE — 70450 CT HEAD WITHOUT CONTRAST: ICD-10-PCS | Mod: 26,HCNC,, | Performed by: RADIOLOGY

## 2019-03-26 PROCEDURE — 70450 CT HEAD/BRAIN W/O DYE: CPT | Mod: TC,HCNC,PO

## 2019-03-26 PROCEDURE — 70450 CT HEAD/BRAIN W/O DYE: CPT | Mod: 26,HCNC,, | Performed by: RADIOLOGY

## 2019-03-28 ENCOUNTER — OFFICE VISIT (OUTPATIENT)
Dept: PODIATRY | Facility: CLINIC | Age: 74
End: 2019-03-28
Payer: MEDICARE

## 2019-03-28 VITALS
WEIGHT: 206.25 LBS | SYSTOLIC BLOOD PRESSURE: 128 MMHG | HEIGHT: 67 IN | BODY MASS INDEX: 32.37 KG/M2 | RESPIRATION RATE: 20 BRPM | DIASTOLIC BLOOD PRESSURE: 69 MMHG | HEART RATE: 80 BPM

## 2019-03-28 DIAGNOSIS — B35.1 ONYCHOMYCOSIS DUE TO DERMATOPHYTE: ICD-10-CM

## 2019-03-28 DIAGNOSIS — E11.21 TYPE 2 DIABETES MELLITUS WITH DIABETIC NEPHROPATHY, WITHOUT LONG-TERM CURRENT USE OF INSULIN: Primary | ICD-10-CM

## 2019-03-28 DIAGNOSIS — M20.41 HAMMER TOES OF BOTH FEET: ICD-10-CM

## 2019-03-28 DIAGNOSIS — M20.42 HAMMER TOES OF BOTH FEET: ICD-10-CM

## 2019-03-28 PROCEDURE — 11721 DEBRIDE NAIL 6 OR MORE: CPT | Mod: Q9,HCNC,S$GLB, | Performed by: PODIATRIST

## 2019-03-28 PROCEDURE — 99203 OFFICE O/P NEW LOW 30 MIN: CPT | Mod: 25,HCNC,S$GLB, | Performed by: PODIATRIST

## 2019-03-28 PROCEDURE — 1101F PR PT FALLS ASSESS DOC 0-1 FALLS W/OUT INJ PAST YR: ICD-10-PCS | Mod: HCNC,CPTII,S$GLB, | Performed by: PODIATRIST

## 2019-03-28 PROCEDURE — 3074F SYST BP LT 130 MM HG: CPT | Mod: HCNC,CPTII,S$GLB, | Performed by: PODIATRIST

## 2019-03-28 PROCEDURE — 99999 PR PBB SHADOW E&M-EST. PATIENT-LVL III: ICD-10-PCS | Mod: PBBFAC,HCNC,, | Performed by: PODIATRIST

## 2019-03-28 PROCEDURE — 3046F PR MOST RECENT HEMOGLOBIN A1C LEVEL > 9.0%: ICD-10-PCS | Mod: HCNC,CPTII,S$GLB, | Performed by: PODIATRIST

## 2019-03-28 PROCEDURE — 3078F PR MOST RECENT DIASTOLIC BLOOD PRESSURE < 80 MM HG: ICD-10-PCS | Mod: HCNC,CPTII,S$GLB, | Performed by: PODIATRIST

## 2019-03-28 PROCEDURE — 11721 PR DEBRIDEMENT OF NAILS, 6 OR MORE: ICD-10-PCS | Mod: Q9,HCNC,S$GLB, | Performed by: PODIATRIST

## 2019-03-28 PROCEDURE — 99203 PR OFFICE/OUTPT VISIT, NEW, LEVL III, 30-44 MIN: ICD-10-PCS | Mod: 25,HCNC,S$GLB, | Performed by: PODIATRIST

## 2019-03-28 PROCEDURE — 3078F DIAST BP <80 MM HG: CPT | Mod: HCNC,CPTII,S$GLB, | Performed by: PODIATRIST

## 2019-03-28 PROCEDURE — 3074F PR MOST RECENT SYSTOLIC BLOOD PRESSURE < 130 MM HG: ICD-10-PCS | Mod: HCNC,CPTII,S$GLB, | Performed by: PODIATRIST

## 2019-03-28 PROCEDURE — 99999 PR PBB SHADOW E&M-EST. PATIENT-LVL III: CPT | Mod: PBBFAC,HCNC,, | Performed by: PODIATRIST

## 2019-03-28 PROCEDURE — 3046F HEMOGLOBIN A1C LEVEL >9.0%: CPT | Mod: HCNC,CPTII,S$GLB, | Performed by: PODIATRIST

## 2019-03-28 PROCEDURE — 1101F PT FALLS ASSESS-DOCD LE1/YR: CPT | Mod: HCNC,CPTII,S$GLB, | Performed by: PODIATRIST

## 2019-03-28 NOTE — LETTER
March 29, 2019      Madeleine Marley MD  [address] [facility] [address] [place] [address]           [facility]  [address] [facility] [address] [place] [address]  Phone: [phone]          Patient: Abdullahi Salazar   MR Number: [id number]   YOB: 1945   Date of Visit: 3/28/2019       Dear Dr. Madeleine Marley:    Thank you for referring Abdullahi Salazar to me for evaluation. Attached you will find relevant portions of my assessment and plan of care.    If you have questions, please do not hesitate to call me. I look forward to following Abdullahi Salazar along with you.    Sincerely,    Surjit Piña, BREE    Enclosure  CC:  No Recipients    If you would like to receive this communication electronically, please contact [email or web address] or [phone] to request more information on [employer] Link access.    For providers and/or their staff who would like to refer a patient to [facility], please contact us through our one-stop-shop provider referral line, [facility], at [phone].    If you feel you have received this communication in error or would no longer like to receive these types of communications, please e-mail [email or web address]

## 2019-03-29 NOTE — PROGRESS NOTES
Subjective:      Patient ID: Abdullahi Salazar is a 73 y.o. male.    Chief Complaint: Diabetic Foot Exam (PCP: Raysa seen 1/7/19 with A1C: 9.3 on 2/12/19---also seen by ENDO: 2/21/19 SERGIO Owen) and Diabetes Mellitus (1st time to Podiatry here with DR. Piña)    Abdullahi is a 73 y.o. male who presents to the clinic upon referral from Dr. Marley  for evaluation and treatment of diabetic feet. Abdullahi has a past medical history of Actinic keratoses, Altered mental status (3/22/2015), Anticoagulant long-term use, Atrial fibrillation, CAD (coronary artery disease), Carotid artery stenosis (3/31/2015), CHF (congestive heart failure) (03/2018), Colon polyps (2011), Combined hyperlipidemia associated with type 2 diabetes mellitus, Diabetes mellitus type II, uncontrolled, GERD (gastroesophageal reflux disease), Grand mal seizure, Hard of hearing, History of cardiac pacemaker (3/31/2015), HTN (hypertension), Hydrocephalus (09/08/2017), Influenza A (1/2/2018), Iron deficiency anemia due to chronic blood loss (1/7/2019), Mitral valve insufficiency, Presence of automatic (implantable) cardiac defibrillator, Respiratory distress, Sleep apnea with use of continuous positive airway pressure (CPAP), Syncope (3/30/2015), Wears dentures, and Wears hearing aid. Patient relates no major problem with feet. Only complaints today consist of toenails in need of trimming.  Denies being painful with wearing shoe gear and has not attempted to trim on his own.  Denies having overt neuropathy pain in the extremities.  Denies any additional pedal complaints.    PCP: Maximo Hernandez MD    Date Last Seen by PCP: 1/7/19    Hemoglobin A1C   Date Value Ref Range Status   02/12/2019 9.3 (H) 4.0 - 5.6 % Final     Comment:     ADA Screening Guidelines:  5.7-6.4%  Consistent with prediabetes  >or=6.5%  Consistent with diabetes  High levels of fetal hemoglobin interfere with the HbA1C  assay. Heterozygous hemoglobin variants (HbS, HgC, etc)do  not significantly  interfere with this assay.   However, presence of multiple variants may affect accuracy.     12/05/2018 13.0 (H) 0.0 - 5.6 % Final     Comment:     Reference Interval:  5.0 - 5.6 Normal   5.7 - 6.4 High Risk   > 6.5 Diabetic    Hgb A1c results are standardized based on the (NGSP) National   Glycohemoglobin Standardization Program.    Hemoglobin A1C levels are related to mean serum/plasma glucose   during the preceding 2-3 months.        11/14/2018 >14.0 (H) 4.0 - 5.6 % Final     Comment:     ADA Screening Guidelines:  5.7-6.4%  Consistent with prediabetes  >or=6.5%  Consistent with diabetes  High levels of fetal hemoglobin interfere with the HbA1C  assay. Heterozygous hemoglobin variants (HbS, HgC, etc)do  not significantly interfere with this assay.   However, presence of multiple variants may affect accuracy.             Past Medical History:   Diagnosis Date    Actinic keratoses     Altered mental status 3/22/2015    Anticoagulant long-term use     Atrial fibrillation     CAD (coronary artery disease)     stents after bypass    Carotid artery stenosis 3/31/2015    CHF (congestive heart failure) 03/2018    Colon polyps 2011    repeat colonoscopy 2014    Combined hyperlipidemia associated with type 2 diabetes mellitus     Diabetes mellitus type II, uncontrolled     dx 2004    GERD (gastroesophageal reflux disease)     Grand mal seizure     last 2/2017    Hard of hearing     History of cardiac pacemaker 3/31/2015    HTN (hypertension)     Hydrocephalus 09/08/2017    Influenza A 1/2/2018    He got influenza a recently and was in the hospital in [place].  He had rhabdomyolyisis and acute kidney injury.  He also had an increased troponin.  He had a flu shot in early December.    Iron deficiency anemia due to chronic blood loss 1/7/2019    Mitral valve insufficiency     Presence of automatic (implantable) cardiac defibrillator     Respiratory distress     Sleep apnea with use of continuous positive  airway pressure (CPAP)     patient states he does not use CPAP anymore    Syncope 3/30/2015    Wears dentures     upper and lower    Wears hearing aid     sometimes       Past Surgical History:   Procedure Laterality Date    AORTOGRAM WITH RUNOFF Bilateral 8/24/2016    [facility] CATH    biotronic icd placed Left 3/11/2016    Per[facility] by [facility]ordy Rodney [facility] at [facility] CATH    BRAIN SURGERY      CARDIAC PACEMAKER PLACEMENT      CARDIOVERSION N/A 2/12/2015    [facility] LAB    CATARACT EXTRACTION W/ INTRAOCULAR LENS  IMPLANT, BILATERAL      COLONOSCOPY N/A 3/17/2015    [facility] ENDO    CORONARY ARTERY BYPASS GRAFT  1993    three vessels    CORONARY STENT PLACEMENT      CSF SHUNT      EGD (ESOPHAGOGASTRODUODENOSCOPY) N/A 2/7/2019    Performed by Abdelrahman Hinojosa MD at [facility] ENDO    EYE SURGERY      HEART CATH-LEFT N/A 2/18/2016    Performed by Toño Haro MD at [facility] CATH    HERNIA REPAIR      as infant    LUMBAR PUNCTURE N/A 5/22/2017    Performed by Britton Pizarro MD at [facility] CATH    SHUNT-  RIGHT FRONTAL  SHUNT WITH BIOPSY OF MIDDLE FRONTAL N/A 8/8/2017    Performed by Seun Oconnor MD at [facility] OR       Family History   Problem Relation Age of Onset    Stomach cancer Mother     Lung cancer Father     Diabetes Sister     Hypertension Sister     Heart disease Neg Hx     Stroke Neg Hx        Social History     Socioeconomic History    Marital status:      Spouse name: None    Number of children: None    Years of education: None    Highest education level: None   Occupational History    None   Social Needs    Financial resource strain: None    Food insecurity:     Worry: None     Inability: None    Transportation needs:     Medical: None     Non-medical: None   Tobacco Use    Smoking status: Former Smoker     Packs/day: 2.00     Years: 25.00     Pack years:  "50.00     Types: Cigarettes     Last attempt to quit: 1983     Years since quittin.2    Smokeless tobacco: Never Used    Tobacco comment: quit     Substance and Sexual Activity    Alcohol use: Yes     Alcohol/week: 0.6 oz     Types: 1 Cans of beer per week     Comment: rarely    Drug use: No    Sexual activity: Yes     Partners: Female   Lifestyle    Physical activity:     Days per week: None     Minutes per session: None    Stress: None   Relationships    Social connections:     Talks on phone: None     Gets together: None     Attends Hindu service: None     Active member of club or organization: None     Attends meetings of clubs or organizations: None     Relationship status: None    Intimate partner violence:     Fear of current or ex partner: None     Emotionally abused: None     Physically abused: None     Forced sexual activity: None   Other Topics Concern    None   Social History Narrative    None       Current Outpatient Medications   Medication Sig Dispense Refill    amLODIPine (NORVASC) 10 MG tablet Take 5 mg by mouth.      aspirin (ECOTRIN) 81 MG EC tablet Take 1 tablet (81 mg total) by mouth once daily.  0    atorvastatin (LIPITOR) 40 MG tablet Take 1 tablet (40 mg total) by mouth once daily. 90 tablet 3    blood sugar diagnostic (ACCU-CHEK CLEOPATRA PLUS TEST STRP) Strp TEST BLOOD SUGARS 4 TIMES DAILY 150 strip PRN    carvedilol (COREG) 25 MG tablet Take 1 tablet (25 mg total) by mouth 2 (two) times daily with meals. 180 tablet 3    clopidogrel (PLAVIX) 75 mg tablet       dabigatran etexilate (PRADAXA) 75 mg Cap Take 1 capsule (75 mg total) by mouth 2 (two) times daily. "Do NOT break, chew, or open capsules." 180 capsule 3    ezetimibe (ZETIA) 10 mg tablet TAKE 1 TABLET EVERY DAY 90 tablet 3    ferrous sulfate (FEOSOL) 325 mg (65 mg iron) Tab tablet TAKE 1 TABLET THREE TIMES DAILY 270 tablet 0    hydrALAZINE (APRESOLINE) 100 MG tablet TAKE 1 TABLET TWICE DAILY 180 " "tablet 11    insulin glargine (LANTUS U-100 INSULIN) 100 unit/mL injection Inject 45 Units into the skin.      insulin lispro (HUMALOG U-100 INSULIN) 100 unit/mL injection Inject 15 Units into the skin.      insulin NPH-insulin regular, 70/30, (NOVOLIN 70/30) 100 unit/mL (70-30) injection 60  Units 30 min before breakfast,   40 units 30 min before supper.  Either Humalin, Novolin or Relion 9 vial 3    insulin syringe-needle U-100 1 mL 30 gauge x 5/16 Syrg Use with insulin twice a day 100 each 12    isosorbide mononitrate (IMDUR) 120 MG 24 hr tablet Take 1 tablet (120 mg total) by mouth once daily. 30 tablet 1    lancets (ACCU-CHEK SOFTCLIX LANCETS) Misc 1 each by Misc.(Non-Drug; Combo Route) route 2 (two) times daily. 100 each 11    levETIRAcetam (KEPPRA) 500 MG Tab Take 1.5 tablets (750 mg total) by mouth 2 (two) times daily. 270 tablet 3    multivitamin capsule Take 1 capsule by mouth once daily.      nitroGLYCERIN (NITROSTAT) 0.4 MG SL tablet Place 1 tablet (0.4 mg total) under the tongue every 5 (five) minutes as needed for Chest pain. 100 tablet 11    pantoprazole (PROTONIX) 40 MG tablet Take 1 tablet (40 mg total) by mouth 2 (two) times daily. 60 tablet 1    pantoprazole (PROTONIX) 40 MG tablet Take 1 tablet (40 mg total) by mouth once daily. 90 tablet 0    pen needle, diabetic (BD ULTRA-FINE SHORT PEN NEEDLE) 31 gauge x 5/16" Ndle USE FOUR TIMES DAILY 360 each 3    polyethylene glycol (GLYCOLAX) 17 gram PwPk Take 17 g by mouth once daily. 30 packet 0    potassium chloride SA (K-DUR,KLOR-CON) 10 MEQ tablet TAKE 1 TABLET EVERY DAY 90 tablet 11    ranitidine (ZANTAC) 300 MG tablet Take 1 tablet (300 mg total) by mouth every evening. 30 tablet 2    ranitidine (ZANTAC) 300 MG tablet Take 1 tablet (300 mg total) by mouth every evening. 90 tablet 0    tamsulosin (FLOMAX) 0.4 mg Cap TAKE 1 CAPSULE EVERY DAY 90 capsule 3    torsemide (DEMADEX) 10 MG Tab Take 3 tablets (30 mg total) by mouth once " daily. 90 tablet 0    valsartan (DIOVAN) 320 MG tablet One po QAM to replace benazepril 90 tablet 3     No current facility-administered medications for this visit.        Review of patient's allergies indicates:   Allergen Reactions    Nateglinide      Other reaction(s): fatigue  Other reaction(s): fatigue    Pioglitazone Other (See Comments)     Other reaction(s): CAD    Simvastatin Other (See Comments)     Other reaction(s): Muscle pain         Review of Systems   Constitution: Negative for chills and fever.   Cardiovascular: Positive for leg swelling. Negative for claudication.   Skin: Positive for color change, dry skin and nail changes.   Musculoskeletal: Positive for joint swelling. Negative for joint pain, muscle cramps, muscle weakness and myalgias.   Neurological: Positive for numbness and paresthesias.   Psychiatric/Behavioral: Negative for altered mental status.           Objective:      Physical Exam   Constitutional: He is oriented to person, place, and time. He appears well-developed and well-nourished. No distress.   Cardiovascular:   Pulses:       Dorsalis pedis pulses are 2+ on the right side, and 2+ on the left side.        Posterior tibial pulses are 1+ on the right side, and 1+ on the left side.   CFT is < 3 seconds bilateral.  Pedal hair growth is decreased bilateral.  Varicosities noted bilateral.  Moderate nonpitting lower extremity edema noted bilateral.  Toes are cool to touch bilateral.     Musculoskeletal: He exhibits edema. He exhibits no tenderness.   Muscle strength 5/5 in all muscle groups bilateral.  No tenderness nor crepitation with ROM of foot/ankle joints bilateral.  No tenderness with palpation of bilateral foot and ankle.  Bilateral pes planus foot type.  Bilateral semi-reducible hammertoes.  Bilateral hallux abducto valgus.     Neurological: He is alert and oriented to person, place, and time. He has normal strength. A sensory deficit is present.   Protective sensation per  [place] monofilament is decreased bilateral.  Vibratory sensation is decreased bilateral.  Light touch is intact bilateral.   Skin: Skin is warm, dry and intact. Capillary refill takes 2 to 3 seconds. No abrasion, no bruising, no burn, no ecchymosis, no laceration, no lesion, no petechiae and no rash noted. He is not diaphoretic. No erythema. No pallor.   Pedal skin appears edematous bilateral.  Toenails x 10 appear thickened by 2mm's, elongated by 4 mm's, and discolored with subungual debris.  Hemosiderin staining noted to bilateral lower extremity. Examination of the skin reveals no evidence of significant maceration, rashes, open lesions, suspicious appearing nevi or other concerning lesions.              Assessment:       Encounter Diagnoses   Name Primary?    Type 2 diabetes mellitus with diabetic nephropathy, without long-term current use of insulin Yes    Hammer toes of both feet     Onychomycosis due to dermatophyte          Plan:       Abdullahi was seen today for diabetic foot exam and diabetes mellitus.    Diagnoses and all orders for this visit:    Type 2 diabetes mellitus with diabetic nephropathy, without long-term current use of insulin    Hammer toes of both feet    Onychomycosis due to dermatophyte      I counseled the patient on his conditions, their implications and medical management.    Advised to continue wearing shoe gear that accommodates digital deformities.    Shoe inspection. Diabetic Foot Education. Patient reminded of the importance of good nutrition and blood sugar control to help prevent podiatric complications of diabetes. Patient instructed on proper foot hygeine. We discussed wearing proper shoe gear, daily foot inspections, never walking without protective shoe gear, never putting sharp instruments to feet    With patient's permission, nails were aggressively reduced and debrided x 10 to their soft tissue attachment mechanically and with electric , removing all  offending nail and debris. Patient relates relief following the procedure. He will continue to monitor the areas daily, inspect his feet, wear protective shoe gear when ambulatory, moisturizer to maintain skin integrity and follow in this office in approximately 2-3 months, sooner p.r.n.    Follow up in about 3 months (around 6/28/2019).    Surjit Piña DPM

## 2019-03-31 ENCOUNTER — PATIENT MESSAGE (OUTPATIENT)
Dept: NEUROSURGERY | Facility: CLINIC | Age: 74
End: 2019-03-31

## 2019-04-01 NOTE — PROGRESS NOTES
Subjective:       Patient ID: Abdullahi Salazar is a 73 y.o. White male who presents for follow-up evaluation of Follow-up and Chronic Kidney Disease    HPI     He is seen for surgical clearance for CEA. He reports he is doing well. LE about the same. No LUTS. Good appetite with last Hba1c of 12.4    Interval history March 2019: he is late for follow up. He reports he is feeling 'great' No LE edema and no SOB. His last Hba1c was 9.3. His wife does not recognize me       Review of Systems   Constitutional: Negative for activity change, appetite change, fatigue and unexpected weight change.   HENT: Negative for facial swelling.    Eyes: Negative for visual disturbance.   Respiratory: Negative for cough and shortness of breath.    Cardiovascular: Positive for leg swelling. Negative for chest pain.   Gastrointestinal: Negative for constipation and diarrhea.   Endocrine: Negative for polyuria.   Genitourinary: Positive for frequency. Negative for difficulty urinating, dysuria and hematuria.   Musculoskeletal: Positive for arthralgias and gait problem (much improved).   Allergic/Immunologic: Negative for immunocompromised state.   Neurological: Positive for weakness. Negative for light-headedness and headaches.       Objective:      Physical Exam   Constitutional: He is oriented to person, place, and time. He appears well-developed and well-nourished.   HENT:   Mouth/Throat: Oropharynx is clear and moist.   Eyes: No scleral icterus.   Neck: No JVD present.   Cardiovascular: S1 normal and S2 normal. Exam reveals no friction rub.   Pulmonary/Chest: Breath sounds normal. He has no wheezes. He has no rales.   Abdominal: Soft.   Musculoskeletal: He exhibits no edema.   Neurological: He is alert and oriented to person, place, and time.   Skin: Skin is warm and dry.   Psychiatric: He has a normal mood and affect.   Vitals reviewed.      Assessment:       1. CKD (chronic kidney disease) stage 3, GFR 30-59 ml/min    2. CKD (chronic  kidney disease) stage 4, GFR 15-29 ml/min    3. Hyponatremia    4. Type 2 diabetes mellitus with diabetic nephropathy, without long-term current use of insulin    5. Type 2 diabetes mellitus with stage 3 chronic kidney disease, with long-term current use of insulin    6. Proteinuria due to type 2 diabetes mellitus    7. Long term current use of insulin    8. Sleep apnea in adult    9. Class 2 severe obesity due to excess calories with serious comorbidity and body mass index (BMI) of 36.0 to 36.9 in adult    10. Benign essential HTN    11. Coronary artery disease, angina presence unspecified, unspecified vessel or lesion type, unspecified whether native or transplanted heart    12. Hypertensive heart disease with heart failure    13. Ischemic cardiomyopathy    14. Paroxysmal atrial fibrillation    15. Normal pressure hydrocephalus    16. S/P  shunt    17. Dementia associated with other underlying disease without behavioral disturbance        Plan:             CKD stage 3/4 with stable kidney function.  Continue RAAS blockade for renal preservation    HTN--uncontrolled, and will change ace to ARB, valsartan but at a higher dose    MBD--PTH elevated due to SHPT but at goal    DM--improved but still controlled. Continue endocrine and PCP follow up    CAD--continue cardiology follow up      RTC 4 months with labs prior in [place]

## 2019-04-12 ENCOUNTER — PATIENT MESSAGE (OUTPATIENT)
Dept: NEUROSURGERY | Facility: CLINIC | Age: 74
End: 2019-04-12

## 2019-04-16 ENCOUNTER — CLINICAL SUPPORT (OUTPATIENT)
Dept: CARDIOLOGY | Facility: CLINIC | Age: 74
End: 2019-04-16
Payer: MEDICARE

## 2019-04-16 ENCOUNTER — OFFICE VISIT (OUTPATIENT)
Dept: ENDOCRINOLOGY | Facility: CLINIC | Age: 74
End: 2019-04-16
Payer: MEDICARE

## 2019-04-16 VITALS
SYSTOLIC BLOOD PRESSURE: 192 MMHG | DIASTOLIC BLOOD PRESSURE: 110 MMHG | BODY MASS INDEX: 32.62 KG/M2 | HEIGHT: 67 IN | HEART RATE: 80 BPM | WEIGHT: 207.81 LBS

## 2019-04-16 DIAGNOSIS — I48.0 PAROXYSMAL ATRIAL FIBRILLATION: ICD-10-CM

## 2019-04-16 DIAGNOSIS — I10 BENIGN ESSENTIAL HTN: ICD-10-CM

## 2019-04-16 DIAGNOSIS — I25.10 CORONARY ARTERY DISEASE, ANGINA PRESENCE UNSPECIFIED, UNSPECIFIED VESSEL OR LESION TYPE, UNSPECIFIED WHETHER NATIVE OR TRANSPLANTED HEART: ICD-10-CM

## 2019-04-16 DIAGNOSIS — N18.30 TYPE 2 DIABETES MELLITUS WITH STAGE 3 CHRONIC KIDNEY DISEASE, WITH LONG-TERM CURRENT USE OF INSULIN: Primary | ICD-10-CM

## 2019-04-16 DIAGNOSIS — E11.69 COMBINED HYPERLIPIDEMIA ASSOCIATED WITH TYPE 2 DIABETES MELLITUS: ICD-10-CM

## 2019-04-16 DIAGNOSIS — I25.5 ISCHEMIC CARDIOMYOPATHY: ICD-10-CM

## 2019-04-16 DIAGNOSIS — Z95.810 CARDIAC DEFIBRILLATOR IN SITU: ICD-10-CM

## 2019-04-16 DIAGNOSIS — E78.2 COMBINED HYPERLIPIDEMIA ASSOCIATED WITH TYPE 2 DIABETES MELLITUS: ICD-10-CM

## 2019-04-16 DIAGNOSIS — E11.22 TYPE 2 DIABETES MELLITUS WITH STAGE 3 CHRONIC KIDNEY DISEASE, WITH LONG-TERM CURRENT USE OF INSULIN: Primary | ICD-10-CM

## 2019-04-16 DIAGNOSIS — Z79.4 TYPE 2 DIABETES MELLITUS WITH STAGE 3 CHRONIC KIDNEY DISEASE, WITH LONG-TERM CURRENT USE OF INSULIN: Primary | ICD-10-CM

## 2019-04-16 DIAGNOSIS — F02.80 DEMENTIA ASSOCIATED WITH OTHER UNDERLYING DISEASE WITHOUT BEHAVIORAL DISTURBANCE: Chronic | ICD-10-CM

## 2019-04-16 PROCEDURE — 99214 OFFICE O/P EST MOD 30 MIN: CPT | Mod: S$GLB,,, | Performed by: NURSE PRACTITIONER

## 2019-04-16 PROCEDURE — 99214 PR OFFICE/OUTPT VISIT, EST, LEVL IV, 30-39 MIN: ICD-10-PCS | Mod: S$GLB,,, | Performed by: NURSE PRACTITIONER

## 2019-04-16 PROCEDURE — 99999 PR PBB SHADOW E&M-EST. PATIENT-LVL III: CPT | Mod: PBBFAC,HCNC,, | Performed by: NURSE PRACTITIONER

## 2019-04-16 PROCEDURE — 99999 PR PBB SHADOW E&M-EST. PATIENT-LVL III: ICD-10-PCS | Mod: PBBFAC,HCNC,, | Performed by: NURSE PRACTITIONER

## 2019-04-16 NOTE — PROGRESS NOTES
Subjective:      Patient ID: Abdullahi Salazar is a 73 y.o. male.    Chief Complaint:   Routine Dm visit.     History of Present Illness  CHIEF COMPLAINT:    Pt is a 73 y.o. wm with a diagnosis of Type 2 diabetes mellitus diagnosed approximately 2004, as well as chronic conditions pending review including HTN, HLP, CAD. Also pt t with recurrent chronic SDH and now s/p shunt for NPH.    Interim Events: Again no logs--last several visits were to review logs--yet he forgets every time.  His wife says he doesn't or rarely checks--she didn't know he was supposed to--though I have had same conversation with her as well.  BP markedly elevated--did not take meds this am.  He does recall correctly the Type and dosage of insulin he is to be taking.      's  Supper--cant remember.      Eats breakfast from 8-10 am,   SOmetimes lunch, or snacks  Supper around 6        Diabetes Flow Sheet:   Diabetes Medications: Novolin 70/30  60 and 40 units bid.   Prior Regimens: Lantus 45 , Humalog 30-30-30  plus ss with meals  (but may scale dose back to 10 if glucose low or eating lgihgt) based on what he is eating.   Diabetes Complications:   Aspirin: yes   Statin: Zetia &  Livalo r/t myalgias. --states cannot tolerate statins r/t myalgias and whelchol causes too much abdominal discomfort.   ACE/ARB: yes   Last Urine Microalbumin: 12/13   Last Eye exam: Approx   Last Diabetic Education: 8/13.       PAST MEDICAL HISTORY/PAST SURGICAL HISTORY: Reviewed in [facility]   SOCIAL HISTORY: No T/A. Retired    FAMILY HISTORY: + DM 2. No known thyroid disease. Lung Cancer.   MEDICATIONS/ALLERGIES: The patient's MedCard has been updated and reviewed.       Review of Systems   Constitutional: Negative for activity change and fatigue.   HENT: Negative for hearing loss and trouble swallowing.    Eyes: Negative for photophobia and visual disturbance.        Last Eye Exam:    Respiratory: Negative for cough and shortness of breath.     Cardiovascular: Negative for chest pain and palpitations.   Gastrointestinal: Negative for constipation and diarrhea.   Genitourinary: Negative for frequency and urgency.   Musculoskeletal: Negative for arthralgias and myalgias.   Skin: Negative for rash and wound.   Neurological: Negative for weakness and numbness.   Psychiatric/Behavioral: Negative for sleep disturbance. The patient is not nervous/anxious.        Objective:   Physical Exam   Constitutional: He is oriented to person, place, and time. He appears well-developed.   HENT:   Head: Normocephalic and atraumatic.   Nose: Nose normal.   Eyes: Pupils are equal, round, and reactive to light. Conjunctivae and EOM are normal.   Neck: Normal range of motion. Neck supple. No tracheal deviation present. No thyromegaly present.   Cardiovascular: Normal rate, regular rhythm and normal heart sounds.   Musculoskeletal: Normal range of motion.   Feet:   Neurological: He is alert and oriented to person, place, and time. He has normal reflexes.   Vibratory sensation to feet:   Skin: Skin is warm and dry. No rash noted. No erythema.   Psychiatric: He has a normal mood and affect. His behavior is normal. Judgment and thought content normal.       Lab Review:     Chemistry        Component Value Date/Time     03/19/2019 1034    K 4.3 03/19/2019 1034     03/19/2019 1034    CO2 26 03/19/2019 1034    BUN 19 03/19/2019 1034    CREATININE 1.6 (H) 03/19/2019 1034     (H) 03/19/2019 1034        Component Value Date/Time    CALCIUM 9.6 03/19/2019 1034    ALKPHOS 94 12/28/2018 1932    AST 45 12/28/2018 1932    AST 47 02/15/2016 0453    ALT 22 12/28/2018 1932    BILITOT 1.8 (H) 12/28/2018 1932        Hemoglobin A1C   Date Value Ref Range Status   02/12/2019 9.3 (H) 4.0 - 5.6 % Final     Comment:     ADA Screening Guidelines:  5.7-6.4%  Consistent with prediabetes  >or=6.5%  Consistent with diabetes  High levels of fetal hemoglobin interfere with the HbA1C  assay.  Heterozygous hemoglobin variants (HbS, HgC, etc)do  not significantly interfere with this assay.   However, presence of multiple variants may affect accuracy.     12/05/2018 13.0 (H) 0.0 - 5.6 % Final     Comment:     Reference Interval:  5.0 - 5.6 Normal   5.7 - 6.4 High Risk   > 6.5 Diabetic    Hgb A1c results are standardized based on the (NGSP) National   Glycohemoglobin Standardization Program.    Hemoglobin A1C levels are related to mean serum/plasma glucose   during the preceding 2-3 months.        11/14/2018 >14.0 (H) 4.0 - 5.6 % Final     Comment:     ADA Screening Guidelines:  5.7-6.4%  Consistent with prediabetes  >or=6.5%  Consistent with diabetes  High levels of fetal hemoglobin interfere with the HbA1C  assay. Heterozygous hemoglobin variants (HbS, HgC, etc)do  not significantly interfere with this assay.   However, presence of multiple variants may affect accuracy.       Lab Results   Component Value Date    LDLCALC 62.0 (L) 12/06/2018     Lab Results   Component Value Date    TSH 0.816 06/20/2018       .diag    Assessment:       1. Type 2 diabetes mellitus with stage 3 chronic kidney disease, with long-term current use of insulin  HM DIABETES FOOT EXAM  Chronic-uncontrolled-see plan     Hemoglobin A1c   2. Coronary artery disease, angina presence unspecified, unspecified vessel or lesion type, unspecified whether native or transplanted heart  Chronic-stable-avoid hypoglycemia    3. Combined hyperlipidemia associated with type 2 diabetes mellitus  amaalbh-bhqjgf-dbjg statin    4. Ischemic cardiomyopathy  crhonic-per cards-increases insulin resistance    5. Benign essential HTN  Chronic-worsened-see plan    6. Dementia associated with other underlying disease without behavioral disturbance  Chronic-impedes self care            Plan:     Continue  conventionalinsulin  therapy--pt does not need tight control.  ~8% reasonable       COntinue Novolin 70/30   60 and 40 bid.     Pt advised he must either go  to ER for go take BP meds immediately.  Denies any HA, dizziness, blurred vision reiterated very dangerous BP level.    ORDERS 04/16/2019       3  months with a1c prior in [place].  (link to [facility])

## 2019-04-24 PROBLEM — R07.9 CHEST PAIN: Status: ACTIVE | Noted: 2019-04-24

## 2019-04-24 PROBLEM — I24.9 ACS (ACUTE CORONARY SYNDROME): Status: ACTIVE | Noted: 2019-04-24

## 2019-04-25 ENCOUNTER — TELEPHONE (OUTPATIENT)
Dept: NEUROLOGY | Facility: CLINIC | Age: 74
End: 2019-04-25

## 2019-04-25 PROBLEM — G93.40 ACUTE ENCEPHALOPATHY: Status: ACTIVE | Noted: 2019-04-25

## 2019-04-25 NOTE — TELEPHONE ENCOUNTER
----- Message from Annemarie Álvarez sent at 4/25/2019  3:36 PM CDT -----  Contact: magno  Type: Needs Medical Advice    Who Called:  Patient's daughter Magno  Symptoms (please be specific):    How long has patient had these symptoms:    Pharmacy name and phone #:    Best Call Back Number: [phone]  Additional Information: called to advise that patient is in [facility], she is requesting that someone call her back

## 2019-04-25 NOTE — TELEPHONE ENCOUNTER
"Spoke with patient's daughter. She was explaining that the patient is at the hospital. They brought him because he was shaking, said he was "just going to die already" and had a high blood pressure; states it was 203/98 at home, "or around that". At the hospital, he stated he was having chest pains, but did not say that prior to getting to the hospital. She stated he's not compliant with his medications. Stated they were told the neurologist asked what was going on so she called us to let us know what has been going on. Stated they had a hard time waking him up today at the hospital. Note in chart from Dr. Hyman at the hospital today.  "

## 2019-04-29 DIAGNOSIS — I48.0 PAROXYSMAL ATRIAL FIBRILLATION: ICD-10-CM

## 2019-04-29 DIAGNOSIS — Z95.810 CARDIAC DEFIBRILLATOR IN SITU: Primary | ICD-10-CM

## 2019-04-30 ENCOUNTER — TELEPHONE (OUTPATIENT)
Dept: FAMILY MEDICINE | Facility: CLINIC | Age: 74
End: 2019-04-30

## 2019-04-30 ENCOUNTER — PATIENT MESSAGE (OUTPATIENT)
Dept: CARDIOLOGY | Facility: CLINIC | Age: 74
End: 2019-04-30

## 2019-04-30 NOTE — TELEPHONE ENCOUNTER
----- Message from Gina Way sent at 4/30/2019 11:40 AM CDT -----  Contact: pt   Type:  Sooner Apoointment Request    Caller is requesting a sooner appointment.  Caller declined first available appointment listed below.  Caller will not accept being placed on the waitlist and is requesting a message be sent to doctor.  Name of Caller: Maritza   When is the first available appointment?05/24/19  Symptoms: Hospital f/u  Would the patient rather a call back or a response via [facility]? Call back   Best Call Back Number:[phone]  Additional Information: caller states that pt need to see the doctor 4 days after being release and pt was release on yesterday

## 2019-05-01 ENCOUNTER — TELEPHONE (OUTPATIENT)
Dept: CARDIOLOGY | Facility: CLINIC | Age: 74
End: 2019-05-01

## 2019-05-01 ENCOUNTER — OFFICE VISIT (OUTPATIENT)
Dept: NEUROSURGERY | Facility: CLINIC | Age: 74
End: 2019-05-01
Payer: MEDICARE

## 2019-05-01 VITALS
HEART RATE: 80 BPM | BODY MASS INDEX: 31.08 KG/M2 | TEMPERATURE: 99 F | HEIGHT: 67 IN | DIASTOLIC BLOOD PRESSURE: 77 MMHG | WEIGHT: 198 LBS | SYSTOLIC BLOOD PRESSURE: 133 MMHG

## 2019-05-01 DIAGNOSIS — G91.2 NPH (NORMAL PRESSURE HYDROCEPHALUS): ICD-10-CM

## 2019-05-01 DIAGNOSIS — F02.80 DEMENTIA ASSOCIATED WITH OTHER UNDERLYING DISEASE WITHOUT BEHAVIORAL DISTURBANCE: Primary | ICD-10-CM

## 2019-05-01 PROCEDURE — 1101F PT FALLS ASSESS-DOCD LE1/YR: CPT | Mod: HCNC,CPTII,S$GLB, | Performed by: PHYSICIAN ASSISTANT

## 2019-05-01 PROCEDURE — 99999 PR PBB SHADOW E&M-EST. PATIENT-LVL IV: CPT | Mod: PBBFAC,HCNC,, | Performed by: PHYSICIAN ASSISTANT

## 2019-05-01 PROCEDURE — 62252 CSF SHUNT REPROGRAM: CPT | Mod: HCNC,S$GLB,, | Performed by: PHYSICIAN ASSISTANT

## 2019-05-01 PROCEDURE — 3075F PR MOST RECENT SYSTOLIC BLOOD PRESS GE 130-139MM HG: ICD-10-PCS | Mod: HCNC,CPTII,S$GLB, | Performed by: PHYSICIAN ASSISTANT

## 2019-05-01 PROCEDURE — 99214 PR OFFICE/OUTPT VISIT, EST, LEVL IV, 30-39 MIN: ICD-10-PCS | Mod: 25,HCNC,S$GLB, | Performed by: PHYSICIAN ASSISTANT

## 2019-05-01 PROCEDURE — 99214 OFFICE O/P EST MOD 30 MIN: CPT | Mod: 25,HCNC,S$GLB, | Performed by: PHYSICIAN ASSISTANT

## 2019-05-01 PROCEDURE — 1101F PR PT FALLS ASSESS DOC 0-1 FALLS W/OUT INJ PAST YR: ICD-10-PCS | Mod: HCNC,CPTII,S$GLB, | Performed by: PHYSICIAN ASSISTANT

## 2019-05-01 PROCEDURE — 3078F PR MOST RECENT DIASTOLIC BLOOD PRESSURE < 80 MM HG: ICD-10-PCS | Mod: HCNC,CPTII,S$GLB, | Performed by: PHYSICIAN ASSISTANT

## 2019-05-01 PROCEDURE — 99499 RISK ADDL DX/OHS AUDIT: ICD-10-PCS | Mod: HCNC,S$GLB,, | Performed by: PHYSICIAN ASSISTANT

## 2019-05-01 PROCEDURE — 3078F DIAST BP <80 MM HG: CPT | Mod: HCNC,CPTII,S$GLB, | Performed by: PHYSICIAN ASSISTANT

## 2019-05-01 PROCEDURE — 62252 PR REPROGRAMMING,PROGRAMMABLE CSF SHUNT: ICD-10-PCS | Mod: HCNC,S$GLB,, | Performed by: PHYSICIAN ASSISTANT

## 2019-05-01 PROCEDURE — 3075F SYST BP GE 130 - 139MM HG: CPT | Mod: HCNC,CPTII,S$GLB, | Performed by: PHYSICIAN ASSISTANT

## 2019-05-01 PROCEDURE — 99999 PR PBB SHADOW E&M-EST. PATIENT-LVL IV: ICD-10-PCS | Mod: PBBFAC,HCNC,, | Performed by: PHYSICIAN ASSISTANT

## 2019-05-01 PROCEDURE — 99499 UNLISTED E&M SERVICE: CPT | Mod: HCNC,S$GLB,, | Performed by: PHYSICIAN ASSISTANT

## 2019-05-01 NOTE — PROGRESS NOTES
Neurosurgery History & Physical    Patient ID: Abdullahi Salazar is a 73 y.o. male.    Chief Complaint   Patient presents with    Follow-up       Review of Systems   Constitutional: Negative for activity change, chills, fatigue and unexpected weight change.   HENT: Negative for hearing loss, tinnitus, trouble swallowing and voice change.    Eyes: Negative for visual disturbance.   Respiratory: Negative for apnea, chest tightness and shortness of breath.    Cardiovascular: Negative for chest pain and palpitations.   Gastrointestinal: Negative for abdominal pain, constipation, diarrhea, nausea and vomiting.   Genitourinary: Negative for difficulty urinating, dysuria and frequency.   Musculoskeletal: Negative for back pain, gait problem, neck pain and neck stiffness.   Skin: Negative for wound.   Neurological: Positive for weakness. Negative for dizziness, tremors, seizures, facial asymmetry, speech difficulty, light-headedness, numbness and headaches.   Psychiatric/Behavioral: Positive for behavioral problems, confusion and decreased concentration.       Past Medical History:   Diagnosis Date    Actinic keratoses     Altered mental status 3/22/2015    Anticoagulant long-term use     Atrial fibrillation     CAD (coronary artery disease)     stents after bypass    Carotid artery stenosis 3/31/2015    CHF (congestive heart failure) 03/2018    Colon polyps 2011    repeat colonoscopy 2014    Combined hyperlipidemia associated with type 2 diabetes mellitus     Diabetes mellitus type II, uncontrolled     dx 2004    GERD (gastroesophageal reflux disease)     Grand mal seizure     last 2/2017    History of cardiac pacemaker 3/31/2015    HTN (hypertension)     Hydrocephalus 09/08/2017    Influenza A 1/2/2018    He got influenza a recently and was in the hospital in [place].  He had rhabdomyolyisis and acute kidney injury.  He also had an increased troponin.  He had a flu shot in early December.    Iron deficiency  anemia due to chronic blood loss 2019    Mitral valve insufficiency     Presence of automatic (implantable) cardiac defibrillator     Respiratory distress     Sleep apnea with use of continuous positive airway pressure (CPAP)     patient states he does not use CPAP anymore    Syncope 3/30/2015    Wears dentures     upper and lower    Wears hearing aid     sometimes     Social History     Socioeconomic History    Marital status:      Spouse name: Not on file    Number of children: Not on file    Years of education: Not on file    Highest education level: Not on file   Occupational History    Not on file   Social Needs    Financial resource strain: Not on file    Food insecurity:     Worry: Not on file     Inability: Not on file    Transportation needs:     Medical: Not on file     Non-medical: Not on file   Tobacco Use    Smoking status: Former Smoker     Packs/day: 2.00     Years: 25.00     Pack years: 50.00     Types: Cigarettes     Last attempt to quit: 1983     Years since quittin.3    Smokeless tobacco: Never Used    Tobacco comment: quit     Substance and Sexual Activity    Alcohol use: Yes     Alcohol/week: 0.6 oz     Types: 1 Cans of beer per week     Comment: rarely    Drug use: No    Sexual activity: Yes     Partners: Female   Lifestyle    Physical activity:     Days per week: Not on file     Minutes per session: Not on file    Stress: Not on file   Relationships    Social connections:     Talks on phone: Not on file     Gets together: Not on file     Attends [employer] service: Not on file     Active member of club or organization: Not on file     Attends meetings of clubs or organizations: Not on file     Relationship status: Not on file   Other Topics Concern    Not on file   Social History Narrative    Not on file     Family History   Problem Relation Age of Onset    Stomach cancer Mother     Lung cancer Father     Diabetes Sister     Hypertension  "Sister     Heart disease Neg Hx     Stroke Neg Hx      Review of patient's allergies indicates:  No Known Allergies    Current Outpatient Medications:     amLODIPine (NORVASC) 10 MG tablet, Take 5 mg by mouth., Disp: , Rfl:     atorvastatin (LIPITOR) 40 MG tablet, Take 1 tablet (40 mg total) by mouth once daily., Disp: 90 tablet, Rfl: 3    blood sugar diagnostic (ACCU-CHEK CLEOPATRA PLUS TEST STRP) Strp, TEST BLOOD SUGARS 4 TIMES DAILY, Disp: 150 strip, Rfl: PRN    carvedilol (COREG) 25 MG tablet, Take 1 tablet (25 mg total) by mouth 2 (two) times daily with meals., Disp: 180 tablet, Rfl: 3    clopidogrel (PLAVIX) 75 mg tablet, Take 1 tablet (75 mg total) by mouth once daily., Disp: 30 tablet, Rfl: 0    dabigatran etexilate (PRADAXA) 75 mg Cap, Take 1 capsule (75 mg total) by mouth 2 (two) times daily. "Do NOT break, chew, or open capsules.", Disp: 180 capsule, Rfl: 3    ezetimibe (ZETIA) 10 mg tablet, TAKE 1 TABLET EVERY DAY, Disp: 90 tablet, Rfl: 3    ferrous sulfate (FEOSOL) 325 mg (65 mg iron) Tab tablet, TAKE 1 TABLET THREE TIMES DAILY, Disp: 270 tablet, Rfl: 0    hydrALAZINE (APRESOLINE) 100 MG tablet, TAKE 1 TABLET TWICE DAILY, Disp: 180 tablet, Rfl: 11    insulin NPH-insulin regular, 70/30, (NOVOLIN 70/30) 100 unit/mL (70-30) injection, 60  Units 30 min before breakfast,   40 units 30 min before supper.  Either Humalin, Novolin or Relion, Disp: 9 vial, Rfl: 3    insulin syringe-needle U-100 1 mL 30 gauge x 5/16 Syrg, Use with insulin twice a day, Disp: 100 each, Rfl: 12    isosorbide mononitrate (IMDUR) 120 MG 24 hr tablet, Take 1 tablet (120 mg total) by mouth once daily., Disp: 30 tablet, Rfl: 1    lancets (ACCU-CHEK SOFTCLIX LANCETS) Misc, 1 each by Misc.(Non-Drug; Combo Route) route 2 (two) times daily., Disp: 100 each, Rfl: 11    levETIRAcetam (KEPPRA) 500 MG Tab, Take 1.5 tablets (750 mg total) by mouth 2 (two) times daily., Disp: 270 tablet, Rfl: 3    multivitamin capsule, Take 1 capsule " "by mouth once daily., Disp: , Rfl:     nitroGLYCERIN (NITROSTAT) 0.4 MG SL tablet, Place 1 tablet (0.4 mg total) under the tongue every 5 (five) minutes as needed for Chest pain., Disp: 100 tablet, Rfl: 11    pantoprazole (PROTONIX) 40 MG tablet, Take 1 tablet (40 mg total) by mouth once daily., Disp: 90 tablet, Rfl: 0    pen needle, diabetic (BD ULTRA-FINE SHORT PEN NEEDLE) 31 gauge x 5/16" Ndle, USE FOUR TIMES DAILY, Disp: 360 each, Rfl: 3    polyethylene glycol (GLYCOLAX) 17 gram PwPk, Take 17 g by mouth once daily., Disp: 30 packet, Rfl: 0    potassium chloride SA (K-DUR,KLOR-CON) 10 MEQ tablet, TAKE 1 TABLET EVERY DAY, Disp: 90 tablet, Rfl: 11    ranitidine (ZANTAC) 300 MG tablet, Take 1 tablet (300 mg total) by mouth every evening., Disp: 30 tablet, Rfl: 2    tamsulosin (FLOMAX) 0.4 mg Cap, TAKE 1 CAPSULE EVERY DAY, Disp: 90 capsule, Rfl: 3    torsemide (DEMADEX) 10 MG Tab, Take 3 tablets (30 mg total) by mouth once daily., Disp: 90 tablet, Rfl: 0    valsartan (DIOVAN) 320 MG tablet, One po QAM to replace benazepril, Disp: 90 tablet, Rfl: 3    Vitals:    05/01/19 1227   BP: 133/77   Pulse: 80   Temp: 98.5 °F (36.9 °C)   Weight: 89.8 kg (197 lb 15.6 oz)   Height: 5' 7" (1.702 m)       Physical Exam   Constitutional: He is oriented to person, place, and time. He appears well-developed and well-nourished.   HENT:   Head: Normocephalic and atraumatic.   Eyes: Pupils are equal, round, and reactive to light.   Neck: Normal range of motion. Neck supple.   Cardiovascular: Normal rate.   Pulmonary/Chest: Effort normal.   Abdominal: He exhibits no distension.   Musculoskeletal: Normal range of motion. He exhibits no edema.   Neurological: He is alert and oriented to person, place, and time. He has an abnormal Tandem Gait Test. He has a normal Finger-Nose-Finger Test, a normal Heel to Villeda Test and a normal Romberg Test. Gait normal.   Reflex Scores:       Tricep reflexes are 2+ on the right side and 2+ on the " left side.       Bicep reflexes are 2+ on the right side and 2+ on the left side.       Brachioradialis reflexes are 2+ on the right side and 2+ on the left side.       Patellar reflexes are 2+ on the right side and 2+ on the left side.       Achilles reflexes are 2+ on the right side and 2+ on the left side.  Skin: Skin is warm and dry.   Psychiatric: He has a normal mood and affect. His speech is normal and behavior is normal. Judgment and thought content normal.   Nursing note and vitals reviewed.      Neurologic Exam     Mental Status   Oriented to person, place, and time.   Oriented to person.   Oriented to place.   Oriented to time.   Follows 3 step commands.   Attention: normal. Concentration: normal.   Speech: speech is normal   Level of consciousness: alert  Knowledge: consistent with education.   Able to name object. Able to read. Able to repeat. Able to write. Normal comprehension.     Cranial Nerves     CN II   Visual acuity: normal  Right visual field deficit: none  Left visual field deficit: none     CN III, IV, VI   Pupils are equal, round, and reactive to light.  Right pupil: Size: 3 mm. Shape: regular. Reactivity: brisk. Consensual response: intact.   Left pupil: Size: 3 mm. Shape: regular. Reactivity: brisk. Consensual response: intact.   CN III: no CN III palsy  CN VI: no CN VI palsy  Nystagmus: none   Diplopia: none  Ophthalmoparesis: none  Conjugate gaze: present    CN V   Right facial sensation deficit: none  Left facial sensation deficit: none    CN VII   Right facial weakness: none  Left facial weakness: none    CN VIII   Hearing: intact    CN IX, X   CN IX normal.   CN X normal.     CN XI   Right sternocleidomastoid strength: normal  Left sternocleidomastoid strength: normal  Right trapezius strength: normal  Left trapezius strength: normal    CN XII   Fasciculations: absent  Tongue deviation: none    Motor Exam   Muscle bulk: normal  Overall muscle tone: normal  Right arm pronator drift:  absent  Left arm pronator drift: absent    Strength   Right neck flexion: 5/5  Left neck flexion: 5/5  Right neck extension: 5/5  Left neck extension: 5/5  Right deltoid: 5/5  Left deltoid: 5/5  Right biceps: 5/5  Left biceps: 5/5  Right triceps: 5/5  Left triceps: 5/5  Right wrist flexion: 5/5  Left wrist flexion: 5/5  Right wrist extension: 5/5  Left wrist extension: 5/5  Right interossei: 5/5  Left interossei: 5/5  Right abdominals: 5/5  Left abdominals: 5/5  Right iliopsoas: 5/5  Left iliopsoas: 5/5  Right quadriceps: 5/5  Left quadriceps: 5/5  Right hamstrin/5  Left hamstrin/5  Right glutei:   Left glutei:   Right anterior tibial:   Left anterior tibial:   Right posterior tibial: 5  Left posterior tibial:   Right peroneal: 55  Left peroneal: 55  Right gastroc: 5/5  Left gastroc: 5/5    Sensory Exam   Right arm light touch: normal  Left arm light touch: normal  Right leg light touch: normal  Left leg light touch: normal  Right arm vibration: normal  Left arm vibration: normal  Right arm pinprick: normal  Left arm pinprick: normal    Gait, Coordination, and Reflexes     Gait  Gait: normal    Coordination   Romberg: negative  Finger to nose coordination: normal  Heel to shin coordination: normal  Tandem walking coordination: abnormal    Tremor   Resting tremor: absent  Intention tremor: absent  Action tremor: absent    Reflexes   Right brachioradialis: 2+  Left brachioradialis: 2+  Right biceps: 2+  Left biceps: 2+  Right triceps: 2+  Left triceps: 2+  Right patellar: 2+  Left patellar: 2+  Right achilles: 2+  Left achilles: 2+  Right Chavez: absent  Left Chavez: absent  Right ankle clonus: absent  Left ankle clonus: absent      Provider dictation:    Mr. Marie billings is a known patient in our clinic and I recently just saw him 2 days ago for hospital consultation as he was admitted for seizures.  He has had progressive worsening of his dementia and forgot to take his seizure  medication.  Both he and his wife seems to have more dementia symptoms and they were lost driving in the car.  His daughter presents with them today.  He continues to have cognitive decline as well as intermittent urinary incontinence and gait disturbance.  He previously had spontaneous subdural hematomas when the shunt was decreased and therefore the shunt was kept at 2.0.    On physical examination, he has an abnormal tandem walk and a magnetic gate.  Shunt is palpable and refills appropriately.  It is nontender to palpation.  He is abnormally quiet with a blunted affect at today's office visit..  He has a positive Romberg.    There has been no change in the CT demonstrating right frontal  shunt.    Shunt setting checked and confirmed at 2.0 P/L.  I have adjusted him to 1.5 P/L to see if it makes any difference in his cognitive function.  I have discussed extensively the pathophysiology of normal pressure hydrocephalus with the patient his wife and his daughter.  The family is planning to try to care for both Mr. Caldwell and his wife given their progressive dementia.  Unfortunately he has not benefitted as we had expected from shunting.  Given his multiple hospital admissions for cardiac and carotid stenosis I would recommend avoiding continued decrease in the shunt past 1.5 P/L given his previous spontaneous subdural fluid hematomas.  He may follow up with us as needed and continue his care with Neurology for dementia    Visit Diagnosis:  Dementia associated with other underlying disease without behavioral disturbance    NPH (normal pressure hydrocephalus)

## 2019-05-01 NOTE — TELEPHONE ENCOUNTER
Patient has been scheduled for a hospital follow up with NP. Daughter Maritza has confirmed the appointment

## 2019-05-01 NOTE — TELEPHONE ENCOUNTER
----- Message from Colette Anna sent at 5/1/2019 11:18 AM CDT -----  Contact: Leydi Salazar (Daughter) [phone]  Returning phone call   Please call   Leydi Salazar (Daughter) [phone] or Maritza Pettit (Daughter) [phone]

## 2019-05-02 ENCOUNTER — OFFICE VISIT (OUTPATIENT)
Dept: FAMILY MEDICINE | Facility: CLINIC | Age: 74
End: 2019-05-02
Payer: MEDICARE

## 2019-05-02 ENCOUNTER — PATIENT MESSAGE (OUTPATIENT)
Dept: FAMILY MEDICINE | Facility: CLINIC | Age: 74
End: 2019-05-02

## 2019-05-02 ENCOUNTER — OFFICE VISIT (OUTPATIENT)
Dept: CARDIOLOGY | Facility: CLINIC | Age: 74
End: 2019-05-02
Payer: MEDICARE

## 2019-05-02 ENCOUNTER — TELEPHONE (OUTPATIENT)
Dept: CARDIOLOGY | Facility: CLINIC | Age: 74
End: 2019-05-02

## 2019-05-02 VITALS
HEIGHT: 67 IN | TEMPERATURE: 98 F | BODY MASS INDEX: 31.29 KG/M2 | SYSTOLIC BLOOD PRESSURE: 150 MMHG | HEART RATE: 80 BPM | WEIGHT: 199.38 LBS | DIASTOLIC BLOOD PRESSURE: 87 MMHG

## 2019-05-02 VITALS
SYSTOLIC BLOOD PRESSURE: 145 MMHG | HEIGHT: 67 IN | WEIGHT: 202.38 LBS | DIASTOLIC BLOOD PRESSURE: 82 MMHG | HEART RATE: 81 BPM | BODY MASS INDEX: 31.76 KG/M2

## 2019-05-02 DIAGNOSIS — I70.0 ATHEROSCLEROSIS OF AORTA: ICD-10-CM

## 2019-05-02 DIAGNOSIS — I50.32 CHRONIC DIASTOLIC HEART FAILURE: ICD-10-CM

## 2019-05-02 DIAGNOSIS — I25.810 CORONARY ARTERY DISEASE INVOLVING CORONARY BYPASS GRAFT OF NATIVE HEART WITHOUT ANGINA PECTORIS: ICD-10-CM

## 2019-05-02 DIAGNOSIS — Z79.4 LONG TERM CURRENT USE OF INSULIN: ICD-10-CM

## 2019-05-02 DIAGNOSIS — N18.30 CKD (CHRONIC KIDNEY DISEASE) STAGE 3, GFR 30-59 ML/MIN: ICD-10-CM

## 2019-05-02 DIAGNOSIS — I15.2 HYPERTENSION ASSOCIATED WITH DIABETES: ICD-10-CM

## 2019-05-02 DIAGNOSIS — E11.59 HYPERTENSION ASSOCIATED WITH DIABETES: ICD-10-CM

## 2019-05-02 DIAGNOSIS — Z95.810 PRESENCE OF BIVENTRICULAR AICD: ICD-10-CM

## 2019-05-02 DIAGNOSIS — Z09 HOSPITAL DISCHARGE FOLLOW-UP: Primary | ICD-10-CM

## 2019-05-02 DIAGNOSIS — G91.2 NORMAL PRESSURE HYDROCEPHALUS: ICD-10-CM

## 2019-05-02 DIAGNOSIS — I48.19 PERSISTENT ATRIAL FIBRILLATION: ICD-10-CM

## 2019-05-02 DIAGNOSIS — E11.65 UNCONTROLLED TYPE 2 DIABETES MELLITUS WITH HYPERGLYCEMIA: ICD-10-CM

## 2019-05-02 DIAGNOSIS — G40.909 SEIZURE DISORDER: ICD-10-CM

## 2019-05-02 DIAGNOSIS — E11.21 TYPE 2 DIABETES MELLITUS WITH DIABETIC NEPHROPATHY, WITHOUT LONG-TERM CURRENT USE OF INSULIN: ICD-10-CM

## 2019-05-02 DIAGNOSIS — I35.0 NONRHEUMATIC AORTIC VALVE STENOSIS: Primary | ICD-10-CM

## 2019-05-02 PROCEDURE — 3046F HEMOGLOBIN A1C LEVEL >9.0%: CPT | Mod: HCNC,CPTII,S$GLB, | Performed by: PHYSICIAN ASSISTANT

## 2019-05-02 PROCEDURE — 1101F PT FALLS ASSESS-DOCD LE1/YR: CPT | Mod: HCNC,CPTII,S$GLB, | Performed by: PHYSICIAN ASSISTANT

## 2019-05-02 PROCEDURE — 3046F PR MOST RECENT HEMOGLOBIN A1C LEVEL > 9.0%: ICD-10-PCS | Mod: HCNC,CPTII,S$GLB, | Performed by: NURSE PRACTITIONER

## 2019-05-02 PROCEDURE — 99999 PR PBB SHADOW E&M-EST. PATIENT-LVL III: CPT | Mod: PBBFAC,HCNC,, | Performed by: PHYSICIAN ASSISTANT

## 2019-05-02 PROCEDURE — 99999 PR PBB SHADOW E&M-EST. PATIENT-LVL III: ICD-10-PCS | Mod: PBBFAC,HCNC,, | Performed by: NURSE PRACTITIONER

## 2019-05-02 PROCEDURE — 3077F SYST BP >= 140 MM HG: CPT | Mod: HCNC,CPTII,S$GLB, | Performed by: NURSE PRACTITIONER

## 2019-05-02 PROCEDURE — 99499 UNLISTED E&M SERVICE: CPT | Mod: HCNC,S$GLB,, | Performed by: NURSE PRACTITIONER

## 2019-05-02 PROCEDURE — 1101F PR PT FALLS ASSESS DOC 0-1 FALLS W/OUT INJ PAST YR: ICD-10-PCS | Mod: HCNC,CPTII,S$GLB, | Performed by: NURSE PRACTITIONER

## 2019-05-02 PROCEDURE — 99214 OFFICE O/P EST MOD 30 MIN: CPT | Mod: HCNC,S$GLB,, | Performed by: PHYSICIAN ASSISTANT

## 2019-05-02 PROCEDURE — 99214 PR OFFICE/OUTPT VISIT, EST, LEVL IV, 30-39 MIN: ICD-10-PCS | Mod: HCNC,S$GLB,, | Performed by: PHYSICIAN ASSISTANT

## 2019-05-02 PROCEDURE — 1101F PR PT FALLS ASSESS DOC 0-1 FALLS W/OUT INJ PAST YR: ICD-10-PCS | Mod: HCNC,CPTII,S$GLB, | Performed by: PHYSICIAN ASSISTANT

## 2019-05-02 PROCEDURE — 3046F HEMOGLOBIN A1C LEVEL >9.0%: CPT | Mod: HCNC,CPTII,S$GLB, | Performed by: NURSE PRACTITIONER

## 2019-05-02 PROCEDURE — 3046F PR MOST RECENT HEMOGLOBIN A1C LEVEL > 9.0%: ICD-10-PCS | Mod: HCNC,CPTII,S$GLB, | Performed by: PHYSICIAN ASSISTANT

## 2019-05-02 PROCEDURE — 3079F PR MOST RECENT DIASTOLIC BLOOD PRESSURE 80-89 MM HG: ICD-10-PCS | Mod: HCNC,CPTII,S$GLB, | Performed by: PHYSICIAN ASSISTANT

## 2019-05-02 PROCEDURE — 1101F PT FALLS ASSESS-DOCD LE1/YR: CPT | Mod: HCNC,CPTII,S$GLB, | Performed by: NURSE PRACTITIONER

## 2019-05-02 PROCEDURE — 99214 PR OFFICE/OUTPT VISIT, EST, LEVL IV, 30-39 MIN: ICD-10-PCS | Mod: HCNC,S$GLB,, | Performed by: NURSE PRACTITIONER

## 2019-05-02 PROCEDURE — 99499 RISK ADDL DX/OHS AUDIT: ICD-10-PCS | Mod: HCNC,S$GLB,, | Performed by: NURSE PRACTITIONER

## 2019-05-02 PROCEDURE — 3079F DIAST BP 80-89 MM HG: CPT | Mod: HCNC,CPTII,S$GLB, | Performed by: NURSE PRACTITIONER

## 2019-05-02 PROCEDURE — 3079F DIAST BP 80-89 MM HG: CPT | Mod: HCNC,CPTII,S$GLB, | Performed by: PHYSICIAN ASSISTANT

## 2019-05-02 PROCEDURE — 99214 OFFICE O/P EST MOD 30 MIN: CPT | Mod: HCNC,S$GLB,, | Performed by: NURSE PRACTITIONER

## 2019-05-02 PROCEDURE — 3077F SYST BP >= 140 MM HG: CPT | Mod: HCNC,CPTII,S$GLB, | Performed by: PHYSICIAN ASSISTANT

## 2019-05-02 PROCEDURE — 3077F PR MOST RECENT SYSTOLIC BLOOD PRESSURE >= 140 MM HG: ICD-10-PCS | Mod: HCNC,CPTII,S$GLB, | Performed by: PHYSICIAN ASSISTANT

## 2019-05-02 PROCEDURE — 3077F PR MOST RECENT SYSTOLIC BLOOD PRESSURE >= 140 MM HG: ICD-10-PCS | Mod: HCNC,CPTII,S$GLB, | Performed by: NURSE PRACTITIONER

## 2019-05-02 PROCEDURE — 99999 PR PBB SHADOW E&M-EST. PATIENT-LVL III: CPT | Mod: PBBFAC,HCNC,, | Performed by: NURSE PRACTITIONER

## 2019-05-02 PROCEDURE — 3079F PR MOST RECENT DIASTOLIC BLOOD PRESSURE 80-89 MM HG: ICD-10-PCS | Mod: HCNC,CPTII,S$GLB, | Performed by: NURSE PRACTITIONER

## 2019-05-02 PROCEDURE — 99999 PR PBB SHADOW E&M-EST. PATIENT-LVL III: ICD-10-PCS | Mod: PBBFAC,HCNC,, | Performed by: PHYSICIAN ASSISTANT

## 2019-05-02 NOTE — PROGRESS NOTES
Subjective:       Patient ID: Abdullahi Salazar is a 73 y.o. male.    Chief Complaint: Hospital Follow Up (had a seizure)    He comes in the office accompanied by his daughter Maritza for hospital follow-up.  He was admitted to [facility] and diagnosed with seizure.  He has been seen by Neurology and Cardiology.  His daughter would like for him to come to stay with her for a couple of months in [facility] along with his wife.  While admitted in the hospital medications including aspirin, benazepril, and amlodipine were discontinued.  He which changed to valsartan.    Diabetes   He presents for his follow-up diabetic visit. He has type 2 diabetes mellitus. His disease course has been stable. Hypoglycemia symptoms include confusion and headaches. Associated symptoms include chest pain and weakness. Pertinent negatives for diabetes include no polydipsia and no polyuria. Symptoms are stable. Current diabetic treatment includes insulin injections. He is compliant with treatment some of the time. His weight is stable. His home blood glucose trend is increasing rapidly. His overall blood glucose range is >200 mg/dl. An ACE inhibitor/angiotensin II receptor blocker is being taken.    In reviewing his labs, hemoglobin A1c from hospital admit was 11.7    Review of Systems   Constitutional: Positive for unexpected weight change. Negative for activity change.   HENT: Negative for hearing loss, rhinorrhea and trouble swallowing.    Eyes: Negative for discharge and visual disturbance.   Respiratory: Negative for chest tightness and wheezing.    Cardiovascular: Positive for chest pain. Negative for palpitations.   Gastrointestinal: Negative for blood in stool, constipation, diarrhea and vomiting.   Endocrine: Negative for polydipsia and polyuria.   Genitourinary: Negative for difficulty urinating, hematuria and urgency.   Musculoskeletal: Negative for arthralgias, joint swelling and neck pain.  "  Neurological: Positive for weakness and headaches.   Psychiatric/Behavioral: Positive for confusion and dysphoric mood.       Vitals:    05/02/19 0920   BP: (!) 150/87   Pulse: 80   Temp: 97.9 °F (36.6 °C)       Objective:     Current Outpatient Medications   Medication Sig Dispense Refill    atorvastatin (LIPITOR) 40 MG tablet Take 1 tablet (40 mg total) by mouth once daily. 90 tablet 3    blood sugar diagnostic (ACCU-CHEK CLEOPATRA PLUS TEST STRP) Strp TEST BLOOD SUGARS 4 TIMES DAILY 150 strip PRN    carvedilol (COREG) 25 MG tablet Take 1 tablet (25 mg total) by mouth 2 (two) times daily with meals. 180 tablet 3    clopidogrel (PLAVIX) 75 mg tablet Take 1 tablet (75 mg total) by mouth once daily. 30 tablet 0    dabigatran etexilate (PRADAXA) 75 mg Cap Take 1 capsule (75 mg total) by mouth 2 (two) times daily. "Do NOT break, chew, or open capsules." 180 capsule 3    ezetimibe (ZETIA) 10 mg tablet TAKE 1 TABLET EVERY DAY 90 tablet 3    ferrous sulfate (FEOSOL) 325 mg (65 mg iron) Tab tablet TAKE 1 TABLET THREE TIMES DAILY 270 tablet 0    hydrALAZINE (APRESOLINE) 100 MG tablet TAKE 1 TABLET TWICE DAILY 180 tablet 11    insulin NPH-insulin regular, 70/30, (NOVOLIN 70/30) 100 unit/mL (70-30) injection 60  Units 30 min before breakfast,   40 units 30 min before supper.  Either Humalin, Novolin or Relion 9 vial 3    insulin syringe-needle U-100 1 mL 30 gauge x 5/16 Syrg Use with insulin twice a day 100 each 12    isosorbide mononitrate (IMDUR) 120 MG 24 hr tablet Take 1 tablet (120 mg total) by mouth once daily. 30 tablet 1    lancets (ACCU-CHEK SOFTCLIX LANCETS) Misc 1 each by Misc.(Non-Drug; Combo Route) route 2 (two) times daily. 100 each 11    levETIRAcetam (KEPPRA) 500 MG Tab Take 1.5 tablets (750 mg total) by mouth 2 (two) times daily. 270 tablet 3    multivitamin capsule Take 1 capsule by mouth once daily.      nitroGLYCERIN (NITROSTAT) 0.4 MG SL tablet Place 1 tablet (0.4 mg total) under the tongue " "every 5 (five) minutes as needed for Chest pain. 100 tablet 11    pantoprazole (PROTONIX) 40 MG tablet Take 1 tablet (40 mg total) by mouth once daily. 90 tablet 0    pen needle, diabetic (BD ULTRA-FINE SHORT PEN NEEDLE) 31 gauge x 5/16" Ndle USE FOUR TIMES DAILY 360 each 3    potassium chloride SA (K-DUR,KLOR-CON) 10 MEQ tablet TAKE 1 TABLET EVERY DAY 90 tablet 11    ranitidine (ZANTAC) 300 MG tablet Take 1 tablet (300 mg total) by mouth every evening. 30 tablet 2    tamsulosin (FLOMAX) 0.4 mg Cap TAKE 1 CAPSULE EVERY DAY 90 capsule 3    torsemide (DEMADEX) 10 MG Tab Take 3 tablets (30 mg total) by mouth once daily. 90 tablet 0    valsartan (DIOVAN) 320 MG tablet One po QAM to replace benazepril 90 tablet 3    flash glucose scanning reader (FREESTYLE PAULIE 14 DAY READER) Misc 1 each by Misc.(Non-Drug; Combo Route) route once daily. 1 each 0    flash glucose sensor (FREESTYLE PAULIE 14 DAY SENSOR) Kit 1 each by Misc.(Non-Drug; Combo Route) route once daily. 2 kit 11     No current facility-administered medications for this visit.        Physical Exam   Constitutional: He is oriented to person, place, and time. He appears well-developed. No distress.   HENT:   Head: Normocephalic and atraumatic.   Eyes: Pupils are equal, round, and reactive to light. EOM are normal.   Neck: Normal range of motion. Neck supple.   Cardiovascular: Normal rate and regular rhythm.   Pulmonary/Chest: Effort normal and breath sounds normal.   Musculoskeletal: Normal range of motion.   Neurological: He is alert and oriented to person, place, and time.   + shunt    Skin: Skin is warm and dry. No rash noted.   Psychiatric: He has a normal mood and affect. Thought content normal.   Nursing note and vitals reviewed.      Assessment:       1. Hospital discharge follow-up    2. Uncontrolled type 2 diabetes mellitus with hyperglycemia    3. Long term current use of insulin        Plan:   Hospital discharge follow-up    Uncontrolled type 2 " diabetes mellitus with hyperglycemia  -     flash glucose sensor (FREESTYLE PAULIE 14 DAY SENSOR) Kit; 1 each by Misc.(Non-Drug; Combo Route) route once daily.  Dispense: 2 kit; Refill: 11  -     flash glucose scanning reader (FREESTYLE PAULIE 14 DAY READER) Misc; 1 each by Misc.(Non-Drug; Combo Route) route once daily.  Dispense: 1 each; Refill: 0    Long term current use of insulin     His daughter is going to help regulate his medications, it seems that he has been forgetting to take them frequently and is not monitoring his glucose.  If he is in agreement to moving to [place] with her I have agreed to send medications to the pharmacy closer to them.  His daughter also states that she will find a physician close by in case here his wife have any medical needs that arise during the time they are staying with her.    We discussed importance of not driving after seizure activity, according to Neurology he has not allowed to drive for the next 6 months.    No follow-ups on file.    There are no Patient Instructions on file for this visit.

## 2019-05-02 NOTE — PROGRESS NOTES
Subjective:    Patient ID:  Abdullahi Salazar is a 73 y.o. male who presents for follow-up of Chest Pain (post hosp - [facility] 04/24/19)      HPI  Mr. Salazar presents for f/u after recently being hospitalized on 4/24 for AMS. He has a hx of CAD s/p CABG, PAF on Pradaxa, AICD, hx of seizure disorder, NPH s/p  shunt, insulin-dependent DM, JACOB, and HTN. It was felt the patient had a seizure because he had not been taking his medications regularly. He was seen by Dr. Haro for mildly elevated troponins and chest pain (though the patient did not recall ever having CP). Medical management was recommended and his ASA was discontinued as it was felt he was a fall risk.     Since returning home he has done well and he is without cardiovascular complaints today. He denies CP, PERDOMO, palpitations, syncope. His daughter is concerned because he is mostly sedentary and not longer does the things he used to enjoy (woodworking, fishing, etc). He agrees he is depressed. His daughter is in the process of having the patient and his wife move to [place] with her temporarily as they both have memory issues and she is concerned about them staying alone; they are not eager to go and would prefer to stay in their home.       Review of Systems   Constitution: Negative for chills, diaphoresis, fever, weight gain and weight loss.   HENT: Negative for sore throat.    Eyes: Negative for blurred vision, vision loss in left eye, vision loss in right eye and visual disturbance.   Cardiovascular: Negative for chest pain, claudication, dyspnea on exertion, leg swelling, near-syncope, orthopnea, palpitations, paroxysmal nocturnal dyspnea and syncope.   Respiratory: Negative for cough, hemoptysis, shortness of breath, sputum production and wheezing.    Endocrine: Negative for cold intolerance and heat intolerance.   Hematologic/Lymphatic: Negative for adenopathy. Does not bruise/bleed easily.   Skin: Negative for rash.   Musculoskeletal: Negative for falls,  "muscle weakness and myalgias.   Gastrointestinal: Negative for abdominal pain, change in bowel habit, constipation, diarrhea, melena and nausea.   Genitourinary: Negative for bladder incontinence.   Neurological: Negative for dizziness, focal weakness, headaches, light-headedness, numbness and weakness.   Psychiatric/Behavioral: Negative for altered mental status.         Review of patient's allergies indicates:   Allergen Reactions    Nateglinide      Other reaction(s): fatigue  Other reaction(s): fatigue    Pioglitazone Other (See Comments)     Other reaction(s): CAD    Simvastatin Other (See Comments)     Other reaction(s): Muscle pain       Current Outpatient Medications:     atorvastatin (LIPITOR) 40 MG tablet, Take 1 tablet (40 mg total) by mouth once daily., Disp: 90 tablet, Rfl: 3    blood sugar diagnostic (ACCU-CHEK CLEOPATRA PLUS TEST STRP) Strp, TEST BLOOD SUGARS 4 TIMES DAILY, Disp: 150 strip, Rfl: PRN    carvedilol (COREG) 25 MG tablet, Take 1 tablet (25 mg total) by mouth 2 (two) times daily with meals., Disp: 180 tablet, Rfl: 3    clopidogrel (PLAVIX) 75 mg tablet, Take 1 tablet (75 mg total) by mouth once daily., Disp: 30 tablet, Rfl: 0    dabigatran etexilate (PRADAXA) 75 mg Cap, Take 1 capsule (75 mg total) by mouth 2 (two) times daily. "Do NOT break, chew, or open capsules.", Disp: 180 capsule, Rfl: 3    ezetimibe (ZETIA) 10 mg tablet, TAKE 1 TABLET EVERY DAY, Disp: 90 tablet, Rfl: 3    ferrous sulfate (FEOSOL) 325 mg (65 mg iron) Tab tablet, TAKE 1 TABLET THREE TIMES DAILY, Disp: 270 tablet, Rfl: 0    flash glucose scanning reader (FREESTYLE PAULIE 14 DAY READER) Misc, 1 each by Misc.(Non-Drug; Combo Route) route once daily., Disp: 1 each, Rfl: 0    flash glucose sensor (FREESTYLE PAULIE 14 DAY SENSOR) Kit, 1 each by Misc.(Non-Drug; Combo Route) route once daily., Disp: 2 kit, Rfl: 11    hydrALAZINE (APRESOLINE) 100 MG tablet, TAKE 1 TABLET TWICE DAILY, Disp: 180 tablet, Rfl: 11    " "insulin NPH-insulin regular, 70/30, (NOVOLIN 70/30) 100 unit/mL (70-30) injection, 60  Units 30 min before breakfast,   40 units 30 min before supper.  Either Humalin, Novolin or Relion, Disp: 9 vial, Rfl: 3    insulin syringe-needle U-100 1 mL 30 gauge x 5/16 Syrg, Use with insulin twice a day, Disp: 100 each, Rfl: 12    isosorbide mononitrate (IMDUR) 120 MG 24 hr tablet, Take 1 tablet (120 mg total) by mouth once daily., Disp: 30 tablet, Rfl: 1    lancets (ACCU-CHEK SOFTCLIX LANCETS) Misc, 1 each by Misc.(Non-Drug; Combo Route) route 2 (two) times daily., Disp: 100 each, Rfl: 11    levETIRAcetam (KEPPRA) 500 MG Tab, Take 1.5 tablets (750 mg total) by mouth 2 (two) times daily., Disp: 270 tablet, Rfl: 3    nitroGLYCERIN (NITROSTAT) 0.4 MG SL tablet, Place 1 tablet (0.4 mg total) under the tongue every 5 (five) minutes as needed for Chest pain., Disp: 100 tablet, Rfl: 11    pantoprazole (PROTONIX) 40 MG tablet, Take 1 tablet (40 mg total) by mouth once daily., Disp: 90 tablet, Rfl: 0    pen needle, diabetic (BD ULTRA-FINE SHORT PEN NEEDLE) 31 gauge x 5/16" Ndle, USE FOUR TIMES DAILY, Disp: 360 each, Rfl: 3    potassium chloride SA (K-DUR,KLOR-CON) 10 MEQ tablet, TAKE 1 TABLET EVERY DAY, Disp: 90 tablet, Rfl: 11    ranitidine (ZANTAC) 300 MG tablet, Take 1 tablet (300 mg total) by mouth every evening., Disp: 30 tablet, Rfl: 2    tamsulosin (FLOMAX) 0.4 mg Cap, TAKE 1 CAPSULE EVERY DAY, Disp: 90 capsule, Rfl: 3    torsemide (DEMADEX) 10 MG Tab, Take 3 tablets (30 mg total) by mouth once daily., Disp: 90 tablet, Rfl: 0    valsartan (DIOVAN) 320 MG tablet, One po QAM to replace benazepril, Disp: 90 tablet, Rfl: 3    multivitamin capsule, Take 1 capsule by mouth once daily., Disp: , Rfl:     Vitals:    05/02/19 1308   BP: (!) 145/82   BP Location: Left arm   Patient Position: Sitting   BP Method: Medium (Automatic)   Pulse: 81   Weight: 91.8 kg (202 lb 6.1 oz)   Height: 5' 7" (1.702 m)   Body mass index is " 31.7 kg/m².    Objective:    Physical Exam   Constitutional: He is oriented to person, place, and time. He appears well-developed and well-nourished.   HENT:   Head: Normocephalic and atraumatic.   Eyes: Pupils are equal, round, and reactive to light. EOM are normal.   Neck: Neck supple. No JVD present. No tracheal deviation present. No thyromegaly present.   Cardiovascular: Normal rate, regular rhythm, S1 normal, S2 normal, intact distal pulses and normal pulses. PMI is not displaced. Exam reveals no gallop and no friction rub.   Murmur heard.   Harsh systolic murmur is present with a grade of 2/6 at the upper right sternal border.  Pulmonary/Chest: Effort normal and breath sounds normal. No respiratory distress. He has no wheezes. He has no rales. He exhibits no tenderness.   Abdominal: Soft. Bowel sounds are normal. He exhibits no distension and no mass. There is no tenderness.   Musculoskeletal: Normal range of motion. He exhibits no edema or tenderness.   Neurological: He is alert and oriented to person, place, and time.   Skin: Skin is warm and dry. No rash noted.   Psychiatric: He has a normal mood and affect. His behavior is normal.         Assessment:       Coronary artery disease involving coronary bypass graft of native heart without angina pectoris  S/p CABG.   On Plavix (no ASA as patient is on Pradaxa), statin, and b-blocker.     Presence of biventricular AICD  PPM upgraded to AICD in 2016 for VF on PPM interrogation.   Recent interrogation (April 2019) WNL.   Follows with Dr. Sofia.     Hypertension associated with diabetes  Controlled on current regimen.     Atherosclerosis of aorta  Seen on CTA.   On Plavix and statin.     CKD (chronic kidney disease) stage 3, GFR 30-59 ml/min  Stable.   BP controlled.     Chronic diastolic heart failure  Well compensated clinically at this time.     Persistent atrial fibrillation  On Pradaxa for AC.     Normal pressure hydrocephalus  S/p  shunt.   Stable.    Follows with Neurology.     Seizure disorder  On Keppra.     Type 2 diabetes mellitus with renal complication  Insulin-dependent.     Nonrheumatic aortic valve stenosis  Moderate - Aortic valve area is 1.18 cm2; peak velocity is 2.25 m/s; mean gradient is 10 mmHg, EF= 55%.       Plan:       Continue current meds.   Encouraged patient to discuss his depression with his PCP.   Increase activity.   F/U with Dr. Rodney in 4 months with echo.

## 2019-05-02 NOTE — ASSESSMENT & PLAN NOTE
PPM upgraded to AICD in 2016 for VF on PPM interrogation.   Recent interrogation (April 2019) WNL.   Follows with Dr. Sofia.

## 2019-05-02 NOTE — TELEPHONE ENCOUNTER
Leydi, patient's daughter, returned call.  She was informed of the cancellation need of the apt this afternoon with [facility] as patient has already follow up with his PCP.  She verbalized an understanding and apologized.  She discussed her difficulty as a single parent assisting with the patient's apt needs and her sister, who is in from [place], is currently with him.  She reported the patient has plans to go to [place] with daughter as she can assist him and his spouses needs.  She will contact patient/spouse and inform them of the cancellation.

## 2019-05-02 NOTE — ASSESSMENT & PLAN NOTE
Moderate - Aortic valve area is 1.18 cm2; peak velocity is 2.25 m/s; mean gradient is 10 mmHg, EF= 55%.

## 2019-05-02 NOTE — TELEPHONE ENCOUNTER
----- Message from Alice Burton sent at 5/2/2019 10:05 AM CDT -----  Type: Needs Medical Advice    Who Called:  [facility] Call Back Number: [phone]  Additional Information: Patient has an appointment with them for a hospital follow up after his appointment with Arti today at 1:00pm but [facility] states that he does not have to come to their appointment since he has already followed up with PCP and your office. They are asking office to tell patient that he does not have to come to their appointment. Call for more details, if needed.

## 2019-05-03 RX ORDER — SERTRALINE HYDROCHLORIDE 25 MG/1
25 TABLET, FILM COATED ORAL DAILY
Qty: 90 TABLET | Refills: 3 | Status: SHIPPED | OUTPATIENT
Start: 2019-05-03 | End: 2019-07-01 | Stop reason: SDUPTHER

## 2019-05-03 NOTE — TELEPHONE ENCOUNTER
I have signed for the following orders AND/OR meds.  Please call the patient and ask the patient to schedule the testing AND/OR inform about any medications that were sent.      No orders of the defined types were placed in this encounter.      Medications Ordered This Encounter   Medications    sertraline (ZOLOFT) 25 MG tablet     Sig: Take 1 tablet (25 mg total) by mouth once daily.     Dispense:  90 tablet     Refill:  3

## 2019-05-16 ENCOUNTER — CLINICAL SUPPORT (OUTPATIENT)
Dept: CARDIOLOGY | Facility: CLINIC | Age: 74
End: 2019-05-16
Attending: INTERNAL MEDICINE
Payer: MEDICARE

## 2019-05-16 ENCOUNTER — PATIENT OUTREACH (OUTPATIENT)
Dept: ADMINISTRATIVE | Facility: HOSPITAL | Age: 74
End: 2019-05-16

## 2019-05-16 DIAGNOSIS — I48.0 PAROXYSMAL ATRIAL FIBRILLATION: ICD-10-CM

## 2019-05-16 DIAGNOSIS — Z95.810 CARDIAC DEFIBRILLATOR IN SITU: ICD-10-CM

## 2019-06-02 ENCOUNTER — PATIENT MESSAGE (OUTPATIENT)
Dept: FAMILY MEDICINE | Facility: CLINIC | Age: 74
End: 2019-06-02

## 2019-06-12 ENCOUNTER — PATIENT OUTREACH (OUTPATIENT)
Dept: ADMINISTRATIVE | Facility: HOSPITAL | Age: 74
End: 2019-06-12

## 2019-06-13 ENCOUNTER — PATIENT OUTREACH (OUTPATIENT)
Dept: ADMINISTRATIVE | Facility: HOSPITAL | Age: 74
End: 2019-06-13

## 2019-06-13 ENCOUNTER — PATIENT MESSAGE (OUTPATIENT)
Dept: FAMILY MEDICINE | Facility: CLINIC | Age: 74
End: 2019-06-13

## 2019-06-14 ENCOUNTER — PATIENT MESSAGE (OUTPATIENT)
Dept: FAMILY MEDICINE | Facility: CLINIC | Age: 74
End: 2019-06-14

## 2019-06-18 ENCOUNTER — CLINICAL SUPPORT (OUTPATIENT)
Dept: CARDIOLOGY | Facility: CLINIC | Age: 74
End: 2019-06-18
Attending: INTERNAL MEDICINE
Payer: MEDICARE

## 2019-06-18 DIAGNOSIS — Z95.810 CARDIAC DEFIBRILLATOR IN SITU: ICD-10-CM

## 2019-06-18 DIAGNOSIS — I48.0 PAROXYSMAL ATRIAL FIBRILLATION: ICD-10-CM

## 2019-06-21 ENCOUNTER — PATIENT MESSAGE (OUTPATIENT)
Dept: FAMILY MEDICINE | Facility: CLINIC | Age: 74
End: 2019-06-21

## 2019-06-21 DIAGNOSIS — E11.69 COMBINED HYPERLIPIDEMIA ASSOCIATED WITH TYPE 2 DIABETES MELLITUS: ICD-10-CM

## 2019-06-21 DIAGNOSIS — E11.65 UNCONTROLLED TYPE 2 DIABETES MELLITUS WITH HYPERGLYCEMIA: ICD-10-CM

## 2019-06-21 DIAGNOSIS — I70.213 ATHEROSCLEROSIS OF NATIVE ARTERY OF BOTH LOWER EXTREMITIES WITH INTERMITTENT CLAUDICATION: ICD-10-CM

## 2019-06-21 DIAGNOSIS — N18.4 CKD (CHRONIC KIDNEY DISEASE) STAGE 4, GFR 15-29 ML/MIN: ICD-10-CM

## 2019-06-21 DIAGNOSIS — R41.3 MEMORY LOSS: ICD-10-CM

## 2019-06-21 DIAGNOSIS — E78.2 COMBINED HYPERLIPIDEMIA ASSOCIATED WITH TYPE 2 DIABETES MELLITUS: ICD-10-CM

## 2019-06-21 DIAGNOSIS — F03.90 DEMENTIA WITHOUT BEHAVIORAL DISTURBANCE, UNSPECIFIED DEMENTIA TYPE: Primary | ICD-10-CM

## 2019-06-21 NOTE — TELEPHONE ENCOUNTER
BMI Readings from Last 4 Encounters:   05/02/19 31.70 kg/m²   05/02/19 31.23 kg/m²   05/01/19 31.01 kg/m²   04/24/19 31.01 kg/m²

## 2019-06-21 NOTE — TELEPHONE ENCOUNTER
I have printed all of the labs that they want for the patient. Please order them and pend them to me with the attached diagnosis on the paper.

## 2019-06-21 NOTE — TELEPHONE ENCOUNTER
Found theCRP.      I also could not find where our lab does c4a, tgf-beta1, mmp-9, vip, LDL particle number.  Let them know I could not order it.

## 2019-06-24 RX ORDER — PEN NEEDLE, DIABETIC 30 GX3/16"
NEEDLE, DISPOSABLE MISCELLANEOUS
Qty: 360 EACH | Refills: 3 | Status: SHIPPED | OUTPATIENT
Start: 2019-06-24 | End: 2019-10-11 | Stop reason: ALTCHOICE

## 2019-06-24 RX ORDER — ISOSORBIDE MONONITRATE 120 MG/1
120 TABLET, EXTENDED RELEASE ORAL DAILY
Qty: 30 TABLET | Refills: 1 | Status: SHIPPED | OUTPATIENT
Start: 2019-06-24 | End: 2019-08-20 | Stop reason: SDUPTHER

## 2019-06-25 RX ORDER — VALSARTAN 320 MG/1
TABLET ORAL
Qty: 90 TABLET | Refills: 3 | Status: SHIPPED | OUTPATIENT
Start: 2019-06-25 | End: 2019-10-11 | Stop reason: SDUPTHER

## 2019-07-01 RX ORDER — HYDRALAZINE HYDROCHLORIDE 100 MG/1
100 TABLET, FILM COATED ORAL 2 TIMES DAILY
Qty: 180 TABLET | Refills: 11 | Status: SHIPPED | OUTPATIENT
Start: 2019-07-01 | End: 2019-07-30 | Stop reason: SDUPTHER

## 2019-07-01 RX ORDER — SERTRALINE HYDROCHLORIDE 25 MG/1
25 TABLET, FILM COATED ORAL DAILY
Qty: 90 TABLET | Refills: 3 | Status: SHIPPED | OUTPATIENT
Start: 2019-07-01 | End: 2020-06-30

## 2019-07-02 ENCOUNTER — TELEPHONE (OUTPATIENT)
Dept: NEUROLOGY | Facility: CLINIC | Age: 74
End: 2019-07-02

## 2019-07-02 NOTE — TELEPHONE ENCOUNTER
----- Message from Luis Milan sent at 7/2/2019  9:44 AM CDT -----  Contact: pt's daughter Leydi  Type: Needs Medical Advice    Who Called:  Leydi Dawkins    Best Call Back Number: [phone]  Additional Information:  States that Dr. Hyman saw the pt  in the hospital back in May. pt does not remember what Dr. Hyman told him. Wants to know if they need  to make an appointment for the pt with Dr. Hyman. Please call to advise.

## 2019-07-03 NOTE — PROGRESS NOTES
Subjective:       Patient ID: Abdullahi Salazar is a 73 y.o. male.    Chief Complaint: Follow-up    He comes into the office for follow-up, he has been out of town for the last few months staying with his daughter in [place].  He denies complaints at this time.  He and his wife for happy to be back home in their own house.  His eldest daughter is with him in the office today.    Hypertension   This is a chronic problem. The current episode started more than 1 year ago. The problem has been waxing and waning since onset. Condition status: monitors at home, denies elevated readings. Pertinent negatives include no chest pain, headaches, palpitations or shortness of breath. Risk factors for coronary artery disease include stress, male gender, obesity, dyslipidemia and diabetes mellitus. Past treatments include beta blockers, diuretics and angiotensin blockers.    Blood pressure is elevated in the office today, he and his wife feel this is because his daughter came with him to his appointment.  They are concerned that their children are not going to let them continue to live alone.    Health maintenance needs to be updated    Review of Systems   Constitutional: Negative for fatigue, fever and unexpected weight change.   HENT: Negative for ear pain and sore throat.    Eyes: Negative.  Negative for pain and visual disturbance.   Respiratory: Negative for cough and shortness of breath.    Cardiovascular: Negative for chest pain and palpitations.   Gastrointestinal: Negative for abdominal pain, diarrhea, nausea and vomiting.   Genitourinary: Negative for dysuria and frequency.   Musculoskeletal: Negative for arthralgias and myalgias.   Skin: Negative for color change and rash.   Neurological: Negative for dizziness and headaches.   Psychiatric/Behavioral: Negative for sleep disturbance. The patient is not nervous/anxious.        Vitals:    07/05/19 1030   BP: (!) 170/90   Pulse:    Temp:        Objective:     Current Outpatient  "Medications   Medication Sig Dispense Refill    atorvastatin (LIPITOR) 40 MG tablet Take 1 tablet (40 mg total) by mouth once daily. 90 tablet 3    blood sugar diagnostic (ACCU-CHEK CLEOPATRA PLUS TEST STRP) Strp TEST BLOOD SUGARS 4 TIMES DAILY 150 strip PRN    carvedilol (COREG) 25 MG tablet Take 1 tablet (25 mg total) by mouth 2 (two) times daily with meals. 180 tablet 3    clopidogrel (PLAVIX) 75 mg tablet Take 1 tablet (75 mg total) by mouth once daily. 30 tablet 0    dabigatran etexilate (PRADAXA) 75 mg Cap Take 1 capsule (75 mg total) by mouth 2 (two) times daily. "Do NOT break, chew, or open capsules." 180 capsule 3    ezetimibe (ZETIA) 10 mg tablet TAKE 1 TABLET EVERY DAY 90 tablet 3    ferrous sulfate (FEOSOL) 325 mg (65 mg iron) Tab tablet TAKE 1 TABLET THREE TIMES DAILY 270 tablet 0    flash glucose scanning reader (FREESTYLE PAULIE 14 DAY READER) Misc 1 each by Misc.(Non-Drug; Combo Route) route once daily. 1 each 0    flash glucose sensor (FREESTYLE PAULIE 14 DAY SENSOR) Kit 1 each by Misc.(Non-Drug; Combo Route) route once daily. 2 kit 11    hydrALAZINE (APRESOLINE) 100 MG tablet Take 1 tablet (100 mg total) by mouth 2 (two) times daily. 180 tablet 11    insulin NPH-insulin regular, 70/30, (NOVOLIN 70/30) 100 unit/mL (70-30) injection 60  Units 30 min before breakfast,   40 units 30 min before supper.  Either Humalin, Novolin or Relion 9 vial 3    insulin syringe-needle U-100 1 mL 30 gauge x 5/16 Syrg Use with insulin twice a day 100 each 12    isosorbide mononitrate (IMDUR) 120 MG 24 hr tablet Take 1 tablet (120 mg total) by mouth once daily. 30 tablet 1    lancets (ACCU-CHEK SOFTCLIX LANCETS) Misc 1 each by Misc.(Non-Drug; Combo Route) route 2 (two) times daily. 100 each 11    levETIRAcetam (KEPPRA) 500 MG Tab Take 1.5 tablets (750 mg total) by mouth 2 (two) times daily. 270 tablet 3    multivitamin capsule Take 1 capsule by mouth once daily.      nitroGLYCERIN (NITROSTAT) 0.4 MG SL tablet " "Place 1 tablet (0.4 mg total) under the tongue every 5 (five) minutes as needed for Chest pain. 100 tablet 11    pen needle, diabetic (BD ULTRA-FINE SHORT PEN NEEDLE) 31 gauge x 5/16" Ndle USE FOUR TIMES DAILY 360 each 3    potassium chloride SA (K-DUR,KLOR-CON) 10 MEQ tablet TAKE 1 TABLET EVERY DAY 90 tablet 11    sertraline (ZOLOFT) 25 MG tablet Take 1 tablet (25 mg total) by mouth once daily. 90 tablet 3    tamsulosin (FLOMAX) 0.4 mg Cap TAKE 1 CAPSULE EVERY DAY 90 capsule 3    torsemide (DEMADEX) 10 MG Tab Take 3 tablets (30 mg total) by mouth once daily. 90 tablet 0    valsartan (DIOVAN) 320 MG tablet One po QAM to replace benazepril 90 tablet 3     No current facility-administered medications for this visit.        Physical Exam   Constitutional: He is oriented to person, place, and time. He appears well-developed. No distress.   HENT:   Head: Normocephalic and atraumatic.   Eyes: Pupils are equal, round, and reactive to light. EOM are normal.   Neck: Normal range of motion. Neck supple.   Cardiovascular: Normal rate and regular rhythm.   Pulmonary/Chest: Effort normal and breath sounds normal.   Musculoskeletal: Normal range of motion.   Neurological: He is alert and oriented to person, place, and time.   Skin: Skin is warm and dry. No rash noted.   Psychiatric: He has a normal mood and affect. Thought content normal.   Nursing note and vitals reviewed.      Assessment:       1. Annual physical exam    2. Immunization due    3. Benign essential HTN        Plan:   Annual physical exam    Immunization due  -     (In Office Administered) Td Vaccine - Preservative Free    Benign essential HTN      He was instructed to monitor his blood pressure at home, bring his blood pressure cuff to his next appointment.  Low-sodium diet.    Follow up in about 2 weeks (around 7/19/2019) for repeat blood pressure.    There are no Patient Instructions on file for this visit.  "

## 2019-07-05 ENCOUNTER — LAB VISIT (OUTPATIENT)
Dept: LAB | Facility: HOSPITAL | Age: 74
End: 2019-07-05
Attending: FAMILY MEDICINE
Payer: MEDICARE

## 2019-07-05 ENCOUNTER — OFFICE VISIT (OUTPATIENT)
Dept: FAMILY MEDICINE | Facility: CLINIC | Age: 74
End: 2019-07-05
Payer: MEDICARE

## 2019-07-05 VITALS
SYSTOLIC BLOOD PRESSURE: 170 MMHG | HEART RATE: 79 BPM | DIASTOLIC BLOOD PRESSURE: 90 MMHG | HEIGHT: 67 IN | BODY MASS INDEX: 32.74 KG/M2 | WEIGHT: 208.63 LBS | TEMPERATURE: 98 F

## 2019-07-05 DIAGNOSIS — E11.69 COMBINED HYPERLIPIDEMIA ASSOCIATED WITH TYPE 2 DIABETES MELLITUS: ICD-10-CM

## 2019-07-05 DIAGNOSIS — N18.4 CKD (CHRONIC KIDNEY DISEASE) STAGE 4, GFR 15-29 ML/MIN: ICD-10-CM

## 2019-07-05 DIAGNOSIS — R41.3 MEMORY LOSS: ICD-10-CM

## 2019-07-05 DIAGNOSIS — E11.21 TYPE 2 DIABETES MELLITUS WITH DIABETIC NEPHROPATHY, WITHOUT LONG-TERM CURRENT USE OF INSULIN: ICD-10-CM

## 2019-07-05 DIAGNOSIS — Z23 IMMUNIZATION DUE: ICD-10-CM

## 2019-07-05 DIAGNOSIS — I10 BENIGN ESSENTIAL HTN: ICD-10-CM

## 2019-07-05 DIAGNOSIS — E78.2 COMBINED HYPERLIPIDEMIA ASSOCIATED WITH TYPE 2 DIABETES MELLITUS: ICD-10-CM

## 2019-07-05 DIAGNOSIS — E11.65 UNCONTROLLED TYPE 2 DIABETES MELLITUS WITH HYPERGLYCEMIA: ICD-10-CM

## 2019-07-05 DIAGNOSIS — I70.213 ATHEROSCLEROSIS OF NATIVE ARTERY OF BOTH LOWER EXTREMITIES WITH INTERMITTENT CLAUDICATION: ICD-10-CM

## 2019-07-05 DIAGNOSIS — Z79.4 TYPE 2 DIABETES MELLITUS WITH COMPLICATION, WITH LONG-TERM CURRENT USE OF INSULIN: ICD-10-CM

## 2019-07-05 DIAGNOSIS — Z00.00 ANNUAL PHYSICAL EXAM: Primary | ICD-10-CM

## 2019-07-05 DIAGNOSIS — E11.8 TYPE 2 DIABETES MELLITUS WITH COMPLICATION, WITH LONG-TERM CURRENT USE OF INSULIN: ICD-10-CM

## 2019-07-05 DIAGNOSIS — F03.90 DEMENTIA WITHOUT BEHAVIORAL DISTURBANCE, UNSPECIFIED DEMENTIA TYPE: ICD-10-CM

## 2019-07-05 LAB
25(OH)D3+25(OH)D2 SERPL-MCNC: 25 NG/ML (ref 30–96)
ALBUMIN SERPL BCP-MCNC: 4 G/DL (ref 3.5–5.2)
ALP SERPL-CCNC: 146 U/L (ref 55–135)
ALT SERPL W/O P-5'-P-CCNC: 37 U/L (ref 10–44)
ANION GAP SERPL CALC-SCNC: 9 MMOL/L (ref 8–16)
AST SERPL-CCNC: 28 U/L (ref 10–40)
BILIRUB SERPL-MCNC: 1 MG/DL (ref 0.1–1)
BUN SERPL-MCNC: 27 MG/DL (ref 8–23)
CALCIUM SERPL-MCNC: 9.3 MG/DL (ref 8.7–10.5)
CHLORIDE SERPL-SCNC: 107 MMOL/L (ref 95–110)
CHOLEST SERPL-MCNC: 101 MG/DL (ref 120–199)
CHOLEST/HDLC SERPL: 2.9 {RATIO} (ref 2–5)
CO2 SERPL-SCNC: 27 MMOL/L (ref 23–29)
CREAT SERPL-MCNC: 1.9 MG/DL (ref 0.5–1.4)
CRP SERPL-MCNC: 2.21 MG/L (ref 0–3.19)
EST. GFR  (AFRICAN AMERICAN): 39 ML/MIN/1.73 M^2
EST. GFR  (NON AFRICAN AMERICAN): 33.7 ML/MIN/1.73 M^2
ESTIMATED AVG GLUCOSE: 217 MG/DL (ref 68–131)
ESTRADIOL SERPL-MCNC: 35 PG/ML (ref 11–44)
FOLATE SERPL-MCNC: 14.7 NG/ML (ref 4–24)
GLUCOSE SERPL-MCNC: 162 MG/DL (ref 70–110)
HBA1C MFR BLD HPLC: 9.2 % (ref 4–5.6)
HCYS SERPL-SCNC: 14.4 UMOL/L (ref 4–16.5)
HDLC SERPL-MCNC: 35 MG/DL (ref 40–75)
HDLC SERPL: 34.7 % (ref 20–50)
LDLC SERPL CALC-MCNC: 55 MG/DL (ref 63–159)
MAGNESIUM SERPL-MCNC: 2.5 MG/DL (ref 1.6–2.6)
NONHDLC SERPL-MCNC: 66 MG/DL
OSMOLALITY SERPL: 307 MOSM/KG (ref 280–300)
POTASSIUM SERPL-SCNC: 4.7 MMOL/L (ref 3.5–5.1)
PROT SERPL-MCNC: 7.4 G/DL (ref 6–8.4)
SODIUM SERPL-SCNC: 143 MMOL/L (ref 136–145)
TRIGL SERPL-MCNC: 55 MG/DL (ref 30–150)
VIT B12 SERPL-MCNC: 456 PG/ML (ref 210–950)

## 2019-07-05 PROCEDURE — 80053 COMPREHEN METABOLIC PANEL: CPT | Mod: HCNC

## 2019-07-05 PROCEDURE — 90471 TD VACCINE GREATER THAN OR EQUAL TO 7YO PRESERVATIVE FREE IM: ICD-10-PCS | Mod: HCNC,S$GLB,, | Performed by: NURSE PRACTITIONER

## 2019-07-05 PROCEDURE — 83930 ASSAY OF BLOOD OSMOLALITY: CPT | Mod: HCNC

## 2019-07-05 PROCEDURE — 3077F PR MOST RECENT SYSTOLIC BLOOD PRESSURE >= 140 MM HG: ICD-10-PCS | Mod: HCNC,CPTII,S$GLB, | Performed by: NURSE PRACTITIONER

## 2019-07-05 PROCEDURE — 1101F PT FALLS ASSESS-DOCD LE1/YR: CPT | Mod: HCNC,CPTII,S$GLB, | Performed by: NURSE PRACTITIONER

## 2019-07-05 PROCEDURE — 99214 PR OFFICE/OUTPT VISIT, EST, LEVL IV, 30-39 MIN: ICD-10-PCS | Mod: 25,HCNC,S$GLB, | Performed by: NURSE PRACTITIONER

## 2019-07-05 PROCEDURE — 99999 PR PBB SHADOW E&M-EST. PATIENT-LVL III: CPT | Mod: PBBFAC,HCNC,, | Performed by: NURSE PRACTITIONER

## 2019-07-05 PROCEDURE — 82390 ASSAY OF CERULOPLASMIN: CPT | Mod: HCNC

## 2019-07-05 PROCEDURE — 82306 VITAMIN D 25 HYDROXY: CPT | Mod: HCNC

## 2019-07-05 PROCEDURE — 82607 VITAMIN B-12: CPT | Mod: HCNC

## 2019-07-05 PROCEDURE — 3080F PR MOST RECENT DIASTOLIC BLOOD PRESSURE >= 90 MM HG: ICD-10-PCS | Mod: HCNC,CPTII,S$GLB, | Performed by: NURSE PRACTITIONER

## 2019-07-05 PROCEDURE — 82978 ASSAY OF GLUTATHIONE: CPT | Mod: HCNC

## 2019-07-05 PROCEDURE — 83735 ASSAY OF MAGNESIUM: CPT | Mod: 91,HCNC

## 2019-07-05 PROCEDURE — 82746 ASSAY OF FOLIC ACID SERUM: CPT | Mod: HCNC

## 2019-07-05 PROCEDURE — 84446 ASSAY OF VITAMIN E: CPT | Mod: HCNC

## 2019-07-05 PROCEDURE — 84630 ASSAY OF ZINC: CPT | Mod: HCNC

## 2019-07-05 PROCEDURE — 84588 ASSAY OF VASOPRESSIN: CPT | Mod: HCNC

## 2019-07-05 PROCEDURE — 84255 ASSAY OF SELENIUM: CPT | Mod: HCNC

## 2019-07-05 PROCEDURE — 84207 ASSAY OF VITAMIN B-6: CPT | Mod: HCNC

## 2019-07-05 PROCEDURE — 83036 HEMOGLOBIN GLYCOSYLATED A1C: CPT | Mod: HCNC

## 2019-07-05 PROCEDURE — 99214 OFFICE O/P EST MOD 30 MIN: CPT | Mod: 25,HCNC,S$GLB, | Performed by: NURSE PRACTITIONER

## 2019-07-05 PROCEDURE — 83735 ASSAY OF MAGNESIUM: CPT | Mod: HCNC

## 2019-07-05 PROCEDURE — 36415 COLL VENOUS BLD VENIPUNCTURE: CPT | Mod: HCNC,PO

## 2019-07-05 PROCEDURE — 80061 LIPID PANEL: CPT | Mod: HCNC

## 2019-07-05 PROCEDURE — 83520 IMMUNOASSAY QUANT NOS NONAB: CPT | Mod: HCNC

## 2019-07-05 PROCEDURE — 82525 ASSAY OF COPPER: CPT | Mod: HCNC

## 2019-07-05 PROCEDURE — 82180 ASSAY OF ASCORBIC ACID: CPT | Mod: HCNC

## 2019-07-05 PROCEDURE — 3080F DIAST BP >= 90 MM HG: CPT | Mod: HCNC,CPTII,S$GLB, | Performed by: NURSE PRACTITIONER

## 2019-07-05 PROCEDURE — 99999 PR PBB SHADOW E&M-EST. PATIENT-LVL III: ICD-10-PCS | Mod: PBBFAC,HCNC,, | Performed by: NURSE PRACTITIONER

## 2019-07-05 PROCEDURE — 90714 TD VACCINE GREATER THAN OR EQUAL TO 7YO PRESERVATIVE FREE IM: ICD-10-PCS | Mod: HCNC,S$GLB,, | Performed by: NURSE PRACTITIONER

## 2019-07-05 PROCEDURE — 90471 IMMUNIZATION ADMIN: CPT | Mod: HCNC,S$GLB,, | Performed by: NURSE PRACTITIONER

## 2019-07-05 PROCEDURE — 86141 C-REACTIVE PROTEIN HS: CPT | Mod: HCNC

## 2019-07-05 PROCEDURE — 82670 ASSAY OF TOTAL ESTRADIOL: CPT | Mod: HCNC

## 2019-07-05 PROCEDURE — 1101F PR PT FALLS ASSESS DOC 0-1 FALLS W/OUT INJ PAST YR: ICD-10-PCS | Mod: HCNC,CPTII,S$GLB, | Performed by: NURSE PRACTITIONER

## 2019-07-05 PROCEDURE — 90714 TD VACC NO PRESV 7 YRS+ IM: CPT | Mod: HCNC,S$GLB,, | Performed by: NURSE PRACTITIONER

## 2019-07-05 PROCEDURE — 3077F SYST BP >= 140 MM HG: CPT | Mod: HCNC,CPTII,S$GLB, | Performed by: NURSE PRACTITIONER

## 2019-07-05 PROCEDURE — 83090 ASSAY OF HOMOCYSTEINE: CPT | Mod: HCNC

## 2019-07-05 RX ORDER — SYRINGE-NEEDLE,INSULIN,0.5 ML 30 GX5/16"
SYRINGE, EMPTY DISPOSABLE MISCELLANEOUS
Qty: 200 EACH | Refills: 11 | Status: SHIPPED | OUTPATIENT
Start: 2019-07-05

## 2019-07-05 NOTE — TELEPHONE ENCOUNTER
----- Message from Joe Quintana sent at 7/5/2019  1:23 PM CDT -----  ..Type:  Patient Returning Call    Who Called: nirmal ( daughter )   Who Left Message for Patient:  Does the patient know what this is regarding?:pt insolent  Would the patient rather a call back or a response via [facility]? Call back   Best Call Back Number: [phone]  Additional Information: : nirmal ( daughter ) states pt needs refill on insolent needles and insolent please send a prescription to .  [id number]  [address]  [address]  [place] [address]  Phone: [phone] Fax: [phone]

## 2019-07-07 LAB — SELENIUM SERPL-MCNC: 145 UG/L (ref 23–190)

## 2019-07-08 LAB
CERULOPLASMIN SERPL-MCNC: 32 MG/DL (ref 15–45)
COPPER SERPL-MCNC: 1289 UG/L (ref 665–1480)
VASOPRESSIN SERPL-MCNC: 2.9 PG/ML (ref 0–6.9)
VIT C SERPL-MCNC: 5 MG/L (ref 2–19)
ZINC SERPL-MCNC: 81 UG/DL (ref 60–130)

## 2019-07-09 LAB
PYRIDOXAL SERPL-MCNC: 6 UG/L (ref 5–50)
VEGF SERPL-MCNC: 20.8 PG/ML

## 2019-07-09 NOTE — PROGRESS NOTES
These are all labs that the patient needed to have to see a doctor in [place] so I am not going to address them.  They just needed them done here.  Please copy them all and ask how they would like to receive them.    The a1c is high.  Please ask him to address this with his diabetic educator.

## 2019-07-10 LAB — A-TOCOPHEROL VIT E SERPL-MCNC: 657 UG/DL (ref 500–1800)

## 2019-07-11 LAB
GLUTATHIONE BLD-SCNC: 262 UM (ref 544–1228)
MAGNESIUM RBC-MCNC: 4.8 MG/DL (ref 4–6.4)

## 2019-07-14 NOTE — PROGRESS NOTES
These levels were ordered by someone else but they put them on my name in our lab.  Please copy these for them and get them the results so that they can send them to the doctor in [place] who wanted them.

## 2019-07-16 ENCOUNTER — OFFICE VISIT (OUTPATIENT)
Dept: ENDOCRINOLOGY | Facility: CLINIC | Age: 74
End: 2019-07-16
Payer: MEDICARE

## 2019-07-16 VITALS
DIASTOLIC BLOOD PRESSURE: 74 MMHG | SYSTOLIC BLOOD PRESSURE: 132 MMHG | WEIGHT: 211 LBS | HEIGHT: 67 IN | BODY MASS INDEX: 33.12 KG/M2 | HEART RATE: 82 BPM

## 2019-07-16 DIAGNOSIS — I25.810 CORONARY ARTERY DISEASE INVOLVING CORONARY BYPASS GRAFT OF NATIVE HEART WITHOUT ANGINA PECTORIS: ICD-10-CM

## 2019-07-16 DIAGNOSIS — I15.2 HYPERTENSION ASSOCIATED WITH DIABETES: ICD-10-CM

## 2019-07-16 DIAGNOSIS — N18.30 TYPE 2 DIABETES MELLITUS WITH STAGE 3 CHRONIC KIDNEY DISEASE, WITH LONG-TERM CURRENT USE OF INSULIN: Primary | ICD-10-CM

## 2019-07-16 DIAGNOSIS — E11.59 HYPERTENSION ASSOCIATED WITH DIABETES: ICD-10-CM

## 2019-07-16 DIAGNOSIS — I25.10 CORONARY ARTERY DISEASE, ANGINA PRESENCE UNSPECIFIED, UNSPECIFIED VESSEL OR LESION TYPE, UNSPECIFIED WHETHER NATIVE OR TRANSPLANTED HEART: ICD-10-CM

## 2019-07-16 DIAGNOSIS — N18.30 CKD (CHRONIC KIDNEY DISEASE) STAGE 3, GFR 30-59 ML/MIN: ICD-10-CM

## 2019-07-16 DIAGNOSIS — Z79.4 TYPE 2 DIABETES MELLITUS WITH STAGE 3 CHRONIC KIDNEY DISEASE, WITH LONG-TERM CURRENT USE OF INSULIN: Primary | ICD-10-CM

## 2019-07-16 DIAGNOSIS — E11.22 TYPE 2 DIABETES MELLITUS WITH STAGE 3 CHRONIC KIDNEY DISEASE, WITH LONG-TERM CURRENT USE OF INSULIN: Primary | ICD-10-CM

## 2019-07-16 DIAGNOSIS — R41.3 MEMORY LOSS: ICD-10-CM

## 2019-07-16 PROCEDURE — 99999 PR PBB SHADOW E&M-EST. PATIENT-LVL V: ICD-10-PCS | Mod: PBBFAC,HCNC,, | Performed by: NURSE PRACTITIONER

## 2019-07-16 PROCEDURE — 99214 PR OFFICE/OUTPT VISIT, EST, LEVL IV, 30-39 MIN: ICD-10-PCS | Mod: HCNC,S$GLB,, | Performed by: NURSE PRACTITIONER

## 2019-07-16 PROCEDURE — 99999 PR PBB SHADOW E&M-EST. PATIENT-LVL V: CPT | Mod: PBBFAC,HCNC,, | Performed by: NURSE PRACTITIONER

## 2019-07-16 PROCEDURE — 99214 OFFICE O/P EST MOD 30 MIN: CPT | Mod: HCNC,S$GLB,, | Performed by: NURSE PRACTITIONER

## 2019-07-16 NOTE — PROGRESS NOTES
Subjective:      Patient ID: Abdullahi Salazar is a 73 y.o. male.    Chief Complaint:   Routine Dm visit.     History of Present Illness  CHIEF COMPLAINT:    Pt is a 73 y.o. wm with a diagnosis of Type 2 diabetes mellitus diagnosed approximately 2004, as well as chronic conditions pending review including HTN, HLP, CAD. Also pt t with recurrent chronic SDH and now s/p shunt for NPH.    Interim Events: Brought logs today.  Accompanied by daughter Colin.  Children are rotating to help assist parents now.  A1C is improved.  Note many FBS are quite good, but marked elevations with supper.  After inquiry--pt will not take insulin in the morning if glucoses are reasonable--which explains markedly elevated glucoses rest of day.  No c/o hypoglycmia.  No focal complaints.     Eats breakfast from 8-10 am,   SOmetimes lunch, or snacks  Supper around 6        Diabetes Flow Sheet:   Diabetes Medications: Novolin 70/30  60 and 40 units bid.   Prior Regimens: Lantus 45 , Humalog 30-30-30  plus ss with meals  (but may scale dose back to 10 if glucose low or eating lgihgt) based on what he is eating.   Diabetes Complications:   Aspirin: yes   Statin: Zetia &  Livalo r/t myalgias. --states cannot tolerate statins r/t myalgias and whelchol causes too much abdominal discomfort.   ACE/ARB: yes   Last Urine Microalbumin: 12/13   Last Eye exam: Approx   Last Diabetic Education: 8/13.       PAST MEDICAL HISTORY/PAST SURGICAL HISTORY: Reviewed in [facility]   SOCIAL HISTORY: No T/A. Retired    FAMILY HISTORY: + DM 2. No known thyroid disease. Lung Cancer.   MEDICATIONS/ALLERGIES: The patient's MedCard has been updated and reviewed.       Review of Systems   Constitutional: Negative for activity change and fatigue.   HENT: Negative for hearing loss and trouble swallowing.    Eyes: Negative for photophobia and visual disturbance.        Last Eye Exam:    Respiratory: Negative for cough and shortness of breath.    Cardiovascular: Negative  for chest pain and palpitations.   Gastrointestinal: Negative for constipation and diarrhea.   Genitourinary: Negative for frequency and urgency.   Musculoskeletal: Negative for arthralgias and myalgias.   Skin: Negative for rash and wound.   Neurological: Negative for weakness and numbness.   Psychiatric/Behavioral: Negative for sleep disturbance. The patient is not nervous/anxious.        Objective:   Physical Exam   Constitutional: He is oriented to person, place, and time. He appears well-developed.   HENT:   Head: Normocephalic and atraumatic.   Nose: Nose normal.   Eyes: Pupils are equal, round, and reactive to light. Conjunctivae and EOM are normal.   Neck: Normal range of motion. Neck supple. No tracheal deviation present. No thyromegaly present.   Cardiovascular: Normal rate, regular rhythm and normal heart sounds.   Musculoskeletal: Normal range of motion.   Feet:   Neurological: He is alert and oriented to person, place, and time. He has normal reflexes.   Vibratory sensation to feet:   Skin: Skin is warm and dry. No rash noted. No erythema.   Psychiatric: He has a normal mood and affect. His behavior is normal. Judgment and thought content normal.       Lab Review:     Chemistry        Component Value Date/Time     07/05/2019 1146    K 4.7 07/05/2019 1146     07/05/2019 1146    CO2 27 07/05/2019 1146    BUN 27 (H) 07/05/2019 1146    CREATININE 1.9 (H) 07/05/2019 1146     (H) 07/05/2019 1146        Component Value Date/Time    CALCIUM 9.3 07/05/2019 1146    ALKPHOS 146 (H) 07/05/2019 1146    AST 28 07/05/2019 1146    AST 47 02/15/2016 0453    ALT 37 07/05/2019 1146    BILITOT 1.0 07/05/2019 1146        Hemoglobin A1C   Date Value Ref Range Status   07/05/2019 9.2 (H) 4.0 - 5.6 % Final     Comment:     ADA Screening Guidelines:  5.7-6.4%  Consistent with prediabetes  >or=6.5%  Consistent with diabetes  High levels of fetal hemoglobin interfere with the HbA1C  assay. Heterozygous  hemoglobin variants (HbS, HgC, etc)do  not significantly interfere with this assay.   However, presence of multiple variants may affect accuracy.     04/26/2019 11.7 (H) 0.0 - 5.6 % Final     Comment:     Reference Interval:  5.0 - 5.6 Normal   5.7 - 6.4 High Risk   > 6.5 Diabetic    Hgb A1c results are standardized based on the (NGSP) National   Glycohemoglobin Standardization Program.    Hemoglobin A1C levels are related to mean serum/plasma glucose   during the preceding 2-3 months.        02/12/2019 9.3 (H) 4.0 - 5.6 % Final     Comment:     ADA Screening Guidelines:  5.7-6.4%  Consistent with prediabetes  >or=6.5%  Consistent with diabetes  High levels of fetal hemoglobin interfere with the HbA1C  assay. Heterozygous hemoglobin variants (HbS, HgC, etc)do  not significantly interfere with this assay.   However, presence of multiple variants may affect accuracy.       Lab Results   Component Value Date    LDLCALC 55.0 (L) 07/05/2019     Lab Results   Component Value Date    TSH 0.816 06/20/2018       .diag    Assessment:         1. Type 2 diabetes mellitus with stage 3 chronic kidney disease, with long-term current use of insulin  Hemoglobin A1c  Chronic=-uncontrolled see plan    2. CKD (chronic kidney disease) stage 3, GFR 30-59 ml/min  Chronic-worseninig-improve glucoses, bp.  Renal pending    3. Coronary artery disease, angina presence unspecified, unspecified vessel or lesion type, unspecified whether native or transplanted heart  jovhryd-ptyaet-ocxue hypoglycemia    4. Hypertension associated with diabetes  Chronic-stable-needs consistency in meds    5. Coronary artery disease involving coronary bypass graft of native heart without angina pectoris     6. Memory loss  Chronic-impedes self care.          Plan:   Decrease morning dose of 70/30 from 60 to 40----since I am not sure he has even been taking the higher dose to begin with.   Continue  conventionalinsulin  therapy--pt does not need tight control.  ~8%  reasonable       CO Novolin 70/30   40 and 40 bid.     ORDERS 07/16/2019       3  months with a1c prior in [place].

## 2019-07-24 ENCOUNTER — PATIENT MESSAGE (OUTPATIENT)
Dept: FAMILY MEDICINE | Facility: CLINIC | Age: 74
End: 2019-07-24

## 2019-07-25 ENCOUNTER — OFFICE VISIT (OUTPATIENT)
Dept: PODIATRY | Facility: CLINIC | Age: 74
End: 2019-07-25
Payer: MEDICARE

## 2019-07-25 ENCOUNTER — CLINICAL SUPPORT (OUTPATIENT)
Dept: CARDIOLOGY | Facility: CLINIC | Age: 74
End: 2019-07-25
Attending: INTERNAL MEDICINE
Payer: MEDICARE

## 2019-07-25 VITALS
HEART RATE: 80 BPM | BODY MASS INDEX: 33.12 KG/M2 | HEIGHT: 67 IN | SYSTOLIC BLOOD PRESSURE: 152 MMHG | DIASTOLIC BLOOD PRESSURE: 78 MMHG | WEIGHT: 211 LBS

## 2019-07-25 DIAGNOSIS — Z95.810 CARDIAC DEFIBRILLATOR IN SITU: ICD-10-CM

## 2019-07-25 DIAGNOSIS — E11.21 TYPE 2 DIABETES MELLITUS WITH DIABETIC NEPHROPATHY, WITHOUT LONG-TERM CURRENT USE OF INSULIN: Primary | ICD-10-CM

## 2019-07-25 DIAGNOSIS — B35.1 ONYCHOMYCOSIS DUE TO DERMATOPHYTE: ICD-10-CM

## 2019-07-25 DIAGNOSIS — M20.41 HAMMER TOES OF BOTH FEET: ICD-10-CM

## 2019-07-25 DIAGNOSIS — I50.30 CHF WITH LEFT VENTRICULAR DIASTOLIC DYSFUNCTION, NYHA CLASS 2: ICD-10-CM

## 2019-07-25 DIAGNOSIS — M20.42 HAMMER TOES OF BOTH FEET: ICD-10-CM

## 2019-07-25 PROCEDURE — 99499 NO LOS: ICD-10-PCS | Mod: HCNC,S$GLB,, | Performed by: PODIATRIST

## 2019-07-25 PROCEDURE — 99999 PR PBB SHADOW E&M-EST. PATIENT-LVL IV: ICD-10-PCS | Mod: PBBFAC,HCNC,, | Performed by: PODIATRIST

## 2019-07-25 PROCEDURE — 11721 PR DEBRIDEMENT OF NAILS, 6 OR MORE: ICD-10-PCS | Mod: HCNC,Q9,S$GLB, | Performed by: PODIATRIST

## 2019-07-25 PROCEDURE — 11721 DEBRIDE NAIL 6 OR MORE: CPT | Mod: HCNC,Q9,S$GLB, | Performed by: PODIATRIST

## 2019-07-25 PROCEDURE — 99499 UNLISTED E&M SERVICE: CPT | Mod: HCNC,S$GLB,, | Performed by: PODIATRIST

## 2019-07-25 PROCEDURE — 99999 PR PBB SHADOW E&M-EST. PATIENT-LVL IV: CPT | Mod: PBBFAC,HCNC,, | Performed by: PODIATRIST

## 2019-07-25 NOTE — PROGRESS NOTES
Subjective:      Patient ID: Abdullahi Salazar is a 73 y.o. male.    Chief Complaint: Routine Foot Care (3 month f/u, PCP-(SERGIO Jackson)-07/05/2019) and Diabetes Mellitus (Endo-Pedro,NP-07/16/2019, A1c-9.2-07/05/2019)    Abdullahi is a 73 y.o. male who presents to the clinic upon referral from Dr. Marshall ref. provider found  for evaluation and treatment of diabetic feet. Abdullahi has a past medical history of Actinic keratoses, Altered mental status (3/22/2015), Anticoagulant long-term use, Atrial fibrillation, CAD (coronary artery disease), Carotid artery stenosis (3/31/2015), CHF (congestive heart failure) (03/2018), Colon polyps (2011), Combined hyperlipidemia associated with type 2 diabetes mellitus, Diabetes mellitus type II, uncontrolled, GERD (gastroesophageal reflux disease), Grand mal seizure, History of cardiac pacemaker (3/31/2015), HTN (hypertension), Hydrocephalus (09/08/2017), Influenza A (1/2/2018), Iron deficiency anemia due to chronic blood loss (1/7/2019), Mitral valve insufficiency, Presence of automatic (implantable) cardiac defibrillator, Respiratory distress, Sleep apnea with use of continuous positive airway pressure (CPAP), Syncope (3/30/2015), Wears dentures, and Wears hearing aid. Patient relates no major problem with feet. Only complaints today consist of toenails in need of trimming.  Denies this being associated with pain symptoms.  Has not attempted to self trim.  Denies having overt neuropathy pain in the extremities or instability during gait.  Notes improvement in his blood glucose since the last exam.  Denies any additional pedal complaints.    PCP: Maximo Hernandez MD    Date Last Seen by PCP: 7/5/19    Hemoglobin A1C   Date Value Ref Range Status   07/05/2019 9.2 (H) 4.0 - 5.6 % Final     Comment:     ADA Screening Guidelines:  5.7-6.4%  Consistent with prediabetes  >or=6.5%  Consistent with diabetes  High levels of fetal hemoglobin interfere with the HbA1C  assay. Heterozygous  hemoglobin variants (HbS, HgC, etc)do  not significantly interfere with this assay.   However, presence of multiple variants may affect accuracy.     04/26/2019 11.7 (H) 0.0 - 5.6 % Final     Comment:     Reference Interval:  5.0 - 5.6 Normal   5.7 - 6.4 High Risk   > 6.5 Diabetic    Hgb A1c results are standardized based on the (NGSP) National   Glycohemoglobin Standardization Program.    Hemoglobin A1C levels are related to mean serum/plasma glucose   during the preceding 2-3 months.        02/12/2019 9.3 (H) 4.0 - 5.6 % Final     Comment:     ADA Screening Guidelines:  5.7-6.4%  Consistent with prediabetes  >or=6.5%  Consistent with diabetes  High levels of fetal hemoglobin interfere with the HbA1C  assay. Heterozygous hemoglobin variants (HbS, HgC, etc)do  not significantly interfere with this assay.   However, presence of multiple variants may affect accuracy.             Past Medical History:   Diagnosis Date    Actinic keratoses     Altered mental status 3/22/2015    Anticoagulant long-term use     Atrial fibrillation     CAD (coronary artery disease)     stents after bypass    Carotid artery stenosis 3/31/2015    CHF (congestive heart failure) 03/2018    Colon polyps 2011    repeat colonoscopy 2014    Combined hyperlipidemia associated with type 2 diabetes mellitus     Diabetes mellitus type II, uncontrolled     dx 2004    GERD (gastroesophageal reflux disease)     Grand mal seizure     last 2/2017    History of cardiac pacemaker 3/31/2015    HTN (hypertension)     Hydrocephalus 09/08/2017    Influenza A 1/2/2018    He got influenza a recently and was in the hospital in [place].  He had rhabdomyolyisis and acute kidney injury.  He also had an increased troponin.  He had a flu shot in early December.    Iron deficiency anemia due to chronic blood loss 1/7/2019    Mitral valve insufficiency     Presence of automatic (implantable) cardiac defibrillator     Respiratory distress     Sleep  apnea with use of continuous positive airway pressure (CPAP)     patient states he does not use CPAP anymore    Syncope 3/30/2015    Wears dentures     upper and lower    Wears hearing aid     sometimes       Past Surgical History:   Procedure Laterality Date    AORTOGRAM WITH RUNOFF Bilateral 8/24/2016    [facility]    biotronic icd placed Left 3/11/2016    [facility]    BRAIN SURGERY      CARDIAC PACEMAKER PLACEMENT      CARDIOVERSION N/A 2/12/2015    [facility]    CATARACT EXTRACTION W/ INTRAOCULAR LENS  IMPLANT, BILATERAL      COLONOSCOPY N/A 3/17/2015    [facility] at [facility]    CORONARY ARTERY BYPASS GRAFT  1993    three vessels    CORONARY STENT PLACEMENT      CSF SHUNT      EGD (ESOPHAGOGASTRODUODENOSCOPY) N/A 2/7/2019    Performed by Abdelrahman Hinojosa [facility] at [facility]    EYE SURGERY      HEART CATH-LEFT N/A 2/18/2016    [facility] by Toño Haro [facility] at [facility]    HERNIA REPAIR      as infant    LUMBAR PUNCTURE N/A 5/22/2017    [facility] Britton Pizarro [facility] at [facility]    SHUNT-  RIGHT FRONTAL  SHUNT WITH BIOPSY OF MIDDLE FRONTAL N/A 8/8/2017    [facility]formed by Seun Oconnor MD at [facility]       Family History   Problem Relation Age of Onset    Stomach cancer Mother     Lung cancer Father     Diabetes Sister     Hypertension Sister     Heart disease Neg Hx     Stroke Neg Hx        Social History     Socioeconomic History    Marital status:      Spouse name: Not on file    Number of children: Not on file    Years of education: Not on file    Highest education level: Not on file   Occupational History    Not on file   Social Needs    Financial resource strain: Not on file    Food insecurity:     Worry: Not on file     Inability: Not on file    Transportation needs:     Medical: Not on file     Non-medical: Not on  "file   Tobacco Use    Smoking status: Former Smoker     Packs/day: 2.00     Years: 25.00     Pack years: 50.00     Types: Cigarettes     Last attempt to quit: 1983     Years since quittin.5    Smokeless tobacco: Never Used    Tobacco comment: quit     Substance and Sexual Activity    Alcohol use: Yes     Alcohol/week: 0.6 oz     Types: 1 Cans of beer per week     Comment: rarely    Drug use: No    Sexual activity: Yes     Partners: Female   Lifestyle    Physical activity:     Days per week: Not on file     Minutes per session: Not on file    Stress: Not on file   Relationships    Social connections:     Talks on phone: Not on file     Gets together: Not on file     Attends [employer] service: Not on file     Active member of club or organization: Not on file     Attends meetings of clubs or organizations: Not on file     Relationship status: Not on file   Other Topics Concern    Not on file   Social History Narrative    Not on file       Current Outpatient Medications   Medication Sig Dispense Refill    atorvastatin (LIPITOR) 40 MG tablet Take 1 tablet (40 mg total) by mouth once daily. 90 tablet 3    blood sugar diagnostic (ACCU-CHEK CLEOPATRA PLUS TEST STRP) Strp TEST BLOOD SUGARS 4 TIMES DAILY 150 strip PRN    carvedilol (COREG) 25 MG tablet Take 1 tablet (25 mg total) by mouth 2 (two) times daily with meals. 180 tablet 3    clopidogrel (PLAVIX) 75 mg tablet Take 1 tablet (75 mg total) by mouth once daily. 30 tablet 0    dabigatran etexilate (PRADAXA) 75 mg Cap Take 1 capsule (75 mg total) by mouth 2 (two) times daily. "Do NOT break, chew, or open capsules." 180 capsule 3    ezetimibe (ZETIA) 10 mg tablet TAKE 1 TABLET EVERY DAY 90 tablet 3    ferrous sulfate (FEOSOL) 325 mg (65 mg iron) Tab tablet TAKE 1 TABLET THREE TIMES DAILY 270 tablet 0    flash glucose scanning reader (TPACKSTYLE PAULIE 14 DAY READER) Misc 1 each by Misc.(Non-Drug; Combo Route) route once daily. 1 each 0    flash " "glucose sensor (FREESTYLE PAULIE 14 DAY SENSOR) Kit 1 each by Misc.(Non-Drug; Combo Route) route once daily. 2 kit 11    hydrALAZINE (APRESOLINE) 100 MG tablet Take 1 tablet (100 mg total) by mouth 2 (two) times daily. 180 tablet 11    insulin NPH-insulin regular, 70/30, (NOVOLIN 70/30) 100 unit/mL (70-30) injection 60  Units 30 min before breakfast,   40 units 30 min before supper.  Either Humalin, Novolin or Relion 9 vial 11    insulin syringe-needle U-100 1 mL 30 gauge x 5/16 Syrg Use with insulin twice a day 200 each 11    isosorbide mononitrate (IMDUR) 120 MG 24 hr tablet Take 1 tablet (120 mg total) by mouth once daily. 30 tablet 1    lancets (ACCU-CHEK SOFTCLIX LANCETS) Misc 1 each by Misc.(Non-Drug; Combo Route) route 2 (two) times daily. 100 each 11    levETIRAcetam (KEPPRA) 500 MG Tab Take 1.5 tablets (750 mg total) by mouth 2 (two) times daily. 270 tablet 3    multivitamin capsule Take 1 capsule by mouth once daily.      nitroGLYCERIN (NITROSTAT) 0.4 MG SL tablet Place 1 tablet (0.4 mg total) under the tongue every 5 (five) minutes as needed for Chest pain. 100 tablet 11    pen needle, diabetic (BD ULTRA-FINE SHORT PEN NEEDLE) 31 gauge x 5/16" Ndle USE FOUR TIMES DAILY 360 each 3    potassium chloride SA (K-DUR,KLOR-CON) 10 MEQ tablet TAKE 1 TABLET EVERY DAY 90 tablet 11    sertraline (ZOLOFT) 25 MG tablet Take 1 tablet (25 mg total) by mouth once daily. 90 tablet 3    tamsulosin (FLOMAX) 0.4 mg Cap TAKE 1 CAPSULE EVERY DAY 90 capsule 3    torsemide (DEMADEX) 10 MG Tab Take 3 tablets (30 mg total) by mouth once daily. 90 tablet 0    valsartan (DIOVAN) 320 MG tablet One po QAM to replace benazepril 90 tablet 3     No current facility-administered medications for this visit.        Review of patient's allergies indicates:   Allergen Reactions    Nateglinide      Other reaction(s): fatigue  Other reaction(s): fatigue    Pioglitazone Other (See Comments)     Other reaction(s): CAD    " Simvastatin Other (See Comments)     Other reaction(s): Muscle pain         Review of Systems   Constitution: Negative for chills and fever.   Cardiovascular: Positive for leg swelling. Negative for claudication.   Skin: Positive for color change and nail changes. Negative for dry skin.   Musculoskeletal: Positive for joint swelling. Negative for joint pain, muscle cramps, muscle weakness and myalgias.   Neurological: Positive for numbness and paresthesias.   Psychiatric/Behavioral: Negative for altered mental status.           Objective:      Physical Exam   Constitutional: He is oriented to person, place, and time. He appears well-developed and well-nourished. No distress.   Cardiovascular:   Pulses:       Dorsalis pedis pulses are 2+ on the right side, and 2+ on the left side.        Posterior tibial pulses are 1+ on the right side, and 1+ on the left side.   CFT is < 3 seconds bilateral.  Pedal hair growth is decreased bilateral.  Varicosities noted bilateral.  Moderate nonpitting lower extremity edema noted bilateral.  Toes are cool to touch bilateral.     Musculoskeletal: He exhibits edema. He exhibits no tenderness.   Muscle strength 5/5 in all muscle groups bilateral.  No tenderness nor crepitation with ROM of foot/ankle joints bilateral.  No tenderness with palpation of bilateral foot and ankle.  Bilateral pes planus foot type.  Bilateral semi-reducible hammertoes.  Bilateral hallux abducto valgus.     Neurological: He is alert and oriented to person, place, and time. He has normal strength. A sensory deficit is present.   Protective sensation per [place] monofilament is decreased bilateral.  Vibratory sensation is intact bilateral.  Light touch is intact bilateral.   Skin: Skin is warm, dry and intact. Capillary refill takes 2 to 3 seconds. No abrasion, no bruising, no burn, no ecchymosis, no laceration, no lesion, no petechiae and no rash noted. He is not diaphoretic. No erythema. No pallor.    Pedal skin appears edematous bilateral.  Toenails x 10 appear thickened by 2mm's, elongated by 6 mm's, and discolored with subungual debris.  Hemosiderin staining noted to bilateral lower extremity. Examination of the skin reveals no evidence of significant maceration, rashes, open lesions, suspicious appearing nevi or other concerning lesions.              Assessment:       Encounter Diagnoses   Name Primary?    Type 2 diabetes mellitus with diabetic nephropathy, without long-term current use of insulin Yes    Hammer toes of both feet     Onychomycosis due to dermatophyte          Plan:       Abdullahi was seen today for routine foot care and diabetes mellitus.    Diagnoses and all orders for this visit:    Type 2 diabetes mellitus with diabetic nephropathy, without long-term current use of insulin    Hammer toes of both feet    Onychomycosis due to dermatophyte      I counseled the patient on his conditions, their implications and medical management.    Advised to continue wearing shoe gear that accommodates digital deformities.    Shoe inspection. Diabetic Foot Education. Patient reminded of the importance of good nutrition and blood sugar control to help prevent podiatric complications of diabetes. Patient instructed on proper foot hygeine. We discussed wearing proper shoe gear, daily foot inspections, never walking without protective shoe gear, never putting sharp instruments to feet    With patient's permission, nails were aggressively reduced and debrided x 10 to their soft tissue attachment mechanically and with electric , removing all offending nail and debris. Patient relates relief following the procedure. He will continue to monitor the areas daily, inspect his feet, wear protective shoe gear when ambulatory, moisturizer to maintain skin integrity and follow in this office in approximately 4 months, sooner p.r.n.    Follow up in about 4 months (around 11/25/2019).    Surjit Piña DPM

## 2019-07-30 ENCOUNTER — PATIENT MESSAGE (OUTPATIENT)
Dept: ENDOCRINOLOGY | Facility: CLINIC | Age: 74
End: 2019-07-30

## 2019-07-30 ENCOUNTER — OFFICE VISIT (OUTPATIENT)
Dept: FAMILY MEDICINE | Facility: CLINIC | Age: 74
End: 2019-07-30
Payer: MEDICARE

## 2019-07-30 VITALS
HEIGHT: 67 IN | BODY MASS INDEX: 32.96 KG/M2 | TEMPERATURE: 98 F | DIASTOLIC BLOOD PRESSURE: 90 MMHG | WEIGHT: 210 LBS | SYSTOLIC BLOOD PRESSURE: 159 MMHG | HEART RATE: 80 BPM

## 2019-07-30 DIAGNOSIS — I10 BENIGN ESSENTIAL HTN: Primary | ICD-10-CM

## 2019-07-30 PROCEDURE — 99999 PR PBB SHADOW E&M-EST. PATIENT-LVL III: ICD-10-PCS | Mod: PBBFAC,HCNC,, | Performed by: NURSE PRACTITIONER

## 2019-07-30 PROCEDURE — 1101F PT FALLS ASSESS-DOCD LE1/YR: CPT | Mod: HCNC,CPTII,S$GLB, | Performed by: NURSE PRACTITIONER

## 2019-07-30 PROCEDURE — 3080F DIAST BP >= 90 MM HG: CPT | Mod: HCNC,CPTII,S$GLB, | Performed by: NURSE PRACTITIONER

## 2019-07-30 PROCEDURE — 3077F SYST BP >= 140 MM HG: CPT | Mod: HCNC,CPTII,S$GLB, | Performed by: NURSE PRACTITIONER

## 2019-07-30 PROCEDURE — 99214 PR OFFICE/OUTPT VISIT, EST, LEVL IV, 30-39 MIN: ICD-10-PCS | Mod: HCNC,S$GLB,, | Performed by: NURSE PRACTITIONER

## 2019-07-30 PROCEDURE — 1101F PR PT FALLS ASSESS DOC 0-1 FALLS W/OUT INJ PAST YR: ICD-10-PCS | Mod: HCNC,CPTII,S$GLB, | Performed by: NURSE PRACTITIONER

## 2019-07-30 PROCEDURE — 99214 OFFICE O/P EST MOD 30 MIN: CPT | Mod: HCNC,S$GLB,, | Performed by: NURSE PRACTITIONER

## 2019-07-30 PROCEDURE — 3080F PR MOST RECENT DIASTOLIC BLOOD PRESSURE >= 90 MM HG: ICD-10-PCS | Mod: HCNC,CPTII,S$GLB, | Performed by: NURSE PRACTITIONER

## 2019-07-30 PROCEDURE — 3077F PR MOST RECENT SYSTOLIC BLOOD PRESSURE >= 140 MM HG: ICD-10-PCS | Mod: HCNC,CPTII,S$GLB, | Performed by: NURSE PRACTITIONER

## 2019-07-30 PROCEDURE — 99999 PR PBB SHADOW E&M-EST. PATIENT-LVL III: CPT | Mod: PBBFAC,HCNC,, | Performed by: NURSE PRACTITIONER

## 2019-07-30 RX ORDER — HYDRALAZINE HYDROCHLORIDE 100 MG/1
100 TABLET, FILM COATED ORAL 3 TIMES DAILY
Qty: 270 TABLET | Refills: 3 | Status: SHIPPED | OUTPATIENT
Start: 2019-07-30 | End: 2019-10-11 | Stop reason: SDUPTHER

## 2019-07-30 NOTE — PROGRESS NOTES
"Subjective:       Patient ID: Abdullahi Salazar is a 73 y.o. male.    Chief Complaint: Follow-up    Hypertension   This is a chronic problem. The current episode started more than 1 year ago. The problem has been waxing and waning since onset. The problem is uncontrolled. Pertinent negatives include no chest pain, headaches, palpitations or shortness of breath. Past treatments include beta blockers, angiotensin blockers, diuretics and direct vasodilators. The current treatment provides mild improvement.       Review of Systems   Constitutional: Negative for fatigue, fever and unexpected weight change.   HENT: Negative for ear pain and sore throat.    Eyes: Negative.  Negative for pain and visual disturbance.   Respiratory: Negative for cough and shortness of breath.    Cardiovascular: Negative for chest pain and palpitations.   Gastrointestinal: Negative for abdominal pain, diarrhea, nausea and vomiting.   Genitourinary: Negative for dysuria and frequency.   Musculoskeletal: Negative for arthralgias and myalgias.   Skin: Negative for color change and rash.   Neurological: Negative for dizziness and headaches.   Psychiatric/Behavioral: Negative for sleep disturbance. The patient is not nervous/anxious.        Vitals:    07/30/19 0722   BP: (!) 159/90   Pulse: 80   Temp: 97.7 °F (36.5 °C)       Objective:     Current Outpatient Medications   Medication Sig Dispense Refill    atorvastatin (LIPITOR) 40 MG tablet Take 1 tablet (40 mg total) by mouth once daily. 90 tablet 3    blood sugar diagnostic (ACCU-CHEK CLEOPATRA PLUS TEST STRP) Strp TEST BLOOD SUGARS 4 TIMES DAILY 150 strip PRN    carvedilol (COREG) 25 MG tablet Take 1 tablet (25 mg total) by mouth 2 (two) times daily with meals. 180 tablet 3    clopidogrel (PLAVIX) 75 mg tablet Take 1 tablet (75 mg total) by mouth once daily. 30 tablet 0    dabigatran etexilate (PRADAXA) 75 mg Cap Take 1 capsule (75 mg total) by mouth 2 (two) times daily. "Do NOT break, chew, or " "open capsules." 180 capsule 3    ezetimibe (ZETIA) 10 mg tablet TAKE 1 TABLET EVERY DAY 90 tablet 3    ferrous sulfate (FEOSOL) 325 mg (65 mg iron) Tab tablet TAKE 1 TABLET THREE TIMES DAILY 270 tablet 0    flash glucose scanning reader (FREESTYLE PAULIE 14 DAY READER) Misc 1 each by Misc.(Non-Drug; Combo Route) route once daily. 1 each 0    flash glucose sensor (FREESTYLE PAULIE 14 DAY SENSOR) Kit 1 each by Misc.(Non-Drug; Combo Route) route once daily. 2 kit 11    hydrALAZINE (APRESOLINE) 100 MG tablet Take 1 tablet (100 mg total) by mouth 3 (three) times daily. 270 tablet 3    insulin NPH-insulin regular, 70/30, (NOVOLIN 70/30) 100 unit/mL (70-30) injection 60  Units 30 min before breakfast,   40 units 30 min before supper.  Either Humalin, Novolin or Relion 9 vial 11    insulin syringe-needle U-100 1 mL 30 gauge x 5/16 Syrg Use with insulin twice a day 200 each 11    isosorbide mononitrate (IMDUR) 120 MG 24 hr tablet Take 1 tablet (120 mg total) by mouth once daily. 30 tablet 1    lancets (ACCU-CHEK SOFTCLIX LANCETS) Misc 1 each by Misc.(Non-Drug; Combo Route) route 2 (two) times daily. 100 each 11    levETIRAcetam (KEPPRA) 500 MG Tab Take 1.5 tablets (750 mg total) by mouth 2 (two) times daily. 270 tablet 3    multivitamin capsule Take 1 capsule by mouth once daily.      nitroGLYCERIN (NITROSTAT) 0.4 MG SL tablet Place 1 tablet (0.4 mg total) under the tongue every 5 (five) minutes as needed for Chest pain. 100 tablet 11    pen needle, diabetic (BD ULTRA-FINE SHORT PEN NEEDLE) 31 gauge x 5/16" Ndle USE FOUR TIMES DAILY 360 each 3    potassium chloride SA (K-DUR,KLOR-CON) 10 MEQ tablet TAKE 1 TABLET EVERY DAY 90 tablet 11    sertraline (ZOLOFT) 25 MG tablet Take 1 tablet (25 mg total) by mouth once daily. 90 tablet 3    tamsulosin (FLOMAX) 0.4 mg Cap TAKE 1 CAPSULE EVERY DAY 90 capsule 3    torsemide (DEMADEX) 10 MG Tab Take 3 tablets (30 mg total) by mouth once daily. 90 tablet 0    valsartan " (DIOVAN) 320 MG tablet One po QAM to replace benazepril 90 tablet 3     No current facility-administered medications for this visit.        Physical Exam   Constitutional: He is oriented to person, place, and time. He appears well-developed. No distress.   HENT:   Head: Normocephalic and atraumatic.   Eyes: Pupils are equal, round, and reactive to light. EOM are normal.   Neck: Normal range of motion. Neck supple.   Cardiovascular: Normal rate and regular rhythm.   Pulmonary/Chest: Effort normal and breath sounds normal.   Musculoskeletal: Normal range of motion.   Neurological: He is alert and oriented to person, place, and time.   Skin: Skin is warm and dry. No rash noted.   Psychiatric: He has a normal mood and affect. Thought content normal.   Nursing note and vitals reviewed.      Assessment:       1. Benign essential HTN        Plan:   Benign essential HTN    Other orders  -     hydrALAZINE (APRESOLINE) 100 MG tablet; Take 1 tablet (100 mg total) by mouth 3 (three) times daily.  Dispense: 270 tablet; Refill: 3        No follow-ups on file.    Patient Instructions     Low-Salt Diet  This diet removes foods that are high in salt. It also limits the amount of salt you use when cooking. It is most often used for people with high blood pressure, edema (fluid retention), and kidney, liver, or heart disease.  Table salt contains the mineral sodium. Your body needs sodium to work normally. But too much sodium can make your health problems worse. Your healthcare provider is recommending a low-salt (also called low-sodium) diet for you. Your total daily allowance of salt is 1,500 to 2,300 milligrams (mg). It is less than 1 teaspoon of table salt. This means you can have only about 500 to 700 mg of sodium at each meal. People with certain health problems should limit salt intake to the lower end of the recommended range.    When you cook, dont add much salt. If you can cook without using salt, even better. Dont add  salt to your food at the table.  When shopping, read food labels. Salt is often called sodium on the label. Choose foods that are salt-free, low salt, or very low salt. Note that foods with reduced salt may not lower your salt intake enough.    Beans, potatoes, and pasta  Ok: Dry beans, split peas, lentils, potatoes, rice, macaroni, pasta, spaghetti without added salt  Avoid: Potato chips, tortilla chips, and similar products  Breads and cereals  Ok: Low-sodium breads, rolls, cereals, and cakes; low-salt crackers, matzo crackers  Avoid: Salted crackers, pretzels, popcorn, Kiswahili toast, pancakes, muffins  Dairy  Ok: Milk, chocolate milk, hot chocolate mix, low-salt cheeses, and yogurt  Avoid: Processed cheese and cheese spreads; Roquefort, Camembert, and cottage cheese; buttermilk, instant breakfast drink  Desserts  Ok: Ice cream, frozen yogurt, juice bars, gelatin, cookies and pies, sugar, honey, jelly, hard candy  Avoid: Most pies, cakes and cookies prepared or processed with salt; instant pudding  Drinks  Ok: Tea, coffee, fizzy (carbonated) drinks, juices  Avoid: Flavored coffees, electrolyte replacement drinks, sports drinks  Meats  Ok: All fresh meat, fish, poultry, low-salt tuna, eggs, egg substitute  Avoid: Smoked, pickled, brine-cured, or salted meats and fish. This includes pierce, chipped beef, corned beef, hot dogs, deli meats, ham, kosher meats, salt pork, sausage, canned tuna, salted codfish, smoked salmon, herring, sardines, or anchovies.  Seasonings and spices  Ok: Most seasonings are okay. Good substitutes for salt include: fresh herb blends, hot sauce, lemon, garlic, swan, vinegar, dry mustard, parsley, cilantro, horseradish, tomato paste, regular margarine, mayonnaise, unsalted butter, cream cheese, vegetable oil, cream, low-salt salad dressing and gravy.  Avoid: Regular ketchup, relishes, pickles, soy sauce, teriyaki sauce, Worcestershire sauce, BBQ sauce, tartar sauce, meat tenderizer, chili  sauce, regular gravy, regular salad dressing, salted butter  Soups  Ok: Low-salt soups and broths made with allowed foods  Avoid: Bouillon cubes, soups with smoked or salted meats, regular soup and broth  Vegetables  Ok: Most vegetables are okay; also low-salt tomato and vegetable juices  Avoid: Sauerkraut and other brine-soaked vegetables; pickles and other pickled vegetables; tomato juice, olives  Date Last Reviewed: 8/1/2016 © 2000-2017 [employer]. [address] [place] [address]. All rights reserved. This information is not intended as a substitute for professional medical care. Always follow your healthcare professional's instructions.

## 2019-07-30 NOTE — PATIENT INSTRUCTIONS

## 2019-08-15 ENCOUNTER — TELEPHONE (OUTPATIENT)
Dept: INTERNAL MEDICINE | Facility: CLINIC | Age: 74
End: 2019-08-15

## 2019-08-20 RX ORDER — ISOSORBIDE MONONITRATE 120 MG/1
120 TABLET, EXTENDED RELEASE ORAL DAILY
Qty: 60 TABLET | Refills: 11 | Status: SHIPPED | OUTPATIENT
Start: 2019-08-20 | End: 2019-10-11 | Stop reason: SDUPTHER

## 2019-08-27 ENCOUNTER — OFFICE VISIT (OUTPATIENT)
Dept: NEUROLOGY | Facility: CLINIC | Age: 74
End: 2019-08-27
Payer: MEDICARE

## 2019-08-27 VITALS
RESPIRATION RATE: 16 BRPM | BODY MASS INDEX: 32.32 KG/M2 | SYSTOLIC BLOOD PRESSURE: 123 MMHG | HEIGHT: 67 IN | WEIGHT: 205.94 LBS | HEART RATE: 80 BPM | DIASTOLIC BLOOD PRESSURE: 59 MMHG

## 2019-08-27 DIAGNOSIS — F02.80 DEMENTIA ASSOCIATED WITH OTHER UNDERLYING DISEASE WITHOUT BEHAVIORAL DISTURBANCE: Primary | Chronic | ICD-10-CM

## 2019-08-27 DIAGNOSIS — G40.909 SEIZURE DISORDER: ICD-10-CM

## 2019-08-27 DIAGNOSIS — I48.0 PAROXYSMAL ATRIAL FIBRILLATION: ICD-10-CM

## 2019-08-27 DIAGNOSIS — Z98.2 S/P VP SHUNT: ICD-10-CM

## 2019-08-27 DIAGNOSIS — Z86.79 HX OF SUBDURAL HEMATOMA: ICD-10-CM

## 2019-08-27 PROCEDURE — 99499 UNLISTED E&M SERVICE: CPT | Mod: HCNC,S$GLB,, | Performed by: PSYCHIATRY & NEUROLOGY

## 2019-08-27 PROCEDURE — 99499 RISK ADDL DX/OHS AUDIT: ICD-10-PCS | Mod: HCNC,S$GLB,, | Performed by: PSYCHIATRY & NEUROLOGY

## 2019-08-27 PROCEDURE — 99215 OFFICE O/P EST HI 40 MIN: CPT | Mod: HCNC,S$GLB,, | Performed by: PSYCHIATRY & NEUROLOGY

## 2019-08-27 PROCEDURE — 3074F PR MOST RECENT SYSTOLIC BLOOD PRESSURE < 130 MM HG: ICD-10-PCS | Mod: HCNC,CPTII,S$GLB, | Performed by: PSYCHIATRY & NEUROLOGY

## 2019-08-27 PROCEDURE — 3074F SYST BP LT 130 MM HG: CPT | Mod: HCNC,CPTII,S$GLB, | Performed by: PSYCHIATRY & NEUROLOGY

## 2019-08-27 PROCEDURE — 3078F DIAST BP <80 MM HG: CPT | Mod: HCNC,CPTII,S$GLB, | Performed by: PSYCHIATRY & NEUROLOGY

## 2019-08-27 PROCEDURE — 99999 PR PBB SHADOW E&M-EST. PATIENT-LVL III: ICD-10-PCS | Mod: PBBFAC,HCNC,, | Performed by: PSYCHIATRY & NEUROLOGY

## 2019-08-27 PROCEDURE — 1101F PT FALLS ASSESS-DOCD LE1/YR: CPT | Mod: HCNC,CPTII,S$GLB, | Performed by: PSYCHIATRY & NEUROLOGY

## 2019-08-27 PROCEDURE — 99999 PR PBB SHADOW E&M-EST. PATIENT-LVL III: CPT | Mod: PBBFAC,HCNC,, | Performed by: PSYCHIATRY & NEUROLOGY

## 2019-08-27 PROCEDURE — 99215 PR OFFICE/OUTPT VISIT, EST, LEVL V, 40-54 MIN: ICD-10-PCS | Mod: HCNC,S$GLB,, | Performed by: PSYCHIATRY & NEUROLOGY

## 2019-08-27 PROCEDURE — 1101F PR PT FALLS ASSESS DOC 0-1 FALLS W/OUT INJ PAST YR: ICD-10-PCS | Mod: HCNC,CPTII,S$GLB, | Performed by: PSYCHIATRY & NEUROLOGY

## 2019-08-27 PROCEDURE — 3078F PR MOST RECENT DIASTOLIC BLOOD PRESSURE < 80 MM HG: ICD-10-PCS | Mod: HCNC,CPTII,S$GLB, | Performed by: PSYCHIATRY & NEUROLOGY

## 2019-08-27 RX ORDER — DONEPEZIL HYDROCHLORIDE 10 MG/1
10 TABLET, FILM COATED ORAL NIGHTLY
Qty: 396 TABLET | Refills: 0 | Status: SHIPPED | OUTPATIENT
Start: 2019-09-27 | End: 2020-10-27

## 2019-08-27 RX ORDER — DONEPEZIL HYDROCHLORIDE 5 MG/1
5 TABLET, FILM COATED ORAL NIGHTLY
Qty: 30 TABLET | Refills: 0 | Status: SHIPPED | OUTPATIENT
Start: 2019-08-27 | End: 2019-09-26

## 2019-08-27 NOTE — PROGRESS NOTES
Subjective:          Patient ID: Abdullahi Salazar is a 73 y.o.  male who presents for follow up of recent hospitalization in with breakthrough seizure, dementia, and atrial fibrillation    Initial / Last History of Present Illness:    From assessment last hospital note 4/27/19:    Had an extended conversation with the patient and his wife, with both daughters at the bedside.  Talked about safety concerns in that both he and his wife have significant degree of dementia, which very likely contributed to him not being able to comply with medications, missing anticonvulsants leading to this admission.  Daughters are very concerned.  They do have strong family support with multiple siblings in the area, but and we talked about shared concerns from the family that they are not able to continue with their current living situation and maintain safety.     I advised that neither of them drive; Mr. Salazar most immediately because of the seizure, by law restricted from driving for 6 months, and both of them on the basis of his dementia, and her likely dementia based on observation and history.  They have met with , and we will be looking into resources to the bring in caregivers, potentially may need to look at alternative living environments such as assisted living facilities, etc.  Difficult to gauge at this time if they would do well at assisted living, or if they would need a higher level of care.     Will work with our office as well to try to help with resources, social work for dementia related care    Regarding seizure disorder:     Has been noncompliant with medications for at least the past 3 weeks; despite family setting out meds in pill boxes, between he and his wife they had forgotten to take them.  Continue on Keppra 750 mg twice a day, touched on this as well as other issues (see above) regarding further assistance in the home.      No driving x6 months at least as long as remains seizure-free; would  "not recommend he resume driving after that point in time because of dementia      Interval history 8/27/2019:    Here today with his wife. Unfortunately neither of them recall much of why he was in the hospital or what was told to them;  His sister in law drove them here today.  Does not believe he has had any seizures since then.  Family places medications in planner for him. Does not remember the name of the medicine, but has been taking it.     Live next door to bother in law.  They do the shopping and cooking, manage their own finances.     Niece: Lee Ann Aparicio (Dee) [phone]    Review of patient's allergies indicates:   Allergen Reactions    Nateglinide      Other reaction(s): fatigue  Other reaction(s): fatigue    Pioglitazone Other (See Comments)     Other reaction(s): CAD    Simvastatin Other (See Comments)     Other reaction(s): Muscle pain     Current Outpatient Medications   Medication Sig Dispense Refill    blood sugar diagnostic (ACCU-CHEK CLEOPATRA PLUS TEST STRP) Strp TEST BLOOD SUGARS 4 TIMES DAILY 150 strip PRN    carvedilol (COREG) 25 MG tablet Take 1 tablet (25 mg total) by mouth 2 (two) times daily with meals. 180 tablet 3    clopidogrel (PLAVIX) 75 mg tablet Take 1 tablet (75 mg total) by mouth once daily. 30 tablet 0    dabigatran etexilate (PRADAXA) 75 mg Cap Take 1 capsule (75 mg total) by mouth 2 (two) times daily. "Do NOT break, chew, or open capsules." 180 capsule 3    ezetimibe (ZETIA) 10 mg tablet TAKE 1 TABLET EVERY DAY 90 tablet 3    ferrous sulfate (FEOSOL) 325 mg (65 mg iron) Tab tablet TAKE 1 TABLET THREE TIMES DAILY 270 tablet 0    flash glucose scanning reader (FREESTYLE PAULIE 14 DAY READER) Misc 1 each by Misc.(Non-Drug; Combo Route) route once daily. 1 each 0    flash glucose sensor (FREESTYLE PAULIE 14 DAY SENSOR) Kit 1 each by Misc.(Non-Drug; Combo Route) route once daily. 2 kit 11    hydrALAZINE (APRESOLINE) 100 MG tablet Take 1 tablet (100 mg total) by mouth 3 " "(three) times daily. 270 tablet 3    insulin NPH-insulin regular, 70/30, (NOVOLIN 70/30) 100 unit/mL (70-30) injection 60  Units 30 min before breakfast,   40 units 30 min before supper.  Either Humalin, Novolin or Relion 9 vial 11    insulin syringe-needle U-100 1 mL 30 gauge x 5/16 Syrg Use with insulin twice a day 200 each 11    isosorbide mononitrate (IMDUR) 120 MG 24 hr tablet TAKE 1 TABLET (120 MG TOTAL) BY MOUTH ONCE DAILY. 60 tablet 11    lancets (ACCU-CHEK SOFTCLIX LANCETS) Misc 1 each by Misc.(Non-Drug; Combo Route) route 2 (two) times daily. 100 each 11    levETIRAcetam (KEPPRA) 500 MG Tab Take 1.5 tablets (750 mg total) by mouth 2 (two) times daily. 270 tablet 3    multivitamin capsule Take 1 capsule by mouth once daily.      nitroGLYCERIN (NITROSTAT) 0.4 MG SL tablet Place 1 tablet (0.4 mg total) under the tongue every 5 (five) minutes as needed for Chest pain. 100 tablet 11    pen needle, diabetic (BD ULTRA-FINE SHORT PEN NEEDLE) 31 gauge x 5/16" Ndle USE FOUR TIMES DAILY 360 each 3    potassium chloride SA (K-DUR,KLOR-CON) 10 MEQ tablet TAKE 1 TABLET EVERY DAY 90 tablet 11    sertraline (ZOLOFT) 25 MG tablet Take 1 tablet (25 mg total) by mouth once daily. 90 tablet 3    tamsulosin (FLOMAX) 0.4 mg Cap TAKE 1 CAPSULE EVERY DAY 90 capsule 3    torsemide (DEMADEX) 10 MG Tab Take 3 tablets (30 mg total) by mouth once daily. 90 tablet 0    valsartan (DIOVAN) 320 MG tablet One po QAM to replace benazepril 90 tablet 3    atorvastatin (LIPITOR) 40 MG tablet Take 1 tablet (40 mg total) by mouth once daily. 90 tablet 3    [START ON 9/27/2019] donepezil (ARICEPT) 10 MG tablet Take 1 tablet (10 mg total) by mouth every evening. 396 tablet 0    donepezil (ARICEPT) 5 MG tablet Take 1 tablet (5 mg total) by mouth every evening. 30 tablet 0     No current facility-administered medications for this visit.          Review of Systems  Review of Systems    Objective:        Vitals:    08/27/19 1306   BP: " (!) 123/59   Pulse: 80   Resp: 16     Body mass index is 32.25 kg/m².  Neurologic Exam     Mental Status   Oriented to person, place, and time.   Registration: recalls 3 of 3 objects. Recall at 5 minutes: recalls 2 of 3 objects. Follows 3 step commands.   Attention: normal. Concentration: normal.   Speech: speech is normal   Level of consciousness: alert  Knowledge: good.   Able to name object. Able to repeat. Normal comprehension.   At [facility] in [facility].   27th August, 2019.  Tues.    Recall 2/3, 3/3 with prompting    WORLD-DLROW         Cranial Nerves   Cranial nerves II through XII intact.     Motor Exam   Muscle bulk: normal  Overall muscle tone: normal  Right arm pronator drift: absent  Left arm pronator drift: absent    Strength   Strength 5/5 throughout.     Sensory Exam   Light touch normal.     Gait, Coordination, and Reflexes     Gait  Gait: normal    Coordination   Romberg: negative  Finger to nose coordination: normal  Tandem walking coordination: normal (unsteady first few steps but improved with practice)      Physical Exam   Constitutional: He is oriented to person, place, and time.   Neurological: He is oriented to person, place, and time. He has normal strength. He has a normal Finger-Nose-Finger Test, a normal Romberg Test and a normal Tandem Gait Test (unsteady first few steps but improved with practice). Gait normal.   Psychiatric: His speech is normal.         Data Review:    Personally reviewed and interpreted previous head CT scans from Jan and March.  Some mild enlargement of lateral ventricles       CT head 4/25/19:  1. No acute intracranial abnormality.  2. Stable shunted ventricles and chronic frontal convexity subdural collections.     EEG 4/26/19:  Diffuse slowing in the theta and delta ranges.  No focal abnormalities, no epileptiform activity    Results for COREEN BIANCHI (MRN [id number]) as of 8/27/2019 13:47   Ref. Range 7/5/2019 11:46   Folate Latest Ref Range: 4.0 - 24.0  ng/mL 14.7   Vitamin B-12 Latest Ref Range: 210 - 950 pg/mL 456   Sodium Latest Ref Range: 136 - 145 mmol/L 143   Potassium Latest Ref Range: 3.5 - 5.1 mmol/L 4.7   Chloride Latest Ref Range: 95 - 110 mmol/L 107   CO2 Latest Ref Range: 23 - 29 mmol/L 27   Anion Gap Latest Ref Range: 8 - 16 mmol/L 9   BUN, Bld Latest Ref Range: 8 - 23 mg/dL 27 (H)   Creatinine Latest Ref Range: 0.5 - 1.4 mg/dL 1.9 (H)   eGFR if non African American Latest Ref Range: >60 mL/min/1.73 m^2 33.7 (A)   eGFR if African American Latest Ref Range: >60 mL/min/1.73 m^2 39.0 (A)   Glucose Latest Ref Range: 70 - 110 mg/dL 162 (H)   Calcium Latest Ref Range: 8.7 - 10.5 mg/dL 9.3   Magnesium Latest Ref Range: 1.6 - 2.6 mg/dL 2.5   Alkaline Phosphatase Latest Ref Range: 55 - 135 U/L 146 (H)   PROTEIN TOTAL Latest Ref Range: 6.0 - 8.4 g/dL 7.4   Albumin Latest Ref Range: 3.5 - 5.2 g/dL 4.0   BILIRUBIN TOTAL Latest Ref Range: 0.1 - 1.0 mg/dL 1.0   AST Latest Ref Range: 10 - 40 U/L 28   ALT Latest Ref Range: 10 - 44 U/L 37   Triglycerides Latest Ref Range: 30 - 150 mg/dL 55   Cholesterol Latest Ref Range: 120 - 199 mg/dL 101 (L)   HDL Latest Ref Range: 40 - 75 mg/dL 35 (L)   Hdl/Cholesterol Ratio Latest Ref Range: 20.0 - 50.0 % 34.7   LDL Cholesterol External Latest Ref Range: 63.0 - 159.0 mg/dL 55.0 (L)   Non-HDL Cholesterol Latest Units: mg/dL 66   Total Cholesterol/HDL Ratio Latest Ref Range: 2.0 - 5.0  2.9   CRP, High Sensitivity Latest Ref Range: 0.00 - 3.19 mg/L 2.21   CERULOPLASMIN Latest Ref Range: 15.0 - 45.0 mg/dL 32.0   Copper Latest Ref Range: 665 - 1480 ug/L 1289   Glutathione, Total Latest Ref Range: 544 - 1228 uM 262 (L)   Magnesium RBC Latest Ref Range: 4.0 - 6.4 mg/dL 4.8   Selenium Latest Ref Range: 23 - 190 ug/L 145   Vascular Endothelial Growth Factor (VEGF) Latest Ref Range: <=96.2 pg/mL 20.8   Zinc, Serum-ALT Latest Ref Range: 60 - 130 ug/dL 81   Homocysteine Latest Ref Range: 4.0 - 16.5 umol/L 14.4   Vit D, 25-Hydroxy Latest Ref  Range: 30 - 96 ng/mL 25 (L)   Vitamin B6 Latest Ref Range: 5 - 50 ug/L 6   Vitamin C Latest Ref Range: 2 - 19 mg/L 5   Vitamin E Latest Ref Range: 500 - 1800 ug/dL 657   Antidiuretic Hormone Latest Ref Range: 0.0 - 6.9 pg/mL 2.9   Hemoglobin A1C External Latest Ref Range: 4.0 - 5.6 % 9.2 (H)   Estimated Avg Glucose Latest Ref Range: 68 - 131 mg/dL 217 (H)   Estradiol Latest Ref Range: 11 - 44 pg/mL 35     Assessment:        1. Dementia associated with other underlying disease without behavioral disturbance    2. Seizure disorder    3. S/P  shunt    4. Hx of subdural hematoma    5. Paroxysmal atrial fibrillation           Plan:         Problem List Items Addressed This Visit        Neuro    Hx of subdural hematoma    Overview     6/2017         S/P  shunt    Seizure disorder    Overview     Last suspected seizure 4/24/19.  Question compliance with medication, increased Keppra to 1000 mg BID.    Persistent slowing on EEG 3/9/17 L temporal.   Generalized slowing, theta and delta ranges, April 25, 2018           Dementia associated with other underlying disease without behavioral disturbance - Primary (Chronic)    Current Assessment & Plan     Home health plan was set up from recent hospital admission.  Concerned about compliance with medication at home, as well as the fact that both the patient and his wife suffer from impairment of memory.  Continue to encourage no driving; family member drove them to their office appointment today.  Will need to get help from family and home health to ensure safe living environment, etc.    Following visit, spoke with sister-in-law and niece regarding the above         Relevant Medications    donepezil (ARICEPT) 5 MG tablet    donepezil (ARICEPT) 10 MG tablet (Start on 9/27/2019)       Cardiac/Vascular    Paroxysmal atrial fibrillation        We will stay on the same medications, however I would like you to also start a new medicine for your memory.  This can help slow down the  progression of memory loss and dementia.      Start taking Aricept (donepezil) 5 mg once a day; after a month increase it to 10 mg once a day.  Watch for any stomach upset, nausea or diarrhea.     Because of the seizure, it is important to continue to refrain from any driving at this time     To prevent further memory loss, some of the best preventative measures are following a healthy diet, getting regular exercise, and ensuring good sleep habits.  Approximately 30 to 45 minutes of brisk physical activity (brisk walking, swimming, stationary bicycle, etc.) 5 days a week has been shown to improve function in vascular dementias, and can lower the risk of stroke and slow progression of memory loss.     A Mediterranean style diet, or the DASH diet with lots of fresh fruits and vegetables, whole grains, more fish and chicken, less red meat, less dairy, less processed foods is also beneficial. For more information see:     https://www.rush.[facility]/news/diet-may-help-prevent-alzheimers      Minimize or eliminate the use of alcohol, and discuss any prescription medications you might be taking with your doctor to avoid medications which can cause sedation or worsen cognitive function.     Establish a regular, consistent sleep pattern and practice good sleep hygiene.  Avoid screen time (computer, TV, smartphones or tablets) or heavy meals for at least an hour before bedtime, and avoid caffeine or stimulants after 2 PM. Exercise earlier in the day or mornings and keep your sleeping environment comfortable.      Socializing with friends and family and staying both mentally and physically active is also very important. Continue with hobbies or activities that are engaging and practice activities that are mentally stimulating (word puzzles, Sudoku, etc) and stay active in the community.    Some supplements which may be of potential benefit include:  - tumeric (curcumin) supplement  - omega-3 fatty acids with sufficient amounts  of EPA and DHA, 1000 to 2000 mg a day   - Vitamin D3 supplement 2000 units (IU) daily   - Green tea and green tea extracts may also help (caffeine free)   - supplements containing Phosphatidylserine (PS) and phosphatidylcholine (PC)     There may be some benefit to dietary supplements, however there is no definitive evidence to support the prevention of cognitive impairment or dementia.     Please look into the following websites, to help you find resources including day programs, caregivers and caregiver support, legal questions, support groups, etc.    [facility] ([facility] - [address]  [facility] - [address]    Group meetings facilitated by Renato Martinez MA, JESSICA [phone]    ADDRESS  Mailing Address:  [address]  [place] [address] Street Address:  [address] [place] [address]   Web Address:  [email or web address]     Services offered at this location:  Chore, Congregate Meals, Home Delivered Meals, Homemaker, Information and Assistance, Legal, Material Aid, Medical Alert, Medication Management, NFCSP Information and Assistance, NFCSP In-Home Respite, NFCSP Material Aid, NGCSP Personal Care , NFCSP Public Education, NFCSP Sitter Service, NFCSP Support Groups, Nutrition Counseling, Nutrition Education, Outreach, Recreation, Transportation, Utility Assistance, Wellness, Area Agency on Aging, [place] on Aging      Website for the Alzheimers Association:  http://www.alz.org/    Excellent resources for finding community resources   Action plan navigator and online tools   Call 1290.125.3440 for 24/7 helpline    [facility] Office of Aging and Adult Services:  Http://www.ldh.[place].gov/index.cfm/subhome/12/n/7    Peace With Dementia: http://carepartnermentoring.com/    Website for [facility] Agency on Ageing: http://goea.[place].gov/index.cfm?md=citlalli&tmp=category&catID=38&nid=24&ssid=0&startIndex=1        PATIENT/FAMILY RESOURCES:  1. Alzheimer's Association       http://www.alz.org  2. Alzheimer's Foundation of Di     http://www.alzfdn.org  3. The Alzheimers Disease Education and Referral Center  https://www.demian.nih.gov/alzheimers  4. Lewy Body Dementia Association      http://www.lbda.org  5. National [place] of Mental Health     http://www.nimh.nih.gov  6. National [place] on Mental Illness     http://www.rc.org  7. Mental Health Di       http://www.mentalhealthamerica.net  8. Mental Health.gov        http://www.mentalhealth.gov  9. National [place] for Behavioral Health     http://www.thenationalcouncil.org  10. Substance Abuse and Mental Health Services Administration  http://www.samhsa.gov    11. Licensed local counselors, social workers, psychiatrists, psychologists - one starting point is the Psychology Today website, therapist finder      Over 60 minutes time spent with patient face to face, > 50% in discussion and counseling with patient regarding diagnosis, prognosis and treatment strategies related to seizure and memory loss as described above    Follow up in about 4 months (around 12/27/2019).       Thank you very much for the opportunity to assist in this patient's care.  If you have any questions or concerns, please do not hesitate to contact me at any time.     Sincerely,  Hermilo Hyman, DO

## 2019-08-27 NOTE — LETTER
August 30, 2019      [facility]  [address] [facility] [address]           [facility]  [address] [facility] [address] [place] [address]  Phone: [phone]  Fax: [phone]          Patient: Abdullahi Salazar   MR Number: [id number]   YOB: 1945   Date of Visit: 8/27/2019       Dear [facility]:    Thank you for referring Abdullahi Salazar to me for evaluation. Attached you will find relevant portions of my assessment and plan of care.    If you have questions, please do not hesitate to call me. I look forward to following Abdullahi Salazar along with you.    Sincerely,    Hermilo Hyman, DO    Enclosure  CC:  No Recipients    If you would like to receive this communication electronically, please contact [email or web address] or [phone] to request more information on [facility] Link access.    For providers and/or their staff who would like to refer a patient to [facility], please contact us through our one-stop-shop provider referral line, [facility], at [phone].    If you feel you have received this communication in error or would no longer like to receive these types of communications, please e-mail [email or web address]

## 2019-08-27 NOTE — PATIENT INSTRUCTIONS
We will stay on the same medications, however I would like you to also start a new medicine for your memory.  This can help slow down the progression of memory loss and dementia.      Start taking Aricept (donepezil) 5 mg once a day; after a month increase it to 10 mg once a day.  Watch for any stomach upset, nausea or diarrhea.     Because of the seizure, it is important to continue to refrain from any driving at this time     To prevent further memory loss, some of the best preventative measures are following a healthy diet, getting regular exercise, and ensuring good sleep habits.  Approximately 30 to 45 minutes of brisk physical activity (brisk walking, swimming, stationary bicycle, etc.) 5 days a week has been shown to improve function in vascular dementias, and can lower the risk of stroke and slow progression of memory loss.     A Mediterranean style diet, or the DASH diet with lots of fresh fruits and vegetables, whole grains, more fish and chicken, less red meat, less dairy, less processed foods is also beneficial. For more information see:     https://www.rush.[facility]/news/diet-may-help-prevent-alzheimers      Minimize or eliminate the use of alcohol, and discuss any prescription medications you might be taking with your doctor to avoid medications which can cause sedation or worsen cognitive function.     Establish a regular, consistent sleep pattern and practice good sleep hygiene.  Avoid screen time (computer, TV, smartphones or tablets) or heavy meals for at least an hour before bedtime, and avoid caffeine or stimulants after 2 PM. Exercise earlier in the day or mornings and keep your sleeping environment comfortable.      Socializing with friends and family and staying both mentally and physically active is also very important. Continue with hobbies or activities that are engaging and practice activities that are mentally stimulating (word puzzles, Sudoku, etc) and stay active in the  community.    Some supplements which may be of potential benefit include:  - tumeric (curcumin) supplement  - omega-3 fatty acids with sufficient amounts of EPA and DHA, 1000 to 2000 mg a day   - Vitamin D3 supplement 2000 units (IU) daily   - Green tea and green tea extracts may also help (caffeine free)   - supplements containing Phosphatidylserine (PS) and phosphatidylcholine (PC)     There may be some benefit to dietary supplements, however there is no definitive evidence to support the prevention of cognitive impairment or dementia.     Please look into the following websites, to help you find resources including day programs, caregivers and caregiver support, legal questions, support groups, etc.    [facility] ([facility])  [facility] - [address]  [facility] - [address]    Group meetings facilitated by Renato Martinez MA, JESSICA [phone]    ADDRESS  Mailing Address:  [address] [place] [address] [address] Address:  [address] [place] [address]   Web Address:  [email or web address]     Services offered at this location:  Chore, Congregate Meals, Home Delivered Meals, Homemaker, Information and Assistance, Legal, Material Aid, Medical Alert, Medication Management, NFCSP Information and Assistance, NFCSP In-Home Respite, NFCSP Material Aid, NGCSP Personal Care , NFCSP Public Education, NFCSP Sitter Service, NFCSP Support Groups, Nutrition Counseling, Nutrition Education, Outreach, Recreation, Transportation, Utility Assistance, Wellness, Area Agency on Aging, [place] on Aging      Website for the Alzheimers Association:  http://www.alz.org/    Excellent resources for finding community resources   Action plan navigator and online tools   Call [phone] for 24/7 helpline    [facility] Office of Aging and Adult Services:  Http://www.ldh.[place].gov/index.cfm/subhome/12/n/7    Peace With Dementia:  http://EverPower.Wardrobe Housekeeper/    Website for [facility] Agency on Ageing: http://goea.[place].gov/index.cfm?md=citlalli&tmp=category&catID=38&nid=24&ssid=0&startIndex=1       PATIENT/FAMILY RESOURCES:  1. Alzheimer's Association       http://www.alz.org  2. Alzheimer's Foundation of Di     http://www.alzfdn.org  3. The Alzheimers Disease Education and Referral Center  https://www.demian.nih.gov/alzheimers  4. Lewy Body Dementia Association      http://www.lbda.org  5. National [place] of Mental Health     http://www.nimh.nih.gov  6. National [place] on Mental Illness     http://www.rc.org  7. Mental Health Di       http://www.mentalhealthamerica.net  8. Mental Health.gov        http://www.mentalhealth.gov  9. National [place] for Behavioral Health     http://www.thenationalcouncil.org  10. Substance Abuse and Mental Health Services Administration  http://www.samhsa.gov    11. Licensed local counselors, social workers, psychiatrists, psychologists - one starting point is the Psychology Today website, therapist finder

## 2019-08-28 ENCOUNTER — TELEPHONE (OUTPATIENT)
Dept: NEUROLOGY | Facility: CLINIC | Age: 74
End: 2019-08-28

## 2019-08-28 NOTE — TELEPHONE ENCOUNTER
----- Message from Eden Nelson sent at 8/28/2019  3:06 PM CDT -----  Type:  Pharmacy Calling to Clarify an RX    Name of Caller:  Lac  Pharmacy Name:  [facility]  Prescription Name:  donepezil (ARICEPT) 10 MG tablet (qty 396)  What do they need to clarify?:  Quantity  Best Call Back Number: [phone]  Additional Information:  Calling to confirm if quantity is correct

## 2019-08-29 ENCOUNTER — TELEPHONE (OUTPATIENT)
Dept: NEUROLOGY | Facility: CLINIC | Age: 74
End: 2019-08-29

## 2019-08-29 NOTE — TELEPHONE ENCOUNTER
----- Message from Antonio WRIGHT Madison sent at 8/29/2019  3:17 PM CDT -----  Contact: DAVE/[facility]  DAVE called in regarding the attached patient and his Rx for donepezil (ARICEPT) 10 MG tablet & the quantity of 396.  DAVE would like a call back about the quantity.      [facility]  [place] - [address]  [address]  [place] [address]  Phone: [phone] Fax: [phone]

## 2019-08-30 ENCOUNTER — TELEPHONE (OUTPATIENT)
Dept: NEUROLOGY | Facility: CLINIC | Age: 74
End: 2019-08-30

## 2019-08-30 NOTE — ASSESSMENT & PLAN NOTE
Home health plan was set up from recent hospital admission.  Concerned about compliance with medication at home, as well as the fact that both the patient and his wife suffer from impairment of memory.  Continue to encourage no driving; family member drove them to their office appointment today.  Will need to get help from family and home health to ensure safe living environment, etc.    Following visit, spoke with sister-in-law and niece regarding the above

## 2019-09-02 ENCOUNTER — CLINICAL SUPPORT (OUTPATIENT)
Dept: CARDIOLOGY | Facility: CLINIC | Age: 74
End: 2019-09-02
Payer: MEDICARE

## 2019-09-02 ENCOUNTER — PATIENT MESSAGE (OUTPATIENT)
Dept: NEUROLOGY | Facility: CLINIC | Age: 74
End: 2019-09-02

## 2019-09-03 ENCOUNTER — CLINICAL SUPPORT (OUTPATIENT)
Dept: CARDIOLOGY | Facility: CLINIC | Age: 74
End: 2019-09-03
Attending: PHYSICIAN ASSISTANT
Payer: MEDICARE

## 2019-09-03 ENCOUNTER — TELEPHONE (OUTPATIENT)
Dept: NEUROLOGY | Facility: CLINIC | Age: 74
End: 2019-09-03

## 2019-09-03 VITALS
DIASTOLIC BLOOD PRESSURE: 68 MMHG | HEIGHT: 67 IN | WEIGHT: 205 LBS | HEART RATE: 80 BPM | SYSTOLIC BLOOD PRESSURE: 118 MMHG | BODY MASS INDEX: 32.18 KG/M2

## 2019-09-03 DIAGNOSIS — I35.0 NONRHEUMATIC AORTIC VALVE STENOSIS: ICD-10-CM

## 2019-09-03 LAB
ASCENDING AORTA: 2.46 CM
AV INDEX (PROSTH): 0.53
AV MEAN GRADIENT: 12 MMHG
AV PEAK GRADIENT: 19 MMHG
AV VALVE AREA: 1.68 CM2
AV VELOCITY RATIO: 0.51
BSA FOR ECHO PROCEDURE: 2.1 M2
CV ECHO LV RWT: 0.64 CM
DOP CALC AO PEAK VEL: 2.19 M/S
DOP CALC AO VTI: 47.86 CM
DOP CALC LVOT AREA: 3.2 CM2
DOP CALC LVOT DIAMETER: 2.01 CM
DOP CALC LVOT PEAK VEL: 1.11 M/S
DOP CALC LVOT STROKE VOLUME: 80.3 CM3
DOP CALCLVOT PEAK VEL VTI: 25.32 CM
ECHO LV POSTERIOR WALL: 1.3 CM (ref 0.6–1.1)
FRACTIONAL SHORTENING: 33 % (ref 28–44)
INTERVENTRICULAR SEPTUM: 1.7 CM (ref 0.6–1.1)
IVRT: 0.08 MSEC
LA MAJOR: 5.86 CM
LA MINOR: 5.5 CM
LA WIDTH: 3.43 CM
LEFT ATRIUM SIZE: 4.15 CM
LEFT ATRIUM VOLUME INDEX: 33.6 ML/M2
LEFT ATRIUM VOLUME: 68.66 CM3
LEFT INTERNAL DIMENSION IN SYSTOLE: 2.71 CM (ref 2.1–4)
LEFT VENTRICLE DIASTOLIC VOLUME INDEX: 35.62 ML/M2
LEFT VENTRICLE DIASTOLIC VOLUME: 72.78 ML
LEFT VENTRICLE MASS INDEX: 117 G/M2
LEFT VENTRICLE SYSTOLIC VOLUME INDEX: 13.3 ML/M2
LEFT VENTRICLE SYSTOLIC VOLUME: 27.16 ML
LEFT VENTRICULAR INTERNAL DIMENSION IN DIASTOLE: 4.07 CM (ref 3.5–6)
LEFT VENTRICULAR MASS: 238.53 G
PISA TR MAX VEL: 2.99 M/S
RA MAJOR: 5.42 CM
RA PRESSURE: 8 MMHG
RA WIDTH: 4.55 CM
RIGHT VENTRICULAR END-DIASTOLIC DIMENSION: 4.26 CM
RV TISSUE DOPPLER FREE WALL SYSTOLIC VELOCITY 1 (APICAL 4 CHAMBER VIEW): 7.07 CM/S
SINUS: 2.68 CM
STJ: 2.06 CM
TDI LATERAL: 0.05 M/S
TDI SEPTAL: 0.06 M/S
TDI: 0.06 M/S
TR MAX PG: 36 MMHG
TRICUSPID ANNULAR PLANE SYSTOLIC EXCURSION: 2.02 CM
TV REST PULMONARY ARTERY PRESSURE: 44 MMHG

## 2019-09-03 PROCEDURE — 99999 PR PBB SHADOW E&M-EST. PATIENT-LVL II: CPT | Mod: PBBFAC,HCNC,,

## 2019-09-03 PROCEDURE — 99999 PR PBB SHADOW E&M-EST. PATIENT-LVL II: ICD-10-PCS | Mod: PBBFAC,HCNC,,

## 2019-09-03 PROCEDURE — 93306 TTE W/DOPPLER COMPLETE: CPT | Mod: HCNC,S$GLB,, | Performed by: INTERNAL MEDICINE

## 2019-09-03 PROCEDURE — 93306 TRANSTHORACIC ECHO (TTE) COMPLETE (CUPID ONLY): ICD-10-PCS | Mod: HCNC,S$GLB,, | Performed by: INTERNAL MEDICINE

## 2019-09-05 ENCOUNTER — OFFICE VISIT (OUTPATIENT)
Dept: NEPHROLOGY | Facility: CLINIC | Age: 74
End: 2019-09-05
Payer: MEDICARE

## 2019-09-05 ENCOUNTER — TELEPHONE (OUTPATIENT)
Dept: NEUROLOGY | Facility: CLINIC | Age: 74
End: 2019-09-05

## 2019-09-05 VITALS
DIASTOLIC BLOOD PRESSURE: 88 MMHG | BODY MASS INDEX: 32.63 KG/M2 | HEART RATE: 72 BPM | SYSTOLIC BLOOD PRESSURE: 138 MMHG | HEIGHT: 67 IN | WEIGHT: 207.88 LBS

## 2019-09-05 DIAGNOSIS — R80.9 PROTEINURIA DUE TO TYPE 2 DIABETES MELLITUS: ICD-10-CM

## 2019-09-05 DIAGNOSIS — E11.21 TYPE 2 DIABETES MELLITUS WITH DIABETIC NEPHROPATHY, WITHOUT LONG-TERM CURRENT USE OF INSULIN: ICD-10-CM

## 2019-09-05 DIAGNOSIS — N18.4 CKD (CHRONIC KIDNEY DISEASE) STAGE 4, GFR 15-29 ML/MIN: ICD-10-CM

## 2019-09-05 DIAGNOSIS — E66.01 CLASS 2 SEVERE OBESITY DUE TO EXCESS CALORIES WITH SERIOUS COMORBIDITY AND BODY MASS INDEX (BMI) OF 36.0 TO 36.9 IN ADULT: ICD-10-CM

## 2019-09-05 DIAGNOSIS — I10 BENIGN ESSENTIAL HTN: ICD-10-CM

## 2019-09-05 DIAGNOSIS — N18.30 TYPE 2 DIABETES MELLITUS WITH STAGE 3 CHRONIC KIDNEY DISEASE, WITH LONG-TERM CURRENT USE OF INSULIN: ICD-10-CM

## 2019-09-05 DIAGNOSIS — I25.10 CORONARY ARTERY DISEASE, ANGINA PRESENCE UNSPECIFIED, UNSPECIFIED VESSEL OR LESION TYPE, UNSPECIFIED WHETHER NATIVE OR TRANSPLANTED HEART: ICD-10-CM

## 2019-09-05 DIAGNOSIS — G47.30 SLEEP APNEA IN ADULT: ICD-10-CM

## 2019-09-05 DIAGNOSIS — N18.30 CKD (CHRONIC KIDNEY DISEASE) STAGE 3, GFR 30-59 ML/MIN: Primary | ICD-10-CM

## 2019-09-05 DIAGNOSIS — E11.22 TYPE 2 DIABETES MELLITUS WITH STAGE 3 CHRONIC KIDNEY DISEASE, WITH LONG-TERM CURRENT USE OF INSULIN: ICD-10-CM

## 2019-09-05 DIAGNOSIS — Z79.4 TYPE 2 DIABETES MELLITUS WITH STAGE 3 CHRONIC KIDNEY DISEASE, WITH LONG-TERM CURRENT USE OF INSULIN: ICD-10-CM

## 2019-09-05 DIAGNOSIS — E11.29 PROTEINURIA DUE TO TYPE 2 DIABETES MELLITUS: ICD-10-CM

## 2019-09-05 DIAGNOSIS — Z79.4 LONG TERM CURRENT USE OF INSULIN: ICD-10-CM

## 2019-09-05 PROCEDURE — 99999 PR PBB SHADOW E&M-EST. PATIENT-LVL III: ICD-10-PCS | Mod: PBBFAC,HCNC,, | Performed by: INTERNAL MEDICINE

## 2019-09-05 PROCEDURE — 99214 PR OFFICE/OUTPT VISIT, EST, LEVL IV, 30-39 MIN: ICD-10-PCS | Mod: HCNC,S$GLB,, | Performed by: INTERNAL MEDICINE

## 2019-09-05 PROCEDURE — 1101F PR PT FALLS ASSESS DOC 0-1 FALLS W/OUT INJ PAST YR: ICD-10-PCS | Mod: HCNC,CPTII,S$GLB, | Performed by: INTERNAL MEDICINE

## 2019-09-05 PROCEDURE — 3079F PR MOST RECENT DIASTOLIC BLOOD PRESSURE 80-89 MM HG: ICD-10-PCS | Mod: HCNC,CPTII,S$GLB, | Performed by: INTERNAL MEDICINE

## 2019-09-05 PROCEDURE — 1101F PT FALLS ASSESS-DOCD LE1/YR: CPT | Mod: HCNC,CPTII,S$GLB, | Performed by: INTERNAL MEDICINE

## 2019-09-05 PROCEDURE — 3046F PR MOST RECENT HEMOGLOBIN A1C LEVEL > 9.0%: ICD-10-PCS | Mod: HCNC,CPTII,S$GLB, | Performed by: INTERNAL MEDICINE

## 2019-09-05 PROCEDURE — 3046F HEMOGLOBIN A1C LEVEL >9.0%: CPT | Mod: HCNC,CPTII,S$GLB, | Performed by: INTERNAL MEDICINE

## 2019-09-05 PROCEDURE — 99499 UNLISTED E&M SERVICE: CPT | Mod: HCNC,S$GLB,, | Performed by: INTERNAL MEDICINE

## 2019-09-05 PROCEDURE — 3075F PR MOST RECENT SYSTOLIC BLOOD PRESS GE 130-139MM HG: ICD-10-PCS | Mod: HCNC,CPTII,S$GLB, | Performed by: INTERNAL MEDICINE

## 2019-09-05 PROCEDURE — 3075F SYST BP GE 130 - 139MM HG: CPT | Mod: HCNC,CPTII,S$GLB, | Performed by: INTERNAL MEDICINE

## 2019-09-05 PROCEDURE — 3079F DIAST BP 80-89 MM HG: CPT | Mod: HCNC,CPTII,S$GLB, | Performed by: INTERNAL MEDICINE

## 2019-09-05 PROCEDURE — 99999 PR PBB SHADOW E&M-EST. PATIENT-LVL III: CPT | Mod: PBBFAC,HCNC,, | Performed by: INTERNAL MEDICINE

## 2019-09-05 PROCEDURE — 99499 RISK ADDL DX/OHS AUDIT: ICD-10-PCS | Mod: HCNC,S$GLB,, | Performed by: INTERNAL MEDICINE

## 2019-09-05 PROCEDURE — 99214 OFFICE O/P EST MOD 30 MIN: CPT | Mod: HCNC,S$GLB,, | Performed by: INTERNAL MEDICINE

## 2019-09-05 NOTE — PATIENT INSTRUCTIONS
1. Check blood pressure a few times this week. Top number should be 120-155 and bottom number 55-90)   2. Ensure good fluid intake (water, tea, Sprite, juice, lemonade, milk, punch)   3. Kidney function is stable    Complex Repair And Z Plasty Text: The defect edges were debeveled with a #15 scalpel blade.  The primary defect was closed partially with a complex linear closure.  Given the location of the remaining defect, shape of the defect and the proximity to free margins a Z plasty was deemed most appropriate for complete closure of the defect.  Using a sterile surgical marker, an appropriate advancement flap was drawn incorporating the defect and placing the expected incisions within the relaxed skin tension lines where possible.    The area thus outlined was incised deep to adipose tissue with a #15 scalpel blade.  The skin margins were undermined to an appropriate distance in all directions utilizing iris scissors.

## 2019-09-07 ENCOUNTER — PATIENT MESSAGE (OUTPATIENT)
Dept: ENDOCRINOLOGY | Facility: CLINIC | Age: 74
End: 2019-09-07

## 2019-09-10 ENCOUNTER — OFFICE VISIT (OUTPATIENT)
Dept: CARDIOLOGY | Facility: CLINIC | Age: 74
End: 2019-09-10
Payer: MEDICARE

## 2019-09-10 VITALS
HEART RATE: 80 BPM | SYSTOLIC BLOOD PRESSURE: 143 MMHG | HEIGHT: 67 IN | DIASTOLIC BLOOD PRESSURE: 80 MMHG | WEIGHT: 212.06 LBS | BODY MASS INDEX: 33.28 KG/M2

## 2019-09-10 DIAGNOSIS — I25.5 ISCHEMIC CARDIOMYOPATHY: ICD-10-CM

## 2019-09-10 DIAGNOSIS — I25.810 CORONARY ARTERY DISEASE INVOLVING CORONARY BYPASS GRAFT OF NATIVE HEART WITHOUT ANGINA PECTORIS: Primary | ICD-10-CM

## 2019-09-10 DIAGNOSIS — Z95.1 S/P CABG (CORONARY ARTERY BYPASS GRAFT): ICD-10-CM

## 2019-09-10 DIAGNOSIS — I48.0 PAROXYSMAL ATRIAL FIBRILLATION: ICD-10-CM

## 2019-09-10 PROCEDURE — 3077F PR MOST RECENT SYSTOLIC BLOOD PRESSURE >= 140 MM HG: ICD-10-PCS | Mod: HCNC,CPTII,S$GLB, | Performed by: INTERNAL MEDICINE

## 2019-09-10 PROCEDURE — 1101F PT FALLS ASSESS-DOCD LE1/YR: CPT | Mod: HCNC,CPTII,S$GLB, | Performed by: INTERNAL MEDICINE

## 2019-09-10 PROCEDURE — 99999 PR PBB SHADOW E&M-EST. PATIENT-LVL IV: CPT | Mod: PBBFAC,HCNC,, | Performed by: INTERNAL MEDICINE

## 2019-09-10 PROCEDURE — 99214 PR OFFICE/OUTPT VISIT, EST, LEVL IV, 30-39 MIN: ICD-10-PCS | Mod: HCNC,S$GLB,, | Performed by: INTERNAL MEDICINE

## 2019-09-10 PROCEDURE — 3079F PR MOST RECENT DIASTOLIC BLOOD PRESSURE 80-89 MM HG: ICD-10-PCS | Mod: HCNC,CPTII,S$GLB, | Performed by: INTERNAL MEDICINE

## 2019-09-10 PROCEDURE — 99999 PR PBB SHADOW E&M-EST. PATIENT-LVL IV: ICD-10-PCS | Mod: PBBFAC,HCNC,, | Performed by: INTERNAL MEDICINE

## 2019-09-10 PROCEDURE — 3077F SYST BP >= 140 MM HG: CPT | Mod: HCNC,CPTII,S$GLB, | Performed by: INTERNAL MEDICINE

## 2019-09-10 PROCEDURE — 99214 OFFICE O/P EST MOD 30 MIN: CPT | Mod: HCNC,S$GLB,, | Performed by: INTERNAL MEDICINE

## 2019-09-10 PROCEDURE — 3079F DIAST BP 80-89 MM HG: CPT | Mod: HCNC,CPTII,S$GLB, | Performed by: INTERNAL MEDICINE

## 2019-09-10 PROCEDURE — 1101F PR PT FALLS ASSESS DOC 0-1 FALLS W/OUT INJ PAST YR: ICD-10-PCS | Mod: HCNC,CPTII,S$GLB, | Performed by: INTERNAL MEDICINE

## 2019-09-10 NOTE — PROGRESS NOTES
Subjective:    Patient ID:  Abdullahi Salazar is a 73 y.o. male who presents for follow-up of ICM    HPI  He comes for follow up with no major problems, no chest pain, no shortness of breath.  FC II    Review of Systems   Constitution: Negative for decreased appetite, malaise/fatigue, weight gain and weight loss.   Cardiovascular: Negative for chest pain, dyspnea on exertion, leg swelling, palpitations and syncope.   Respiratory: Negative for cough and shortness of breath.    Gastrointestinal: Negative.    Neurological: Negative for weakness.   All other systems reviewed and are negative.       Objective:      Physical Exam   Constitutional: He is oriented to person, place, and time. He appears well-developed and well-nourished.   HENT:   Head: Normocephalic.   Eyes: Pupils are equal, round, and reactive to light.   Neck: Normal range of motion. Neck supple. No JVD present. Carotid bruit is not present. No thyromegaly present.   Cardiovascular: Normal rate, regular rhythm, normal heart sounds, intact distal pulses and normal pulses. PMI is not displaced. Exam reveals no gallop.   No murmur heard.  Pulmonary/Chest: Effort normal and breath sounds normal.   Abdominal: Soft. Normal appearance. He exhibits no mass. There is no hepatosplenomegaly. There is no tenderness.   Musculoskeletal: Normal range of motion. He exhibits no edema.   Neurological: He is alert and oriented to person, place, and time. He has normal strength and normal reflexes. No sensory deficit.   Skin: Skin is warm and intact.   Psychiatric: He has a normal mood and affect.   Nursing note and vitals reviewed.        Assessment:       1. Coronary artery disease involving coronary bypass graft of native heart without angina pectoris    2. S/P CABG (coronary artery bypass graft)    3. Ischemic cardiomyopathy    4. Paroxysmal atrial fibrillation         Plan:     Continue all cardiac medications  Regular exercise program  9 m f/u with chris Castillo

## 2019-09-12 ENCOUNTER — CLINICAL SUPPORT (OUTPATIENT)
Dept: CARDIOLOGY | Facility: CLINIC | Age: 74
End: 2019-09-12
Payer: MEDICARE

## 2019-09-12 PROCEDURE — 93296 CARDIAC DEVICE CHECK - REMOTE: ICD-10-PCS | Mod: ,,, | Performed by: INTERNAL MEDICINE

## 2019-09-12 PROCEDURE — 93296 REM INTERROG EVL PM/IDS: CPT | Mod: ,,, | Performed by: INTERNAL MEDICINE

## 2019-09-12 PROCEDURE — 93295 DEV INTERROG REMOTE 1/2/MLT: CPT | Mod: ,,, | Performed by: INTERNAL MEDICINE

## 2019-09-12 PROCEDURE — 93295 CARDIAC DEVICE CHECK - REMOTE: ICD-10-PCS | Mod: ,,, | Performed by: INTERNAL MEDICINE

## 2019-09-15 NOTE — PROGRESS NOTES
Subjective:       Patient ID: Abdullahi Salazar is a 73 y.o. White male who presents for follow-up evaluation of Chronic Kidney Disease    HPI     He is seen for surgical clearance for CEA. He reports he is doing well. LE about the same. No LUTS. Good appetite with last Hba1c of 12.4    Interval history March 2019: he is late for follow up. He reports he is feeling 'great' No LE edema and no SOB. His last Hba1c was 9.3. His wife does not recognize me       Review of Systems   Constitutional: Negative for activity change, appetite change, fatigue and unexpected weight change.   HENT: Negative for facial swelling.    Eyes: Negative for visual disturbance.   Respiratory: Negative for cough and shortness of breath.    Cardiovascular: Positive for leg swelling. Negative for chest pain.   Gastrointestinal: Negative for constipation and diarrhea.   Endocrine: Negative for polyuria.   Genitourinary: Positive for frequency. Negative for difficulty urinating, dysuria and hematuria.   Musculoskeletal: Positive for arthralgias and gait problem (much improved).   Allergic/Immunologic: Negative for immunocompromised state.   Neurological: Positive for weakness. Negative for light-headedness and headaches.       Objective:      Physical Exam   Constitutional: He is oriented to person, place, and time. He appears well-developed and well-nourished.   HENT:   Mouth/Throat: Oropharynx is clear and moist.   Eyes: No scleral icterus.   Neck: No JVD present.   Cardiovascular: S1 normal and S2 normal. Exam reveals no friction rub.   Pulmonary/Chest: Breath sounds normal. He has no wheezes. He has no rales.   Abdominal: Soft.   Musculoskeletal: He exhibits no edema.   Neurological: He is alert and oriented to person, place, and time.   Skin: Skin is warm and dry.   Psychiatric: He has a normal mood and affect.   Vitals reviewed.      Assessment:       1. CKD (chronic kidney disease) stage 3, GFR 30-59 ml/min    2. CKD (chronic kidney disease)  stage 4, GFR 15-29 ml/min    3. Type 2 diabetes mellitus with diabetic nephropathy, without long-term current use of insulin    4. Type 2 diabetes mellitus with stage 3 chronic kidney disease, with long-term current use of insulin    5. Long term current use of insulin    6. Sleep apnea in adult    7. Class 2 severe obesity due to excess calories with serious comorbidity and body mass index (BMI) of 36.0 to 36.9 in adult    8. Coronary artery disease, angina presence unspecified, unspecified vessel or lesion type, unspecified whether native or transplanted heart    9. Benign essential HTN    10. Proteinuria due to type 2 diabetes mellitus        Plan:             CKD stage 3/4 with stable kidney function.  Continue RAAS blockade (valsartan) for renal preservation    HTN--controlled    MBD--check PTH     DM--improved but still controlled. Continue endocrine and PCP follow up    CAD--continue cardiology follow up      RTC 4 months with labs prior in [place]

## 2019-09-20 ENCOUNTER — PATIENT MESSAGE (OUTPATIENT)
Dept: FAMILY MEDICINE | Facility: CLINIC | Age: 74
End: 2019-09-20

## 2019-09-22 ENCOUNTER — PATIENT MESSAGE (OUTPATIENT)
Dept: CARDIOLOGY | Facility: CLINIC | Age: 74
End: 2019-09-22

## 2019-09-23 RX ORDER — TORSEMIDE 10 MG/1
30 TABLET ORAL DAILY
Qty: 270 TABLET | Refills: 10 | Status: SHIPPED | OUTPATIENT
Start: 2019-09-23 | End: 2020-09-22

## 2019-09-23 RX ORDER — TORSEMIDE 10 MG/1
30 TABLET ORAL DAILY
Qty: 90 TABLET | Refills: 0 | Status: SHIPPED | OUTPATIENT
Start: 2019-09-23 | End: 2019-10-11 | Stop reason: SDUPTHER

## 2019-09-24 ENCOUNTER — LAB VISIT (OUTPATIENT)
Dept: LAB | Facility: HOSPITAL | Age: 74
End: 2019-09-24
Attending: NURSE PRACTITIONER
Payer: MEDICARE

## 2019-09-24 ENCOUNTER — OFFICE VISIT (OUTPATIENT)
Dept: FAMILY MEDICINE | Facility: CLINIC | Age: 74
End: 2019-09-24
Payer: MEDICARE

## 2019-09-24 ENCOUNTER — TELEPHONE (OUTPATIENT)
Dept: FAMILY MEDICINE | Facility: CLINIC | Age: 74
End: 2019-09-24

## 2019-09-24 VITALS
WEIGHT: 208.81 LBS | DIASTOLIC BLOOD PRESSURE: 70 MMHG | HEART RATE: 81 BPM | BODY MASS INDEX: 32.77 KG/M2 | TEMPERATURE: 98 F | HEIGHT: 67 IN | SYSTOLIC BLOOD PRESSURE: 130 MMHG

## 2019-09-24 DIAGNOSIS — Z23 IMMUNIZATION DUE: ICD-10-CM

## 2019-09-24 DIAGNOSIS — R53.83 FATIGUE, UNSPECIFIED TYPE: ICD-10-CM

## 2019-09-24 DIAGNOSIS — E55.9 VITAMIN D DEFICIENCY: ICD-10-CM

## 2019-09-24 DIAGNOSIS — R53.83 FATIGUE, UNSPECIFIED TYPE: Primary | ICD-10-CM

## 2019-09-24 PROCEDURE — 99999 PR PBB SHADOW E&M-EST. PATIENT-LVL III: CPT | Mod: PBBFAC,HCNC,, | Performed by: NURSE PRACTITIONER

## 2019-09-24 PROCEDURE — 90662 FLU VACCINE - HIGH DOSE (65+) PRESERVATIVE FREE IM: ICD-10-PCS | Mod: HCNC,S$GLB,, | Performed by: NURSE PRACTITIONER

## 2019-09-24 PROCEDURE — 99214 PR OFFICE/OUTPT VISIT, EST, LEVL IV, 30-39 MIN: ICD-10-PCS | Mod: 25,HCNC,S$GLB, | Performed by: NURSE PRACTITIONER

## 2019-09-24 PROCEDURE — 99214 OFFICE O/P EST MOD 30 MIN: CPT | Mod: 25,HCNC,S$GLB, | Performed by: NURSE PRACTITIONER

## 2019-09-24 PROCEDURE — 36415 COLL VENOUS BLD VENIPUNCTURE: CPT | Mod: HCNC,PO

## 2019-09-24 PROCEDURE — 99999 PR PBB SHADOW E&M-EST. PATIENT-LVL III: ICD-10-PCS | Mod: PBBFAC,HCNC,, | Performed by: NURSE PRACTITIONER

## 2019-09-24 PROCEDURE — 1101F PT FALLS ASSESS-DOCD LE1/YR: CPT | Mod: HCNC,CPTII,S$GLB, | Performed by: NURSE PRACTITIONER

## 2019-09-24 PROCEDURE — 3075F SYST BP GE 130 - 139MM HG: CPT | Mod: HCNC,CPTII,S$GLB, | Performed by: NURSE PRACTITIONER

## 2019-09-24 PROCEDURE — G0008 ADMIN INFLUENZA VIRUS VAC: HCPCS | Mod: HCNC,S$GLB,, | Performed by: NURSE PRACTITIONER

## 2019-09-24 PROCEDURE — 90662 IIV NO PRSV INCREASED AG IM: CPT | Mod: HCNC,S$GLB,, | Performed by: NURSE PRACTITIONER

## 2019-09-24 PROCEDURE — G0008 FLU VACCINE - HIGH DOSE (65+) PRESERVATIVE FREE IM: ICD-10-PCS | Mod: HCNC,S$GLB,, | Performed by: NURSE PRACTITIONER

## 2019-09-24 PROCEDURE — 84443 ASSAY THYROID STIM HORMONE: CPT | Mod: HCNC

## 2019-09-24 PROCEDURE — 1101F PR PT FALLS ASSESS DOC 0-1 FALLS W/OUT INJ PAST YR: ICD-10-PCS | Mod: HCNC,CPTII,S$GLB, | Performed by: NURSE PRACTITIONER

## 2019-09-24 PROCEDURE — 3078F PR MOST RECENT DIASTOLIC BLOOD PRESSURE < 80 MM HG: ICD-10-PCS | Mod: HCNC,CPTII,S$GLB, | Performed by: NURSE PRACTITIONER

## 2019-09-24 PROCEDURE — 3075F PR MOST RECENT SYSTOLIC BLOOD PRESS GE 130-139MM HG: ICD-10-PCS | Mod: HCNC,CPTII,S$GLB, | Performed by: NURSE PRACTITIONER

## 2019-09-24 PROCEDURE — 3078F DIAST BP <80 MM HG: CPT | Mod: HCNC,CPTII,S$GLB, | Performed by: NURSE PRACTITIONER

## 2019-09-24 RX ORDER — VIT C/E/ZN/COPPR/LUTEIN/ZEAXAN 250MG-90MG
2000 CAPSULE ORAL DAILY
Refills: 0
Start: 2019-09-24

## 2019-09-24 NOTE — TELEPHONE ENCOUNTER
----- Message from Porsha Alcaraz sent at 9/24/2019  2:54 PM CDT -----  Contact: daughter-rolando pinto  Type:  Needs Medical Advice    Who Called: daughter  Symptoms (please be specific): n/a   How long has patient had these symptoms:n/a  Pharmacy name and phone #: n/a  Would the patient rather a call back or a response via [facility]? Call back  Best Call Back Number: [phone]  Additional Information: requesting call back regarding getting dads medication adjusted due to him sleeping all the time. Pt is currently at appt.    Thanks,  Porsha Alcaraz

## 2019-09-25 LAB — TSH SERPL DL<=0.005 MIU/L-ACNC: 1.82 UIU/ML (ref 0.4–4)

## 2019-09-26 ENCOUNTER — TELEPHONE (OUTPATIENT)
Dept: FAMILY MEDICINE | Facility: CLINIC | Age: 74
End: 2019-09-26

## 2019-09-26 NOTE — TELEPHONE ENCOUNTER
I m sorry but due to the patient's memory being off, I need for the patient to convey the name of the medicine he is wanting by getting the bottle he needs filled and conversing with you.  I assume that this is demadex but I don't want to take anything for granted.

## 2019-09-26 NOTE — TELEPHONE ENCOUNTER
----- Message from Angle Taylor sent at 9/26/2019  1:55 PM CDT -----  ..Type:  RX Refill Request    Who Called: self  Refill or New Rx:refill  RX Name and Strength:fluid pills (doesn't know name)  How is the patient currently taking it? (ex. 1XDay):1/day  Is this a 30 day or 90 day RX:90  Preferred Pharmacy with phone number:Alexi  [facility]  [place] - [address]  [address] [place] [address]  Phone: [phone] Fax: [phone]  Local or Mail Order:local  Ordering Provider:vince  Would the patient rather a call back or a response via [facility]? call  Best Call Back Number:.[phone] (home)   Additional Information:

## 2019-09-26 NOTE — TELEPHONE ENCOUNTER
Spoke with Daughter she got a RX for demadex from the cardiologist. States they got a month supply at local pharmacy and then a 90 day supply from the mail in pharmacy.

## 2019-09-26 NOTE — TELEPHONE ENCOUNTER
Pt is asking for a refill on Furosemide. States he doesn't have the bottle to look at but he needs a refill. I do not see this on his medication list unless im by passing it.  Please Advise.

## 2019-09-26 NOTE — TELEPHONE ENCOUNTER
That is why I am concerned. It is not on our list and he is asking for meds and I cannot be certain that this is appropriate. Please call his next of kin which is one of his children who is helping him with his care to confirm this request.

## 2019-09-27 NOTE — TELEPHONE ENCOUNTER
So I assume that he is NOT on the lasix that he was trying to get from me, correct?      Also, I am assuming that the cardiologist took care of the needs for demadex and he does not need anything from me right now.

## 2019-10-02 PROCEDURE — 93299 CARDIAC DEVICE CHECK - REMOTE: ICD-10-PCS | Mod: ,,, | Performed by: INTERNAL MEDICINE

## 2019-10-02 PROCEDURE — 93297 CARDIAC DEVICE CHECK - REMOTE: ICD-10-PCS | Mod: ,,, | Performed by: INTERNAL MEDICINE

## 2019-10-02 PROCEDURE — 93299 CARDIAC DEVICE CHECK - REMOTE: CPT | Mod: ,,, | Performed by: INTERNAL MEDICINE

## 2019-10-02 PROCEDURE — 93297 REM INTERROG DEV EVAL ICPMS: CPT | Mod: ,,, | Performed by: INTERNAL MEDICINE

## 2019-10-02 NOTE — PROGRESS NOTES
Subjective:       Patient ID: Abdullahi Salazar is a 73 y.o. male.    Chief Complaint: Follow-up    Fatigue   This is a chronic problem. The current episode started more than 1 month ago. The problem occurs daily. The problem has been waxing and waning. Associated symptoms include fatigue. Pertinent negatives include no abdominal pain, arthralgias, chest pain, coughing, fever, headaches, myalgias, nausea, rash, sore throat or vomiting. The symptoms are aggravated by exertion. He has tried rest for the symptoms. The treatment provided mild relief.    History of vitamin-D deficiency    He is due for flu vaccine    Review of Systems   Constitutional: Positive for fatigue. Negative for fever and unexpected weight change.   HENT: Negative for ear pain and sore throat.    Eyes: Negative.  Negative for pain and visual disturbance.   Respiratory: Negative for cough and shortness of breath.    Cardiovascular: Negative for chest pain and palpitations.   Gastrointestinal: Negative for abdominal pain, diarrhea, nausea and vomiting.   Genitourinary: Negative for dysuria and frequency.   Musculoskeletal: Negative for arthralgias and myalgias.   Skin: Negative for color change and rash.   Neurological: Negative for dizziness and headaches.   Psychiatric/Behavioral: Negative for sleep disturbance. The patient is not nervous/anxious.        Vitals:    09/24/19 1454   BP: 130/70   Pulse: 81   Temp: 97.5 °F (36.4 °C)       Objective:     Current Outpatient Medications   Medication Sig Dispense Refill    atorvastatin (LIPITOR) 40 MG tablet Take 1 tablet (40 mg total) by mouth once daily. 90 tablet 3    blood sugar diagnostic (ACCU-CHEK CLEOPATRA PLUS TEST STRP) Strp TEST BLOOD SUGARS 4 TIMES DAILY 150 strip PRN    carvedilol (COREG) 25 MG tablet Take 1 tablet (25 mg total) by mouth 2 (two) times daily with meals. 180 tablet 3    clopidogrel (PLAVIX) 75 mg tablet Take 1 tablet (75 mg total) by mouth once daily. 30 tablet 0    dabigatran  "etexilate (PRADAXA) 75 mg Cap Take 1 capsule (75 mg total) by mouth 2 (two) times daily. "Do NOT break, chew, or open capsules." 180 capsule 3    donepezil (ARICEPT) 10 MG tablet Take 1 tablet (10 mg total) by mouth every evening. 396 tablet 0    ezetimibe (ZETIA) 10 mg tablet TAKE 1 TABLET EVERY DAY 90 tablet 3    ferrous sulfate (FEOSOL) 325 mg (65 mg iron) Tab tablet TAKE 1 TABLET THREE TIMES DAILY 270 tablet 0    flash glucose scanning reader (FREESTYLE PAULIE 14 DAY READER) Misc 1 each by Misc.(Non-Drug; Combo Route) route once daily. 1 each 0    hydrALAZINE (APRESOLINE) 100 MG tablet Take 1 tablet (100 mg total) by mouth 3 (three) times daily. 270 tablet 3    insulin NPH-insulin regular, 70/30, (NOVOLIN 70/30) 100 unit/mL (70-30) injection 60  Units 30 min before breakfast,   40 units 30 min before supper.  Either Humalin, Novolin or Relion 9 vial 11    insulin syringe-needle U-100 1 mL 30 gauge x 5/16 Syrg Use with insulin twice a day 200 each 11    isosorbide mononitrate (IMDUR) 120 MG 24 hr tablet TAKE 1 TABLET (120 MG TOTAL) BY MOUTH ONCE DAILY. 60 tablet 11    lancets (ACCU-CHEK SOFTCLIX LANCETS) Misc 1 each by Misc.(Non-Drug; Combo Route) route 2 (two) times daily. 100 each 11    levETIRAcetam (KEPPRA) 500 MG Tab Take 1.5 tablets (750 mg total) by mouth 2 (two) times daily. 270 tablet 3    multivitamin capsule Take 1 capsule by mouth once daily.      nitroGLYCERIN (NITROSTAT) 0.4 MG SL tablet Place 1 tablet (0.4 mg total) under the tongue every 5 (five) minutes as needed for Chest pain. 100 tablet 11    pen needle, diabetic (BD ULTRA-FINE SHORT PEN NEEDLE) 31 gauge x 5/16" Ndle USE FOUR TIMES DAILY (Patient taking differently: USE TWO TIMES DAILY) 360 each 3    potassium chloride SA (K-DUR,KLOR-CON) 10 MEQ tablet TAKE 1 TABLET EVERY DAY 90 tablet 11    sertraline (ZOLOFT) 25 MG tablet Take 1 tablet (25 mg total) by mouth once daily. 90 tablet 3    tamsulosin (FLOMAX) 0.4 mg Cap TAKE 1 " CAPSULE EVERY DAY 90 capsule 3    torsemide (DEMADEX) 10 MG Tab Take 3 tablets (30 mg total) by mouth once daily. 270 tablet 10    valsartan (DIOVAN) 320 MG tablet One po QAM to replace benazepril 90 tablet 3    cholecalciferol, vitamin D3, (VITAMIN D3) 1,000 unit capsule Take 2 capsules (2,000 Units total) by mouth once daily.  0    flash glucose sensor (FREESTYLE PAULIE 14 DAY SENSOR) Kit 1 each by Misc.(Non-Drug; Combo Route) route once daily. 2 kit 11    torsemide (DEMADEX) 10 MG Tab Take 3 tablets (30 mg total) by mouth once daily. 90 tablet 0     No current facility-administered medications for this visit.        Physical Exam   Constitutional: He is oriented to person, place, and time. He appears well-developed. No distress.   HENT:   Head: Normocephalic and atraumatic.   Eyes: Pupils are equal, round, and reactive to light. EOM are normal.   Neck: Normal range of motion. Neck supple.   Cardiovascular: Normal rate and regular rhythm.   Pulmonary/Chest: Effort normal and breath sounds normal.   Musculoskeletal: Normal range of motion.   Neurological: He is alert and oriented to person, place, and time.   Skin: Skin is warm and dry. No rash noted.   Psychiatric: He has a normal mood and affect. Thought content normal.   Nursing note and vitals reviewed.      Assessment:       1. Fatigue, unspecified type    2. Vitamin D deficiency    3. Immunization due        Plan:   Fatigue, unspecified type  -     TSH; Future; Expected date: 09/24/2019    Vitamin D deficiency    Immunization due  -     Influenza - High Dose (65+) (PF) (IM)    Other orders  -     cholecalciferol, vitamin D3, (VITAMIN D3) 1,000 unit capsule; Take 2 capsules (2,000 Units total) by mouth once daily.; Refill: 0        No follow-ups on file.    There are no Patient Instructions on file for this visit.

## 2019-10-09 ENCOUNTER — LAB VISIT (OUTPATIENT)
Dept: LAB | Facility: HOSPITAL | Age: 74
End: 2019-10-09
Payer: MEDICARE

## 2019-10-09 DIAGNOSIS — Z79.4 TYPE 2 DIABETES MELLITUS WITH STAGE 3 CHRONIC KIDNEY DISEASE, WITH LONG-TERM CURRENT USE OF INSULIN: ICD-10-CM

## 2019-10-09 DIAGNOSIS — E11.22 TYPE 2 DIABETES MELLITUS WITH STAGE 3 CHRONIC KIDNEY DISEASE, WITH LONG-TERM CURRENT USE OF INSULIN: ICD-10-CM

## 2019-10-09 DIAGNOSIS — N18.30 TYPE 2 DIABETES MELLITUS WITH STAGE 3 CHRONIC KIDNEY DISEASE, WITH LONG-TERM CURRENT USE OF INSULIN: ICD-10-CM

## 2019-10-09 LAB
ESTIMATED AVG GLUCOSE: 169 MG/DL (ref 68–131)
HBA1C MFR BLD HPLC: 7.5 % (ref 4–5.6)

## 2019-10-09 PROCEDURE — 83036 HEMOGLOBIN GLYCOSYLATED A1C: CPT | Mod: HCNC

## 2019-10-09 PROCEDURE — 36415 COLL VENOUS BLD VENIPUNCTURE: CPT | Mod: HCNC,PO

## 2019-10-21 ENCOUNTER — PATIENT MESSAGE (OUTPATIENT)
Dept: FAMILY MEDICINE | Facility: CLINIC | Age: 74
End: 2019-10-21

## 2019-10-21 NOTE — LETTER
October 21, 2019                 [facility]  Family Medicine  [address] [place] [address]  Phone: [phone]  Fax: [phone]   October 21, 2019     Patient: Abdullahi Salazar   YOB: 1945   Date of Visit: 10/21/2019       To Whom it May Concern:    Abdullahi Salazar has been a patient of mine for many years.  I understand that he is not doing well at this time and that others will be needing to make medical decisions for him.  Please be advised that I also care for his wife and I am being asked to comment on her ability to make insightful decisions about this patient's care.  I do not feel that she is currently mentally in a position to make appropriate judgments about Mr. Fernando's health.  Because of this, I would recommend that a default to the next of kin would be made and recommend that a medical power of  be arranged to help him with his care.  This could be another family member.  Thank you and I hope that this is helpful to you in making decisions about making critical life decisions for this patient.    If you have any questions or concerns, please don't hesitate to call.    Sincerely,         Maximo Hernandez MD

## 2019-10-21 NOTE — TELEPHONE ENCOUNTER
My understanding is that a power of  has to be signed by an .  However your  may need corroboration that your mother is not in a position to make medical decisions for your father.  I agree with this and would be willing to speak with you're  who arranges this legal document for you.  I do not have a specific form to give you but I would be happy to also write a letter to that effect if it is needed.

## 2019-10-21 NOTE — TELEPHONE ENCOUNTER
I wrote a letter concerning his medical power of .  Please connect with the family member who requested it and get it to them.

## 2019-11-03 ENCOUNTER — PATIENT MESSAGE (OUTPATIENT)
Dept: FAMILY MEDICINE | Facility: CLINIC | Age: 74
End: 2019-11-03

## 2019-11-04 NOTE — TELEPHONE ENCOUNTER
Please call the patient's daughter who message to us through his my chart.  Let her know that I cannot message to a  person's my chart message in his system.  Let her know that we have listed him as  in the system which should notify all of his physicians.  It will notify physicians within [facility] but will not notify doctors outside of our system.    Please arrange for Ms. nabila Salazar to have her mammogram as her daughter has requested.  Also give her the telephone number for the [employer] so that they can get her into her [employer].

## 2022-05-24 NOTE — TELEPHONE ENCOUNTER
----- Message from Danisha Guaman sent at 3/19/2019 11:53 AM CDT -----  Please contact patient about appointment    Refills prepped for signature.

## 2023-03-07 NOTE — TELEPHONE ENCOUNTER
-- DO NOT REPLY / DO NOT REPLY ALL --  -- Message is from Engagement Center Operations (ECO) --    General Patient Message: Patient says he needs permission from doctor to get Jardiance and Entresto medication sent over to his house, please advice.     Caller Information       Type Contact Phone/Fax    03/07/2023 11:55 AM CST Phone (Incoming) Vikas Mejia (Self) [phone] (M)        Alternative phone number: no    Can a detailed message be left? Yes    Message Turnaround:     Is it Working Hours? Yes - Working Hours     [place]:    Please give this turnaround time to the caller:   \"This message will be sent to [state Provider's name]. The clinical team will fulfill your request as soon as they review your message.\"                 Left voicemail; trying to find out why they are calling.

## 2023-05-02 NOTE — TELEPHONE ENCOUNTER
Problem: Sleep Disturbance  Goal: Adequate Sleep/Rest  Outcome: Progressing   Goal Outcome Evaluation:  No acute events for the overnight. Patient appears to have slept for 7 hours. No requests for prn's. No concerns noted during routine safety checks.                          Returned pt daughter's call; scheduled consult appointment w Dr Boogie hayes dc from [facility] from early 2019; date/time/location confirmed. Verbalized understanding; appt added to waiting list; instructed daughter that medical records request needs to be completed through HIM at [facility]; verbalized understanding.

## 2023-07-21 NOTE — PROGRESS NOTES
Patient needs appointment.   Subjective:      Patient ID: Abdullahi Salazar is a 73 y.o. male.    Chief Complaint:   2 week glucose log review Routine f/u Type 2 Dm     History of Present Illness  CHIEF COMPLAINT:    Pt is a 73 y.o. wm with a diagnosis of Type 2 diabetes mellitus diagnosed approximately 2004, as well as chronic conditions pending review including HTN, HLP, CAD.     Interim Events: Pt recently seen after not seen in > 2 years.  REcent A1C was >14%.  States he just stopped taking his insulin. Doesn't know why.  He feels fine.  Had seen  Diabetes Ed yesterday and there was some concerns with memory.  He states he usually only occasionally misses insulin injections.  Brought  2 days of glucose readings.  Also reviewed neuro notes--pt with recurrent chronic SDH and now s/p shunt for NPH.   Since memory is clearly an issue opted to put on Conventional 70/30 BID vs basal bolus.  States his DM has never been so well controlled--He also did not bring glucose logs which was his only reason for coming today.  No c/o hypoglycemia. Reports SBGM BID.      Before--supper-170's        Eats breakfast from 8-10 am,   SOmetimes lunch, or snacks  Supper around 6        Diabetes Flow Sheet:   Diabetes Medications: Novolin 70/30  60 and 40 units bid.   Prior Regimens: Lantus 45 , Humalog 30-30-30  plus ss with meals  (but may scale dose back to 10 if glucose low or eating lgihgt) based on what he is eating.   Diabetes Complications:   Aspirin: yes   Statin: Zetia &  Livalo r/t myalgias. --states cannot tolerate statins r/t myalgias and whelchol causes too much abdominal discomfort.   ACE/ARB: yes   Last Urine Microalbumin: 12/13   Last Eye exam: Approx   Last Diabetic Education: 8/13.       PAST MEDICAL HISTORY/PAST SURGICAL HISTORY: Reviewed in [employer]   SOCIAL HISTORY: No T/A. Retired    FAMILY HISTORY: + DM 2. No known thyroid disease. Lung Cancer.   MEDICATIONS/ALLERGIES: The patient's MedCard has been updated and reviewed.        Review of Systems       Objective:   Physical Exam       Lab Review:     Chemistry        Component Value Date/Time     12/13/2018 1651    K 5.2 (H) 12/13/2018 1651     12/13/2018 1651    CO2 28 12/13/2018 1651    BUN 36 (H) 12/13/2018 1651    CREATININE 1.56 (H) 12/13/2018 1651     (H) 12/13/2018 1651        Component Value Date/Time    CALCIUM 9.1 12/13/2018 1651    ALKPHOS 87 12/13/2018 1651    AST 55 12/13/2018 1651    AST 47 02/15/2016 0453    ALT 38 12/13/2018 1651    BILITOT 1.1 12/13/2018 1651        Hemoglobin A1C   Date Value Ref Range Status   12/05/2018 13.0 (H) 0.0 - 5.6 % Final     Comment:     Reference Interval:  5.0 - 5.6 Normal   5.7 - 6.4 High Risk   > 6.5 Diabetic    Hgb A1c results are standardized based on the (NGSP) National   Glycohemoglobin Standardization Program.    Hemoglobin A1C levels are related to mean serum/plasma glucose   during the preceding 2-3 months.        11/14/2018 >14.0 (H) 4.0 - 5.6 % Final     Comment:     ADA Screening Guidelines:  5.7-6.4%  Consistent with prediabetes  >or=6.5%  Consistent with diabetes  High levels of fetal hemoglobin interfere with the HbA1C  assay. Heterozygous hemoglobin variants (HbS, HgC, etc)do  not significantly interfere with this assay.   However, presence of multiple variants may affect accuracy.     07/29/2018 11.6 (H) 0.0 - 5.6 % Final     Comment:     Reference Interval:  5.0 - 5.6 Normal   5.7 - 6.4 High Risk   > 6.5 Diabetic    Hgb A1c results are standardized based on the (NGSP) National   Glycohemoglobin Standardization Program.    Hemoglobin A1C levels are related to mean serum/plasma glucose   during the preceding 2-3 months.          Lab Results   Component Value Date    LDLCALC 62.0 (L) 12/06/2018     Lab Results   Component Value Date    TSH 0.816 06/20/2018         Assessment:     1. Type 2 diabetes mellitus with stage 3 chronic kidney disease, with long-term current use of insulin  Hemoglobin A1c   2.  Memory loss     3. Medication monitoring encounter             Plan:     _will switch to conventional therapy--pt does not need tight control.  ~8% reasonable       COntinue Novolin 70/30   60 and 40 bid.     Can drop off Log 12/28 when he has card appt.     ORDERS 12/18/2018    2 months with a1c prior in [place].
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
## 2023-11-07 NOTE — TELEPHONE ENCOUNTER
"    Reason for Disposition   [1] Vomiting AND [2] contains red blood or black ("coffee ground") material  (Exception: few red streaks in vomit that only happened once)    Answer Assessment - Initial Assessment Questions  1. VOMITING SEVERITY: "How many times have you vomited in the past 24 hours?"      - MILD:  1 - 2 times/day     - MODERATE: 3 - 5 times/day, decreased oral intake without significant weight loss or symptoms of dehydration     - SEVERE: 6 or more times/day, vomits everything or nearly everything, with significant weight loss, symptoms of dehydration     severe  2. ONSET: "When did the vomiting begin?"     2 days   3. FLUIDS: "What fluids or food have you vomited up today?" "Have you been able to keep any fluids down?"    16 ounces of water  4. ABDOMINAL PAIN: "Are your having any abdominal pain?" If yes : "How bad is it and what does it feel like?" (e.g., crampy, dull, intermittent, constant)     no  5. DIARRHEA: "Is there any diarrhea?" If so, ask: "How many times today?"     no  6. CONTACTS: "Is there anyone else in the family with the same symptoms?"      no  7. CAUSE: "What do you think is causing your vomiting?"      Not sure  8. HYDRATION STATUS: "Any signs of dehydration?" (e.g., dry mouth [not only dry lips], too weak to stand) "When did you last urinate?"    no  9. OTHER SYMPTOMS: "Do you have any other symptoms?" (e.g., fever, headache, vertigo, vomiting blood or coffee grounds)     Headache and coffee grounds  10. PREGNANCY: "Is there any chance you are pregnant?" "When was your last menstrual period?"     n/a    Protocols used: ST VOMITING-A-      "
independent

## 2025-03-31 NOTE — HPI
Mr Salazar is a 71 yo man with medical history significant for HTN, DLPD, CAD s/p LAD PCI, NPH s/p  shunt, TIA in April 2018 with symptoms consistent of left facial paralysis and drooling with motor weakness who is referred by Dr Rodriguez for evaluation of symptomatic severe R ICA stenosis.   Patient had a TIA event 4 months ago and was evaluated at [facility]. This prompted CTA of the neck which showed extensive atherosclerosis of the carotid arch including > 70% stenosis of the R ICA.   Patient currently reports no symptoms and reports to be doing well. Denies any current neurological events.        Melena x 1 day w/ severe epigastric pain. BUN 90, disproportionally elevated in setting of UGIB,   EGD (3/28) w/ nonbleeding gastric ulcers (cynthia class III), moderate gastritis, non-bleeding duodenal ulcer (cynthia class IIc)     - GI consulted, f/u recs  - IV PPI BID  - 2 large bore IVs  - NPO for possible EGD   - transfuse for Hgb >7  - maintain active T&S  - multimodal pain control: tylenol for mild, IV dilaudid 0.2 q4h  for severe pain Melena x 1 day w/ severe epigastric pain. BUN 90, disproportionally elevated in setting of UGIB,   EGD (3/28) w/ nonbleeding gastric ulcers (cynthia class III), moderate gastritis, non-bleeding duodenal ulcer (cynthia class IIc)     - GI consulted will plan for EGD today  - IV PPI BID  - 2 large bore IVs  - NPO for possible EGD   - transfuse for Hgb >7  - maintain active T&S  - multimodal pain control: tylenol for mild, IV dilaudid 0.2 q4h  for severe pain